# Patient Record
Sex: FEMALE | Race: WHITE | Employment: OTHER | URBAN - METROPOLITAN AREA
[De-identification: names, ages, dates, MRNs, and addresses within clinical notes are randomized per-mention and may not be internally consistent; named-entity substitution may affect disease eponyms.]

---

## 2017-08-13 ENCOUNTER — APPOINTMENT (OUTPATIENT)
Dept: GENERAL RADIOLOGY | Age: 67
End: 2017-08-13
Payer: MEDICARE

## 2017-08-13 ENCOUNTER — HOSPITAL ENCOUNTER (EMERGENCY)
Age: 67
Discharge: HOME | End: 2017-08-14
Attending: EMERGENCY MEDICINE
Payer: MEDICARE

## 2017-08-13 VITALS
RESPIRATION RATE: 16 BRPM | WEIGHT: 260 LBS | HEIGHT: 60 IN | TEMPERATURE: 99.6 F | HEART RATE: 82 BPM | SYSTOLIC BLOOD PRESSURE: 130 MMHG | BODY MASS INDEX: 51.04 KG/M2 | DIASTOLIC BLOOD PRESSURE: 46 MMHG | OXYGEN SATURATION: 96 %

## 2017-08-13 DIAGNOSIS — E86.0 DEHYDRATION: Primary | ICD-10-CM

## 2017-08-13 DIAGNOSIS — R31.9 URINARY TRACT INFECTION WITH HEMATURIA, SITE UNSPECIFIED: ICD-10-CM

## 2017-08-13 DIAGNOSIS — N39.0 URINARY TRACT INFECTION WITH HEMATURIA, SITE UNSPECIFIED: ICD-10-CM

## 2017-08-13 LAB
-: ABNORMAL
ABSOLUTE EOS #: 0 K/UL (ref 0–0.4)
ABSOLUTE LYMPH #: 0.5 K/UL (ref 1–4.8)
ABSOLUTE MONO #: 0.8 K/UL (ref 0.1–1.2)
ALBUMIN SERPL-MCNC: 3.5 G/DL (ref 3.5–5.2)
ALBUMIN/GLOBULIN RATIO: 1 (ref 1–2.5)
ALP BLD-CCNC: 48 U/L (ref 35–104)
ALT SERPL-CCNC: 7 U/L (ref 5–33)
AMORPHOUS: ABNORMAL
ANION GAP SERPL CALCULATED.3IONS-SCNC: 20 MMOL/L (ref 9–17)
AST SERPL-CCNC: 7 U/L
BACTERIA: ABNORMAL
BASOPHILS # BLD: 0 %
BASOPHILS ABSOLUTE: 0 K/UL (ref 0–0.2)
BILIRUB SERPL-MCNC: 0.43 MG/DL (ref 0.3–1.2)
BILIRUBIN URINE: NEGATIVE
BUN BLDV-MCNC: 85 MG/DL (ref 8–23)
BUN/CREAT BLD: ABNORMAL (ref 9–20)
CALCIUM SERPL-MCNC: 8.8 MG/DL (ref 8.6–10.4)
CASTS UA: ABNORMAL /LPF
CHLORIDE BLD-SCNC: 95 MMOL/L (ref 98–107)
CO2: 19 MMOL/L (ref 20–31)
COLOR: YELLOW
COMMENT UA: ABNORMAL
CREAT SERPL-MCNC: 2.07 MG/DL (ref 0.5–0.9)
CRYSTALS, UA: ABNORMAL /HPF
DIFFERENTIAL TYPE: ABNORMAL
EOSINOPHILS RELATIVE PERCENT: 0 %
EPITHELIAL CELLS UA: ABNORMAL /HPF (ref 0–5)
GFR AFRICAN AMERICAN: 29 ML/MIN
GFR NON-AFRICAN AMERICAN: 24 ML/MIN
GFR SERPL CREATININE-BSD FRML MDRD: ABNORMAL ML/MIN/{1.73_M2}
GFR SERPL CREATININE-BSD FRML MDRD: ABNORMAL ML/MIN/{1.73_M2}
GLUCOSE BLD-MCNC: 196 MG/DL (ref 70–99)
GLUCOSE BLD-MCNC: 199 MG/DL (ref 65–105)
GLUCOSE URINE: NEGATIVE
HCT VFR BLD CALC: 29.1 % (ref 36–46)
HEMOGLOBIN: 9.8 G/DL (ref 12–16)
KETONES, URINE: NEGATIVE
LEUKOCYTE ESTERASE, URINE: ABNORMAL
LYMPHOCYTES # BLD: 5 %
MCH RBC QN AUTO: 28.5 PG (ref 26–34)
MCHC RBC AUTO-ENTMCNC: 33.6 G/DL (ref 31–37)
MCV RBC AUTO: 85 FL (ref 80–100)
MONOCYTES # BLD: 8 %
MUCUS: ABNORMAL
NITRITE, URINE: NEGATIVE
OTHER OBSERVATIONS UA: ABNORMAL
PDW BLD-RTO: 14.1 % (ref 12.5–15.4)
PH UA: 5 (ref 5–8)
PLATELET # BLD: 263 K/UL (ref 140–450)
PLATELET ESTIMATE: ABNORMAL
PMV BLD AUTO: 8.1 FL (ref 6–12)
POTASSIUM SERPL-SCNC: 3.5 MMOL/L (ref 3.7–5.3)
PROTEIN UA: ABNORMAL
RBC # BLD: 3.42 M/UL (ref 4–5.2)
RBC # BLD: ABNORMAL 10*6/UL
RBC UA: ABNORMAL /HPF (ref 0–2)
RENAL EPITHELIAL, UA: ABNORMAL /HPF
SEG NEUTROPHILS: 87 %
SEGMENTED NEUTROPHILS ABSOLUTE COUNT: 9 K/UL (ref 1.8–7.7)
SODIUM BLD-SCNC: 134 MMOL/L (ref 135–144)
SPECIFIC GRAVITY UA: 1.02 (ref 1–1.03)
TOTAL PROTEIN: 6.9 G/DL (ref 6.4–8.3)
TRICHOMONAS: ABNORMAL
TURBIDITY: ABNORMAL
URINE HGB: ABNORMAL
UROBILINOGEN, URINE: NORMAL
WBC # BLD: 10.4 K/UL (ref 3.5–11)
WBC # BLD: ABNORMAL 10*3/UL
WBC UA: ABNORMAL /HPF (ref 0–5)
YEAST: ABNORMAL

## 2017-08-13 PROCEDURE — 87077 CULTURE AEROBIC IDENTIFY: CPT

## 2017-08-13 PROCEDURE — 87186 SC STD MICRODIL/AGAR DIL: CPT

## 2017-08-13 PROCEDURE — 85025 COMPLETE CBC W/AUTO DIFF WBC: CPT

## 2017-08-13 PROCEDURE — 74022 RADEX COMPL AQT ABD SERIES: CPT

## 2017-08-13 PROCEDURE — 81001 URINALYSIS AUTO W/SCOPE: CPT

## 2017-08-13 PROCEDURE — 2580000003 HC RX 258: Performed by: EMERGENCY MEDICINE

## 2017-08-13 PROCEDURE — 6360000002 HC RX W HCPCS: Performed by: EMERGENCY MEDICINE

## 2017-08-13 PROCEDURE — 36415 COLL VENOUS BLD VENIPUNCTURE: CPT

## 2017-08-13 PROCEDURE — 82947 ASSAY GLUCOSE BLOOD QUANT: CPT

## 2017-08-13 PROCEDURE — 87086 URINE CULTURE/COLONY COUNT: CPT

## 2017-08-13 PROCEDURE — 99283 EMERGENCY DEPT VISIT LOW MDM: CPT

## 2017-08-13 PROCEDURE — 96365 THER/PROPH/DIAG IV INF INIT: CPT

## 2017-08-13 PROCEDURE — 80053 COMPREHEN METABOLIC PANEL: CPT

## 2017-08-13 RX ORDER — LOSARTAN POTASSIUM AND HYDROCHLOROTHIAZIDE 25; 100 MG/1; MG/1
1 TABLET ORAL DAILY
Status: ON HOLD | COMMUNITY
End: 2017-10-17 | Stop reason: HOSPADM

## 2017-08-13 RX ORDER — FAMOTIDINE 40 MG/1
40 TABLET, FILM COATED ORAL DAILY
Status: ON HOLD | COMMUNITY
End: 2017-11-09 | Stop reason: HOSPADM

## 2017-08-13 RX ORDER — 0.9 % SODIUM CHLORIDE 0.9 %
500 INTRAVENOUS SOLUTION INTRAVENOUS ONCE
Status: COMPLETED | OUTPATIENT
Start: 2017-08-13 | End: 2017-08-14

## 2017-08-13 RX ORDER — ATORVASTATIN CALCIUM 40 MG/1
40 TABLET, FILM COATED ORAL DAILY
Status: ON HOLD | COMMUNITY
End: 2017-11-09 | Stop reason: HOSPADM

## 2017-08-13 RX ORDER — GLIPIZIDE 10 MG/1
10 TABLET ORAL
Status: ON HOLD | COMMUNITY
End: 2017-10-17 | Stop reason: HOSPADM

## 2017-08-13 RX ORDER — AMLODIPINE BESYLATE 5 MG/1
5 TABLET ORAL DAILY
Status: ON HOLD | COMMUNITY
End: 2017-10-17 | Stop reason: HOSPADM

## 2017-08-13 RX ORDER — CARVEDILOL 6.25 MG/1
6.25 TABLET ORAL 2 TIMES DAILY WITH MEALS
Status: ON HOLD | COMMUNITY
End: 2017-10-17 | Stop reason: HOSPADM

## 2017-08-13 RX ORDER — PAROXETINE 10 MG/1
10 TABLET, FILM COATED ORAL EVERY MORNING
Status: ON HOLD | COMMUNITY
End: 2017-11-09 | Stop reason: HOSPADM

## 2017-08-13 RX ORDER — PHENOL 1.4 %
1 AEROSOL, SPRAY (ML) MUCOUS MEMBRANE DAILY
Status: ON HOLD | COMMUNITY
End: 2017-11-09 | Stop reason: HOSPADM

## 2017-08-13 RX ORDER — FUROSEMIDE 40 MG/1
40 TABLET ORAL 2 TIMES DAILY
Status: ON HOLD | COMMUNITY
End: 2017-10-17

## 2017-08-13 RX ORDER — PANTOPRAZOLE SODIUM 40 MG/1
40 TABLET, DELAYED RELEASE ORAL DAILY
Status: ON HOLD | COMMUNITY
End: 2017-11-09 | Stop reason: HOSPADM

## 2017-08-13 RX ADMIN — SODIUM CHLORIDE 500 ML: 9 INJECTION, SOLUTION INTRAVENOUS at 23:46

## 2017-08-13 RX ADMIN — CEFTRIAXONE SODIUM 1 G: 1 INJECTION, POWDER, FOR SOLUTION INTRAMUSCULAR; INTRAVENOUS at 23:46

## 2017-08-14 PROCEDURE — 96361 HYDRATE IV INFUSION ADD-ON: CPT

## 2017-08-14 RX ORDER — SULFAMETHOXAZOLE AND TRIMETHOPRIM 800; 160 MG/1; MG/1
1 TABLET ORAL 2 TIMES DAILY
Qty: 20 TABLET | Refills: 0 | Status: SHIPPED | OUTPATIENT
Start: 2017-08-14 | End: 2017-08-24

## 2017-08-14 RX ORDER — ONDANSETRON 4 MG/1
4 TABLET, ORALLY DISINTEGRATING ORAL EVERY 8 HOURS PRN
Qty: 20 TABLET | Refills: 0 | Status: ON HOLD | OUTPATIENT
Start: 2017-08-14 | End: 2017-11-09 | Stop reason: HOSPADM

## 2017-08-14 ASSESSMENT — ENCOUNTER SYMPTOMS
DIARRHEA: 0
EYE PAIN: 0
COLOR CHANGE: 0
ABDOMINAL PAIN: 1
SHORTNESS OF BREATH: 0
SORE THROAT: 0
CONSTIPATION: 0
EYE DISCHARGE: 0
BACK PAIN: 0
CHEST TIGHTNESS: 0
WHEEZING: 0
RHINORRHEA: 0
NAUSEA: 1
COUGH: 0
EYE REDNESS: 0

## 2017-08-15 LAB
CULTURE: ABNORMAL
CULTURE: ABNORMAL
Lab: ABNORMAL
ORGANISM: ABNORMAL
SPECIMEN DESCRIPTION: ABNORMAL
SPECIMEN DESCRIPTION: ABNORMAL
STATUS: ABNORMAL

## 2017-08-17 ENCOUNTER — HOSPITAL ENCOUNTER (OUTPATIENT)
Age: 67
Discharge: HOME | End: 2017-08-17
Payer: MEDICARE

## 2017-08-17 LAB
ALBUMIN SERPL-MCNC: 3.7 G/DL (ref 3.5–5.2)
ALBUMIN/GLOBULIN RATIO: 1.2 (ref 1–2.5)
ALP BLD-CCNC: 51 U/L (ref 35–104)
ALT SERPL-CCNC: 13 U/L (ref 5–33)
ANION GAP SERPL CALCULATED.3IONS-SCNC: 21 MMOL/L (ref 9–17)
AST SERPL-CCNC: 16 U/L
BILIRUB SERPL-MCNC: 0.15 MG/DL (ref 0.3–1.2)
BUN BLDV-MCNC: 84 MG/DL (ref 8–23)
BUN/CREAT BLD: ABNORMAL (ref 9–20)
CALCIUM SERPL-MCNC: 9.4 MG/DL (ref 8.6–10.4)
CHLORIDE BLD-SCNC: 98 MMOL/L (ref 98–107)
CO2: 18 MMOL/L (ref 20–31)
CREAT SERPL-MCNC: 2.32 MG/DL (ref 0.5–0.9)
GFR AFRICAN AMERICAN: 25 ML/MIN
GFR NON-AFRICAN AMERICAN: 21 ML/MIN
GFR SERPL CREATININE-BSD FRML MDRD: ABNORMAL ML/MIN/{1.73_M2}
GFR SERPL CREATININE-BSD FRML MDRD: ABNORMAL ML/MIN/{1.73_M2}
GLUCOSE BLD-MCNC: 98 MG/DL (ref 70–99)
HCT VFR BLD CALC: 28.9 % (ref 36–46)
HEMOGLOBIN: 9.8 G/DL (ref 12–16)
POTASSIUM SERPL-SCNC: 4.2 MMOL/L (ref 3.7–5.3)
SODIUM BLD-SCNC: 137 MMOL/L (ref 135–144)
TOTAL PROTEIN: 6.9 G/DL (ref 6.4–8.3)
TSH SERPL DL<=0.05 MIU/L-ACNC: 1.22 MIU/L (ref 0.3–5)
VITAMIN B-12: 289 PG/ML (ref 211–946)

## 2017-08-17 PROCEDURE — 80053 COMPREHEN METABOLIC PANEL: CPT

## 2017-08-17 PROCEDURE — 82607 VITAMIN B-12: CPT

## 2017-08-17 PROCEDURE — 36415 COLL VENOUS BLD VENIPUNCTURE: CPT

## 2017-08-17 PROCEDURE — 85014 HEMATOCRIT: CPT

## 2017-08-17 PROCEDURE — 84443 ASSAY THYROID STIM HORMONE: CPT

## 2017-08-17 PROCEDURE — 85018 HEMOGLOBIN: CPT

## 2017-10-09 ENCOUNTER — HOSPITAL ENCOUNTER (INPATIENT)
Age: 67
LOS: 7 days | Discharge: NURSING, REHAB OR LONG-TERM CARE | DRG: 853 | End: 2017-10-17
Attending: EMERGENCY MEDICINE | Admitting: INTERNAL MEDICINE
Payer: MEDICARE

## 2017-10-09 DIAGNOSIS — N30.01 ACUTE CYSTITIS WITH HEMATURIA: ICD-10-CM

## 2017-10-09 DIAGNOSIS — R11.2 NON-INTRACTABLE VOMITING WITH NAUSEA, UNSPECIFIED VOMITING TYPE: Primary | ICD-10-CM

## 2017-10-09 DIAGNOSIS — N20.0 KIDNEY STONE: ICD-10-CM

## 2017-10-09 PROCEDURE — 80048 BASIC METABOLIC PNL TOTAL CA: CPT

## 2017-10-09 PROCEDURE — 80076 HEPATIC FUNCTION PANEL: CPT

## 2017-10-09 PROCEDURE — 82150 ASSAY OF AMYLASE: CPT

## 2017-10-09 PROCEDURE — 93005 ELECTROCARDIOGRAM TRACING: CPT

## 2017-10-09 PROCEDURE — 36415 COLL VENOUS BLD VENIPUNCTURE: CPT

## 2017-10-09 PROCEDURE — 99285 EMERGENCY DEPT VISIT HI MDM: CPT

## 2017-10-09 PROCEDURE — 85025 COMPLETE CBC W/AUTO DIFF WBC: CPT

## 2017-10-09 PROCEDURE — 2500000003 HC RX 250 WO HCPCS

## 2017-10-09 PROCEDURE — 83690 ASSAY OF LIPASE: CPT

## 2017-10-09 PROCEDURE — 84484 ASSAY OF TROPONIN QUANT: CPT

## 2017-10-09 PROCEDURE — 6360000002 HC RX W HCPCS

## 2017-10-09 RX ORDER — 0.9 % SODIUM CHLORIDE 0.9 %
250 INTRAVENOUS SOLUTION INTRAVENOUS ONCE
Status: COMPLETED | OUTPATIENT
Start: 2017-10-10 | End: 2017-10-10

## 2017-10-09 RX ORDER — 0.9 % SODIUM CHLORIDE 0.9 %
1000 INTRAVENOUS SOLUTION INTRAVENOUS ONCE
Status: DISCONTINUED | OUTPATIENT
Start: 2017-10-10 | End: 2017-10-09

## 2017-10-09 RX ORDER — ONDANSETRON 2 MG/ML
4 INJECTION INTRAMUSCULAR; INTRAVENOUS ONCE
Status: COMPLETED | OUTPATIENT
Start: 2017-10-10 | End: 2017-10-10

## 2017-10-09 RX ORDER — M-VIT,TX,IRON,MINS/CALC/FOLIC 27MG-0.4MG
1 TABLET ORAL DAILY
COMMUNITY

## 2017-10-09 ASSESSMENT — PAIN DESCRIPTION - LOCATION: LOCATION: BACK

## 2017-10-09 ASSESSMENT — PAIN SCALES - GENERAL: PAINLEVEL_OUTOF10: 5

## 2017-10-09 ASSESSMENT — PAIN DESCRIPTION - PAIN TYPE: TYPE: ACUTE PAIN

## 2017-10-10 ENCOUNTER — ANESTHESIA (OUTPATIENT)
Dept: OPERATING ROOM | Age: 67
DRG: 853 | End: 2017-10-10
Payer: MEDICARE

## 2017-10-10 ENCOUNTER — APPOINTMENT (OUTPATIENT)
Dept: GENERAL RADIOLOGY | Age: 67
DRG: 853 | End: 2017-10-10
Attending: PSYCHIATRY & NEUROLOGY
Payer: MEDICARE

## 2017-10-10 ENCOUNTER — APPOINTMENT (OUTPATIENT)
Dept: CT IMAGING | Age: 67
DRG: 853 | End: 2017-10-10
Attending: PSYCHIATRY & NEUROLOGY
Payer: MEDICARE

## 2017-10-10 ENCOUNTER — APPOINTMENT (OUTPATIENT)
Dept: MRI IMAGING | Age: 67
DRG: 853 | End: 2017-10-10
Payer: MEDICARE

## 2017-10-10 ENCOUNTER — APPOINTMENT (OUTPATIENT)
Dept: MRI IMAGING | Age: 67
DRG: 853 | End: 2017-10-10
Attending: PSYCHIATRY & NEUROLOGY
Payer: MEDICARE

## 2017-10-10 ENCOUNTER — ANESTHESIA EVENT (OUTPATIENT)
Dept: OPERATING ROOM | Age: 67
DRG: 853 | End: 2017-10-10
Payer: MEDICARE

## 2017-10-10 VITALS — SYSTOLIC BLOOD PRESSURE: 109 MMHG | DIASTOLIC BLOOD PRESSURE: 53 MMHG | OXYGEN SATURATION: 97 %

## 2017-10-10 PROBLEM — N13.30 HYDRONEPHROSIS OF LEFT KIDNEY: Status: ACTIVE | Noted: 2017-10-10

## 2017-10-10 PROBLEM — I95.9 HYPOTENSION ARTERIAL: Status: ACTIVE | Noted: 2017-10-10

## 2017-10-10 PROBLEM — I10 ESSENTIAL HYPERTENSION: Status: ACTIVE | Noted: 2017-10-10

## 2017-10-10 PROBLEM — I48.0 PAROXYSMAL ATRIAL FIBRILLATION (HCC): Status: ACTIVE | Noted: 2017-10-10

## 2017-10-10 PROBLEM — N18.30 CKD (CHRONIC KIDNEY DISEASE), STAGE III (HCC): Status: ACTIVE | Noted: 2017-10-10

## 2017-10-10 PROBLEM — Z86.711 HISTORY OF PULMONARY EMBOLUS (PE): Status: ACTIVE | Noted: 2017-10-10

## 2017-10-10 PROBLEM — E66.01 MORBID OBESITY WITH BMI OF 40.0-44.9, ADULT (HCC): Status: ACTIVE | Noted: 2017-10-10

## 2017-10-10 PROBLEM — N30.01 ACUTE CYSTITIS WITH HEMATURIA: Status: ACTIVE | Noted: 2017-10-10

## 2017-10-10 PROBLEM — Z86.73 HISTORY OF ARTERIAL ISCHEMIC STROKE: Status: ACTIVE | Noted: 2017-10-10

## 2017-10-10 PROBLEM — N20.1 LEFT URETERAL STONE: Status: ACTIVE | Noted: 2017-10-10

## 2017-10-10 LAB
-: ABNORMAL
ABSOLUTE EOS #: 0 K/UL (ref 0–0.4)
ABSOLUTE EOS #: 0.2 K/UL (ref 0–0.4)
ABSOLUTE LYMPH #: 0.55 K/UL (ref 1–4.8)
ABSOLUTE LYMPH #: 0.7 K/UL (ref 1–4.8)
ABSOLUTE MONO #: 0.8 K/UL (ref 0.1–1.2)
ABSOLUTE MONO #: 0.83 K/UL (ref 0.1–0.8)
ALBUMIN SERPL-MCNC: 2.8 G/DL (ref 3.5–5.2)
ALBUMIN SERPL-MCNC: 3.8 G/DL (ref 3.5–5.2)
ALBUMIN/GLOBULIN RATIO: 1.1 (ref 1–2.5)
ALBUMIN/GLOBULIN RATIO: 1.4 (ref 1–2.5)
ALLEN TEST: ABNORMAL
ALP BLD-CCNC: 38 U/L (ref 35–104)
ALP BLD-CCNC: 55 U/L (ref 35–104)
ALT SERPL-CCNC: 10 U/L (ref 5–33)
ALT SERPL-CCNC: 7 U/L (ref 5–33)
AMORPHOUS: ABNORMAL
AMYLASE: 70 U/L (ref 28–100)
ANION GAP SERPL CALCULATED.3IONS-SCNC: 15 MMOL/L (ref 9–17)
ANION GAP SERPL CALCULATED.3IONS-SCNC: 20 MMOL/L (ref 9–17)
AST SERPL-CCNC: 15 U/L
AST SERPL-CCNC: 19 U/L
BACTERIA: ABNORMAL
BASOPHILS # BLD: 0 %
BASOPHILS # BLD: 0 %
BASOPHILS ABSOLUTE: 0 K/UL (ref 0–0.2)
BASOPHILS ABSOLUTE: 0 K/UL (ref 0–0.2)
BILIRUB SERPL-MCNC: 0.28 MG/DL (ref 0.3–1.2)
BILIRUB SERPL-MCNC: 0.32 MG/DL (ref 0.3–1.2)
BILIRUBIN DIRECT: 0.11 MG/DL
BILIRUBIN URINE: NEGATIVE
BILIRUBIN, INDIRECT: 0.21 MG/DL (ref 0–1)
BUN BLDV-MCNC: 47 MG/DL (ref 8–23)
BUN BLDV-MCNC: 64 MG/DL (ref 8–23)
BUN/CREAT BLD: ABNORMAL (ref 9–20)
BUN/CREAT BLD: ABNORMAL (ref 9–20)
CALCIUM SERPL-MCNC: 7.7 MG/DL (ref 8.6–10.4)
CALCIUM SERPL-MCNC: 8.8 MG/DL (ref 8.6–10.4)
CASTS UA: ABNORMAL /LPF
CHLORIDE BLD-SCNC: 103 MMOL/L (ref 98–107)
CHLORIDE BLD-SCNC: 98 MMOL/L (ref 98–107)
CHOLESTEROL/HDL RATIO: 1.9
CHOLESTEROL: 73 MG/DL
CO2: 18 MMOL/L (ref 20–31)
CO2: 20 MMOL/L (ref 20–31)
COLOR: YELLOW
COMMENT UA: ABNORMAL
CREAT SERPL-MCNC: 1.55 MG/DL (ref 0.5–0.9)
CREAT SERPL-MCNC: 1.74 MG/DL (ref 0.5–0.9)
CRYSTALS, UA: ABNORMAL /HPF
DIFFERENTIAL TYPE: ABNORMAL
DIFFERENTIAL TYPE: ABNORMAL
EOSINOPHILS RELATIVE PERCENT: 0 %
EOSINOPHILS RELATIVE PERCENT: 2 %
EPITHELIAL CELLS UA: ABNORMAL /HPF (ref 0–5)
FIO2: 2
GFR AFRICAN AMERICAN: 35 ML/MIN
GFR AFRICAN AMERICAN: 40 ML/MIN
GFR NON-AFRICAN AMERICAN: 29 ML/MIN
GFR NON-AFRICAN AMERICAN: 33 ML/MIN
GFR SERPL CREATININE-BSD FRML MDRD: ABNORMAL ML/MIN/{1.73_M2}
GLOBULIN: NORMAL G/DL (ref 1.5–3.8)
GLUCOSE BLD-MCNC: 122 MG/DL (ref 65–105)
GLUCOSE BLD-MCNC: 183 MG/DL (ref 65–105)
GLUCOSE BLD-MCNC: 190 MG/DL (ref 70–99)
GLUCOSE BLD-MCNC: 199 MG/DL (ref 70–99)
GLUCOSE BLD-MCNC: 202 MG/DL (ref 65–105)
GLUCOSE BLD-MCNC: 225 MG/DL (ref 65–105)
GLUCOSE URINE: NEGATIVE
HCT VFR BLD CALC: 25.7 % (ref 36–46)
HCT VFR BLD CALC: 28.7 % (ref 36–46)
HDLC SERPL-MCNC: 39 MG/DL
HEMOGLOBIN: 8.4 G/DL (ref 12–16)
HEMOGLOBIN: 8.9 G/DL (ref 12–16)
KETONES, URINE: NEGATIVE
LACTIC ACID, WHOLE BLOOD: 1.2 MMOL/L (ref 0.7–2.1)
LACTIC ACID: 1.1 MMOL/L (ref 0.5–2.2)
LDL CHOLESTEROL: 21 MG/DL (ref 0–130)
LEUKOCYTE ESTERASE, URINE: ABNORMAL
LIPASE: 70 U/L (ref 13–60)
LYMPHOCYTES # BLD: 2 %
LYMPHOCYTES # BLD: 5 %
MCH RBC QN AUTO: 28.3 PG (ref 26–34)
MCH RBC QN AUTO: 29 PG (ref 26–34)
MCHC RBC AUTO-ENTMCNC: 31.1 G/DL (ref 31–37)
MCHC RBC AUTO-ENTMCNC: 32.7 G/DL (ref 31–37)
MCV RBC AUTO: 88.9 FL (ref 80–100)
MCV RBC AUTO: 90.8 FL (ref 80–100)
MODE: ABNORMAL
MONOCYTES # BLD: 3 %
MONOCYTES # BLD: 5 %
MORPHOLOGY: ABNORMAL
MUCUS: ABNORMAL
NEGATIVE BASE EXCESS, ART: 2 (ref 0–2)
NITRITE, URINE: NEGATIVE
O2 DEVICE/FLOW/%: ABNORMAL
OTHER OBSERVATIONS UA: ABNORMAL
PATIENT TEMP: ABNORMAL
PDW BLD-RTO: 17.4 % (ref 12.5–15.4)
PDW BLD-RTO: 18.6 % (ref 12.5–15.4)
PH UA: 5 (ref 5–8)
PLATELET # BLD: 266 K/UL (ref 140–450)
PLATELET # BLD: 351 K/UL (ref 140–450)
PLATELET ESTIMATE: ABNORMAL
PLATELET ESTIMATE: ABNORMAL
PMV BLD AUTO: 8 FL (ref 6–12)
PMV BLD AUTO: 8.1 FL (ref 6–12)
POC HCO3: 22.5 MMOL/L (ref 21–28)
POC O2 SATURATION: 95 % (ref 94–98)
POC PCO2 TEMP: ABNORMAL MM HG
POC PCO2: 36.4 MM HG (ref 35–48)
POC PH TEMP: ABNORMAL
POC PH: 7.4 (ref 7.35–7.45)
POC PO2 TEMP: ABNORMAL MM HG
POC PO2: 74 MM HG (ref 83–108)
POSITIVE BASE EXCESS, ART: ABNORMAL (ref 0–3)
POTASSIUM SERPL-SCNC: 3.3 MMOL/L (ref 3.7–5.3)
POTASSIUM SERPL-SCNC: 4.2 MMOL/L (ref 3.7–5.3)
PROTEIN UA: ABNORMAL
RBC # BLD: 2.89 M/UL (ref 4–5.2)
RBC # BLD: 3.16 M/UL (ref 4–5.2)
RBC # BLD: ABNORMAL 10*6/UL
RBC # BLD: ABNORMAL 10*6/UL
RBC UA: ABNORMAL /HPF (ref 0–2)
RENAL EPITHELIAL, UA: ABNORMAL /HPF
SAMPLE SITE: ABNORMAL
SEG NEUTROPHILS: 88 %
SEG NEUTROPHILS: 95 %
SEGMENTED NEUTROPHILS ABSOLUTE COUNT: 12.2 K/UL (ref 1.8–7.7)
SEGMENTED NEUTROPHILS ABSOLUTE COUNT: 26.22 K/UL (ref 1.8–7.7)
SODIUM BLD-SCNC: 136 MMOL/L (ref 135–144)
SODIUM BLD-SCNC: 138 MMOL/L (ref 135–144)
SPECIFIC GRAVITY UA: 1.01 (ref 1–1.03)
TCO2 (CALC), ART: 24 MMOL/L (ref 22–29)
TOTAL PROTEIN: 5.4 G/DL (ref 6.4–8.3)
TOTAL PROTEIN: 6.5 G/DL (ref 6.4–8.3)
TRICHOMONAS: ABNORMAL
TRIGL SERPL-MCNC: 66 MG/DL
TROPONIN INTERP: NORMAL
TROPONIN T: <0.03 NG/ML
TURBIDITY: ABNORMAL
URINE HGB: ABNORMAL
UROBILINOGEN, URINE: NORMAL
VLDLC SERPL CALC-MCNC: ABNORMAL MG/DL (ref 1–30)
WBC # BLD: 13.9 K/UL (ref 3.5–11)
WBC # BLD: 27.6 K/UL (ref 3.5–11)
WBC # BLD: ABNORMAL 10*3/UL
WBC # BLD: ABNORMAL 10*3/UL
WBC UA: ABNORMAL /HPF (ref 0–5)
YEAST: ABNORMAL

## 2017-10-10 PROCEDURE — 82803 BLOOD GASES ANY COMBINATION: CPT

## 2017-10-10 PROCEDURE — 6370000000 HC RX 637 (ALT 250 FOR IP): Performed by: EMERGENCY MEDICINE

## 2017-10-10 PROCEDURE — 3700000000 HC ANESTHESIA ATTENDED CARE: Performed by: UROLOGY

## 2017-10-10 PROCEDURE — 87086 URINE CULTURE/COLONY COUNT: CPT

## 2017-10-10 PROCEDURE — 36415 COLL VENOUS BLD VENIPUNCTURE: CPT

## 2017-10-10 PROCEDURE — 2500000003 HC RX 250 WO HCPCS: Performed by: NURSE ANESTHETIST, CERTIFIED REGISTERED

## 2017-10-10 PROCEDURE — 6360000002 HC RX W HCPCS: Performed by: EMERGENCY MEDICINE

## 2017-10-10 PROCEDURE — 99291 CRITICAL CARE FIRST HOUR: CPT | Performed by: PSYCHIATRY & NEUROLOGY

## 2017-10-10 PROCEDURE — 83036 HEMOGLOBIN GLYCOSYLATED A1C: CPT

## 2017-10-10 PROCEDURE — 96375 TX/PRO/DX INJ NEW DRUG ADDON: CPT

## 2017-10-10 PROCEDURE — 82607 VITAMIN B-12: CPT

## 2017-10-10 PROCEDURE — 83605 ASSAY OF LACTIC ACID: CPT

## 2017-10-10 PROCEDURE — 2580000003 HC RX 258: Performed by: EMERGENCY MEDICINE

## 2017-10-10 PROCEDURE — 6370000000 HC RX 637 (ALT 250 FOR IP): Performed by: NURSE PRACTITIONER

## 2017-10-10 PROCEDURE — 99223 1ST HOSP IP/OBS HIGH 75: CPT | Performed by: PSYCHIATRY & NEUROLOGY

## 2017-10-10 PROCEDURE — 2580000003 HC RX 258: Performed by: NURSE PRACTITIONER

## 2017-10-10 PROCEDURE — 3700000001 HC ADD 15 MINUTES (ANESTHESIA): Performed by: UROLOGY

## 2017-10-10 PROCEDURE — 85025 COMPLETE CBC W/AUTO DIFF WBC: CPT

## 2017-10-10 PROCEDURE — 6370000000 HC RX 637 (ALT 250 FOR IP): Performed by: PSYCHIATRY & NEUROLOGY

## 2017-10-10 PROCEDURE — 7100000011 HC PHASE II RECOVERY - ADDTL 15 MIN: Performed by: UROLOGY

## 2017-10-10 PROCEDURE — 70551 MRI BRAIN STEM W/O DYE: CPT

## 2017-10-10 PROCEDURE — 6360000002 HC RX W HCPCS: Performed by: INTERNAL MEDICINE

## 2017-10-10 PROCEDURE — 3600000002 HC SURGERY LEVEL 2 BASE: Performed by: UROLOGY

## 2017-10-10 PROCEDURE — 80053 COMPREHEN METABOLIC PANEL: CPT

## 2017-10-10 PROCEDURE — 7100000010 HC PHASE II RECOVERY - FIRST 15 MIN: Performed by: UROLOGY

## 2017-10-10 PROCEDURE — 96376 TX/PRO/DX INJ SAME DRUG ADON: CPT

## 2017-10-10 PROCEDURE — 87040 BLOOD CULTURE FOR BACTERIA: CPT

## 2017-10-10 PROCEDURE — 80061 LIPID PANEL: CPT

## 2017-10-10 PROCEDURE — 96374 THER/PROPH/DIAG INJ IV PUSH: CPT

## 2017-10-10 PROCEDURE — 82947 ASSAY GLUCOSE BLOOD QUANT: CPT

## 2017-10-10 PROCEDURE — C2617 STENT, NON-COR, TEM W/O DEL: HCPCS | Performed by: UROLOGY

## 2017-10-10 PROCEDURE — 2060000000 HC ICU INTERMEDIATE R&B

## 2017-10-10 PROCEDURE — 36600 WITHDRAWAL OF ARTERIAL BLOOD: CPT

## 2017-10-10 PROCEDURE — 0T778DZ DILATION OF LEFT URETER WITH INTRALUMINAL DEVICE, VIA NATURAL OR ARTIFICIAL OPENING ENDOSCOPIC: ICD-10-PCS | Performed by: UROLOGY

## 2017-10-10 PROCEDURE — 6360000002 HC RX W HCPCS: Performed by: NURSE ANESTHETIST, CERTIFIED REGISTERED

## 2017-10-10 PROCEDURE — 74000 XR ABDOMEN LIMITED (KUB): CPT

## 2017-10-10 PROCEDURE — 99223 1ST HOSP IP/OBS HIGH 75: CPT | Performed by: INTERNAL MEDICINE

## 2017-10-10 PROCEDURE — 3600000012 HC SURGERY LEVEL 2 ADDTL 15MIN: Performed by: UROLOGY

## 2017-10-10 PROCEDURE — 81001 URINALYSIS AUTO W/SCOPE: CPT

## 2017-10-10 PROCEDURE — 82746 ASSAY OF FOLIC ACID SERUM: CPT

## 2017-10-10 PROCEDURE — 74176 CT ABD & PELVIS W/O CONTRAST: CPT

## 2017-10-10 DEVICE — STENT URET 6FR L26CM PERCFLX HYDR+ TAPR TIP GRAD: Type: IMPLANTABLE DEVICE | Site: VAGINA | Status: FUNCTIONAL

## 2017-10-10 RX ORDER — M-VIT,TX,IRON,MINS/CALC/FOLIC 27MG-0.4MG
1 TABLET ORAL DAILY
Status: DISCONTINUED | OUTPATIENT
Start: 2017-10-10 | End: 2017-10-17 | Stop reason: HOSPADM

## 2017-10-10 RX ORDER — ONDANSETRON 4 MG/1
4 TABLET, FILM COATED ORAL EVERY 8 HOURS PRN
Status: DISCONTINUED | OUTPATIENT
Start: 2017-10-10 | End: 2017-10-11 | Stop reason: SDUPTHER

## 2017-10-10 RX ORDER — AMLODIPINE BESYLATE 5 MG/1
5 TABLET ORAL DAILY
Status: DISCONTINUED | OUTPATIENT
Start: 2017-10-10 | End: 2017-10-10

## 2017-10-10 RX ORDER — BISACODYL 10 MG
10 SUPPOSITORY, RECTAL RECTAL DAILY PRN
Status: DISCONTINUED | OUTPATIENT
Start: 2017-10-10 | End: 2017-10-17 | Stop reason: HOSPADM

## 2017-10-10 RX ORDER — CIPROFLOXACIN 2 MG/ML
400 INJECTION, SOLUTION INTRAVENOUS EVERY 12 HOURS
Status: DISCONTINUED | OUTPATIENT
Start: 2017-10-10 | End: 2017-10-10

## 2017-10-10 RX ORDER — CEFTRIAXONE SODIUM 1 G/50ML
1 INJECTION, SOLUTION INTRAVENOUS EVERY 24 HOURS
Status: DISCONTINUED | OUTPATIENT
Start: 2017-10-10 | End: 2017-10-10

## 2017-10-10 RX ORDER — CIPROFLOXACIN 2 MG/ML
400 INJECTION, SOLUTION INTRAVENOUS ONCE
Status: COMPLETED | OUTPATIENT
Start: 2017-10-10 | End: 2017-10-10

## 2017-10-10 RX ORDER — ACETAMINOPHEN 500 MG
1000 TABLET ORAL ONCE
Status: COMPLETED | OUTPATIENT
Start: 2017-10-10 | End: 2017-10-10

## 2017-10-10 RX ORDER — LOSARTAN POTASSIUM 50 MG/1
100 TABLET ORAL DAILY
Status: DISCONTINUED | OUTPATIENT
Start: 2017-10-10 | End: 2017-10-10

## 2017-10-10 RX ORDER — ASPIRIN 81 MG/1
81 TABLET ORAL DAILY
Status: DISCONTINUED | OUTPATIENT
Start: 2017-10-10 | End: 2017-10-16

## 2017-10-10 RX ORDER — LIDOCAINE HYDROCHLORIDE 10 MG/ML
INJECTION, SOLUTION EPIDURAL; INFILTRATION; INTRACAUDAL; PERINEURAL PRN
Status: DISCONTINUED | OUTPATIENT
Start: 2017-10-10 | End: 2017-10-10 | Stop reason: SDUPTHER

## 2017-10-10 RX ORDER — ONDANSETRON 2 MG/ML
4 INJECTION INTRAMUSCULAR; INTRAVENOUS
Status: DISCONTINUED | OUTPATIENT
Start: 2017-10-10 | End: 2017-10-10 | Stop reason: HOSPADM

## 2017-10-10 RX ORDER — PAROXETINE 10 MG/1
10 TABLET, FILM COATED ORAL EVERY MORNING
Status: DISCONTINUED | OUTPATIENT
Start: 2017-10-10 | End: 2017-10-17 | Stop reason: HOSPADM

## 2017-10-10 RX ORDER — 0.9 % SODIUM CHLORIDE 0.9 %
500 INTRAVENOUS SOLUTION INTRAVENOUS ONCE
Status: DISCONTINUED | OUTPATIENT
Start: 2017-10-10 | End: 2017-10-13

## 2017-10-10 RX ORDER — SODIUM CHLORIDE 9 MG/ML
INJECTION, SOLUTION INTRAVENOUS CONTINUOUS
Status: DISCONTINUED | OUTPATIENT
Start: 2017-10-10 | End: 2017-10-17 | Stop reason: HOSPADM

## 2017-10-10 RX ORDER — HYDROCODONE BITARTRATE AND ACETAMINOPHEN 5; 325 MG/1; MG/1
1 TABLET ORAL EVERY 4 HOURS PRN
Status: DISCONTINUED | OUTPATIENT
Start: 2017-10-10 | End: 2017-10-17 | Stop reason: HOSPADM

## 2017-10-10 RX ORDER — FAMOTIDINE 20 MG/1
40 TABLET, FILM COATED ORAL DAILY
Status: DISCONTINUED | OUTPATIENT
Start: 2017-10-10 | End: 2017-10-17 | Stop reason: HOSPADM

## 2017-10-10 RX ORDER — HYDROCODONE BITARTRATE AND ACETAMINOPHEN 5; 325 MG/1; MG/1
2 TABLET ORAL EVERY 4 HOURS PRN
Status: DISCONTINUED | OUTPATIENT
Start: 2017-10-10 | End: 2017-10-17 | Stop reason: HOSPADM

## 2017-10-10 RX ORDER — MEPERIDINE HYDROCHLORIDE 50 MG/ML
12.5 INJECTION INTRAMUSCULAR; INTRAVENOUS; SUBCUTANEOUS EVERY 5 MIN PRN
Status: DISCONTINUED | OUTPATIENT
Start: 2017-10-10 | End: 2017-10-10 | Stop reason: HOSPADM

## 2017-10-10 RX ORDER — FENTANYL CITRATE 50 UG/ML
25 INJECTION, SOLUTION INTRAMUSCULAR; INTRAVENOUS EVERY 5 MIN PRN
Status: DISCONTINUED | OUTPATIENT
Start: 2017-10-10 | End: 2017-10-10 | Stop reason: HOSPADM

## 2017-10-10 RX ORDER — SODIUM CHLORIDE 0.9 % (FLUSH) 0.9 %
10 SYRINGE (ML) INJECTION EVERY 12 HOURS SCHEDULED
Status: DISCONTINUED | OUTPATIENT
Start: 2017-10-10 | End: 2017-10-17 | Stop reason: HOSPADM

## 2017-10-10 RX ORDER — POTASSIUM CHLORIDE 1.5 G/1.77G
20 POWDER, FOR SOLUTION ORAL 2 TIMES DAILY
Status: DISCONTINUED | OUTPATIENT
Start: 2017-10-10 | End: 2017-10-17 | Stop reason: HOSPADM

## 2017-10-10 RX ORDER — HYDROCHLOROTHIAZIDE 25 MG/1
25 TABLET ORAL DAILY
Status: DISCONTINUED | OUTPATIENT
Start: 2017-10-10 | End: 2017-10-10

## 2017-10-10 RX ORDER — NICOTINE 21 MG/24HR
1 PATCH, TRANSDERMAL 24 HOURS TRANSDERMAL DAILY PRN
Status: DISCONTINUED | OUTPATIENT
Start: 2017-10-10 | End: 2017-10-17 | Stop reason: HOSPADM

## 2017-10-10 RX ORDER — ACETAMINOPHEN 325 MG/1
650 TABLET ORAL EVERY 4 HOURS PRN
Status: DISCONTINUED | OUTPATIENT
Start: 2017-10-10 | End: 2017-10-12

## 2017-10-10 RX ORDER — SODIUM CHLORIDE 0.9 % (FLUSH) 0.9 %
10 SYRINGE (ML) INJECTION PRN
Status: DISCONTINUED | OUTPATIENT
Start: 2017-10-10 | End: 2017-10-17 | Stop reason: HOSPADM

## 2017-10-10 RX ORDER — CARVEDILOL 6.25 MG/1
6.25 TABLET ORAL 2 TIMES DAILY WITH MEALS
Status: DISCONTINUED | OUTPATIENT
Start: 2017-10-10 | End: 2017-10-10

## 2017-10-10 RX ORDER — DEXTROSE MONOHYDRATE 50 MG/ML
100 INJECTION, SOLUTION INTRAVENOUS PRN
Status: DISCONTINUED | OUTPATIENT
Start: 2017-10-10 | End: 2017-10-17 | Stop reason: HOSPADM

## 2017-10-10 RX ORDER — CEFEPIME 1 G/50ML
1 INJECTION, SOLUTION INTRAVENOUS EVERY 12 HOURS
Status: DISCONTINUED | OUTPATIENT
Start: 2017-10-10 | End: 2017-10-13

## 2017-10-10 RX ORDER — ONDANSETRON 2 MG/ML
4 INJECTION INTRAMUSCULAR; INTRAVENOUS ONCE
Status: COMPLETED | OUTPATIENT
Start: 2017-10-10 | End: 2017-10-10

## 2017-10-10 RX ORDER — ATORVASTATIN CALCIUM 40 MG/1
40 TABLET, FILM COATED ORAL DAILY
Status: DISCONTINUED | OUTPATIENT
Start: 2017-10-10 | End: 2017-10-17 | Stop reason: HOSPADM

## 2017-10-10 RX ORDER — PROPOFOL 10 MG/ML
INJECTION, EMULSION INTRAVENOUS PRN
Status: DISCONTINUED | OUTPATIENT
Start: 2017-10-10 | End: 2017-10-10 | Stop reason: SDUPTHER

## 2017-10-10 RX ORDER — LOSARTAN POTASSIUM AND HYDROCHLOROTHIAZIDE 25; 100 MG/1; MG/1
1 TABLET ORAL DAILY
Status: DISCONTINUED | OUTPATIENT
Start: 2017-10-10 | End: 2017-10-10

## 2017-10-10 RX ORDER — ONDANSETRON 2 MG/ML
4 INJECTION INTRAMUSCULAR; INTRAVENOUS EVERY 6 HOURS PRN
Status: DISCONTINUED | OUTPATIENT
Start: 2017-10-10 | End: 2017-10-17 | Stop reason: HOSPADM

## 2017-10-10 RX ORDER — DEXTROSE MONOHYDRATE 25 G/50ML
12.5 INJECTION, SOLUTION INTRAVENOUS PRN
Status: DISCONTINUED | OUTPATIENT
Start: 2017-10-10 | End: 2017-10-17 | Stop reason: HOSPADM

## 2017-10-10 RX ORDER — FUROSEMIDE 40 MG/1
40 TABLET ORAL 2 TIMES DAILY
Status: DISCONTINUED | OUTPATIENT
Start: 2017-10-10 | End: 2017-10-10

## 2017-10-10 RX ORDER — GLIPIZIDE 10 MG/1
10 TABLET ORAL
Status: DISCONTINUED | OUTPATIENT
Start: 2017-10-10 | End: 2017-10-10

## 2017-10-10 RX ORDER — FENTANYL CITRATE 50 UG/ML
INJECTION, SOLUTION INTRAMUSCULAR; INTRAVENOUS PRN
Status: DISCONTINUED | OUTPATIENT
Start: 2017-10-10 | End: 2017-10-10 | Stop reason: SDUPTHER

## 2017-10-10 RX ORDER — 0.9 % SODIUM CHLORIDE 0.9 %
2000 INTRAVENOUS SOLUTION INTRAVENOUS ONCE
Status: COMPLETED | OUTPATIENT
Start: 2017-10-10 | End: 2017-10-10

## 2017-10-10 RX ORDER — PANTOPRAZOLE SODIUM 40 MG/1
40 TABLET, DELAYED RELEASE ORAL DAILY
Status: DISCONTINUED | OUTPATIENT
Start: 2017-10-10 | End: 2017-10-14

## 2017-10-10 RX ORDER — MYCOPHENOLATE MOFETIL 500 MG/1
1500 TABLET ORAL 2 TIMES DAILY
Status: ON HOLD | COMMUNITY
End: 2017-11-09 | Stop reason: HOSPADM

## 2017-10-10 RX ORDER — NICOTINE POLACRILEX 4 MG
15 LOZENGE BUCCAL PRN
Status: DISCONTINUED | OUTPATIENT
Start: 2017-10-10 | End: 2017-10-17 | Stop reason: HOSPADM

## 2017-10-10 RX ORDER — FENTANYL CITRATE 50 UG/ML
50 INJECTION, SOLUTION INTRAMUSCULAR; INTRAVENOUS EVERY 5 MIN PRN
Status: DISCONTINUED | OUTPATIENT
Start: 2017-10-10 | End: 2017-10-10 | Stop reason: HOSPADM

## 2017-10-10 RX ORDER — TAMSULOSIN HYDROCHLORIDE 0.4 MG/1
0.4 CAPSULE ORAL DAILY
Status: DISCONTINUED | OUTPATIENT
Start: 2017-10-10 | End: 2017-10-17 | Stop reason: HOSPADM

## 2017-10-10 RX ADMIN — FENTANYL CITRATE 25 MCG: 50 INJECTION INTRAMUSCULAR; INTRAVENOUS at 10:45

## 2017-10-10 RX ADMIN — FENTANYL CITRATE 25 MCG: 50 INJECTION INTRAMUSCULAR; INTRAVENOUS at 10:50

## 2017-10-10 RX ADMIN — HYDROCODONE BITARTRATE AND ACETAMINOPHEN 1 TABLET: 5; 325 TABLET ORAL at 13:53

## 2017-10-10 RX ADMIN — TAMSULOSIN HYDROCHLORIDE 0.4 MG: 0.4 CAPSULE ORAL at 13:53

## 2017-10-10 RX ADMIN — ATORVASTATIN CALCIUM 40 MG: 40 TABLET, FILM COATED ORAL at 15:50

## 2017-10-10 RX ADMIN — GLIPIZIDE 10 MG: 10 TABLET ORAL at 15:50

## 2017-10-10 RX ADMIN — PANTOPRAZOLE SODIUM 40 MG: 40 TABLET, DELAYED RELEASE ORAL at 13:52

## 2017-10-10 RX ADMIN — MULTIPLE VITAMINS W/ MINERALS TAB 1 TABLET: TAB at 13:51

## 2017-10-10 RX ADMIN — LIDOCAINE HYDROCHLORIDE 50 MG: 10 INJECTION, SOLUTION EPIDURAL; INFILTRATION; INTRACAUDAL; PERINEURAL at 10:39

## 2017-10-10 RX ADMIN — Medication 600 MG: at 13:52

## 2017-10-10 RX ADMIN — SODIUM CHLORIDE 1000 ML: 9 INJECTION, SOLUTION INTRAVENOUS at 17:31

## 2017-10-10 RX ADMIN — ACETAMINOPHEN 1000 MG: 500 TABLET ORAL at 00:36

## 2017-10-10 RX ADMIN — CEFTRIAXONE SODIUM 1 G: 1 INJECTION, SOLUTION INTRAVENOUS at 10:02

## 2017-10-10 RX ADMIN — SODIUM CHLORIDE: 9 INJECTION, SOLUTION INTRAVENOUS at 05:21

## 2017-10-10 RX ADMIN — PAROXETINE HYDROCHLORIDE HEMIHYDRATE 10 MG: 10 TABLET, FILM COATED ORAL at 13:52

## 2017-10-10 RX ADMIN — ONDANSETRON 4 MG: 2 INJECTION, SOLUTION INTRAMUSCULAR; INTRAVENOUS at 02:41

## 2017-10-10 RX ADMIN — APIXABAN 5 MG: 5 TABLET, FILM COATED ORAL at 15:50

## 2017-10-10 RX ADMIN — CEFEPIME 1 G: 1 INJECTION, SOLUTION INTRAVENOUS at 20:51

## 2017-10-10 RX ADMIN — CIPROFLOXACIN 400 MG: 2 INJECTION, SOLUTION INTRAVENOUS at 01:48

## 2017-10-10 RX ADMIN — ONDANSETRON 4 MG: 2 INJECTION, SOLUTION INTRAMUSCULAR; INTRAVENOUS at 00:03

## 2017-10-10 RX ADMIN — SODIUM CHLORIDE 250 ML: 9 INJECTION, SOLUTION INTRAVENOUS at 00:03

## 2017-10-10 RX ADMIN — ONDANSETRON 4 MG: 2 INJECTION, SOLUTION INTRAMUSCULAR; INTRAVENOUS at 00:37

## 2017-10-10 RX ADMIN — INSULIN LISPRO 2 UNITS: 100 INJECTION, SOLUTION INTRAVENOUS; SUBCUTANEOUS at 17:45

## 2017-10-10 RX ADMIN — PROPOFOL 60 MG: 10 INJECTION, EMULSION INTRAVENOUS at 10:39

## 2017-10-10 RX ADMIN — FENTANYL CITRATE 50 MCG: 50 INJECTION INTRAMUSCULAR; INTRAVENOUS at 10:35

## 2017-10-10 RX ADMIN — ASPIRIN 81 MG: 81 TABLET ORAL at 22:21

## 2017-10-10 RX ADMIN — POTASSIUM CHLORIDE 20 MEQ: 1.5 POWDER, FOR SOLUTION ORAL at 20:53

## 2017-10-10 ASSESSMENT — PAIN SCALES - GENERAL
PAINLEVEL_OUTOF10: 0
PAINLEVEL_OUTOF10: 4
PAINLEVEL_OUTOF10: 0
PAINLEVEL_OUTOF10: 4
PAINLEVEL_OUTOF10: 0
PAINLEVEL_OUTOF10: 5

## 2017-10-10 NOTE — CONSULTS
family positive for gait disturbance, and weakness  Vitals:  BP (!) 108/48   Pulse 99   Temp 98.7 °F (37.1 °C) (Oral)   Resp 21   Ht 5' 2\" (1.575 m)   Wt 232 lb 11.2 oz (105.6 kg)   SpO2 95%   BMI 42.56 kg/m²     PHYSICAL EXAM:      CONSTITUTIONAL:  Well developed, well nourished, alert and oriented to person and place, but not time or situation. GCS 15. Nontoxic. HEAD:  normocephalic, atraumatic    EYES:  PERRLA, partial vision deficit   ENT:  moist mucous membranes   NECK:  supple, symmetric, no midline tenderness to palpation    BACK:  without midline tenderness, step-offs or deformities   LUNGS:  Equal air entry bilaterally   CARDIOVASCULAR:  normal s1 / s2   ABDOMEN:  Soft, no rigidity   NEUROLOGIC:  Mental Status:  A & O x2 and Not oriented to date,awake             Cranial Nerves:    cranial nerves II-XII are grossly intact    Motor Exam:    Drift:  No drift in the RUE, unable to test the LUE  Tone:  Normal  Strength: 4/5  strength on the LUE, inability to move LUE up to her nose, 5/5  strength in RUE, 5/5 strength in the B/L LE.       Sensory:    Touch:    Right Upper Extremity:  normal  Left Upper Extremity:  normal  Right Lower Extremity:  normal  Left Lower Extremity:  normal      Coordination/Dysmetria:  Finger to Nose: normal in the right, unable to perform on the left     SKIN:  no rash, mild bleeding from the old IV site            DATA:  CBC with Differential:    Lab Results   Component Value Date    WBC 13.9 10/09/2017    RBC 3.16 10/09/2017    HGB 8.9 10/09/2017    HCT 28.7 10/09/2017     10/09/2017    MCV 90.8 10/09/2017    MCH 28.3 10/09/2017    MCHC 31.1 10/09/2017    RDW 17.4 10/09/2017    LYMPHOPCT 5 10/09/2017    MONOPCT 5 10/09/2017    BASOPCT 0 10/09/2017    MONOSABS 0.80 10/09/2017    LYMPHSABS 0.70 10/09/2017    EOSABS 0.20 10/09/2017    BASOSABS 0.00 10/09/2017    DIFFTYPE NOT REPORTED 10/09/2017     CMP:    Lab Results   Component Value Date     10/09/2017 K 4.2 10/09/2017    CL 98 10/09/2017    CO2 18 10/09/2017    BUN 64 10/09/2017    CREATININE 1.74 10/09/2017    GFRAA 35 10/09/2017    LABGLOM 29 10/09/2017    GLUCOSE 199 10/09/2017    PROT 6.5 10/09/2017    LABALBU 3.8 10/09/2017    CALCIUM 8.8 10/09/2017    BILITOT 0.32 10/09/2017    ALKPHOS 55 10/09/2017    AST 15 10/09/2017    ALT 7 10/09/2017       U/A:    Lab Results   Component Value Date    COLORU YELLOW 10/10/2017    PROTEINU 1+ 10/10/2017    PHUR 5.0 10/10/2017    WBCUA 50  10/10/2017    RBCUA 50  10/10/2017    MUCUS NOT REPORTED 10/10/2017    TRICHOMONAS NOT REPORTED 10/10/2017    YEAST NOT REPORTED 10/10/2017    BACTERIA MODERATE 10/10/2017    SPECGRAV 1.010 10/10/2017    LEUKOCYTESUR MODERATE 10/10/2017    UROBILINOGEN Normal 10/10/2017    BILIRUBINUR NEGATIVE 10/10/2017    GLUCOSEU NEGATIVE 10/10/2017    AMORPHOUS NOT REPORTED 10/10/2017       Radiology:     Ct Abdomen Pelvis Wo Iv Contrast    Result Date: 10/10/2017  EXAMINATION: CT OF THE ABDOMEN AND PELVIS WITHOUT CONTRAST 10/10/2017 12:57 am TECHNIQUE: CT of the abdomen and pelvis was performed without the administration of intravenous contrast. Multiplanar reformatted images are provided for review. Dose modulation, iterative reconstruction, and/or weight based adjustment of the mA/kV was utilized to reduce the radiation dose to as low as reasonably achievable. COMPARISON: None. HISTORY: ORDERING SYSTEM PROVIDED HISTORY: vomiting TECHNOLOGIST PROVIDED HISTORY: Ordering Physician Provided Reason for Exam: C/o vomitting x 2-3 hours pta and nausea. Denies diarrhea or constipation. Acuity: Acute Type of Exam: Initial Relevant Medical/Surgical History: Hx xiao, diabetes, hernia repair FINDINGS: Lower Chest: The lung bases are clear. The heart is borderline enlarged. Calcification in the region of the mitral annulus. There is trace pericardial fluid.  Organs: Well-circumscribed low-attenuation lesions in the superior aspect of the liver measuring up to 1 cm, likely small cysts. The gallbladder is surgically absent. The spleen, pancreas and adrenal glands are normal with the limitations of a noncontrast study. There are multiple bilateral renal calculi measuring up to 1 cm in the lower pole of the left kidney. On the left, there is an obstructing 6-7 mm calculus in the proximal ureter just distal to the UPJ causing moderate left hydronephrosis. There is peripelvic and proximal periureteral fat stranding. The left kidney is enlarged. GI/Bowel: Mild sigmoid colon diverticulosis. No evidence of diverticulitis. No evidence bowel obstruction. Pelvis: Small amount of air in the urinary bladder likely related to recent catheterization. Uterus and ovaries are atrophic. Peritoneum/Retroperitoneum: There is no adenopathy, free air or free fluid. Prominent vascular calcifications suggest diabetes. Bones/Soft Tissues: Degenerative changes in the lumbar spine particularly facet arthropathy. No acute bone finding. Acute left obstructive uropathy secondary to a 6- 7 mm calculus in the proximal ureter just distal to the UPJ. There are multiple bilateral nonobstructing renal calculi measuring up to 1 cm in the lower pole of the left kidney. Small amount of air within the urinary bladder likely related to recent catheterization. Clinical correlation is recommended. Xr Abdomen (kub) (single Ap View)    Result Date: 10/10/2017  EXAMINATION: SUPINE VIEW(S) OF THE ABDOMEN 10/10/2017 11:16 am COMPARISON: 08/13/2017 HISTORY: ORDERING SYSTEM PROVIDED HISTORY: Stent insertion TECHNOLOGIST PROVIDED HISTORY: Reason for exam:->Stent insertion Left ureteral stent insertion in surgery FINDINGS: 6 seconds of fluoroscopy time utilized in surgery. D AP 82.6. 2 spot views obtained in surgery show placement of a left ureteral stent. Please correlate with procedure report for additional details. Intraoperative fluoroscopy for left ureteral stent placement. Mri Brain Wo Contrast    Result Date: 10/10/2017  EXAMINATION: MRI OF THE BRAIN WITHOUT CONTRAST  10/10/2017 12:33 pm TECHNIQUE: Multiplanar multisequence MRI of the brain was performed without the administration of intravenous contrast. COMPARISON: None HISTORY: ORDERING SYSTEM PROVIDED HISTORY: FACIAL MUSCLE WEAKNESS/PARALYSIS Initial evaluation. FINDINGS: INTRACRANIAL STRUCTURES/VENTRICLES: There is an area of increased diffusion signal noted along the left choroid plexus, likely related to calcification. There are multifocal areas of restricted diffusion, primarily noted in the posterior right frontal lobe, as well as along the right pre and postcentral gyri. This is likely accounting for the patient's facial muscle weakness. This is in the vascular distribution of the right middle cerebral artery. The cerebral and cerebellar parenchyma demonstrate volume loss. There are areas of encephalomalacia along the medial aspects of the occipital lobes. There are scattered areas of increased T2 signal noted supratentorially which are compatible with minimal chronic microvascular white matter ischemic disease. There is a small chronic infarct noted in the left cerebellar hemisphere. There are no abnormal extra-axial fluid collections. The ventricles are proportional to the cerebral sulci. Normal major intracranial flow voids are noted. There are no areas of blooming artifact noted on the gradient echo sequences to suggest sequela of acute or chronic hemorrhage. ORBITS: Lens implants from prior cataract surgery are noted. The orbits are otherwise unremarkable. SINUSES: There is scattered paranasal sinus disease with greatest involvement in the ethmoid air cells. The rest of the visualized paranasal sinuses and mastoid air cells are well-aerated. BONES/SOFT TISSUES: The bone marrow signal intensity appears normal. The craniocervical junction is normal in appearance.      1. There are multifocal acute infarcts noted with pmhx of HTN, PE, occipital lobe strokes, lymphedema, presenting to the ED with nausea and vomiting and was diagnosed with hydronephrosis and a kidney stone. Patient transferred to [facility].  for further care. Earlier it was noticed that the patient had L sided weakness. MRI with Right sided ana rolandic region stroke. On exam,   Sleepy, following commands, L sided UE weakness (extensor), no drift in bilateral LEs, mild dysarthria. CV: S1 S2. No murmur. RESP: Clear to auscultation. GI: Soft, non-tender. EXT: Lymphedema. DIAGNOSTIC DATA REVIEWED. Assessment/PLAN:  Patient is a 74year old lady with pmhx of embolic strokes, PE, on eliquis presenting to the ED with GI symptoms and diagnosed with hydronephrosis and kidney stone (septic). - R MCA territory infarction.  - Etiology appears to be embolic in nature. - Continue with Eliquis. - Patient meets criteria for sepsis with UTI and low BP and leukocytosis. - Early goal directed therapy--  A. Fluid resuscitation. B. IV antibiotics. C. ABG and lactic Acid. D. Obtain TTE to rule out infective endocarditis. E. Blood cultures x 2.    - Close neuro monitoring.   - SBP goal > 100 mmHg. - Statin therapy. - Elevated serum creatinine.   - GI prophylaxis. - Continue close monitoring. Patient is at increased risk of further deterioration given the presence of stroke and the high risk of developing severe sepsis and septic shock. I provided 35 minutes of critical care time for this patient. Shea Corbett [address], Vascular neurology.   [address] [facility]

## 2017-10-10 NOTE — CONSULTS
NEUROLOGY INPATIENT CONSULT NOTE    10/10/2017         Carmelita Child is a  74 y.o. female admitted on 10/9/2017 with  Ureterolithiasis [N20.1]      History is obtained mostly from the patient and the medical record and from the caregivers. Chart is reviewed and patient is examined. Briefly, this is a  74 y.o. left handed   female with hx of htn, DM, CKD was admitted on 10/9/2017 with acute cystitis without hematuria and hydronephrosis of left kidney. Patient is on schedule for cysto stent emergently. Patient is evaluated on her way to stenting. Patient's son at bedside stated that patient is having left-sided weakness since last night/this morning. Patient stated that she has weakness in left upper extremity and mild weakness in left lower extremity. Patient has been on atorvastatin and eliquis at home. Patient presently admits to having mild headache but denied nausea and vomiting. Denied speech and swallowing difficulties. Vitals on admit: 124/51, 92/min, 98.6°F.    No current facility-administered medications on file prior to encounter.       Current Outpatient Prescriptions on File Prior to Encounter   Medication Sig Dispense Refill    ondansetron (ZOFRAN ODT) 4 MG disintegrating tablet Take 1 tablet by mouth every 8 hours as needed for Nausea 20 tablet 0    amLODIPine (NORVASC) 5 MG tablet Take 5 mg by mouth daily      atorvastatin (LIPITOR) 40 MG tablet Take 40 mg by mouth daily      calcium carbonate 600 MG TABS tablet Take 1 tablet by mouth daily      carvedilol (COREG) 6.25 MG tablet Take 6.25 mg by mouth 2 times daily (with meals)      apixaban (ELIQUIS) 5 MG TABS tablet Take by mouth 2 times daily      famotidine (PEPCID) 40 MG tablet Take 40 mg by mouth daily      furosemide (LASIX) 40 MG tablet Take 40 mg by mouth 2 times daily      Insulin Glargine (LANTUS SC) Inject 40 Units into the skin nightly       losartan-hydrochlorothiazide (HYZAAR) 100-25 MG per tablet Take 1 Normal touch, normal pin, normal vibration, normal proprioception   CEREBELLAR FUNCTION:  Intact fine motor control over upper limbs   REFLEX FUNCTION:  Symmetric, no perverted reflex, Left extensor plantar response and right equivocal plantar response    STATION and GAIT  Not tested         Data:    Lab Results:     Lab Results   Component Value Date    ALT 7 10/09/2017    AST 15 10/09/2017    TSH 1.22 08/17/2017    [id number] 289 08/17/2017     Hematology:  Recent Labs      10/09/17   2340   WBC  13.9*   HGB  8.9*   HCT  28.7*   PLT  351     Chemistry:  Recent Labs      10/09/17   2340   NA  136   K  4.2   CL  98   CO2  18*   GLUCOSE  199*   BUN  64*   CREATININE  1.74*   CALCIUM  8.8     Recent Labs      10/09/17   2340   PROT  6.5   LABALBU  3.8   AST  15   ALT  7       No results found for: PHENYTOIN, PHENYTOIN, VALPROATE, CBMZ        Diagnostic data reviewed:  EKG (10/9/17): NSR. Impression and Plan: Ms. Colin Lopez is a 74 y.o. female with   New onset left UE weakness; stat MRI brain  Comorbid hypertension, diabetes, CKD., hx of PE (on eliquis)  Recommend permissive hypertension with target systolic [phone]    Septic encephalopathy; on IV Abx and cysto stent emergently as per urology  D/w urologist, Dr. Dwain Plasencia; patient needs procedure done ASAP and then to MRI brain. Discussed with patient and her son at bedside and Nurse. Will follow with you. Thank you for consultation.

## 2017-10-10 NOTE — CONSULTS
day    [facility] Hold] tamsulosin  0.4 mg Oral Daily    [MAR Hold] insulin lispro  0-12 Units Subcutaneous TID WC    [MAR Hold] insulin lispro  0-6 Units Subcutaneous Nightly    [MAR Hold] influenza virus vaccine  0.5 mL Intramuscular Once    [MAR Hold] cefTRIAXone (ROCEPHIN) IV  1 g Intravenous Q24H    [MAR Hold] sodium chloride  500 mL Intravenous Once     Continuous Infusions:   [MAR Hold] sodium chloride 150 mL/hr at 10/10/17 0521    [MAR Hold] dextrose       PRN Meds:. [MAR Hold] ondansetron, [MAR Hold] sodium chloride flush, [MAR Hold] acetaminophen, [MAR Hold] HYDROcodone 5 mg - acetaminophen **OR** [MAR Hold] HYDROcodone 5 mg - acetaminophen, [MAR Hold] magnesium hydroxide, [MAR Hold] bisacodyl, [MAR Hold] ondansetron, [MAR Hold] nicotine, [MAR Hold] HYDROmorphone **OR** [MAR Hold] HYDROmorphone, [MAR Hold] glucose, [MAR Hold] dextrose, [MAR Hold] glucagon (rDNA), [MAR Hold] dextrose    Allergies: Iv contrast [iodides]    Social History:    Social History     Social History    Marital status:      Spouse name: N/A    Number of children: N/A    Years of education: N/A     Occupational History    Not on file. Social History Main Topics    Smoking status: Never Smoker    Smokeless tobacco: Never Used    Alcohol use No    Drug use: No    Sexual activity: No     Other Topics Concern    Not on file     Social History Narrative    No narrative on file       Family History:  History reviewed. No pertinent family history.   Previous Urologic Family history: none  REVIEW OF SYSTEMS:  Constitutional: generalized malaise  Eyes: negative  Respiratory: negative  Cardiovascular: tachycardic  Gastrointestinal: negative  Genitourinary: see HPI  Musculoskeletal: negative  Skin: negative   Neurological: altered mental status  Hematological/Lymphatic: negative  Psychological: negative    Physical Exam:      This a 74 y.o. female   Patient Vitals for the past 24 hrs:   BP Temp Temp src Pulse Resp SpO2 Height Weight   10/10/17 0900 (!) 107/47 - - 100 - - - -   10/10/17 0833 (!) 88/49 97.4 °F (36.3 °C) Oral 100 14 96 % - -   10/10/17 0830 (!) 88/49 - Oral 96 - - - -   10/10/17 0718 (!) 99/48 99.2 °F (37.3 °C) Oral 100 16 96 % - -   10/10/17 0430 (!) 122/55 99.3 °F (37.4 °C) Oral 111 22 94 % 5' 2\" (1.575 m) 232 lb 11.2 oz (105.6 kg)   10/10/17 0232 (!) 111/37 - - 92 18 95 % - -   10/09/17 2343 (!) 124/51 - - - - - - -   10/09/17 2336 - 98.6 °F (37 °C) Oral 92 20 93 % 5' (1.524 m) 203 lb (92.1 kg)     Constitutional: Patient hypotensive, altered mental status  Neuro: altered mental status  Psych: alterd mental status  HEENT negative  Lungs: Respiratory effort is normal  Cardiovascular: Normal peripheral pulses, tachycardic  Abdomen: Soft, non-tender, non-distended with no CVA, flank pain or hepatosplenomegaly. No hernias. Kidneys normal.  Lymphatics: No palpable lymphadenopathy. Bladder non-tender and not distended. Pelvic exam: deferred  Rectal exam not indicated    LABS:   Recent Labs      10/09/17   2340   WBC  13.9*   HGB  8.9*   HCT  28.7*   MCV  90.8   PLT  351     Recent Labs      10/09/17   2340   NA  136   K  4.2   CL  98   CO2  18*   BUN  64*   CREATININE  1.74*       Additional Lab/culture results: pending    Urinalysis:   Recent Labs      10/10/17   0010   COLORU  YELLOW   PHUR  5.0   WBCUA  50    RBCUA  50    MUCUS  NOT REPORTED   TRICHOMONAS  NOT REPORTED   YEAST  NOT REPORTED   BACTERIA  MODERATE*   SPECGRAV  1.010   LEUKOCYTESUR  MODERATE*   UROBILINOGEN  Normal   BILIRUBINUR  NEGATIVE        -----------------------------------------------------------------  Imaging Results: Acute left obstructive uropathy secondary to a 6- 7 mm calculus in the  proximal ureter just distal to the UPJ. There are multiple bilateral nonobstructing renal calculi measuring up to 1  cm in the lower pole of the left kidney.     Small amount of air within the urinary bladder likely related to

## 2017-10-10 NOTE — ED NOTES
Pt to ER with son, to room per wheel chair, transferred self to bed. Son is the caretaker of pt, reports pt started to vomit about three hours ago, reports 8-10 episodes of emesis, small amount. Son denies fever, denies diarrhea, reports attempting zofran and tylenol by mouth, but pt with emesis right after. Son denies sick contacts. Pt denies abd pain or cramping, does report low back pain, son reports it is ongoing chronic pain, pt rates 5/10.  Pt calm, cooperative, dry heaving, respers even non labored, skin warm pink, no distress, here for eval.      De Kumari, RN  10/10/17 3954

## 2017-10-10 NOTE — PROGRESS NOTES
NEUROLOGY INPATIENT PROGRESS NOTE- ADDENDUM    10/10/2017         MRI Brain reviewed and it is showing multifocal acute infarcts in Rt MCA territory. It also showed chronic infarcts in occipital lobes and left cerebellar hemisphere. Recommend transfer to neuro ICU for close monitoring. Also recommend Echo to rule out embolic source. During the interim; start her on baby ASA and continue Eliquis and Atorvastatin 40mg nightly. Will get PT/ OT/ speech eval  Also recommend permissive hypertension. Will follow with you  .      Nikos Guo MD 10/10/2017 1:47 PM

## 2017-10-10 NOTE — H&P
[facility]    HISTORY AND PHYSICAL EXAMINATION            Date:   10/10/2017  Patient name:  Shanthi Sellers  Date of admission:  10/9/2017 11:35 PM  MRN:   [id number]  Account:  [de-identified]  YOB: 1950  PCP:    Jessica Alves DO  Room:   8415/0296-07  Code Status:    Full Code    Chief Complaint:     Chief Complaint   Patient presents with    Emesis     History Obtained From:     Patient and medical record    History of Present Illness:     Miss Marah Worley is a nice 74year old with history of morbid obesity, diabetes, hypertension, with complications of CKD stage III with old creatinine level of around 2, Stroke with no residual paralysis, history of PE after stroke, paroxysmal A Fib with eliquis use at home. She was admitted to ER with complaint of sudden onset abdominal pain, mainly in back, sudden onset, severe, 10/10, with associated multiple episodes of vomiting, around 6 times at home. Noted to have leukocytosis, elevated creatinine at 1.7 along with CT abdomen showing bilateral renal stones and also obstruction on left side with hydronephrosis. She was admitted overnight, with continued pain and vomiting at my visit    Past Medical History:     Past Medical History:   Diagnosis Date    CVA (cerebral vascular accident) ([address]     Diabetes mellitus ([address]     PE [address] thromboembolism) [address] [facility]  Hypertension  Morbid obesity       Past Surgical History:     Past Surgical History:   Procedure Laterality Date    CHOLECYSTECTOMY      HERNIA REPAIR        Medications Prior to Admission:     Prior to Admission medications    Medication Sig Start Date End Date Taking?  Authorizing Provider   mycophenolate (CELLCEPT) 500 MG tablet Take 1,500 mg by mouth 2 times daily   Yes Historical Provider, MD   Multiple Vitamins-Minerals (THERAPEUTIC MULTIVITAMIN-MINERALS) tablet Take 1 tablet by mouth daily   Yes Historical Provider, MD patient if care not provided in a hospital setting.     Brenda Lopez MD  10/10/2017  9:04 AM    Copy sent to Dr. Mc Lawler DO

## 2017-10-10 NOTE — ED PROVIDER NOTES
[facility]  [address] [place] [address]  Phone: [phone]  Fax: [phone]  eMERGENCY dEPARTMENT eNCOUnter      Pt Name: Cheyanne Cooney  MRN: [id number]  [place] 1950  Date of evaluation: 10/9/2017      CHIEF COMPLAINT       Chief Complaint   Patient presents with    Emesis          HISTORY OF PRESENT ILLNESS    Cheyanne Cooney is a 74 y.o. female who presents To the emergency department complaining of vomiting that started 2-3 hours prior to arrival.  Multiple episodes approximately 8. No bowel pain she does admit to chronic low back pain. She denies any chest pain or shortness of breath. No blood in her stool no diarrhea or constipation. Family ate the same thing she did for dinner and they are not sick. She denies any other complaints. No recent travel no recent antibiotics and no sick contacts    REVIEW OF SYSTEMS       Constitutional: No fevers or chills   HEENT: No sore throat, rhinorrhea, or earache   Eyes: No blurry vision or double vision no drainage   Cardiovascular: No chest pain or tachycardia   Respiratory: No wheezing or shortness of breath no cough   Gastrointestinal: Positive nausea and vomiting no diarrhea, constipation, or abdominal pain   : No hematuria or dysuria   Musculoskeletal: No extremity pain positive chronic low back pain  Skin: No rash   Neurological: No focal neurologic complaints, paresthesias, weakness, or headache     PAST MEDICAL HISTORY    has a past medical history of CVA (cerebral vascular accident) ([facility] [address] Diabetes mellitus ([facility] [address] and PE (pulmonary thromboembolism) ([facility] [address] SURGICAL HISTORY      has a past surgical history that includes Cholecystectomy and hernia repair.     CURRENT MEDICATIONS       Previous Medications    AMLODIPINE (NORVASC) 5 MG TABLET    Take 5 mg by mouth daily    APIXABAN (ELIQUIS) 5 MG TABS TABLET    Take by mouth 2 times daily    ATORVASTATIN (LIPITOR) 40 MG TABLET    Take 40 mg by mouth daily CALCIUM CARBONATE 600 MG TABS TABLET    Take 1 tablet by mouth daily    CARVEDILOL (COREG) 6.25 MG TABLET    Take 6.25 mg by mouth 2 times daily (with meals)    FAMOTIDINE (PEPCID) 40 MG TABLET    Take 40 mg by mouth daily    FUROSEMIDE (LASIX) 40 MG TABLET    Take 40 mg by mouth 2 times daily    GLIPIZIDE (GLUCOTROL) 10 MG TABLET    Take 10 mg by mouth 2 times daily (before meals)    INSULIN GLARGINE (LANTUS SC)    Inject into the skin    LOSARTAN-HYDROCHLOROTHIAZIDE (HYZAAR) 100-25 MG PER TABLET    Take 1 tablet by mouth daily    METFORMIN (GLUCOPHAGE) 500 MG TABLET    Take 500 mg by mouth 2 times daily (with meals)    MULTIPLE VITAMINS-MINERALS (THERAPEUTIC MULTIVITAMIN-MINERALS) TABLET    Take 1 tablet by mouth daily    ONDANSETRON (ZOFRAN ODT) 4 MG DISINTEGRATING TABLET    Take 1 tablet by mouth every 8 hours as needed for Nausea    PANTOPRAZOLE (PROTONIX) 40 MG TABLET    Take 40 mg by mouth daily    PAROXETINE (PAXIL) 10 MG TABLET    Take 10 mg by mouth every morning    POTASSIUM CHLORIDE (KLOR-CON) 20 MEQ PACKET    Take 20 mEq by mouth 2 times daily       ALLERGIES     is allergic to iv contrast [iodides]. FAMILY HISTORY     has no family status information on file. family history is not on file. SOCIAL HISTORY      reports that she has never smoked. She has never used smokeless tobacco. She reports that she does not drink alcohol or use drugs. PHYSICAL EXAM     INITIAL VITALS:  height is 5' (1.524 m) and weight is 92.1 kg (203 lb). Her oral temperature is 98.6 °F (37 °C). Her blood pressure is 124/51 (abnormal) and her pulse is 92. Her respiration is 20 and oxygen saturation is 93%.       Constitutional: Alert,  nontoxic, afebrile, answering questions appropriately, acting properly for age, in no acute distress   HEENT: Extraocular muscles intact, mucus membranes dry, TMs clear bilaterally, no posterior pharyngeal erythema or exudates, Pupils equal, round, reactive to light,   Neck: Trachea REPORTED 9 - 20    Calcium 8.8 8.6 - 10.4 mg/dL    Sodium 136 135 - 144 mmol/L    Potassium 4.2 3.7 - 5.3 mmol/L    Chloride 98 98 - 107 mmol/L    CO2 18 (L) 20 - 31 mmol/L    Anion Gap 20 (H) 9 - 17 mmol/L    GFR Non-African American 29 (L) >60 mL/min    GFR  35 (L) >60 mL/min    GFR Comment          GFR Staging NOT REPORTED    Amylase   Result Value Ref Range    Amylase 70 28 - 100 U/L   Lipase   Result Value Ref Range    Lipase 70 (H) 13 - 60 U/L   Hepatic Function Panel   Result Value Ref Range    Alb 3.8 3.5 - 5.2 g/dL    Alkaline Phosphatase 55 35 - 104 U/L    ALT 7 5 - 33 U/L    AST 15 <32 U/L    Total Bilirubin 0.32 0.3 - 1.2 mg/dL    Bilirubin, Direct 0.11 <0.31 mg/dL    Bilirubin, Indirect 0.21 0.00 - 1.00 mg/dL    Total Protein 6.5 6.4 - 8.3 g/dL    Globulin NOT REPORTED 1.5 - 3.8 g/dL    Albumin/Globulin Ratio 1.4 1.0 - 2.5   UA W/REFLEX CULTURE   Result Value Ref Range    Color, UA YELLOW YEL    Turbidity UA SLIGHTLY CLOUDY (A) CLEAR    Glucose, Ur NEGATIVE NEG    Bilirubin Urine NEGATIVE NEG    Ketones, Urine NEGATIVE NEG    Specific Gravity, UA 1.010 1.005 - 1.030    Urine Hgb LARGE (A) NEG    pH, UA 5.0 5.0 - 8.0    Protein, UA 1+ (A) NEG    Urobilinogen, Urine Normal NORM    Nitrite, Urine NEGATIVE NEG    Leukocyte Esterase, Urine MODERATE (A) NEG    Urinalysis Comments NOT REPORTED    Troponin   Result Value Ref Range    Troponin T <0.03 <0.03 ng/mL    Troponin Interp         Lactic Acid   Result Value Ref Range    Lactic Acid 1.1 0.5 - 2.2 mmol/L   Microscopic Urinalysis   Result Value Ref Range    -          WBC, UA 50  0 - 5 /HPF    RBC, UA 50  0 - 2 /HPF    Casts UA NOT REPORTED /LPF    Crystals UA NOT REPORTED NONE /HPF    Epithelial Cells UA 2 TO 5 0 - 5 /HPF    Renal Epithelial, Urine NOT REPORTED 0 /HPF    Bacteria, UA MODERATE (A) NONE    Mucus, UA NOT REPORTED NONE    Trichomonas, UA NOT REPORTED NONE    Amorphous, UA NOT REPORTED NONE    Other Observations UA

## 2017-10-10 NOTE — PLAN OF CARE
Problem: Nutrition  Goal: Optimal nutrition therapy  Outcome: Ongoing  Nutrition Problem: Inadequate oral intake  Intervention: Food and/or Nutrient Delivery: Continue NPO - As able start an oral diet with ONS as/if needed. Nutritional Goals: Meet % of estimated nutrient needs.

## 2017-10-10 NOTE — OP NOTE
FACILITY: [place] [address], [address]  Skip [address]  1950  [id number]    DATE:  10/10/17  SURGEON:  SHANTEL Johnson:  Dr. Michael Almazan M.D. PREOPERATIVE DIAGNOSIS: L Kidney stone, obstructing, sepsis   POSTOPERATIVE DIAGNOSIS: Same  PROCEDURES PERFORMED:  1. Cystoscopy. 2. Left sided stent placement. DRAINS: A Left sided 6x26 cm double-J ureteral stent  SPECIMEN: Urine culture  ANESTHESIA: General  ESTIMATED BLOOD LOSS:  None.   COMPLICATIONS:  None.     INDICATIONS FOR PROCEDURE:  Cheyanne Cooney is a 74 y.o. female presents for L kidney stone, AMS, hypotension. After the risks, benefits, alternatives, of the procedure were discussed with the patient, informed consent was obtained. The patient elected to proceed.     DETAILS OF THE PROCEDURE:  The patient was brought back to the preoperative holding area to the operating suite, and was transferred to the operating table where the patient lay in supine position. General endotracheal anesthesia was induced, and patient was prepped and draped in standard surgical fashion after being placed in dorsolithotomy position. A proper timeout was performed, preoperative antibiotics were given. A rigid cystoscope with a 22 Australian sheath with 30 degree lens was inserted in the patient's urethral and into the bladder with ease. We then focused our attention on the left ureteral orifice, which we cannulated with our glidewire. Over our glidewire we placed a 6x26cm cm double-J ureteral stent and we noted appropriate placement in the upper collecting system using fluoroscopy. We then removed our wire. There was good proximal and distal curl. We did not leave the string at the end of the stent. We then drained the patient's bladder and removed the scope and the procedure was subsequently terminated. Dr. Jenn Monroy was present and scrubbed for all critical portions of the procedure.     Plan:   The patient will be transferred back to floor for further post-operative management by Medicine service.   The patient will need to follow up for definitive stone management.

## 2017-10-10 NOTE — ANESTHESIA PRE PROCEDURE
Department of Anesthesiology  Preprocedure Note       Name:  Henri Islas   Age:  74 y.o.  :  1950                                          MRN:  [id number]         Date:  10/10/2017      Surgeon: [place]):  Roger Ya MD    Procedure: Procedure(s):  CYSTOSCOPY, STENT INSERTION    Medications prior to admission:   Prior to Admission medications    Medication Sig Start Date End Date Taking?  Authorizing Provider   mycophenolate (CELLCEPT) 500 MG tablet Take 1,500 mg by mouth 2 times daily   Yes Historical Provider, MD   Multiple Vitamins-Minerals (THERAPEUTIC MULTIVITAMIN-MINERALS) tablet Take 1 tablet by mouth daily   Yes Historical Provider, MD   ondansetron (ZOFRAN ODT) 4 MG disintegrating tablet Take 1 tablet by mouth every 8 hours as needed for Nausea 17  Yes Clari Varela MD   amLODIPine (NORVASC) 5 MG tablet Take 5 mg by mouth daily   Yes Historical Provider, MD   atorvastatin (LIPITOR) 40 MG tablet Take 40 mg by mouth daily   Yes Historical Provider, MD   calcium carbonate 600 MG TABS tablet Take 1 tablet by mouth daily   Yes Historical Provider, MD   carvedilol (COREG) 6.25 MG tablet Take 6.25 mg by mouth 2 times daily (with meals)   Yes Historical Provider, MD   apixaban (ELIQUIS) 5 MG TABS tablet Take by mouth 2 times daily   Yes Historical Provider, MD   famotidine (PEPCID) 40 MG tablet Take 40 mg by mouth daily   Yes Historical Provider, MD   furosemide (LASIX) 40 MG tablet Take 40 mg by mouth 2 times daily   Yes Historical Provider, MD   Insulin Glargine (LANTUS SC) Inject 40 Units into the skin nightly    Yes Historical Provider, MD   losartan-hydrochlorothiazide (HYZAAR) 100-25 MG per tablet Take 1 tablet by mouth daily   Yes Historical Provider, MD   metFORMIN (GLUCOPHAGE) 500 MG tablet Take 500 mg by mouth 2 times daily (with meals)   Yes Historical Provider, MD   pantoprazole (PROTONIX) 40 MG tablet Take 40 mg by mouth daily   Yes Historical Provider, MD   PARoxetine (PAXIL) 10 MG tablet Take 10 mg by mouth every morning   Yes Historical Provider, MD   POTASSIUM CHLORIDE PO Take 20 mEq by mouth daily    Yes Historical Provider, MD   glipiZIDE (GLUCOTROL) 10 MG tablet Take 10 mg by mouth 2 times daily (before meals)    Historical Provider, MD       Current medications:    Current Facility-Administered Medications   Medication Dose Route Frequency Provider Last Rate Last Dose    [MAR Hold] atorvastatin (LIPITOR) tablet 40 mg  40 mg Oral Daily Lluvia Escobedo CNP        [MAR Hold] calcium carbonate tablet 600 mg  1 tablet Oral Daily Lluvia Escobedo CNP        [MAR Hold] famotidine (PEPCID) tablet 40 mg  40 mg Oral Daily Lluvia TRENA Escobedo CNP        [MAR Hold] glipiZIDE (GLUCOTROL) tablet 10 mg  10 mg Oral BID AC Lluvia Escobedo CNP        [MAR Hold] therapeutic multivitamin-minerals 1 tablet  1 tablet Oral Daily Lluvia Escobedo CNP        [MAR Hold] ondansetron (ZOFRAN) tablet 4 mg  4 mg Oral Q8H PRN Lluvia Escobedo CNP        [MAR Hold] pantoprazole (PROTONIX) tablet 40 mg  40 mg Oral Daily Lluvia TRENA Escobedo CNP        [MAR Hold] PARoxetine (PAXIL) tablet 10 mg  10 mg Oral QAM Lluvia Escobedo CNP        [MAR Hold] potassium chloride (KLOR-CON) packet 20 mEq  20 mEq Oral BID Lluvia Escobedo CNP        [MAR Hold] 0.9 % sodium chloride infusion   Intravenous Continuous Lluvia Escobedo  mL/hr at 10/10/17 0521      [MAR Hold] sodium chloride flush 0.9 % injection 10 mL  10 mL Intravenous 2 times per day Lluvia Escobedo CNP        [MAR Hold] sodium chloride flush 0.9 % injection 10 mL  10 mL Intravenous PRN Lluvia Escobedo CNP        [MAR Hold] acetaminophen (TYLENOL) tablet 650 mg  650 mg Oral Q4H PRN Lluvia Escobedo CNP        [MAR Hold] HYDROcodone-acetaminophen (NORCO) 5-325 MG per tablet 1 tablet  1 tablet Oral Q4H PRN Lluvia Escobedo CNP        Or    [MAR Hold] HYDROcodone-acetaminophen (NORCO) 5-325 MG per tablet 2 tablet  2 tablet Oral Q4H PRN [facility] TRENA Escobedo CNP        [MAR Hold] magnesium hydroxide (MILK OF MAGNESIA) 400 MG/5ML suspension 30 mL  30 mL Oral Daily PRN [facility] TRENA Escobedo CNP        [MAR Hold] bisacodyl (DULCOLAX) suppository 10 mg  10 mg Rectal Daily PRN [facility] TRENA Escobedo CNP        [MAR Hold] ondansetron (ZOFRAN) injection 4 mg  4 mg Intravenous Q6H PRN [facility] TRENA Escobedo CNP        [MAR Hold] nicotine (NICODERM CQ) 21 MG/24HR 1 patch  1 patch Transdermal Daily PRN [facility] TRENA Escobedo CNP        [MAR Hold] tamsulosin (FLOMAX) capsule 0.4 mg  0.4 mg Oral Daily [facility] TRENA Escobedo CNP        [MAR Hold] HYDROmorphone (DILAUDID) injection 0.25 mg  0.25 mg Intravenous Q3H PRN [facility] TRENA Escobedo CNP        Or    [MAR Hold] HYDROmorphone (DILAUDID) injection 0.5 mg  0.5 mg Intravenous Q3H PRN [facility] TRENA Escobedo CNP        [MAR Hold] insulin lispro (HUMALOG) injection vial 0-12 Units  0-12 Units Subcutaneous TID WC [facility] TRENA Escobedo CNP        [MAR Hold] insulin lispro (HUMALOG) injection vial 0-6 Units  0-6 Units Subcutaneous Nightly [facility] TRENA Escobedo CNP        [MAR Hold] glucose (GLUTOSE) 40 % oral gel 15 g  15 g Oral PRN [facility] TRENA Escobedo CNP        [MAR Hold] dextrose 50 % solution 12.5 g  12.5 g Intravenous PRN [facility] TRENA Escobedo CNP        [MAR Hold] glucagon (rDNA) injection 1 mg  1 mg Intramuscular PRN [facility] CNP        [MAR Hold] dextrose 5 % solution  100 mL/hr Intravenous PRN [facility] TRENA Escobedo CNP        [MAR Hold] influenza quadrivalent split vaccine (FLUZONE;FLUARIX;FLULAVAL;AFLURIA) injection 0.5 mL  0.5 mL Intramuscular Once John MD Catarino Kingston [facility] Hold] cefTRIAXone (ROCEPHIN) IVPB 1 g  1 g Intravenous Q24H Dave De Los Santos  mL/hr at 10/10/17 1002 1 g at 10/10/17 1002    [MAR Hold] 0.9 % sodium chloride bolus  500 mL Intravenous Once John Kingston MD           Allergies:     Allergies   Allergen Reactions  Iv Contrast [Iodides] Other (See Comments)     unknown       Problem List:    Patient Active Problem List   Diagnosis Code    Hydronephrosis of left kidney N13.30    Acute cystitis with hematuria N30.01    CKD (chronic kidney disease), stage III N18.3    Hypotension arterial I95.9    Essential hypertension I10    Morbid obesity with BMI of 40.0-44.9, adult ([facility] [address] E66.01, Z68.41    History of arterial ischemic stroke Z86.73    History of pulmonary embolus (PE) Z86.711    Paroxysmal atrial fibrillation (HCC) I48.0    Left ureteral stone N20.1       Past Medical History:        Diagnosis Date    [facility]     Diabetes mellitus ([facility]     PE (pulmonary throm[facility]embolism) ([facility]        Past Surgical History:        Procedure Laterality Date    CHOLECYSTECTOMY      HERNIA REPAIR         Social History:    Social History   Substance Use Topics    Smoking status: Never Smoker    Smokeless tobacco: Never Used    Alcohol use No                                Counseling given: Not Answered      Vital Signs (Current):   Vitals:    10/10/17 0830 10/10/17 0833 10/10/17 0900 10/10/17 1000   BP: (!) 88/49 (!) 88/49 (!) 107/47 (!) 120/56   Pulse: 96 100 100    Resp:  14     Temp:  97.4 °F (36.3 °C)     TempSrc: Oral Oral     SpO2:  96%     Weight:       Height:                                                  BP Readings from Last 3 Encounters:   10/10/17 (!) 120/56   08/13/17 (!) 130/46       NPO Status:                                                                                 BMI:   Wt Readings from Last 3 Encounters:   10/10/17 232 lb 11.2 oz (105.6 kg)   08/13/17 260 lb (117.9 kg)     Body mass index is 42.56 kg/m².     CBC:   Lab Results   Component Value Date    WBC 13.9 10/09/2017    RBC 3.16 10/09/2017    HGB 8.9 10/09/2017    HCT 28.7 10/09/2017    MCV 90.8 10/09/2017    RDW 17.4 10/09/2017     10/09/2017       CMP:   Lab Results   Component Value Date     10/09/2017    K 4.2 10/09/2017    CL 98 10/09/2017    CO2 18 10/09/2017    BUN 64 10/09/2017    CREATININE 1.74 10/09/2017    GFRAA 35 10/09/2017    LABGLOM 29 10/09/2017    GLUCOSE 199 10/09/2017    PROT 6.5 10/09/2017    CALCIUM 8.8 10/09/2017    BILITOT 0.32 10/09/2017    ALKPHOS 55 10/09/2017    AST 15 10/09/2017    ALT 7 10/09/2017       POC Tests:   Recent Labs      10/10/17   0717   POCGLU  202*       Coags: No results found for: PROTIME, INR, APTT    HCG (If Applicable): No results found for: PREGTESTUR, PREGSERUM, HCG, HCGQUANT     ABGs: No results found for: PHART, PO2ART, LNR8PPJ, VMJ9XIH, BEART, H9QJBMOJ     Type & Screen (If Applicable):  No results found for: LABABO, [address]    Anesthesia Evaluation  Patient summary reviewed and Nursing notes reviewed no history of anesthetic complications:   Airway: Mallampati: III  TM distance: >3 FB   Neck ROM: full  Mouth opening: > = 3 FB Dental:    (+) caps      Pulmonary:negative ROS and normal exam                               Cardiovascular:    (+) hypertension:,                   Neuro/Psych:   (+) CVA:,             GI/Hepatic/Renal: neg ROS            Endo/Other: negative ROS                    Abdominal:   (+) obese,         Vascular:                                    Anesthesia Plan      MAC     ASA 3 - emergent     (ASA 3E for obesity, concern with sepsis  Anesthesia consent signed by patient's son)  Induction: intravenous. Anesthetic plan and risks discussed with patient. Plan discussed with CRNA.                 Daina Fitzpatrick MD   10/10/2017

## 2017-10-10 NOTE — PROGRESS NOTES
Smoking Cessation - topics covered   []  Health Risks  []  Benefits of Quitting   []  Smoking Cessation  [x]  Patient has no history of tobacco use  []  Patient is former smoker. [x]  No need for tobacco cessation education. []  Booklet given  []  Patient verbalizes understanding. []  Patient denies need for tobacco cessation education.   Irina Villanueva  8:29 AM

## 2017-10-10 NOTE — ED NOTES
Updated pt with POC, pt and son verbalizes understanding. Assisted pt with positioning in bed, pt denies needs.       Khanh Jimenez RN  10/10/17 0630

## 2017-10-10 NOTE — PROGRESS NOTES
Nutrition Assessment    Type and Reason for Visit: Initial, Positive Nutrition Screen, Reassess (Nausea/Vomiting)    Nutrition Recommendations: Continue NPO - as able suggest starting a Carb Controlled diet as tolerated. Will provide ONS as/if needed. Nutrition Diagnosis:   · Problem: Inadequate oral intake  · Etiology: related to Renal dysfunction; Current medical condition/Procedures     Signs and symptoms:  as evidenced by Diet history of poor intake, Nausea, Vomiting    Nutrition Assessment:  · Subjective Assessment: Pt admitted with c/o nausea and multiple episodes of vomiting. Reports of back and abdominal pain along as well. Today nausea has improved with no additional episodes of vomiting - pt c/o a headache. Currently NPO and planned for a cystoscopy and stent placement today. · Wound Type: None  · Current Nutrition Therapies:  · Oral Diet Orders: NPO   · Anthropometric Measures:  · Ht: 5' 2\" (157.5 cm)   · Current Body Wt: 232 lb 11.2 oz (105.6 kg)  · Ideal Body Wt: 110 lb (49.9 kg), % Ideal Body 211%  · Adjusted Body Wt: 149 lb (67.6 kg), body weight adjusted for Obesity  · BMI Classification: BMI > or equal to 40.0 Obese Class III  · Comparative Standards (Estimated Nutrition Needs):  · Estimated Daily Total Kcal: 8564-9715 kcal  · Estimated Daily Protein (g): 65-85 gm protein    Estimated Intake vs Estimated Needs: Intake Less Than Needs    Nutrition Risk Level: Moderate    Nutrition Interventions:   Continue NPO - As able start an oral diet with ONS as/if needed. Continued Inpatient Monitoring, Education Not Indicated    Nutrition Evaluation:   · Evaluation: Goals set   · Goals: Meet % of estimated nutrient needs. · Monitoring: NPO Status, Diet Progression, Skin Integrity, Fluid Balance, Weight, Pertinent Labs, Nausea or Vomiting    See Adult Nutrition Doc Flowsheet for more detail.      Electronically signed by Junito Hoffmann RD, TERESA on 10/10/17 at 11:43 AM    Contact Number: [phone]

## 2017-10-11 ENCOUNTER — ANESTHESIA EVENT (OUTPATIENT)
Dept: INTERVENTIONAL RADIOLOGY/VASCULAR | Age: 67
DRG: 853 | End: 2017-10-11
Payer: MEDICARE

## 2017-10-11 ENCOUNTER — APPOINTMENT (OUTPATIENT)
Dept: MRI IMAGING | Age: 67
DRG: 853 | End: 2017-10-11
Attending: PSYCHIATRY & NEUROLOGY
Payer: MEDICARE

## 2017-10-11 PROBLEM — I63.511 ACUTE ISCHEMIC RIGHT MCA STROKE (HCC): Status: ACTIVE | Noted: 2017-10-11

## 2017-10-11 LAB
ANION GAP SERPL CALCULATED.3IONS-SCNC: 12 MMOL/L (ref 9–17)
BUN BLDV-MCNC: 36 MG/DL (ref 8–23)
BUN/CREAT BLD: ABNORMAL (ref 9–20)
CALCIUM SERPL-MCNC: 7.2 MG/DL (ref 8.6–10.4)
CHLORIDE BLD-SCNC: 108 MMOL/L (ref 98–107)
CO2: 20 MMOL/L (ref 20–31)
COLLAGEN ADENOSINE-5'-DIPHOSPHATE (ADP) TIME: 126 SEC (ref 67–112)
COLLAGEN EPINEPHRINE TIME: 86 SEC (ref 85–172)
CREAT SERPL-MCNC: 1.16 MG/DL (ref 0.5–0.9)
CULTURE: NO GROWTH
CULTURE: NORMAL
ESTIMATED AVERAGE GLUCOSE: 131 MG/DL
GFR AFRICAN AMERICAN: 56 ML/MIN
GFR NON-AFRICAN AMERICAN: 46 ML/MIN
GFR SERPL CREATININE-BSD FRML MDRD: ABNORMAL ML/MIN/{1.73_M2}
GFR SERPL CREATININE-BSD FRML MDRD: ABNORMAL ML/MIN/{1.73_M2}
GLUCOSE BLD-MCNC: 118 MG/DL (ref 65–105)
GLUCOSE BLD-MCNC: 133 MG/DL (ref 65–105)
GLUCOSE BLD-MCNC: 188 MG/DL (ref 65–105)
GLUCOSE BLD-MCNC: 88 MG/DL (ref 65–105)
GLUCOSE BLD-MCNC: 91 MG/DL (ref 70–99)
HBA1C MFR BLD: 6.2 % (ref 4–6)
HCT VFR BLD CALC: 24 % (ref 36–46)
HEMOGLOBIN: 7.7 G/DL (ref 12–16)
LV EF: 53 %
LVEF MODALITY: NORMAL
Lab: NORMAL
Lab: NORMAL
MCH RBC QN AUTO: 28.7 PG (ref 26–34)
MCHC RBC AUTO-ENTMCNC: 31.9 G/DL (ref 31–37)
MCV RBC AUTO: 89.9 FL (ref 80–100)
PDW BLD-RTO: 19.4 % (ref 12.5–15.4)
PLATELET # BLD: 256 K/UL (ref 140–450)
PLATELET FUNCTION INTERP: ABNORMAL
PMV BLD AUTO: 7.9 FL (ref 6–12)
POTASSIUM SERPL-SCNC: 3.3 MMOL/L (ref 3.7–5.3)
RBC # BLD: 2.67 M/UL (ref 4–5.2)
SODIUM BLD-SCNC: 140 MMOL/L (ref 135–144)
SPECIMEN DESCRIPTION: NORMAL
STATUS: NORMAL
STATUS: NORMAL
WBC # BLD: 18.5 K/UL (ref 3.5–11)

## 2017-10-11 PROCEDURE — 80048 BASIC METABOLIC PNL TOTAL CA: CPT

## 2017-10-11 PROCEDURE — 99232 SBSQ HOSP IP/OBS MODERATE 35: CPT | Performed by: FAMILY MEDICINE

## 2017-10-11 PROCEDURE — 97530 THERAPEUTIC ACTIVITIES: CPT

## 2017-10-11 PROCEDURE — 97535 SELF CARE MNGMENT TRAINING: CPT

## 2017-10-11 PROCEDURE — 36415 COLL VENOUS BLD VENIPUNCTURE: CPT

## 2017-10-11 PROCEDURE — 85576 BLOOD PLATELET AGGREGATION: CPT

## 2017-10-11 PROCEDURE — G9168 MEMORY CURRENT STATUS: HCPCS

## 2017-10-11 PROCEDURE — 97166 OT EVAL MOD COMPLEX 45 MIN: CPT

## 2017-10-11 PROCEDURE — G9169 MEMORY GOAL STATUS: HCPCS

## 2017-10-11 PROCEDURE — 99291 CRITICAL CARE FIRST HOUR: CPT | Performed by: PSYCHIATRY & NEUROLOGY

## 2017-10-11 PROCEDURE — 6370000000 HC RX 637 (ALT 250 FOR IP): Performed by: NEUROLOGICAL SURGERY

## 2017-10-11 PROCEDURE — 99211 OFF/OP EST MAY X REQ PHY/QHP: CPT

## 2017-10-11 PROCEDURE — 6370000000 HC RX 637 (ALT 250 FOR IP): Performed by: PSYCHIATRY & NEUROLOGY

## 2017-10-11 PROCEDURE — 70547 MR ANGIOGRAPHY NECK W/O DYE: CPT

## 2017-10-11 PROCEDURE — 76937 US GUIDE VASCULAR ACCESS: CPT

## 2017-10-11 PROCEDURE — G8978 MOBILITY CURRENT STATUS: HCPCS

## 2017-10-11 PROCEDURE — G8987 SELF CARE CURRENT STATUS: HCPCS

## 2017-10-11 PROCEDURE — 6370000000 HC RX 637 (ALT 250 FOR IP): Performed by: NURSE PRACTITIONER

## 2017-10-11 PROCEDURE — 97162 PT EVAL MOD COMPLEX 30 MIN: CPT

## 2017-10-11 PROCEDURE — 99233 SBSQ HOSP IP/OBS HIGH 50: CPT | Performed by: PSYCHIATRY & NEUROLOGY

## 2017-10-11 PROCEDURE — 85027 COMPLETE CBC AUTOMATED: CPT

## 2017-10-11 PROCEDURE — 82947 ASSAY GLUCOSE BLOOD QUANT: CPT

## 2017-10-11 PROCEDURE — 2580000003 HC RX 258: Performed by: NURSE PRACTITIONER

## 2017-10-11 PROCEDURE — 92523 SPEECH SOUND LANG COMPREHEN: CPT

## 2017-10-11 PROCEDURE — G8988 SELF CARE GOAL STATUS: HCPCS

## 2017-10-11 PROCEDURE — 2000000003 HC NEURO ICU R&B

## 2017-10-11 PROCEDURE — 93306 TTE W/DOPPLER COMPLETE: CPT

## 2017-10-11 PROCEDURE — 6360000002 HC RX W HCPCS: Performed by: INTERNAL MEDICINE

## 2017-10-11 PROCEDURE — 70544 MR ANGIOGRAPHY HEAD W/O DYE: CPT

## 2017-10-11 PROCEDURE — G8979 MOBILITY GOAL STATUS: HCPCS

## 2017-10-11 RX ORDER — INSULIN GLARGINE 100 [IU]/ML
40 INJECTION, SOLUTION SUBCUTANEOUS NIGHTLY
Status: DISCONTINUED | OUTPATIENT
Start: 2017-10-11 | End: 2017-10-16

## 2017-10-11 RX ORDER — CLOPIDOGREL BISULFATE 75 MG/1
300 TABLET ORAL ONCE
Status: COMPLETED | OUTPATIENT
Start: 2017-10-11 | End: 2017-10-11

## 2017-10-11 RX ORDER — CLOPIDOGREL BISULFATE 75 MG/1
75 TABLET ORAL DAILY
Status: COMPLETED | OUTPATIENT
Start: 2017-10-12 | End: 2017-10-12

## 2017-10-11 RX ORDER — LORAZEPAM 2 MG/ML
1 INJECTION INTRAMUSCULAR EVERY 6 HOURS PRN
Status: DISCONTINUED | OUTPATIENT
Start: 2017-10-11 | End: 2017-10-17 | Stop reason: HOSPADM

## 2017-10-11 RX ORDER — POLYETHYLENE GLYCOL 3350 17 G/17G
17 POWDER, FOR SOLUTION ORAL DAILY
Status: DISCONTINUED | OUTPATIENT
Start: 2017-10-11 | End: 2017-10-17 | Stop reason: HOSPADM

## 2017-10-11 RX ADMIN — FAMOTIDINE 40 MG: 20 TABLET, FILM COATED ORAL at 08:42

## 2017-10-11 RX ADMIN — POLYETHYLENE GLYCOL 3350 17 G: 17 POWDER, FOR SOLUTION ORAL at 11:21

## 2017-10-11 RX ADMIN — POTASSIUM CHLORIDE 20 MEQ: 1.5 POWDER, FOR SOLUTION ORAL at 21:09

## 2017-10-11 RX ADMIN — PAROXETINE HYDROCHLORIDE HEMIHYDRATE 10 MG: 10 TABLET, FILM COATED ORAL at 08:42

## 2017-10-11 RX ADMIN — POTASSIUM CHLORIDE 20 MEQ: 1.5 POWDER, FOR SOLUTION ORAL at 08:42

## 2017-10-11 RX ADMIN — MULTIPLE VITAMINS W/ MINERALS TAB 1 TABLET: TAB at 08:42

## 2017-10-11 RX ADMIN — ASPIRIN 81 MG: 81 TABLET ORAL at 08:42

## 2017-10-11 RX ADMIN — CEFEPIME 1 G: 1 INJECTION, SOLUTION INTRAVENOUS at 20:49

## 2017-10-11 RX ADMIN — CLOPIDOGREL 300 MG: 75 TABLET, FILM COATED ORAL at 17:30

## 2017-10-11 RX ADMIN — TAMSULOSIN HYDROCHLORIDE 0.4 MG: 0.4 CAPSULE ORAL at 08:42

## 2017-10-11 RX ADMIN — Medication 10 ML: at 08:42

## 2017-10-11 RX ADMIN — INSULIN LISPRO 2 UNITS: 100 INJECTION, SOLUTION INTRAVENOUS; SUBCUTANEOUS at 13:03

## 2017-10-11 RX ADMIN — Medication 10 ML: at 21:10

## 2017-10-11 RX ADMIN — APIXABAN 5 MG: 5 TABLET, FILM COATED ORAL at 08:42

## 2017-10-11 RX ADMIN — Medication 600 MG: at 08:42

## 2017-10-11 RX ADMIN — PANTOPRAZOLE SODIUM 40 MG: 40 TABLET, DELAYED RELEASE ORAL at 08:42

## 2017-10-11 RX ADMIN — ATORVASTATIN CALCIUM 40 MG: 40 TABLET, FILM COATED ORAL at 08:42

## 2017-10-11 ASSESSMENT — ENCOUNTER SYMPTOMS
WHEEZING: 0
COUGH: 0
CONSTIPATION: 0
VOMITING: 0
DIARRHEA: 0
SHORTNESS OF BREATH: 0
SORE THROAT: 0
SINUS PRESSURE: 0
NAUSEA: 0
VOICE CHANGE: 0
BLOOD IN STOOL: 0
ABDOMINAL PAIN: 0

## 2017-10-11 ASSESSMENT — PAIN DESCRIPTION - DESCRIPTORS
DESCRIPTORS: ACHING
DESCRIPTORS: ACHING;HEADACHE

## 2017-10-11 ASSESSMENT — PAIN DESCRIPTION - PAIN TYPE
TYPE: ACUTE PAIN
TYPE: ACUTE PAIN

## 2017-10-11 ASSESSMENT — PAIN SCALES - GENERAL
PAINLEVEL_OUTOF10: 3
PAINLEVEL_OUTOF10: 0
PAINLEVEL_OUTOF10: 0
PAINLEVEL_OUTOF10: 3

## 2017-10-11 ASSESSMENT — PAIN DESCRIPTION - LOCATION
LOCATION: HEAD
LOCATION: HEAD

## 2017-10-11 NOTE — ANESTHESIA POSTPROCEDURE EVALUATION
Department of Anesthesiology  Postprocedure Note    Patient: Minh Franks  MRN: [id number]  YOB: 1950  Date of evaluation: 10/11/2017  Time:  5:04 PM     Procedure Summary     Date:  10/10/17 Room / Location:  [facility] OR  / [facility] OR    Anesthesia Start:  1030 Anesthesia Stop:  9053    Procedure:  CYSTOSCOPY, STENT INSERTION (Left Vagina ) Diagnosis:  (ADD ON )    Surgeon:  Cathy Miller MD Responsible Provider:  Tiago Isaac MD    Anesthesia Type:  MAC ASA Status:  3 - Emergent          Anesthesia Type: MAC    Abhishek Phase I: Abhishek Score: 7    Abhishek Phase II:      Last vitals: Reviewed and per EMR flowsheets.    POST-OP ANESTHESIA NOTE       /60   Pulse 105   Temp 97.9 °F (36.6 °C) (Oral)   Resp 25   Ht 5' 2\" (1.575 m)   Wt 232 lb 11.2 oz (105.6 kg)   SpO2 99%   BMI 42.56 kg/m²    Pain Assessment: 0-10  Pain Level: 3             Anesthesia Post Evaluation    Patient location during evaluation: PACU  Patient participation: complete - patient participated  Level of consciousness: awake  Pain score: 3  Airway patency: patent  Nausea & Vomiting: no vomiting and no nausea  Complications: no  Cardiovascular status: hemodynamically stable  Respiratory status: acceptable  Hydration status: stable

## 2017-10-11 NOTE — PROGRESS NOTES
CN II-XII significant for left facial droop, pupils 2 mm reactive to light bilaterally. Speech: mild aphasia, mild to moderate dysarthria  Normal tone in four extremities. Sensory: diminished sensation to light touch and pain on left hemibody  Muscle strength is : 5/5 right hemibody  Left shoulder: -4/5, left biceps/triceps -4/5, unable to move left fingers  Left leg: +4/5  DTRs : +1 in bilateral biceps and knees. Cerebellar exam: No nystagmus. Lungs sounds clear to auscultation bilaterally. Heart exam: normal S1,S2 sounds,RRR,no murmers. Abdomen was soft, NT,ND with positive bowel sounds. Normal bilateral radial and pedal pulses. LABS:     CBC:   Lab Results   Component Value Date    WBC 18.5 10/11/2017    RBC 2.67 10/11/2017    HGB 7.7 10/11/2017    HCT 24.0 10/11/2017    MCV 89.9 10/11/2017    MCH 28.7 10/11/2017    MCHC 31.9 10/11/2017    RDW 19.4 10/11/2017     10/11/2017    MPV 7.9 10/11/2017     BMP:    Lab Results   Component Value Date     10/11/2017    K 3.3 10/11/2017     10/11/2017    CO2 20 10/11/2017    BUN 36 10/11/2017    LABALBU 2.8 10/10/2017    CREATININE 1.16 10/11/2017    CALCIUM 7.2 10/11/2017    GFRAA 56 10/11/2017    LABGLOM 46 10/11/2017    GLUCOSE 91 10/11/2017       IMPRESSIONS:     75 yo female with PMH of obesity, DM, HTN, CKD, stroke(with no residual symptoms), A fib and PE who wad admitted on 10/11/17 for left kidney stone and underwent left side ureteral stent placement. Prior to her admission, patient developed left hemibody weakness . MRI brain showed right MCA acute infarct. MRA head and neck showed 70-80% right ICA stenosis. PLANS:     Right MCA acute stroke  Right ICA stenosis    MRA head and neck: bad quality images showed 70-80% right ICA stenosis. Given the symptomatic right ICA stenosis with right MCA stroke, patient might benefit from right ICA stenting.  Plan for conventional angiogram tomorrow with possible carotid stenting.  - Plavix

## 2017-10-11 NOTE — PROGRESS NOTES
NEUROLOGY INPATIENT PROGRESS NOTE    10/11/2017         Subjective: Meda Krabbe is a  74 y.o. female admitted on 10/9/2017 with Ureterolithiasis [N20.1]      Briefly, this is a  74 y.o. female admitted on 10/9/2017 initially presented to outlying facility for nausea and vomiting starting 2-3 hours PTA. CT A/P showed an acute left obstructive uropathy w/ 6-7 mm calculus in the proximal ureter just distal to the UPJ. Was found to have a leukocytosis and UTI. Patient was started on antibiotics and was transferred to our facility. Today she was tachycardic and hypotensive, meeting sepsis criteria. Per family, they noticed patient was weak early this morning. Specifically LUE weakness and gait disturbance. Also noted small facial droop. Uncertain if it started last night or this morning. Given patient's clinical status she was taken for ureteral stent placement first and then taken to the MRI. MRI showed multifocal acute infarcts in the R MCA distribution though within a relative narrow zone. There were also chronic infarcts seen in the occipital lobes and left cerebellar hemisphere. She was seen by neuro and EVNS. Decision was made to transfer the patient to the Neuro ICU for closer monitoring. Pt unable to get gadolinium for MRA neck due to 1.7 creatinine, and was not given IV contrast for CTA due to Hx/o dye allergy. Unenhanced neck MRA indicated possible severe R ICA stenosis. Ureteral stone removed during stent placement 10/10 reportedly appeared as though it might be infected; 11 hour BCx2 (1800 10/10) no growth. There is concern over possible sepsis and SBE, this concern magnified by scattered MRI acute infarct pattern (described above). Pt is on cefipime, but 10/10 was also on LQ and Rocephin.     Within the last year the patient has had a stroke apparently in [facility] [employer] where she was in [facility] that the based on review of her current brain MRI which shows bilateral occipital lobe chronic infarctions, and the report from the patient and daughter-in-law that at the time she developed \"tunnel vision\" as the symptom of the stroke; and, during the course of workup a Holter monitor that was done for 3 days showed evidence of paroxysmal atrial fibrillation, and as well in the same general timeframe the patient had a pulmonary embolus, the patient has been maintained on Eliquis. There is also a reported history as noted above of iodinated dye allergy which was the reason why she did not get a contrast CTA of head and neck yesterday. However the daughter-in-law states that the only manifestation of this allergy is that she developed hives. With this in mind she could get iodinated contrast with premedication.                     No current facility-administered medications on file prior to encounter.       Current Outpatient Prescriptions on File Prior to Encounter   Medication Sig Dispense Refill    ondansetron (ZOFRAN ODT) 4 MG disintegrating tablet Take 1 tablet by mouth every 8 hours as needed for Nausea 20 tablet 0    amLODIPine (NORVASC) 5 MG tablet Take 5 mg by mouth daily      atorvastatin (LIPITOR) 40 MG tablet Take 40 mg by mouth daily      calcium carbonate 600 MG TABS tablet Take 1 tablet by mouth daily      carvedilol (COREG) 6.25 MG tablet Take 6.25 mg by mouth 2 times daily (with meals)      apixaban (ELIQUIS) 5 MG TABS tablet Take by mouth 2 times daily      famotidine (PEPCID) 40 MG tablet Take 40 mg by mouth daily      furosemide (LASIX) 40 MG tablet Take 40 mg by mouth 2 times daily      Insulin Glargine (LANTUS SC) Inject 40 Units into the skin nightly       losartan-hydrochlorothiazide (HYZAAR) 100-25 MG per tablet Take 1 tablet by mouth daily      metFORMIN (GLUCOPHAGE) 500 MG tablet Take 500 mg by mouth 2 times daily (with meals)      pantoprazole (PROTONIX) 40 MG tablet Take 40 mg by mouth daily      PARoxetine (PAXIL) 10 MG tablet Take 10 mg by mouth every morning      POTASSIUM CHLORIDE PO Take 20 mEq by mouth daily       glipiZIDE (GLUCOTROL) 10 MG tablet Take 10 mg by mouth 2 times daily (before meals)         Allergies: Fili Heck is allergic to iv contrast [iodides].     Past Medical History:   Diagnosis Date    [facility] [address]     [facility]     PE [facility] throm[facility]embolism) ([facility]        Past Surgical History:   Procedure Laterality Date    CHOLECYSTECTOMY      HERNIA REPAIR             Medications:     polyethylene glycol  17 g Oral Daily    atorvastatin  40 mg Oral Daily    calcium carbonate  1 tablet Oral Daily    famotidine  40 mg Oral Daily    therapeutic multivitamin-minerals  1 tablet Oral Daily    pantoprazole  40 mg Oral Daily    PARoxetine  10 mg Oral QAM    potassium chloride  20 mEq Oral BID    sodium chloride flush  10 mL Intravenous 2 times per day    tamsulosin  0.4 mg Oral Daily    insulin lispro  0-12 Units Subcutaneous TID WC    insulin lispro  0-6 Units Subcutaneous Nightly    influenza virus vaccine  0.5 mL Intramuscular Once    sodium chloride  500 mL Intravenous Once    aspirin  81 mg Oral Daily    apixaban  5 mg Oral Daily    cefepime  1 g Intravenous Q12H     PRN Meds include: LORazepam, ondansetron, sodium chloride flush, acetaminophen, HYDROcodone 5 mg - acetaminophen **OR** HYDROcodone 5 mg - acetaminophen, magnesium hydroxide, bisacodyl, ondansetron, nicotine, HYDROmorphone **OR** HYDROmorphone, glucose, dextrose, glucagon (rDNA), dextrose    Objective:   BP (!) 107/46   Pulse 96   Temp 99 °F (37.2 °C)   Resp 23   Ht 5' 2\" (1.575 m)   Wt 232 lb 11.2 oz (105.6 kg)   SpO2 94%   BMI 42.56 kg/m²     Blood pressure range: Systolic (30WVK), TZV:375 , Min:90 , GOI:661   ; Diastolic (56WGX), HST:17, Min:38, Max:56  ON EXAMINATION:  GENERAL  Appears comfortable and in no distress   HEENT  NC/ AT   NECK  Supple and no bruits heard   MENTAL STATUS:  Alert, oriented to self, PROVIDED HISTORY: Ordering Physician Provided Reason for Exam: C/o vomitting x 2-3 hours pta and nausea. Denies diarrhea or constipation. Acuity: Acute Type of Exam: Initial Relevant Medical/Surgical History: Hx xiao, diabetes, hernia repair FINDINGS: Lower Chest: The lung bases are clear. The heart is borderline enlarged. Calcification in the region of the mitral annulus. There is trace pericardial fluid. Organs: Well-circumscribed low-attenuation lesions in the superior aspect of the liver measuring up to 1 cm, likely small cysts. The gallbladder is surgically absent. The spleen, pancreas and adrenal glands are normal with the limitations of a noncontrast study. There are multiple bilateral renal calculi measuring up to 1 cm in the lower pole of the left kidney. On the left, there is an obstructing 6-7 mm calculus in the proximal ureter just distal to the UPJ causing moderate left hydronephrosis. There is peripelvic and proximal periureteral fat stranding. The left kidney is enlarged. GI/Bowel: Mild sigmoid colon diverticulosis. No evidence of diverticulitis. No evidence bowel obstruction. Pelvis: Small amount of air in the urinary bladder likely related to recent catheterization. Uterus and ovaries are atrophic. Peritoneum/Retroperitoneum: There is no adenopathy, free air or free fluid. Prominent vascular calcifications suggest diabetes. Bones/Soft Tissues: Degenerative changes in the lumbar spine particularly facet arthropathy. No acute bone finding. Acute left obstructive uropathy secondary to a 6- 7 mm calculus in the proximal ureter just distal to the UPJ. There are multiple bilateral nonobstructing renal calculi measuring up to 1 cm in the lower pole of the left kidney. Small amount of air within the urinary bladder likely related to recent catheterization. Clinical correlation is recommended.      Xr Abdomen (kub) (single Ap View)    Result Date: 10/10/2017  EXAMINATION: SUPINE VIEW(S) OF THE ABDOMEN 10/10/2017 11:16 am COMPARISON: 08/13/2017 HISTORY: ORDERING SYSTEM PROVIDED HISTORY: Stent insertion TECHNOLOGIST PROVIDED HISTORY: Reason for exam:->Stent insertion Left ureteral stent insertion in surgery FINDINGS: 6 seconds of fluoroscopy time utilized in surgery. D AP 82.6. 2 spot views obtained in surgery show placement of a left ureteral stent. Please correlate with procedure report for additional details. Intraoperative fluoroscopy for left ureteral stent placement. Mra Head Wo Contrast    Result Date: 10/11/2017  EXAMINATION: MRA OF THE HEAD WITHOUT CONTRAST  10/11/2017 3:38 am TECHNIQUE: MRA of the head was performed utilizing time-of-flight imaging with MIP images. No intravenous contrast was administered. COMPARISON: None HISTORY: ORDERING SYSTEM PROVIDED HISTORY: MCA stroke, evaluate vasculature FINDINGS: ANTERIOR CIRCULATION: On the right, there is apparent diminished flow in the cavernous and supraclinoid portion of the internal carotid artery. There is extensive artifact at the level of the MCA although flow may be diminished as well. SUNDEEP is not well-visualized. On the left, artifact is present although there is apparent flow in the ICA, and MCA. Sis. POSTERIOR CIRCULATION: There is a short segment moderate stenosis of P1 segment left posterior cerebral artery. Posterior cerebral arteries appear otherwise patent. . The vertebral and basilar arteries appear unremarkable. ANEURYSM: No intracranial aneurysm is seen. Study limited by artifact, within this limit, there is apparent diminished flow in the right ICA and MCA. Short-segment focal stenosis P1 segment left PCA. Mra Neck Wo Contrast    Result Date: 10/11/2017  EXAMINATION: MRA OF THE NECK WITHOUT CONTRAST 10/11/2017 3:38 am TECHNIQUE: Multiplanar multisequence MRA of the neck was performed without the administration of intravenous contrast. Stenosis of the internal carotid arteries measured using NASCET criteria. supratentorially which are compatible with minimal chronic microvascular white matter ischemic disease. There is a small chronic infarct noted in the left cerebellar hemisphere. There are no abnormal extra-axial fluid collections. The ventricles are proportional to the cerebral sulci. Normal major intracranial flow voids are noted. There are no areas of blooming artifact noted on the gradient echo sequences to suggest sequela of acute or chronic hemorrhage. ORBITS: Lens implants from prior cataract surgery are noted. The orbits are otherwise unremarkable. SINUSES: There is scattered paranasal sinus disease with greatest involvement in the ethmoid air cells. The rest of the visualized paranasal sinuses and mastoid air cells are well-aerated. BONES/SOFT TISSUES: The bone marrow signal intensity appears normal. The craniocervical junction is normal in appearance. 1. There are multifocal acute infarcts noted in the right middle cerebral artery vascular distribution. These involve the right pre and postcentral gyri, likely accounting for the patient's left-sided neurologic deficits. 2. Chronic infarcts are noted in the occipital lobes and left cerebellar hemisphere. 3. Cerebral and cerebellar parenchymal volume loss with minimal chronic microvascular white matter ischemic disease.        Lab Results:   CBC:   Recent Labs      10/09/17   2340  10/10/17   1806  10/11/17   0539   WBC  13.9*  27.6*  18.5*   HGB  8.9*  8.4*  7.7*   PLT  351  266  256     BMP:    Recent Labs      10/09/17   2340  10/10/17   1806  10/11/17   0539   NA  136  138  140   K  4.2  3.3*  3.3*   CL  98  103  108*   CO2  18*  20  20   BUN  64*  47*  36*   CREATININE  1.74*  1.55*  1.16*   GLUCOSE  199*  190*  91         Lab Results   Component Value Date    CHOL 73 10/10/2017    LDLCHOLESTEROL 21 10/10/2017    HDL 39 (L) 10/10/2017    TRIG 66 10/10/2017    ALT 10 10/10/2017    AST 19 10/10/2017    TSH 1.22 08/17/2017    [id number]

## 2017-10-11 NOTE — PROGRESS NOTES
Daily Progress Note     Admit Date: 10/9/2017  Bed/Room No.  0527/0527-01  Admitting Physician : Jackie Palm MD  Code Status :[address] Day:  LOS: 1 day   Complaint at Admission :   Chief Complaint   Patient presents with    Emesis     Active Problems:    Hydronephrosis of left kidney    Acute cystitis with hematuria    CKD (chronic kidney disease), stage III    Hypotension arterial    Essential hypertension    Morbid obesity with BMI of 40.0-44.9, adult ([address]    History of arterial ischemic stroke    History of pulmonary embolus (PE)    Paroxysmal atrial fibrillation (HCC)    Left ureteral stone    Left-sided weakness    Non-intractable vomiting with nausea    Cerebrovascular accident (CVA) due to embolism of right middle cerebral artery (HCC)    Hypotension    Blood poisoning ([address]    Elevated serum creatinine    Kidney stone    Subjective: Interval History/Significant events :  10/11/17    Patient Continues to have left upper extremity weakness and facial asymmetry. She denies any difficulty swallowing. Patient does not have speech difficulty. Remains afebrile  Vitals - Stable tachycardia, low blood pressure afebrile  Labs - hypokalemia, hyperglycemia. Nursing notes , Consults notes reviewed. Overnight events and updates discussed with Nursing staff . Background history    Admitted for <principal problem not specified> , in hospital for 1 days . [facility] 74 [facility]  [facility] after presenting with sudden onset abdominal pain with multiple episodes of vomiting. Initial evaluation showed leukocytosis 13.9, elevated creatinine 1.7 with CT abdomen showing bilateral kidney stones and left-sided hydronephrosis. Temperature was 99.3 on presentation and patient was tachycardic 11 blood pressure was low 88/49. Patient had low hemoglobin 8.9 normocytic normochromic. UA was positive for large hemoglobin and moderate bacteria, moderate leukoesterase, negative nitrite.   Patient was started on IV antibiotics for sepsis. Later patient was found to have left upper extremity weakness and decreased disturbance and facial droop. MRI showed multiple focal acute infarction right MCA distribution. MRA showed right ICA stenosis. Patient has been on anticoagulation Eliquis for pulmonary embolism. She has prior history of paroxysmal atrial fibrillation and was recommended Holter monitor which she did not used. PMH:  Past Medical History:   Diagnosis Date    [facility]     Diabetes mellitus ([facility] [address] [facility]     PE (pulmonary thromboembolism) ([facility]       Allergies: Allergies   Allergen Reactions    Iv Contrast [Iodides] Other (See Comments)     unknown      Medications :    atorvastatin 40 mg Oral Daily   calcium carbonate 1 tablet Oral Daily   famotidine 40 mg Oral Daily   therapeutic multivitamin-minerals 1 tablet Oral Daily   pantoprazole 40 mg Oral Daily   PARoxetine 10 mg Oral QAM   potassium chloride 20 mEq Oral BID   sodium chloride flush 10 mL Intravenous 2 times per day   tamsulosin 0.4 mg Oral Daily   insulin lispro 0-12 Units Subcutaneous TID WC   insulin lispro 0-6 Units Subcutaneous Nightly   influenza virus vaccine 0.5 mL Intramuscular Once   sodium chloride 500 mL Intravenous Once   aspirin 81 mg Oral Daily   apixaban 5 mg Oral Daily   cefepime 1 g Intravenous Q12H       Review of Systems   Review of Systems   Constitutional: Negative for activity change, appetite change, chills, fatigue, fever and unexpected weight change. HENT: Negative for congestion, mouth sores, postnasal drip, sinus pressure, sore throat and voice change. Eyes: Negative for visual disturbance. Respiratory: Negative for cough, shortness of breath and wheezing. Cardiovascular: Negative for chest pain and palpitations. Gastrointestinal: Negative for abdominal pain, blood in stool, constipation, diarrhea, nausea and vomiting. Endocrine: Negative for polyuria.    Genitourinary: Negative for difficulty urinating, dysuria, frequency and urgency. Musculoskeletal: Negative for arthralgias, joint swelling and myalgias. Neurological: Positive for facial asymmetry and weakness. Negative for dizziness, tremors, speech difficulty, light-headedness and headaches.      Objective:   Current Vitals : Temp: 99 °F (37.2 °C),  Pulse: 96, Resp: 23, BP: (!) 107/46, SpO2: 94 %  Last 24 Hrs Vitals   Patient Vitals for the past 24 hrs:   BP Temp Temp src Pulse Resp SpO2 Height   10/11/17 0703 (!) 107/46 - - 96 23 94 % -   10/11/17 0603 (!) 109/47 - - 90 18 97 % -   10/11/17 0503 (!) 106/49 - - 98 23 96 % -   10/11/17 0403 (!) 111/51 99 °F (37.2 °C) - 95 22 95 % -   10/11/17 0203 (!) 100/44 - - 88 18 98 % -   10/11/17 0103 (!) 92/44 - - 86 18 96 % -   10/11/17 0003 (!) 90/44 99.2 °F (37.3 °C) - 88 18 97 % -   10/10/17 2203 (!) 99/41 - - 94 18 96 % -   10/10/17 2103 (!) 93/46 - - 114 19 94 % -   10/10/17 2003 (!) 105/46 99 °F (37.2 °C) Oral 95 19 99 % -   10/10/17 1903 (!) 110/52 - - 95 19 97 % -   10/10/17 1832 (!) 124/47 - - 102 26 96 % -   10/10/17 1703 (!) 111/43 - - 100 21 97 % -   10/10/17 1619 (!) 109/38 97.7 °F (36.5 °C) Oral 101 25 91 % -   10/10/17 1526 (!) 108/48 98.7 °F (37.1 °C) Oral 99 21 95 % -   10/10/17 1322 - - - 98 - - -   10/10/17 1315 (!) 113/52 - - 96 23 92 % -   10/10/17 1200 (!) 96/51 - - 90 20 96 % -   10/10/17 1145 (!) 102/55 98.2 °F (36.8 °C) - 90 18 95 % -   10/10/17 1132 - - - - - - 5' 2\" (1.575 m)   10/10/17 1130 (!) 103/52 - - 96 20 95 % -   10/10/17 1115 (!) 105/53 - - 100 18 95 % -   10/10/17 1100 - 97.2 °F (36.2 °C) Temporal 90 20 - -   10/10/17 1000 (!) 120/56 - - - - - -   10/10/17 0900 (!) 107/47 - - 100 - - -   10/10/17 0833 (!) 88/49 97.4 °F (36.3 °C) Oral 100 14 96 % -   10/10/17 0830 (!) 88/49 - Oral 96 - - -     Intake / output   10/10 0701 - 10/11 0700  In: 4285 [I.V.:4285]  Out: 1750 [Urine:1750]  Physical Exam:  Physical Exam   Constitutional: She is oriented to person, place, and time. She appears well-developed and well-nourished. No distress. HENT:   Mouth/Throat: No oropharyngeal exudate. Eyes: Conjunctivae are normal. Pupils are equal, round, and reactive to light. No scleral icterus. Neck: No JVD present. No thyromegaly present. Cardiovascular: Normal rate, regular rhythm and normal heart sounds. Exam reveals no gallop and no friction rub. No murmur heard. Pulmonary/Chest: Effort normal. No respiratory distress. She has no wheezes. She has no rales. Abdominal: Soft. Bowel sounds are normal. She exhibits no distension and no mass. There is no tenderness. There is no rebound and no guarding. Lymphadenopathy:     She has no cervical adenopathy. Neurological: She is alert and oriented to person, place, and time. No cranial nerve deficit. She exhibits normal muscle tone. Left facial droop, left upper extremity weakness   Skin: She is not diaphoretic. Nursing note and vitals reviewed.     Lower Extremities : No ankle Edema , No calf Tenderness     Laboratory findings:    Recent Labs      10/09/17   2340  10/10/17   1806  10/11/17   0539   WBC  13.9*  27.6*  18.5*   HGB  8.9*  8.4*  7.7*   HCT  28.7*  25.7*  24.0*   PLT  351  266  256     Recent Labs      10/09/17   2340  10/10/17   1806  10/11/17   0539   NA  136  138  140   K  4.2  3.3*  3.3*   CL  98  103  108*   CO2  18*  20  20   GLUCOSE  199*  190*  91   BUN  64*  47*  36*   CREATININE  1.74*  1.55*  1.16*   CALCIUM  8.8  7.7*  7.2*     Recent Labs      10/09/17   2340  10/10/17   1443  10/10/17   1806   PROT  6.5   --   5.4*   LABALBU  3.8   --   2.8*   AST  15   --   19   ALT  7   --   10   ALKPHOS  55   --   38   BILITOT  0.32   --   0.28*   BILIDIR  0.11   --    --    AMYLASE  70   --    --    LIPASE  70*   --    --    CHOL   --   73   --    HDL   --   39*   --    LDLCHOLESTEROL   --   21   --    CHOLHDLRATIO   --   1.9   --    TRIG   --   66   --    VLDL   --   NOT REPORTED   -- Specific Gravity, UA   Date Value Ref Range Status   10/10/2017 1.010 1.005 - 1.030 Final     Protein, UA   Date Value Ref Range Status   10/10/2017 1+ (A) NEG Final     RBC, UA   Date Value Ref Range Status   10/10/2017 50  0 - 2 /HPF Final     Bacteria, UA   Date Value Ref Range Status   10/10/2017 MODERATE (A) NONE Final     Nitrite, Urine   Date Value Ref Range Status   10/10/2017 NEGATIVE NEG Final     WBC, UA   Date Value Ref Range Status   10/10/2017 50  0 - 5 /HPF Final     Leukocyte Esterase, Urine   Date Value Ref Range Status   10/10/2017 MODERATE (A) NEG Final     Comment:     Performed at [facility] and [facility], [facility] [address], [address]     MRI brain without contrast 10/10/17  1. There are multifocal acute infarcts noted in the right middle cerebral   artery vascular distribution.  These involve the right pre and postcentral   gyri, likely accounting for the patient's left-sided neurologic deficits. 2. Chronic infarcts are noted in the occipital lobes and left cerebellar   hemisphere. 3. Cerebral and cerebellar parenchymal volume loss with minimal chronic   microvascular white matter ischemic disease. MRA neck without contrast 10/11/17  Flow limiting stenosis in the proximal right internal carotid artery. MRA Head 10/11/17  Study limited by artifact, within this limit, there is apparent diminished   flow in the right ICA and MCA.       Short-segment focal stenosis P1 segment left PCA. Clinical Course : stable  Assessment and Plan      1. Sepsis due to UTI and left pyelonephritis - status post left stent Placement 10/10/17. Continue antibiotics. Follow culture sensitivity. 2. Bilateral kidney stones - urology on board. 3. Type 2 diabetes mellitus - resume Lantus. Metformin on hold. 4. Multiple right MCA infarct - continue aspirin 81 mg and Eliquis , Lipitor.   likely embolic- follow echocardiogram .  Blood pressure running

## 2017-10-11 NOTE — PROGRESS NOTES
10/10/17 0718 (!) 99/48 99.2 °F (37.3 °C) Oral 100 16 96 % -       Intake/Output Summary (Last 24 hours) at 10/11/17 0708  Last data filed at 10/11/17 0603   Gross per 24 hour   Intake             4285 ml   Output             1750 ml   Net             2535 ml       Recent Labs      10/09/17   2340  10/10/17   1806  10/11/17   0539   WBC  13.9*  27.6*  18.5*   HGB  8.9*  8.4*  7.7*   HCT  28.7*  25.7*  24.0*   MCV  90.8  88.9  89.9   PLT  351  266  256     Recent Labs      10/09/17   2340  10/10/17   1806  10/11/17   0539   NA  136  138  140   K  4.2  3.3*  3.3*   CL  98  103  108*   CO2  18*  20  20   BUN  64*  47*  36*   CREATININE  1.74*  1.55*  1.16*       Recent Labs      10/10/17   0010   COLORU  YELLOW   PHUR  5.0   WBCUA  50    RBCUA  50    MUCUS  NOT REPORTED   TRICHOMONAS  NOT REPORTED   YEAST  NOT REPORTED   BACTERIA  MODERATE*   SPECGRAV  1.010   LEUKOCYTESUR  MODERATE*   UROBILINOGEN  Normal   BILIRUBINUR  NEGATIVE       Additional Lab/culture results:    Physical Exam:   NAD, A/Ox2 (not to date)  RRR, palpable radial pulses  Equal chest rise, normal inspiratory effort. NC O2 on. Soft, NT/ND. No peritoneal signs. No flank pain.   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 [E66.01, Z68.41] 10/10/2017    History of arterial ischemic stroke [Z86.73] 10/10/2017    History of pulmonary embolus (PE) [Z86.711] 10/10/2017    Paroxysmal atrial fibrillation ([facility] [I48.0] 10/10/2017  Left ureteral stone [N20.1] 10/10/2017    Left-sided weakness [R53.1]     Non-intractable vomiting with nausea [R11.2]     Cerebrovascular accident (CVA) due to embolism of right middle cerebral artery ([facility] [I63.411]     Hypotension [I95.9]     Blood poisoning ([facility] [A41.9]     Elevated serum creatinine [R79.89]     Kidney stone [N20.0]        Plan:   Supportive care  Maintain catheter for full bladder decompression for now  F/u UCx final results  Okay with iv abx empirically until this happens - On cefepime  Please call with any questions  Patient will need definitive stone management as an outpatient    [facility]  7:08 AM 10/11/2017   I have discussed the care of this patient including pertinent history and exam findings, with the resident. I have seen and examined the patient and the key elements of all parts of the encounter have been performed by me. I agree with the assessment, plan and orders as documented by the resident.   Gavino Knox

## 2017-10-11 NOTE — PROGRESS NOTES
Speech Language Pathology  Facility/Department: [address]  Initial Speech/Language/Cognitive Assessment    NAME: Mikki Parra  : 1950   MRN: [id number]  ADMISSION DATE: 10/9/2017  ADMITTING DIAGNOSIS: has Hydronephrosis of left kidney; Acute cystitis with hematuria; CKD (chronic kidney disease), stage III; Hypotension arterial; Essential hypertension; Morbid obesity with BMI of 40.0-44.9, adult ([facility] History of arterial ischemic stroke; History of pulmonary embolus (PE); Paroxysmal atrial fibrillation ([facility] Left ureteral stone; Left-sided weakness; Non-intractable vomiting with nausea; Cerebrovascular accident (CVA) due to embolism of right middle cerebral artery ([facility] Hypotension; Sepsis secondary to UTI [facility]); Elevated serum creatinine; Kidney stone; Anemia; Hypokalemia; and Acute ischemic right MCA stroke [facility]) on her problem list.  DATE ONSET: 10/9/2017    Date of Eval: 10/11/2017   Evaluating Therapist: Iman Shelton    RECENT RESULTS  CT OF HEAD/MRI:   1. There are multifocal acute infarcts noted in the right middle cerebral   artery vascular distribution.  These involve the right pre and postcentral   gyri, likely accounting for the patient's left-sided neurologic deficits. 2. Chronic infarcts are noted in the occipital lobes and left cerebellar   hemisphere. 3. Cerebral and cerebellar parenchymal volume loss with minimal chronic   microvascular white matter ischemic disease. Primary Complaint: Miss Anam Mendoza is a nice 74year old with history of morbid obesity, diabetes, hypertension, with complications of CKD stage III with old creatinine level of around 2, Stroke with no residual paralysis, history of PE after stroke, paroxysmal A Fib with eliquis use at home. She was admitted to ER with complaint of sudden onset abdominal pain, mainly in back, sudden onset, severe, 10/10, with associated multiple episodes of vomiting, around 6 times at home.  Noted to have leukocytosis, elevated creatinine at 1.7 along with CT abdomen showing bilateral renal stones and also obstruction on left side with hydronephrosis. She was admitted overnight, with continued pain and vomiting at my visit    Pain:  Pain Assessment  Patient Currently in Pain: Denies  Pain Assessment: 0-10  Pain Level: 3  Pain Type: Acute pain  Pain Location: Head  Pain Descriptors: Aching, Headache  Multiple Pain Sites: No  Pain Assessment/FLACC  Pain Rating: FLACC (rest) - Face: occasional grimace or frown, withdrawn, disinterested  Pain Rating: FLACC (rest) - Legs: normal position or relaxed  Pain Rating: FLACC (rest) - Activity: lying quietly, normal position, moves easily  Pain Rating: FLACC (rest) - Cry: no cry (awake or asleep)  Pain Rating: FLACC (rest) - Consolability: content, relaxed  Score: FLACC (rest): 1    Assessment:      Pt presents with mild-severe cognitive deficits characterized by impaired delayed and immediate recall, word generation, divergent thinking, and orientation. ST to follow up and provide treatment to address noted deficits. Education provided. ST recommends home with intermittent assistance as needed at this time. Recommendations:  Requires SLP Intervention: Yes  Duration/Frequency of Treatment: 3-5x/week  D/C Recommendations: Home with intermittent assistance       Plan:   Goals:  Short-term Goals  Goal 1: Recall 3-5 units with and without distraction with 90% accuracy using compensatory strategies. Goal 2: Complete word generation tasks with 90% accuracy. Patient/family involved in developing goals and treatment plan: yes    Subjective:     General  Chart Reviewed: Yes  Family / Caregiver Present: Yes (son)  Social/Functional History  Lives With: Alone  Active : Yes  Occupation: Retired  Vision  Vision: Impaired (Glasses)  Hearing  Hearing: Within functional limits           Objective:     Oral/Motor  Oral Motor:  Within functional limits    Auditory Comprehension  Comprehension:

## 2017-10-11 NOTE — PROGRESS NOTES
[facility] Continence Nurse  Consult Note       NAME:  Korin Silva Kanorado  MEDICAL RECORD NUMBER:  [id number]  AGE: 74 y.o. GENDER: female  : 1950  TODAY'S DATE:  10/11/2017    Subjective:     Reason for WOCN Evaluation and Assessment: intergluteal crease erosion, bilateral buttock friction and moisture damaged skin. Shanthi Sellers is a 74 y.o. female referred by:   [] Physician  [] Nursing  [] Other:     Wound Identification:  Wound Type: friction, moisture  Contributing Factors: diabetes, decreased mobility, shear force, obesity, immunosuppression, anticoagulation therapy, incontinence of urine, poor hygiene and non-adherence        PAST MEDICAL HISTORY        Diagnosis Date    [facility]A [facility]cere[facility] accident) ([facility]     Diabetes mellitus ([facility]     PE (pulmonary thromboembolism) ([facility]        PAST SURGICAL HISTORY    Past Surgical History:   Procedure Laterality Date    CHOLECYSTECTOMY      CYSTOSCOPY Left 10/10/2017    CYSTOSCOPY, STENT INSERTION performed by Wade Sim MD at [facility]    History reviewed. No pertinent family history.     SOCIAL HISTORY    Social History   Substance Use Topics    Smoking status: Never Smoker    Smokeless tobacco: Never Used    Alcohol use No       ALLERGIES    Allergies   Allergen Reactions    Iv Contrast [Iodides] Other (See Comments)     unknown           Objective:      BP (!) 116/51   Pulse 106   Temp 97.9 °F (36.6 °C) (Oral)   Resp 24   Ht 5' 2\" (1.575 m)   Wt 232 lb 11.2 oz (105.6 kg)   SpO2 97%   BMI 42.56 kg/m²   Bandar Risk Score: Bandar Scale Score: 17    LABS    CBC:   Lab Results   Component Value Date    WBC 18.5 10/11/2017    RBC 2.67 10/11/2017    HGB 7.7 10/11/2017     CMP:  Albumin:    Lab Results   Component Value Date    LABALBU 2.8 10/10/2017     PT/INR:  No results found for: PROTIME, INR  HgBA1c:    Lab Results   Component Value Date    LABA1C 6.2 10/10/2017     PTT: No components found for: Teaching:  Pressure injury prevention- turn at least every 2 hours, use zinc oxide cream on the moisture damaged skin.  Increase frequency of brief changes when at home.   [] Indicates understanding       [] Needs reinforcement  [] Unsuccessful      [x] Verbal Understanding  [] Demonstrated understanding       [] No evidence of learning  [] Refused teaching         [] N/A       Electronically signed by Jeremiah Bragg RN, CWON on 10/11/2017 at 2:44 PM

## 2017-10-11 NOTE — PROGRESS NOTES
all times  Hearing: Within functional limits    Orientation  Overall Orientation Status: Impaired  Orientation Level: Oriented to place;Oriented to person;Disoriented to time;Disoriented to situation (Pt knew location was a hospital however disoriented to name of hospital)     Balance  Sitting Balance: Supervision  Standing Balance: Moderate assistance  Standing Balance  Time: 5 min   Activity: Pt stood bedside and short func mob in room  Sit to stand: Maximum assistance  Stand to sit: Maximum assistance  Functional Mobility  Functional - Mobility Device: Rolling Walker  Activity: Other  Assist Level: Maximum assistance  Functional Mobility Comments: Pt demo'd high difficulty clearing floor with LLE and very unsteady, shuffling gait  ADL  Feeding: Setup;Supervision (Pt L handed-using R UE)  Grooming: Setup;Minimal assistance  UE Bathing: Setup;Maximum assistance  LE Bathing: Setup;Maximum assistance  UE Dressing: Setup;Maximum assistance  LE Dressing: Setup;Maximum assistance  Toileting: Setup;Maximum assistance  Tone RUE  RUE Tone: Normotonic  Tone LUE  LUE Tone: Hypotonic  LUE Modified Sharri Scale  LUE Modified Sharri Scale Completed: No  Coordination  Movements Are Fluid And Coordinated: No  Coordination and Movement description: Gross motor impairments; Fine motor impairments; Left UE     Bed mobility  Supine to Sit: Moderate assistance  Sit to Supine: Unable to assess  Scooting:  Moderate assistance  Comment: Pt left in chair  Transfers  Sit to stand: Maximum assistance  Stand to sit: Maximum assistance     Cognition  Overall Cognitive Status: Exceptions  Safety Judgement: Decreased awareness of need for assistance  Problem Solving: Assistance required to generate solutions  Insights: Decreased awareness of deficits  Initiation: Requires cues for some  Sequencing: Requires cues for some     Sensation  Overall Sensation Status: WNL      LUE AROM (degrees)  LUE AROM : Exceptions  L Shoulder Extension 0-45: restricted    Goals  Short term goals  Time Frame for Short term goals: Pt will by discharge   Short term goal 1: demo supine<>sit transfer to EOB with min A and increased time  Short term goal 2: demo sit<>stand transfer with RW and min Ax1  Short term goal 3: demo good safety awareness during func mob around floor with RW and max Ax1  Short term goal 4: demo ADL UB/LB dressing/bathing activity seated with mod A and setup  Short term goal 5: demo A&Ox3 for 3/4 tx sessions     Therapy Time   Individual Concurrent Group Co-treatment   Time In 0827         Time Out 0902         Minutes 35            Pt supine in bed upon arrival. Pt demo'd transfer to EOB with assist, pt disoriented x2 this date. Pt transferred to standing with RW and assist, pt de'd short func mob in room with max A and RW before transferring to recliner. Pt performed ADL feeding task seated with setup and retired with RN aware and call light within reach upon exit-BP WFL's entire tx session.     Mary Pena, OTR/L

## 2017-10-11 NOTE — PROGRESS NOTES
Physical Therapy    Facility/Department: [address]  Initial Assessment    NAME: Jesenia Campbell  : 1950  MRN: [id number]    Date of Service: 10/11/2017  Miss Bishop Erickson is a nice 74year old with history of morbid obesity, diabetes, hypertension, with complications of CKD stage III with old creatinine level of around 2, Stroke with no residual paralysis, history of PE after stroke, paroxysmal A Fib with eliquis use at home. She was admitted to ER with complaint of sudden onset abdominal pain, mainly in back, sudden onset, severe, 10/10, with associated multiple episodes of vomiting, around 6 times at home. Noted to have leukocytosis, elevated creatinine at 1.7 along with CT abdomen showing bilateral renal stones and also obstruction on left side with hydronephrosis. She was admitted overnight, with continued pain and vomiting at my visit  Patient Diagnosis(es): The primary encounter diagnosis was Non-intractable vomiting with nausea, unspecified vomiting type. Diagnoses of Kidney stone and Acute cystitis with hematuria were also pertinent to this visit. has a past medical history of CVA (cerebral vascular accident) ([address] Diabetes mellitus ([address] and PE (pulmonary thromboembolism) ([address] has a past surgical history that includes Cholecystectomy and hernia repair. Vision/Hearing  Vision: Impaired  Vision Exceptions: Wears glasses at all times  Hearing: Within functional limits     Subjective  General  Chart Reviewed: Yes  Patient assessed for rehabilitation services?: Yes  Family / Caregiver Present: No  Follows Commands: Within Functional Limits  Pain Screening  Patient Currently in Pain: Yes  Pain Assessment  Pain Assessment: 0-10  Pain Level: 3  Pain Type: Acute pain  Pain Location: Head  Pain Descriptors: Aching  Multiple Pain Sites: No  Vital Signs  Patient Currently in Pain: Yes    Orientation  Orientation  Overall Orientation Status: Impaired  Orientation Level: Disoriented to place; Disoriented to advancement of left foot during gait, difficulty staying within walker, small step length, and difficulty holding walker with LUE. Distance: 10'  Stairs/Curb  Stairs?: No     Balance  Posture: Good  Sitting - Static: Good  Standing - Static: Good;-  Standing - Dynamic: Fair;+  Comments: with use of 2WW AD and Mod Assist    Assessment   Body structures, Functions, Activity limitations: Decreased functional mobility ; Decreased balance;Decreased coordination;Decreased ROM; Decreased endurance;Decreased strength  Prognosis: Good  Decision Making: Medium Complexity  REQUIRES PT FOLLOW UP: Yes  Activity Tolerance  Activity Tolerance: Patient limited by endurance; Patient limited by fatigue  PT Equipment Recommendations  Equipment Needed: No     Discharge Recommendations:  Continue to assess, IP REHAB      Plan   Plan  Times per week: 5-6  Current Treatment Recommendations: Strengthening, Transfer Training, Endurance Training, Cognitive Reorientation, Patient/Caregiver Education & Training, ROM, Balance Training, Gait Training, Functional Mobility Training, Stair training, Safety Education & Training  Safety Devices  Type of devices: Gait belt, Patient at risk for falls, Nurse notified, Call light within reach, Left in chair    G-Code  PT G-Codes  Functional Assessment Tool Used: [place] Tool   Functional Limitation: Mobility: Walking and moving around    [employer] term goals  Time Frame for Short term goals: 14 visits  Short term goal 1: Independent bed mobility  Short term goal 2: Independent transfers  Short term goal 3: Independent ambulation with last restrictive device 200 ft  Short term goal 4 : Pt to improve standing dynamic balance to good. Short term goal 5; Pt to tolerate 45 minutes of PT treatment to improve strength and endurance.   Patient Goals   Patient goals : patient would like to get back to moving around and walking on own for return to home        Therapy Time   Individual Concurrent Group Co-treatment   Time In 0825         Time Out 0900         Minutes [address], PT

## 2017-10-12 ENCOUNTER — HOSPITAL ENCOUNTER (OUTPATIENT)
Dept: INTERVENTIONAL RADIOLOGY/VASCULAR | Age: 67
Discharge: HOME | DRG: 853 | End: 2017-10-12
Attending: PSYCHIATRY & NEUROLOGY
Payer: MEDICARE

## 2017-10-12 ENCOUNTER — ANESTHESIA (OUTPATIENT)
Dept: INTERVENTIONAL RADIOLOGY/VASCULAR | Age: 67
DRG: 853 | End: 2017-10-12
Payer: MEDICARE

## 2017-10-12 VITALS
RESPIRATION RATE: 18 BRPM | DIASTOLIC BLOOD PRESSURE: 55 MMHG | TEMPERATURE: 99.5 F | HEART RATE: 110 BPM | SYSTOLIC BLOOD PRESSURE: 115 MMHG

## 2017-10-12 VITALS
RESPIRATION RATE: 19 BRPM | SYSTOLIC BLOOD PRESSURE: 102 MMHG | DIASTOLIC BLOOD PRESSURE: 47 MMHG | OXYGEN SATURATION: 95 % | TEMPERATURE: 68.8 F

## 2017-10-12 LAB
ABSOLUTE EOS #: 0 K/UL (ref 0–0.4)
ABSOLUTE LYMPH #: 0.11 K/UL (ref 1–4.8)
ABSOLUTE MONO #: 0.11 K/UL (ref 0.1–0.8)
ACTIVATED CLOTTING TIME: 131 SEC (ref 79–149)
ACTIVATED CLOTTING TIME: 148 SEC (ref 79–149)
ACTIVATED CLOTTING TIME: 192 SEC (ref 79–149)
ACTIVATED CLOTTING TIME: 229 SEC (ref 79–149)
ACTIVATED CLOTTING TIME: 233 SEC (ref 79–149)
ACTIVATED CLOTTING TIME: 237 SEC (ref 79–149)
ACTIVATED CLOTTING TIME: 253 SEC (ref 79–149)
ANION GAP SERPL CALCULATED.3IONS-SCNC: 16 MMOL/L (ref 9–17)
BASOPHILS # BLD: 0 %
BASOPHILS ABSOLUTE: 0 K/UL (ref 0–0.2)
BUN BLDV-MCNC: 20 MG/DL (ref 8–23)
BUN/CREAT BLD: ABNORMAL (ref 9–20)
CALCIUM SERPL-MCNC: 7.3 MG/DL (ref 8.6–10.4)
CHLORIDE BLD-SCNC: 107 MMOL/L (ref 98–107)
CO2: 17 MMOL/L (ref 20–31)
COLLAGEN ADENOSINE-5'-DIPHOSPHATE (ADP) TIME: 79 SEC (ref 67–112)
COLLAGEN EPINEPHRINE TIME: 120 SEC (ref 85–172)
CREAT SERPL-MCNC: 0.88 MG/DL (ref 0.5–0.9)
DIFFERENTIAL TYPE: ABNORMAL
EOSINOPHILS RELATIVE PERCENT: 0 %
GFR AFRICAN AMERICAN: >60 ML/MIN
GFR NON-AFRICAN AMERICAN: >60 ML/MIN
GFR SERPL CREATININE-BSD FRML MDRD: ABNORMAL ML/MIN/{1.73_M2}
GFR SERPL CREATININE-BSD FRML MDRD: ABNORMAL ML/MIN/{1.73_M2}
GLUCOSE BLD-MCNC: 152 MG/DL (ref 65–105)
GLUCOSE BLD-MCNC: 216 MG/DL (ref 65–105)
GLUCOSE BLD-MCNC: 228 MG/DL (ref 70–99)
HCT VFR BLD CALC: 24.1 % (ref 36–46)
HCT VFR BLD CALC: 25.5 % (ref 36–46)
HEMOGLOBIN: 7.6 G/DL (ref 12–16)
HEMOGLOBIN: 8.2 G/DL (ref 12–16)
LYMPHOCYTES # BLD: 1 %
MCH RBC QN AUTO: 28.2 PG (ref 26–34)
MCH RBC QN AUTO: 28.8 PG (ref 26–34)
MCHC RBC AUTO-ENTMCNC: 31.5 G/DL (ref 31–37)
MCHC RBC AUTO-ENTMCNC: 32.3 G/DL (ref 31–37)
MCV RBC AUTO: 89.1 FL (ref 80–100)
MCV RBC AUTO: 89.6 FL (ref 80–100)
MONOCYTES # BLD: 1 %
MORPHOLOGY: ABNORMAL
PDW BLD-RTO: 18.7 % (ref 12.5–15.4)
PDW BLD-RTO: 18.9 % (ref 12.5–15.4)
PLATELET # BLD: 241 K/UL (ref 140–450)
PLATELET # BLD: 324 K/UL (ref 140–450)
PLATELET ESTIMATE: ABNORMAL
PLATELET FUNCTION INTERP: NORMAL
PMV BLD AUTO: 8.1 FL (ref 6–12)
PMV BLD AUTO: 8.1 FL (ref 6–12)
POTASSIUM SERPL-SCNC: 4.6 MMOL/L (ref 3.7–5.3)
RBC # BLD: 2.69 M/UL (ref 4–5.2)
RBC # BLD: 2.86 M/UL (ref 4–5.2)
RBC # BLD: ABNORMAL 10*6/UL
SEG NEUTROPHILS: 98 %
SEGMENTED NEUTROPHILS ABSOLUTE COUNT: 10.88 K/UL (ref 1.8–7.7)
SODIUM BLD-SCNC: 140 MMOL/L (ref 135–144)
WBC # BLD: 11.1 K/UL (ref 3.5–11)
WBC # BLD: 16 K/UL (ref 3.5–11)
WBC # BLD: ABNORMAL 10*3/UL

## 2017-10-12 PROCEDURE — 2580000003 HC RX 258: Performed by: NURSE ANESTHETIST, CERTIFIED REGISTERED

## 2017-10-12 PROCEDURE — 6360000002 HC RX W HCPCS: Performed by: NURSE ANESTHETIST, CERTIFIED REGISTERED

## 2017-10-12 PROCEDURE — 6370000000 HC RX 637 (ALT 250 FOR IP): Performed by: NURSE PRACTITIONER

## 2017-10-12 PROCEDURE — 6360000002 HC RX W HCPCS: Performed by: NURSE PRACTITIONER

## 2017-10-12 PROCEDURE — 6370000000 HC RX 637 (ALT 250 FOR IP): Performed by: PSYCHIATRY & NEUROLOGY

## 2017-10-12 PROCEDURE — 85576 BLOOD PLATELET AGGREGATION: CPT

## 2017-10-12 PROCEDURE — 61650 EVASC PRLNG ADMN RX AGNT 1ST: CPT | Performed by: PSYCHIATRY & NEUROLOGY

## 2017-10-12 PROCEDURE — 36620 INSERTION CATHETER ARTERY: CPT

## 2017-10-12 PROCEDURE — 36224 PLACE CATH CAROTD ART: CPT | Performed by: PSYCHIATRY & NEUROLOGY

## 2017-10-12 PROCEDURE — 85027 COMPLETE CBC AUTOMATED: CPT

## 2017-10-12 PROCEDURE — 80048 BASIC METABOLIC PNL TOTAL CA: CPT

## 2017-10-12 PROCEDURE — 3700000001 HC ADD 15 MINUTES (ANESTHESIA)

## 2017-10-12 PROCEDURE — 99232 SBSQ HOSP IP/OBS MODERATE 35: CPT | Performed by: FAMILY MEDICINE

## 2017-10-12 PROCEDURE — 99222 1ST HOSP IP/OBS MODERATE 55: CPT | Performed by: PHYSICAL MEDICINE & REHABILITATION

## 2017-10-12 PROCEDURE — 99232 SBSQ HOSP IP/OBS MODERATE 35: CPT | Performed by: PSYCHIATRY & NEUROLOGY

## 2017-10-12 PROCEDURE — 037K34Z DILATION OF RIGHT INTERNAL CAROTID ARTERY WITH DRUG-ELUTING INTRALUMINAL DEVICE, PERCUTANEOUS APPROACH: ICD-10-PCS | Performed by: PSYCHIATRY & NEUROLOGY

## 2017-10-12 PROCEDURE — 82947 ASSAY GLUCOSE BLOOD QUANT: CPT

## 2017-10-12 PROCEDURE — C1769 GUIDE WIRE: HCPCS

## 2017-10-12 PROCEDURE — 3700000000 HC ANESTHESIA ATTENDED CARE

## 2017-10-12 PROCEDURE — 6370000000 HC RX 637 (ALT 250 FOR IP): Performed by: FAMILY MEDICINE

## 2017-10-12 PROCEDURE — 6360000002 HC RX W HCPCS: Performed by: INTERNAL MEDICINE

## 2017-10-12 PROCEDURE — 97532 HC COGNITIVE THERAPY 15 MIN: CPT

## 2017-10-12 PROCEDURE — 2580000003 HC RX 258: Performed by: NURSE PRACTITIONER

## 2017-10-12 PROCEDURE — 36415 COLL VENOUS BLD VENIPUNCTURE: CPT

## 2017-10-12 PROCEDURE — 2500000003 HC RX 250 WO HCPCS: Performed by: NURSE PRACTITIONER

## 2017-10-12 PROCEDURE — 99233 SBSQ HOSP IP/OBS HIGH 50: CPT | Performed by: PSYCHIATRY & NEUROLOGY

## 2017-10-12 PROCEDURE — 6370000000 HC RX 637 (ALT 250 FOR IP): Performed by: EMERGENCY MEDICINE

## 2017-10-12 PROCEDURE — 85025 COMPLETE CBC W/AUTO DIFF WBC: CPT

## 2017-10-12 PROCEDURE — 37216 TRANSCATH STENT CCA W/O EPS: CPT | Performed by: PSYCHIATRY & NEUROLOGY

## 2017-10-12 PROCEDURE — 2000000003 HC NEURO ICU R&B

## 2017-10-12 PROCEDURE — 6370000000 HC RX 637 (ALT 250 FOR IP): Performed by: NEUROLOGICAL SURGERY

## 2017-10-12 PROCEDURE — 37215 TRANSCATH STENT CCA W/EPS: CPT | Performed by: PSYCHIATRY & NEUROLOGY

## 2017-10-12 PROCEDURE — 6360000004 HC RX CONTRAST MEDICATION: Performed by: NURSE ANESTHETIST, CERTIFIED REGISTERED

## 2017-10-12 PROCEDURE — 99152 MOD SED SAME PHYS/QHP 5/>YRS: CPT | Performed by: PSYCHIATRY & NEUROLOGY

## 2017-10-12 PROCEDURE — 85347 COAGULATION TIME ACTIVATED: CPT

## 2017-10-12 RX ORDER — DILTIAZEM HYDROCHLORIDE 5 MG/ML
10 INJECTION INTRAVENOUS ONCE
Status: COMPLETED | OUTPATIENT
Start: 2017-10-12 | End: 2017-10-12

## 2017-10-12 RX ORDER — PROTAMINE SULFATE 10 MG/ML
INJECTION, SOLUTION INTRAVENOUS PRN
Status: DISCONTINUED | OUTPATIENT
Start: 2017-10-12 | End: 2017-10-12 | Stop reason: SDUPTHER

## 2017-10-12 RX ORDER — SODIUM CHLORIDE 9 MG/ML
INJECTION, SOLUTION INTRAVENOUS CONTINUOUS PRN
Status: DISCONTINUED | OUTPATIENT
Start: 2017-10-12 | End: 2017-10-12 | Stop reason: SDUPTHER

## 2017-10-12 RX ORDER — ACETAMINOPHEN 325 MG/1
650 TABLET ORAL EVERY 4 HOURS PRN
Status: DISCONTINUED | OUTPATIENT
Start: 2017-10-12 | End: 2017-10-15 | Stop reason: HOSPADM

## 2017-10-12 RX ORDER — PREDNISONE 50 MG/1
50 TABLET ORAL ONCE
Status: COMPLETED | OUTPATIENT
Start: 2017-10-12 | End: 2017-10-12

## 2017-10-12 RX ORDER — FENTANYL CITRATE 50 UG/ML
INJECTION, SOLUTION INTRAMUSCULAR; INTRAVENOUS PRN
Status: DISCONTINUED | OUTPATIENT
Start: 2017-10-12 | End: 2017-10-12 | Stop reason: SDUPTHER

## 2017-10-12 RX ORDER — MIDAZOLAM HYDROCHLORIDE 1 MG/ML
INJECTION INTRAMUSCULAR; INTRAVENOUS PRN
Status: DISCONTINUED | OUTPATIENT
Start: 2017-10-12 | End: 2017-10-12 | Stop reason: SDUPTHER

## 2017-10-12 RX ORDER — SODIUM CHLORIDE 9 MG/ML
INJECTION, SOLUTION INTRAVENOUS CONTINUOUS
Status: DISCONTINUED | OUTPATIENT
Start: 2017-10-12 | End: 2017-10-15 | Stop reason: HOSPADM

## 2017-10-12 RX ORDER — FENTANYL CITRATE 50 UG/ML
25 INJECTION, SOLUTION INTRAMUSCULAR; INTRAVENOUS EVERY 5 MIN PRN
Status: DISCONTINUED | OUTPATIENT
Start: 2017-10-12 | End: 2017-10-15 | Stop reason: HOSPADM

## 2017-10-12 RX ORDER — IODIXANOL 270 MG/ML
150 INJECTION, SOLUTION INTRAVASCULAR
Status: COMPLETED | OUTPATIENT
Start: 2017-10-12 | End: 2017-10-12

## 2017-10-12 RX ORDER — HEPARIN SODIUM 1000 [USP'U]/ML
INJECTION, SOLUTION INTRAVENOUS; SUBCUTANEOUS PRN
Status: DISCONTINUED | OUTPATIENT
Start: 2017-10-12 | End: 2017-10-12 | Stop reason: SDUPTHER

## 2017-10-12 RX ORDER — DIPHENHYDRAMINE HCL 25 MG
50 TABLET ORAL ONCE
Status: COMPLETED | OUTPATIENT
Start: 2017-10-12 | End: 2017-10-12

## 2017-10-12 RX ADMIN — SODIUM CHLORIDE: 9 INJECTION, SOLUTION INTRAVENOUS at 15:29

## 2017-10-12 RX ADMIN — PREDNISONE 50 MG: 50 TABLET ORAL at 10:47

## 2017-10-12 RX ADMIN — LORAZEPAM 1 MG: 2 INJECTION INTRAMUSCULAR; INTRAVENOUS at 00:48

## 2017-10-12 RX ADMIN — CLOPIDOGREL 75 MG: 75 TABLET, FILM COATED ORAL at 08:16

## 2017-10-12 RX ADMIN — CEFEPIME 1 G: 1 INJECTION, SOLUTION INTRAVENOUS at 21:53

## 2017-10-12 RX ADMIN — HEPARIN SODIUM 7400 UNITS: 1000 INJECTION, SOLUTION INTRAVENOUS; SUBCUTANEOUS at 14:33

## 2017-10-12 RX ADMIN — FENTANYL CITRATE 50 MCG: 50 INJECTION, SOLUTION INTRAMUSCULAR; INTRAVENOUS at 15:26

## 2017-10-12 RX ADMIN — INSULIN GLARGINE 40 UNITS: 100 INJECTION, SOLUTION SUBCUTANEOUS at 22:08

## 2017-10-12 RX ADMIN — FENTANYL CITRATE 25 MCG: 50 INJECTION, SOLUTION INTRAMUSCULAR; INTRAVENOUS at 17:00

## 2017-10-12 RX ADMIN — Medication 10 ML: at 08:10

## 2017-10-12 RX ADMIN — MIDAZOLAM HYDROCHLORIDE 0.5 MG: 1 INJECTION, SOLUTION INTRAMUSCULAR; INTRAVENOUS at 15:53

## 2017-10-12 RX ADMIN — PHENYLEPHRINE HYDROCHLORIDE 100 MCG/MIN: 10 INJECTION INTRAMUSCULAR; INTRAVENOUS; SUBCUTANEOUS at 16:24

## 2017-10-12 RX ADMIN — HEPARIN SODIUM 2000 UNITS: 1000 INJECTION, SOLUTION INTRAVENOUS; SUBCUTANEOUS at 16:22

## 2017-10-12 RX ADMIN — TAMSULOSIN HYDROCHLORIDE 0.4 MG: 0.4 CAPSULE ORAL at 08:01

## 2017-10-12 RX ADMIN — PHENYLEPHRINE HYDROCHLORIDE 100 MCG: 10 INJECTION INTRAMUSCULAR; INTRAVENOUS; SUBCUTANEOUS at 15:55

## 2017-10-12 RX ADMIN — HEPARIN SODIUM 1500 UNITS: 1000 INJECTION, SOLUTION INTRAVENOUS; SUBCUTANEOUS at 14:57

## 2017-10-12 RX ADMIN — FENTANYL CITRATE 25 MCG: 50 INJECTION, SOLUTION INTRAMUSCULAR; INTRAVENOUS at 14:24

## 2017-10-12 RX ADMIN — FENTANYL CITRATE 50 MCG: 50 INJECTION, SOLUTION INTRAMUSCULAR; INTRAVENOUS at 15:59

## 2017-10-12 RX ADMIN — Medication 600 MG: at 08:01

## 2017-10-12 RX ADMIN — MULTIPLE VITAMINS W/ MINERALS TAB 1 TABLET: TAB at 08:01

## 2017-10-12 RX ADMIN — SODIUM CHLORIDE: 9 INJECTION, SOLUTION INTRAVENOUS at 12:40

## 2017-10-12 RX ADMIN — ATORVASTATIN CALCIUM 40 MG: 40 TABLET, FILM COATED ORAL at 08:01

## 2017-10-12 RX ADMIN — FENTANYL CITRATE 25 MCG: 50 INJECTION, SOLUTION INTRAMUSCULAR; INTRAVENOUS at 17:10

## 2017-10-12 RX ADMIN — Medication 10 ML: at 21:00

## 2017-10-12 RX ADMIN — POTASSIUM CHLORIDE 20 MEQ: 1.5 POWDER, FOR SOLUTION ORAL at 08:01

## 2017-10-12 RX ADMIN — FENTANYL CITRATE 50 MCG: 50 INJECTION, SOLUTION INTRAMUSCULAR; INTRAVENOUS at 15:12

## 2017-10-12 RX ADMIN — MIDAZOLAM HYDROCHLORIDE 0.5 MG: 1 INJECTION, SOLUTION INTRAMUSCULAR; INTRAVENOUS at 17:00

## 2017-10-12 RX ADMIN — PREDNISONE 50 MG: 50 TABLET ORAL at 04:26

## 2017-10-12 RX ADMIN — FENTANYL CITRATE 25 MCG: 50 INJECTION, SOLUTION INTRAMUSCULAR; INTRAVENOUS at 12:50

## 2017-10-12 RX ADMIN — FENTANYL CITRATE 50 MCG: 50 INJECTION, SOLUTION INTRAMUSCULAR; INTRAVENOUS at 14:58

## 2017-10-12 RX ADMIN — PANTOPRAZOLE SODIUM 40 MG: 40 TABLET, DELAYED RELEASE ORAL at 08:01

## 2017-10-12 RX ADMIN — HEPARIN SODIUM 4000 UNITS: 1000 INJECTION, SOLUTION INTRAVENOUS; SUBCUTANEOUS at 15:22

## 2017-10-12 RX ADMIN — POLYETHYLENE GLYCOL 3350 17 G: 17 POWDER, FOR SOLUTION ORAL at 08:01

## 2017-10-12 RX ADMIN — ASPIRIN 81 MG: 81 TABLET ORAL at 08:01

## 2017-10-12 RX ADMIN — PHENYLEPHRINE HYDROCHLORIDE 100 MCG: 10 INJECTION INTRAMUSCULAR; INTRAVENOUS; SUBCUTANEOUS at 16:09

## 2017-10-12 RX ADMIN — DILTIAZEM HYDROCHLORIDE 10 MG: 5 INJECTION INTRAVENOUS at 03:01

## 2017-10-12 RX ADMIN — FENTANYL CITRATE 25 MCG: 50 INJECTION, SOLUTION INTRAMUSCULAR; INTRAVENOUS at 14:05

## 2017-10-12 RX ADMIN — FENTANYL CITRATE 25 MCG: 50 INJECTION, SOLUTION INTRAMUSCULAR; INTRAVENOUS at 13:19

## 2017-10-12 RX ADMIN — FAMOTIDINE 40 MG: 20 TABLET, FILM COATED ORAL at 08:01

## 2017-10-12 RX ADMIN — INSULIN LISPRO 2 UNITS: 100 INJECTION, SOLUTION INTRAVENOUS; SUBCUTANEOUS at 22:09

## 2017-10-12 RX ADMIN — DIPHENHYDRAMINE HCL 50 MG: 25 TABLET ORAL at 10:47

## 2017-10-12 RX ADMIN — CEFEPIME 1 G: 1 INJECTION, SOLUTION INTRAVENOUS at 08:09

## 2017-10-12 RX ADMIN — SODIUM CHLORIDE: 9 INJECTION, SOLUTION INTRAVENOUS at 07:50

## 2017-10-12 RX ADMIN — SODIUM CHLORIDE: 9 INJECTION, SOLUTION INTRAVENOUS at 12:46

## 2017-10-12 RX ADMIN — FENTANYL CITRATE 50 MCG: 50 INJECTION, SOLUTION INTRAMUSCULAR; INTRAVENOUS at 15:53

## 2017-10-12 RX ADMIN — MIDAZOLAM HYDROCHLORIDE 0.5 MG: 1 INJECTION, SOLUTION INTRAMUSCULAR; INTRAVENOUS at 15:51

## 2017-10-12 RX ADMIN — PHENYLEPHRINE HYDROCHLORIDE 100 MCG: 10 INJECTION INTRAMUSCULAR; INTRAVENOUS; SUBCUTANEOUS at 16:26

## 2017-10-12 RX ADMIN — HEPARIN SODIUM 2000 UNITS: 1000 INJECTION, SOLUTION INTRAVENOUS; SUBCUTANEOUS at 15:46

## 2017-10-12 RX ADMIN — PAROXETINE HYDROCHLORIDE HEMIHYDRATE 10 MG: 10 TABLET, FILM COATED ORAL at 08:01

## 2017-10-12 RX ADMIN — MIDAZOLAM HYDROCHLORIDE 0.5 MG: 1 INJECTION, SOLUTION INTRAMUSCULAR; INTRAVENOUS at 17:10

## 2017-10-12 RX ADMIN — PROTAMINE SULFATE 80 MG: 10 INJECTION, SOLUTION INTRAVENOUS at 17:20

## 2017-10-12 RX ADMIN — IODIXANOL 235 ML: 270 INJECTION, SOLUTION INTRAVASCULAR at 17:26

## 2017-10-12 ASSESSMENT — ENCOUNTER SYMPTOMS
CONSTIPATION: 0
DIARRHEA: 0
BLOOD IN STOOL: 0
VOMITING: 0
SORE THROAT: 0
NAUSEA: 0
SINUS PRESSURE: 0
VOICE CHANGE: 0
SHORTNESS OF BREATH: 0
COUGH: 0
ABDOMINAL PAIN: 0
WHEEZING: 0

## 2017-10-12 ASSESSMENT — PAIN SCALES - GENERAL
PAINLEVEL_OUTOF10: 0

## 2017-10-12 NOTE — OP NOTE
[facility]    NEUROENDOVASCULAR SERVICE: POST-OP NOTE: 10/12/2017    Pt Name: Bronson Anderson  MRN: [id number]  [place]: 1950  Date of Procedure: 10/12/2017  Primary Care Physician: Beth Joel DO    Pre-Procedural Diagnosis:right ica stenosis  Post-Procedural Diagnosis: R cavernous segment       Procedure Performed:right ica cavernous segment stenting. Surgeon:   Cleveland Melo MD    Fellow:  Sam Martinez MD and Gomez Liu MD     1st Assistant:  Derick Low    PRE-PROCEDURAL EXAM:  MODIFIED HALIE SCORE: 2 - Slight disability:  unable to carry out all previous activities, but able to look after own affairs without assistance. Neurological exam performed and unchanged from initial H&P or consult    Anesthesia: MAC anesthesia  Complications: none    EBL: < Minimal      Cc            Contrast:     Visipaque 270 low osmolar 230 Cc             Fluoro: 107.3 min    Findings:  Please see dictated Radiology note for further details  1. The right proximal ICA is not stenotic as reported by the [facility]; however, there is hemodynamically significant cavernous segment ICA stenosis   2. Significaint proximal right SUNDEEP stenosis      POST-PROCEDURAL EXAM:   Stable neurological Exam  Neurological exam performed and unchanged from initial H&P or consult    Closure:  right Angioseal 6   F        POST-PROCEDURAL MONITORING : see orders  Disposition: Neuro ICU      Recommendations:  1. Right leg flat for 6 hours  2.  Aspirin and plavix daily  3. sbp < 140 mmHg        Cleveland Melo MD   Pager: [phone]  Stroke, [facility]  [address]  Electronically signed 10/12/2017 at 5:30 PM

## 2017-10-12 NOTE — SEDATION DOCUMENTATION
Consent obtained by Dr Benja Macario  from patients son [facility]) for intracranial stenting of ICA

## 2017-10-12 NOTE — PROGRESS NOTES
Daily Progress Note     Admit Date: 10/9/2017  Bed/Room No.  0527/0527-01  Admitting Physician : Magnolia Harris MD  Code Status :[address] Day:  LOS: 2 days   Complaint at Admission :   Chief Complaint   Patient presents with    Emesis     Principal Problem:    Sepsis secondary to UTI [facility])  Active Problems:    Acute ischemic right MCA stroke ([facility]    Hydronephrosis of left kidney    Acute cystitis with hematuria    CKD (chronic kidney disease), stage III    Hypotension arterial    Essential hypertension    Morbid obesity with BMI of 40.0-44.9, adult ([facility]    History of arterial ischemic stroke    History of pulmonary embolus (PE)    Paroxysmal atrial fibrillation (HCC)    Left ureteral stone    Left-sided weakness    Non-intractable vomiting with nausea    Cerebrovascular accident (CVA) due to embolism of right middle cerebral artery (HCC)    Hypotension    Elevated serum creatinine    Kidney stone    Anemia    Hypokalemia    Subjective: Interval History/Significant events :  10/12/17    Patient is confused this morning , no changes in weakness . Blood sugar controlled . Vitals - Stable tachycardia, low blood pressure afebrile  Labs - hypokalemia, hyperglycemia. Creatinine improved . HGB stable . Nursing notes , Consults notes reviewed. Overnight events and updates discussed with Nursing staff . Background history    Admitted for Sepsis secondary to UTI [facility]) , in hospital for 2 days . [facility] 74 y.o. female  was admitted after presenting with sudden onset abdominal pain with multiple episodes of vomiting. Initial evaluation showed leukocytosis 13.9, elevated creatinine 1.7 with CT abdomen showing bilateral kidney stones and left-sided hydronephrosis. Temperature was 99.3 on presentation and patient was tachycardic 11 blood pressure was low 88/49. Patient had low hemoglobin 8.9 normocytic normochromic.   UA was positive for large hemoglobin and moderate bacteria, moderate Cardiovascular: Negative for chest pain and palpitations. Gastrointestinal: Negative for abdominal pain, blood in stool, constipation, diarrhea, nausea and vomiting. Endocrine: Negative for polyuria. Genitourinary: Negative for difficulty urinating, dysuria, frequency and urgency. Musculoskeletal: Negative for arthralgias, joint swelling and myalgias. Neurological: Positive for facial asymmetry and weakness. Negative for dizziness, tremors, speech difficulty, light-headedness and headaches.      Objective:   Current Vitals : Temp: 98.8 °F (37.1 °C),  Pulse: 96, Resp: 22, BP: (!) 97/50, SpO2: 97 %  Last 24 Hrs Vitals   Patient Vitals for the past 24 hrs:   BP Temp Temp src Pulse Resp SpO2   10/12/17 0703 (!) 97/50 - - 96 22 97 %   10/12/17 0603 (!) 107/57 - - 108 23 97 %   10/12/17 0503 (!) 100/51 - - 104 21 98 %   10/12/17 0403 (!) 91/50 98.8 °F (37.1 °C) - 99 24 96 %   10/12/17 0303 (!) 109/56 - - 127 (!) 31 96 %   10/12/17 0203 (!) 133/110 - - 135 (!) 35 95 %   10/12/17 0103 112/60 - - 117 (!) 31 95 %   10/12/17 0003 128/68 99 °F (37.2 °C) - 116 (!) 31 91 %   10/11/17 2303 (!) 126/50 - - 110 (!) 31 92 %   10/11/17 2203 (!) 124/53 - - 106 26 97 %   10/11/17 2103 110/69 - - 104 24 99 %   10/11/17 2003 (!) 111/57 98.8 °F (37.1 °C) - 104 25 99 %   10/11/17 1902 101/77 - - 101 - 99 %   10/11/17 1802 (!) 116/52 - - 104 - 99 %   10/11/17 1702 (!) 111/55 - - 101 23 99 %   10/11/17 1602 (!) 98/58 97 °F (36.1 °C) Oral 102 26 99 %   10/11/17 1503 110/60 - - 105 25 99 %   10/11/17 1445 (!) 117/51 - - 104 25 99 %   10/11/17 1302 (!) 116/51 - - 106 24 97 %   10/11/17 1202 (!) 95/55 97.9 °F (36.6 °C) Oral 106 26 96 %   10/11/17 1141 (!) 121/52 - - 108 23 94 %   10/11/17 1002 (!) 111/40 - - 102 23 99 %   10/11/17 0902 (!) 122/50 - - 108 24 96 %   10/11/17 0802 (!) 56/38 97.7 °F (36.5 °C) Oral 93 24 96 %     Intake / output   10/11 0701 - 10/12 0700  In: 2440 [I.V.:2440]  Out: 768 [Urine:768]  Physical Exam:  Physical Exam   Constitutional: She is oriented to person, place, and time. She appears well-developed and well-nourished. No distress. HENT:   Mouth/Throat: No oropharyngeal exudate. Eyes: Conjunctivae are normal. Pupils are equal, round, and reactive to light. No scleral icterus. Neck: No JVD present. No thyromegaly present. Cardiovascular: Normal rate, regular rhythm and normal heart sounds. Exam reveals no gallop and no friction rub. No murmur heard. Pulmonary/Chest: Effort normal. No respiratory distress. She has no wheezes. She has no rales. Abdominal: Soft. Bowel sounds are normal. She exhibits no distension and no mass. There is no tenderness. There is no rebound and no guarding. Lymphadenopathy:     She has no cervical adenopathy. Neurological: She is alert and oriented to person, place, and time. No cranial nerve deficit. She exhibits normal muscle tone. Left facial droop, left upper extremity weakness   Skin: She is not diaphoretic. Nursing note and vitals reviewed.     Lower Extremities : No ankle Edema , No calf Tenderness     Laboratory findings:    Recent Labs      10/09/17   2340  10/10/17   1806  10/11/17   0539   WBC  13.9*  27.6*  18.5*   HGB  8.9*  8.4*  7.7*   HCT  28.7*  25.7*  24.0*   PLT  351  266  256     Recent Labs      10/09/17   2340  10/10/17   1806  10/11/17   0539   NA  136  138  140   K  4.2  3.3*  3.3*   CL  98  103  108*   CO2  18*  20  20   GLUCOSE  199*  190*  91   BUN  64*  47*  36*   CREATININE  1.74*  1.55*  1.16*   CALCIUM  8.8  7.7*  7.2*     Recent Labs      10/09/17   2340  10/10/17   1443  10/10/17   1806   PROT  6.5   --   5.4*   LABALBU  3.8   --   2.8*   LABA1C   --   6.2*   --    AST  15   --   19   ALT  7   --   10   ALKPHOS  55   --   38   BILITOT  0.32   --   0.28*   BILIDIR  0.11   --    --    AMYLASE  70   --    --    LIPASE  70*   --    --    CHOL   --   73   --    HDL   --   39*   --    LDLCHOLESTEROL   --   21   --    CHOLHDLRATIO   --   1.9   -- TRIG   --   66   --    VLDL   --   NOT REPORTED   --           Specific Gravity, UA   Date Value Ref Range Status   10/10/2017 1.010 1.005 - 1.030 Final     Protein, UA   Date Value Ref Range Status   10/10/2017 1+ (A) NEG Final     RBC, UA   Date Value Ref Range Status   10/10/2017 50  0 - 2 /HPF Final     Bacteria, UA   Date Value Ref Range Status   10/10/2017 MODERATE (A) NONE Final     Nitrite, Urine   Date Value Ref Range Status   10/10/2017 NEGATIVE NEG Final     WBC, UA   Date Value Ref Range Status   10/10/2017 50  0 - 5 /HPF Final     Leukocyte Esterase, Urine   Date Value Ref Range Status   10/10/2017 MODERATE (A) NEG Final     Comment:     Performed at [address] [facility] and [facility], [facility] [address], [address], [address]     MRI brain without contrast 10/10/17  1. There are multifocal acute infarcts noted in the right middle cerebral   artery vascular distribution.  These involve the right pre and postcentral   gyri, likely accounting for the patient's left-sided neurologic deficits. 2. Chronic infarcts are noted in the occipital lobes and left cerebellar   hemisphere. 3. Cerebral and cerebellar parenchymal volume loss with minimal chronic   microvascular white matter ischemic disease. MRA neck without contrast 10/11/17  Flow limiting stenosis in the proximal right internal carotid artery. MRA Head 10/11/17  Study limited by artifact, within this limit, there is apparent diminished   flow in the right ICA and MCA.       Short-segment focal stenosis P1 segment left PCA.      CT abdomen and pelvis 10/10/17   Acute left obstructive uropathy secondary to a 6- 7 mm calculus in the   proximal ureter just distal to the UPJ.       There are multiple bilateral nonobstructing renal calculi measuring up to 1   cm in the lower pole of the left kidney.       Small amount of air within the urinary bladder likely related to recent   catheterization.  Clinical correlation is recommended. Clinical Course : stable  Assessment and Plan      1. Sepsis due to UTI and left pyelonephritis - status post left stent Placement 10/10/17. Continue antibiotics. Hassan cathter management per urology. No growth on culture . Cipro for 7 days   2. Bilateral kidney stones left 1 cm  Obstructive uropathy   - s/p stent . .   3. Type 2 diabetes mellitus - continue Lantus. Metformin on hold. 4. Multiple right MCA infarct - continue aspirin 81 mg and Eliquis , Lipitor. likely embolic- . Blood pressure running low. Hold on rate control medication and antihypertensive medications. 5. Right ICA stenosis-carotid Doppler pending . Diagnostic angio today   6. PAF - in NSR - continue Eliquis. Follow after angio   Continue to monitor vitals , Intake / output ,  Cell count , HGB , Kidney function, oxygenation  as indicated . Plan and updates discussed with patient ,  answers  explained to satisfaction.    Plan discussed with Staff edd CLEMENTS     (Please note that portions of this note were completed with a voice recognition program. Efforts were made to edit the dictations but occasionally words are mis-transcribed.)      Basil Cerda MD  10/12/2017

## 2017-10-12 NOTE — PROGRESS NOTES
Neuroendovascular surgery consult    Pt Name: Cheyanne Cooney  MRN: [id number]  [place]: 1950  Date of evaluation: 10/12/2017  Primary Care Physician: Gregory Lei DO  Reason for evaluation: stroke    SUBJECTIVE:   History of Chief Complaint:    73 yo female with PMH of obesity, DM, HTN, CKD, stroke(with no residual symptoms), A fib and PE who wad admitted today for left kidney stone and underwent left side ureteral stent placement. She developed left hemibody weakness for which neurology was consulted. MRI brain showed right MCA infarcts for which stroke team was consulted. She denies any previous strokes. She denies loss of vision/loss of consciousness or seizure like activity. Allergies  is allergic to iv contrast [iodides]. Medications Scheduled Meds:   predniSONE  50 mg Oral Once    diphenhydrAMINE  50 mg Oral Once    polyethylene glycol  17 g Oral Daily    insulin glargine  40 Units Subcutaneous Nightly    clopidogrel  75 mg Oral Daily    atorvastatin  40 mg Oral Daily    calcium carbonate  1 tablet Oral Daily    famotidine  40 mg Oral Daily    therapeutic multivitamin-minerals  1 tablet Oral Daily    pantoprazole  40 mg Oral Daily    PARoxetine  10 mg Oral QAM    potassium chloride  20 mEq Oral BID    sodium chloride flush  10 mL Intravenous 2 times per day    tamsulosin  0.4 mg Oral Daily    insulin lispro  0-12 Units Subcutaneous TID WC    insulin lispro  0-6 Units Subcutaneous Nightly    influenza virus vaccine  0.5 mL Intramuscular Once    sodium chloride  500 mL Intravenous Once    aspirin  81 mg Oral Daily    apixaban  5 mg Oral Daily    cefepime  1 g Intravenous Q12H     Continuous Infusions:   sodium chloride 150 mL/hr at 10/10/17 0521    dextrose       PRN Meds:. LORazepam, sodium chloride flush, acetaminophen, HYDROcodone 5 mg - acetaminophen **OR** HYDROcodone 5 mg - acetaminophen, magnesium hydroxide, bisacodyl, ondansetron, nicotine, HYDROmorphone **OR** HYDROmorphone, glucose, dextrose, glucagon (rDNA), dextrose    OBJECTIVE:   Vitals: BP (!) 107/57   Pulse 108   Temp 98.8 °F (37.1 °C)   Resp 23   Ht 5' 2\" (1.575 m)   Wt 232 lb 11.2 oz (105.6 kg)   SpO2 97%   BMI 42.56 kg/m²     On exam today:   Patient is AAO x3, following commands. CN II-XII significant for left facial droop, pupils 2 mm reactive to light bilaterally. Speech: mild aphasia, mild to moderate dysarthria  Normal tone in four extremities. Sensory: diminished sensation to light touch and pain on left hemibody  Muscle strength is : 5/5 right hemibody  Left shoulder: -4/5, left biceps/triceps -4/5, unable to move left fingers  Left leg: +4/5  DTRs : +1 in bilateral biceps and knees. Cerebellar exam: No nystagmus. Lungs sounds clear to auscultation bilaterally. Heart exam: normal S1,S2 sounds,RRR,no murmers. Abdomen was soft, NT,ND with positive bowel sounds. Normal bilateral radial and pedal pulses. LABS:     CBC:   Lab Results   Component Value Date    WBC 18.5 10/11/2017    RBC 2.67 10/11/2017    HGB 7.7 10/11/2017    HCT 24.0 10/11/2017    MCV 89.9 10/11/2017    MCH 28.7 10/11/2017    MCHC 31.9 10/11/2017    RDW 19.4 10/11/2017     10/11/2017    MPV 7.9 10/11/2017     BMP:    Lab Results   Component Value Date     10/11/2017    K 3.3 10/11/2017     10/11/2017    CO2 20 10/11/2017    BUN 36 10/11/2017    LABALBU 2.8 10/10/2017    CREATININE 1.16 10/11/2017    CALCIUM 7.2 10/11/2017    GFRAA 56 10/11/2017    LABGLOM 46 10/11/2017    GLUCOSE 91 10/11/2017     IMPRESSIONS:     73 yo left handed female with PMH of obesity, DM, HTN, CKD, stroke(with no residual symptoms), A fib and PE who wad admitted for left kidney stone and underwent left side ureteral stent placement. During the work up of her right MCA stroke she was found to have right  ICA stenosis for which we were consulted.      PLANS:     Right MCA acute stroke  Right ICA stenosis  Conventional angiogram

## 2017-10-12 NOTE — PLAN OF CARE
Problem: Falls - Risk of  Goal: Absence of falls  Outcome: Met This Shift  No falls overnight. Problem: Risk for Impaired Skin Integrity  Goal: Tissue integrity - skin and mucous membranes  Structural intactness and normal physiological function of skin and  mucous membranes. Outcome: Ongoing  No new problems identified with skin or mucous membranes this shift. Problem: NEUROLOGICAL DEFICIT  Goal: Neurological status is stable or improving  Outcome: Ongoing  Neurological status remained stable overnight.

## 2017-10-12 NOTE — ANESTHESIA POSTPROCEDURE EVALUATION
Department of Anesthesiology  Postprocedure Note    Patient: Ashley Joya  MRN: [id number]  YOB: 1950  Date of evaluation: 10/12/2017  Time:  6:01 PM     Procedure Summary     Date:  10/12/17 Room / Location:  [facility] Special Procedures    Anesthesia Start:  0803 Anesthesia Stop:  5911    Procedure:  IR ANGIOGRAM CAROTID CEREBRAL BILATERAL Diagnosis:  (carotid stent)    Scheduled Providers:  Марина Badillo CRNA Responsible Provider:  Dannielle Grubbs MD    Anesthesia Type:  general, MAC ASA Status:  4          Anesthesia Type: general, MAC    Abhishek Phase I: Abhishek Score: 9    Abhishek Phase II:      Last vitals: Reviewed and per EMR flowsheets. Anesthesia Post Evaluation    Patient location during evaluation: ICU  Patient participation: complete - patient participated  Level of consciousness: awake and lethargic (baseline)  Pain score: 2  Airway patency: patent  Nausea & Vomiting: no nausea and no vomiting  Complications: no  Cardiovascular status: vasoactive/inotropes and hemodynamically stable (Phenylephrine gtt running at 125 mcg/hr.)  Respiratory status: face mask (8 liters/min)  Hydration status: stable (suspect pt increasinly anemic.   Nursing drawing and sendng stat labs for H/H.)

## 2017-10-12 NOTE — PROGRESS NOTES
NEUROLOGY INPATIENT PROGRESS NOTE    10/12/2017         Subjective: Ashley Joya is a  74 y.o. female admitted on 10/9/2017 with Ureterolithiasis [N20.1]      Briefly, this is a  74 y.o. female admitted on 10/9/2017 [facility] and vomiting starting 2-3 hours PTA. CT A/P showed an acute left obstructive uropathy w/ 6-7 mm calculus in the proximal ureter just distal to the UPJ. Was found to have a leukocytosis and UTI. Patient was started on antibiotics and was transferred to our facility. Today she was tachycardic and hypotensive, meeting sepsis criteria. Per family, they noticed patient was weak early this morning. Specifically LUE weakness and gait disturbance. Also noted small facial droop. Uncertain if it started last night or this morning. Given patient's clinical status she was taken for ureteral stent placement first and then taken to the MRI. MRI showed multifocal acute infarcts in the R MCA distribution though within a relative narrow zone. There were also chronic infarcts seen in the occipital lobes and left cerebellar hemisphere. She was seen by neuro and EVNS. Decision was made to transfer the patient to the Neuro ICU for closer monitoring. Pt unable to get gadolinium for MRA neck due to 1.7 creatinine, and was not given IV contrast for CTA due to Hx/o dye allergy. Unenhanced neck MRA indicated possible severe R ICA stenosis. Ureteral stone removed during stent placement 10/10 reportedly appeared as though it might be infected; 11 hour BCx2 (1800 10/10) no growth. There is concern over possible sepsis and SBE, this concern magnified by scattered MRI acute infarct pattern (described above). Pt is on cefipime, but 10/10 was also on LQ and Rocephin.     Within the last year the patient has had a stroke apparently in [place] where she was in [facility] that the based on review of her current brain MRI which shows bilateral occipital lobe Take 40 mg by mouth daily      PARoxetine (PAXIL) 10 MG tablet Take 10 mg by mouth every morning      POTASSIUM CHLORIDE PO Take 20 mEq by mouth daily       glipiZIDE (GLUCOTROL) 10 MG tablet Take 10 mg by mouth 2 times daily (before meals)         Allergies: Ashley Joya is allergic to iv contrast [iodides].     Past Medical History:   Diagnosis Date    [facility]     Diabetes mellitus ([facility]     [facility]        Past Surgical History:   Procedure Laterality Date    CHOLECYSTECTOMY      CYSTOSCOPY Left 10/10/2017    CYSTOSCOPY, STENT INSERTION performed by Brandy Rojas MD at [address]             Medications:     predniSONE  50 mg Oral Once    diphenhydrAMINE  50 mg Oral Once    polyethylene glycol  17 g Oral Daily    insulin glargine  40 Units Subcutaneous Nightly    atorvastatin  40 mg Oral Daily    calcium carbonate  1 tablet Oral Daily    famotidine  40 mg Oral Daily    therapeutic multivitamin-minerals  1 tablet Oral Daily    pantoprazole  40 mg Oral Daily    PARoxetine  10 mg Oral QAM    potassium chloride  20 mEq Oral BID    sodium chloride flush  10 mL Intravenous 2 times per day    tamsulosin  0.4 mg Oral Daily    insulin lispro  0-12 Units Subcutaneous TID WC    insulin lispro  0-6 Units Subcutaneous Nightly    influenza virus vaccine  0.5 mL Intramuscular Once    sodium chloride  500 mL Intravenous Once    aspirin  81 mg Oral Daily    apixaban  5 mg Oral Daily    cefepime  1 g Intravenous Q12H     PRN Meds include: LORazepam, sodium chloride flush, acetaminophen, HYDROcodone 5 mg - acetaminophen **OR** HYDROcodone 5 mg - acetaminophen, magnesium hydroxide, bisacodyl, ondansetron, nicotine, HYDROmorphone **OR** HYDROmorphone, glucose, dextrose, glucagon (rDNA), dextrose    Objective:   BP (!) 97/50   Pulse 96   Temp 98.8 °F (37.1 °C)   Resp 22   Ht 5' 2\" (1.575 m)   Wt 232 lb 11.2 oz (105.6 kg)   SpO2 97% visualization    Indications:CVA. Comments:Ordering Physician: Dr. Dimas Angel Lobar    Critical Result: RN @ 12:30    History / Tech. Comments:  Procedure explained to patient. HTN, Atrial Fibrillation    Patient Status: Inpatient    Height: 62 inches Weight: 232.01 pounds BSA: 2.04 m^2 BMI: 42.43 kg/m^2    CONCLUSIONS    Summary  Left ventricle is moderately enlarged, global left ventricular systolic  function is normal, inferior and lateral hypokinesis is noted. Estimated  ejection fraction is 50-55%  Evidence of diastolic dysfunction. Right ventricular dilatation with reduced systolic function. Bi-atrial enlargement. Moderate mitral regurgitation due to papillary muscle dysfunction. Mild tricuspid regurgitation. Estimated right ventricular systolic pressure is 40 mmHg. Ct Abdomen Pelvis Wo Iv Contrast    Result Date: 10/10/2017  EXAMINATION: CT OF THE ABDOMEN AND PELVIS WITHOUT CONTRAST 10/10/2017 12:57 am TECHNIQUE: CT of the abdomen and pelvis was performed without the administration of intravenous contrast. Multiplanar reformatted images are provided for review. Dose modulation, iterative reconstruction, and/or weight based adjustment of the mA/kV was utilized to reduce the radiation dose to as low as reasonably achievable. COMPARISON: None. HISTORY: ORDERING SYSTEM PROVIDED HISTORY: vomiting TECHNOLOGIST PROVIDED HISTORY: Ordering Physician Provided Reason for Exam: C/o vomitting x 2-3 hours pta and nausea. Denies diarrhea or constipation. Acuity: Acute Type of Exam: Initial Relevant Medical/Surgical History: Hx xiao, diabetes, hernia repair FINDINGS: Lower Chest: The lung bases are clear. The heart is borderline enlarged. Calcification in the region of the mitral annulus. There is trace pericardial fluid. Organs: Well-circumscribed low-attenuation lesions in the superior aspect of the liver measuring up to 1 cm, likely small cysts. The gallbladder is surgically absent.   The spleen, pancreas and am TECHNIQUE: MRA of the head was performed utilizing time-of-flight imaging with MIP images. No intravenous contrast was administered. COMPARISON: None HISTORY: ORDERING SYSTEM PROVIDED HISTORY: MCA stroke, evaluate vasculature FINDINGS: ANTERIOR CIRCULATION: On the right, there is apparent diminished flow in the cavernous and supraclinoid portion of the internal carotid artery. There is extensive artifact at the level of the MCA although flow may be diminished as well. SUNDEEP is not well-visualized. On the left, artifact is present although there is apparent flow in the ICA, and MCA. Sis. POSTERIOR CIRCULATION: There is a short segment moderate stenosis of P1 segment left posterior cerebral artery. Posterior cerebral arteries appear otherwise patent. . The vertebral and basilar arteries appear unremarkable. ANEURYSM: No intracranial aneurysm is seen. Study limited by artifact, within this limit, there is apparent diminished flow in the right ICA and MCA. Short-segment focal stenosis P1 segment left PCA. Mra Neck Wo Contrast    Result Date: 10/11/2017  EXAMINATION: MRA OF THE NECK WITHOUT CONTRAST 10/11/2017 3:38 am TECHNIQUE: Multiplanar multisequence MRA of the neck was performed without the administration of intravenous contrast. Stenosis of the internal carotid arteries measured using NASCET criteria. COMPARISON: None. HISTORY: ORDERING SYSTEM PROVIDED HISTORY: Evaluate vasculature FINDINGS: AORTIC ARCH/GREAT VESSELS: There is a normal branch pattern of the aortic arch. No significant stenosis is seen of the innominate artery or subclavian arteries. CAROTID ARTERIES: On the right, there is diffusely diminished flow throughout the internal carotid artery cervical segment from its origin through the skullbase. The right common carotid artery appears widely patent. On the left, the common and internal carotid artery are normal in signal and caliber.   Is difficult to estimate the degree of stenosis by NASCET criteria, of the right internal carotid artery within estimate is 70-80%. VERTEBRAL ARTERIES: The vertebral arteries both arise from the subclavian arteries and are normal in caliber without evidence of flow limiting stenosis. Flow limiting stenosis in the proximal right internal carotid artery. Mri Brain Wo Contrast    Result Date: 10/10/2017  EXAMINATION: MRI OF THE BRAIN WITHOUT CONTRAST  10/10/2017 12:33 pm TECHNIQUE: Multiplanar multisequence MRI of the brain was performed without the administration of intravenous contrast. COMPARISON: None HISTORY: ORDERING SYSTEM PROVIDED HISTORY: FACIAL MUSCLE WEAKNESS/PARALYSIS Initial evaluation. FINDINGS: INTRACRANIAL STRUCTURES/VENTRICLES: There is an area of increased diffusion signal noted along the left choroid plexus, likely related to calcification. There are multifocal areas of restricted diffusion, primarily noted in the posterior right frontal lobe, as well as along the right pre and postcentral gyri. This is likely accounting for the patient's facial muscle weakness. This is in the vascular distribution of the right middle cerebral artery. The cerebral and cerebellar parenchyma demonstrate volume loss. There are areas of encephalomalacia along the medial aspects of the occipital lobes. There are scattered areas of increased T2 signal noted supratentorially which are compatible with minimal chronic microvascular white matter ischemic disease. There is a small chronic infarct noted in the left cerebellar hemisphere. There are no abnormal extra-axial fluid collections. The ventricles are proportional to the cerebral sulci. Normal major intracranial flow voids are noted. There are no areas of blooming artifact noted on the gradient echo sequences to suggest sequela of acute or chronic hemorrhage. ORBITS: Lens implants from prior cataract surgery are noted. The orbits are otherwise unremarkable.  SINUSES: There is scattered paranasal sinus disease MRA suggests severe right ICA stenosis (ipsilateral to stroke). She needs to have angiography done at least with CTA and given the limited contrast allergy described above, this could be done with premedication. This was discussed with neuro intensivist just now. Blood cultures are negative after 2 days and transthoracic echocardiogram shows no evidence for valvular vegetations nor other potential cardioembolic source. As neither of these indicate a reasonable possibility of SBE, this entity does not need to be  ruled out with a LO. Cervical and cerebral angiogram is planned for today. Patient seen with daughter-in-law. Case discussed with neuro intensivist.  Once the angiogram is done and the groin puncture site is stable, Eliquis will request will be restarted. Critical care time 45 minutes.       Jack Cramer M.D.

## 2017-10-12 NOTE — PLAN OF CARE
Problem: Falls - Risk of  Goal: Absence of falls  Outcome: Met This Shift  Patient remains free from falls this shift. Fall precautions in place. Falling star posted. Pt is high fall risk. Pt is alert, but disoriented. Bed alarm activated. Family at bedside. Bed is locked and in lowest position. Call light within reach and patient uses appropriately. Non skid footwear applied. Room free from clutter. Will continue to monitor with hourly rounding. Problem: Pain:  Goal: Pain level will decrease  Pain level will decrease   Outcome: Ongoing  PRN pain medication given as ordered for pain. Patient satisfied with current pain medication and states a decrease in 0-10 pain level after each administration.

## 2017-10-12 NOTE — PROGRESS NOTES
Neuro Critical Care Daily Progress Note    Patient Name: Amira Schwab  Patient : 1950  Room/Bed: [facility]/0527-  Allergies: Allergies   Allergen Reactions    Iv Contrast [Iodides] Other (See Comments)     unknown       Admission History:  Patient is a 74year old lady admitted for management of kidney stone and developed left sided weakness. Hospital Course:  Patient found to have a R MCA territory infarction--    No acute events overnight. Patients neuro exam and hemodynamics have remained stable. Patients only concern is nervousness for her angiography today.      Current Medications:  Scheduled Meds:   polyethylene glycol  17 g Oral Daily    insulin glargine  40 Units Subcutaneous Nightly    atorvastatin  40 mg Oral Daily    calcium carbonate  1 tablet Oral Daily    famotidine  40 mg Oral Daily    therapeutic multivitamin-minerals  1 tablet Oral Daily    pantoprazole  40 mg Oral Daily    PARoxetine  10 mg Oral QAM    potassium chloride  20 mEq Oral BID    sodium chloride flush  10 mL Intravenous 2 times per day    tamsulosin  0.4 mg Oral Daily    insulin lispro  0-12 Units Subcutaneous TID WC    insulin lispro  0-6 Units Subcutaneous Nightly    influenza virus vaccine  0.5 mL Intramuscular Once    sodium chloride  500 mL Intravenous Once    aspirin  81 mg Oral Daily    apixaban  5 mg Oral Daily    cefepime  1 g Intravenous Q12H     Continuous Infusions:   sodium chloride 150 mL/hr at 10/12/17 0750    dextrose         Vitals:  Patient Vitals for the past 8 hrs:   BP Temp Temp src Pulse Resp SpO2   10/12/17 1002 (!) 102/54 - - 110 25 98 %   10/12/17 0903 (!) 101/54 - - 95 22 98 %   10/12/17 0801 (!) 114/57 98.5 °F (36.9 °C) Oral 128 (!) 33 94 %   10/12/17 0703 (!) 97/50 - - 96 22 97 %   10/12/17 0603 (!) 107/57 - - 108 23 97 %   10/12/17 0503 (!) 100/51 - - 104 21 98 %     Height and Weight  Height: 5' 2\" (157.5 cm)  Weight: 232 lb 11.2 oz (105.6 kg)  Weight Method: Actual, Bed scale  BSA (Calculated - sq m): 2.15 sq meters  BMI (Calculated): 42.7  Body mass index is 42.56 kg/m². <16 Severe malnutrition  1616.99 Moderate malnutrition  1718.49 Mild malnutrition  18.524.9 Normal  2529.9 Overweight (not obese)  3034.9 Obese class 1 (Low Risk)  3539.9 Obese class 2 (Moderate Risk)  ? 40 Obese class 3 (High Risk)    NEURO: Alert, oriented, mild dysarthria, LUE extensor weakness and more distally, left sided facial asymmetry, visual field defect on the R side. 5/5 muscle strength throughout R side  CVS: Tachycardic, S1 S2, no murmurs. PULMONARY: Clear to auscultation. GI: No tenderness. EXT:  Positive for lymphedema. Assessment/Plan:  Patient is a 74year old  lady admitted with kidney stone and abdominal pain--developed left sided weakness---  R MCA territory infarction.    - Embolic stroke. - MRA reviewed with no significant disease--the quality of study is very poor. - Angiography to be performed today at 1200  - Continue with aspirin and plavix, will begin Elequis after angiography and groin puncture site is stable  - TTE shows preserved EF, likely not source of embolism and Neurology feels patient does not need LO  - Blood cultures negative at 2 days   - Leukocytosis much improved  - Anemia with improving hemoglobin- transfuse if hemoglobin < 7.   - continued to have improved kidney function.  - Hypokalemia resolved  - Continue with IV anti-biotics Cefepime  - Follow FOBT. - Septic parameters improved.  - PT/OT. - Continue close monitoring in ICU. Rhiannon Rosario DO  Neuro Critical Care Team  Pager [phone]  10/12/2017  1:01 PM     Stroke and Neuroscience Intensive Care Attending:    I obtained brief history, examined the patient,reviewed the residents note and agree with the documented findings and plan of care. Any areas of disagreement are noted on the chart.  I agree with the chief complaint, past medical history, past surgical history, allergies, medications, social and family history as documented unless otherwise noted above. Patient is a 74year old  lady admitted to the hospital with urosepsis --admitted and developed left sided weakness and R sided frontal infarct. Alert and oriented, left sided weakness that is extensor and upper motor neuron type Arm > Leg. Lungs are clear to auscultation. CV: S1S2, tachycardia. ABD: Soft, non-tender. EXT: No edema.     - To cerebral angio today. - Continue with IV antibiotics. - Follow FOBT. - Follow CBC and transfuse if less than 7.  - Will continue to follow. Den Garcia [address], Vascular neurology.   [address] [facility] Stroke [place]

## 2017-10-12 NOTE — CONSULTS
this concern magnified by scattered MRI acute infarct pattern (described above). Pt is on cefipime, was also on  Rocephin. Within the last year the patient has had a stroke apparently in [facility] [employer] where she was in [facility] that the based on review by neuro with her current brain MRI which shows bilateral occipital lobe chronic infarctions, and the report from the patient and daughter-in-law that at the time she developed \"tunnel vision\" as the symptom of the stroke; and, during the course of workup a Holter monitor that was done for 3 days showed evidence of paroxysmal atrial fibrillation, and as well in the same general timeframe the patient had a pulmonary embolus, the patient has been maintained on Eliquis. There is also a reported history as noted above of iodinated dye allergy which was the reason why she did not get a contrast CTA of head and neck yesterday. However the daughter-in-law states that the only manifestation of this allergy is that she developed hives. With this in mind she could get iodinated contrast with premedication. Seen by  Neuro-Stroke right middle cerebral artery territory as described above with deficit left upper extremity weakness maximal and severe distally. Blood cultures are negative after 2 days and transthoracic echocardiogram shows no evidence for valvular vegetations nor other potential cardioembolic source. As neither of these indicate a reasonable possibility of SBE, this entity does not need to be  ruled out with a LO. Cervical and cerebral angiogram is planned for today. Once the angiogram is done and the groin puncture site is stable, Eliquis will be restarted.   Neurocrit-R MCA territory infarction- embolic  Uro-s/p cysto/L ureteral stent for septic stone on 10/10, gant cath, will need definitive stone management as an outpatient, cont gant, uro signed off  IM- sepsis due to UTI and left pyelonephritis - antibx, b/l kidney stones per uro, anemia tablet 1 tablet, 1 tablet, Oral, Q4H PRN **OR** HYDROcodone-acetaminophen (NORCO) 5-325 MG per tablet 2 tablet, 2 tablet, Oral, Q4H PRN  magnesium hydroxide (MILK OF MAGNESIA) 400 MG/5ML suspension 30 mL, 30 mL, Oral, Daily PRN  bisacodyl (DULCOLAX) suppository 10 mg, 10 mg, Rectal, Daily PRN  ondansetron (ZOFRAN) injection 4 mg, 4 mg, Intravenous, Q6H PRN  nicotine (NICODERM CQ) 21 MG/24HR 1 patch, 1 patch, Transdermal, Daily PRN  tamsulosin (FLOMAX) capsule 0.4 mg, 0.4 mg, Oral, Daily  HYDROmorphone (DILAUDID) injection 0.25 mg, 0.25 mg, Intravenous, Q3H PRN **OR** HYDROmorphone (DILAUDID) injection 0.5 mg, 0.5 mg, Intravenous, Q3H PRN  insulin lispro (HUMALOG) injection vial 0-12 Units, 0-12 Units, Subcutaneous, TID WC  insulin lispro (HUMALOG) injection vial 0-6 Units, 0-6 Units, Subcutaneous, Nightly  glucose (GLUTOSE) 40 % oral gel 15 g, 15 g, Oral, PRN  dextrose 50 % solution 12.5 g, 12.5 g, Intravenous, PRN  glucagon (rDNA) injection 1 mg, 1 mg, Intramuscular, PRN  dextrose 5 % solution, 100 mL/hr, Intravenous, PRN  influenza quadrivalent split vaccine (FLUZONE;FLUARIX;FLULAVAL;AFLURIA) injection 0.5 mL, 0.5 mL, Intramuscular, Once  0.9 % sodium chloride bolus, 500 mL, Intravenous, Once  aspirin EC tablet 81 mg, 81 mg, Oral, Daily  apixaban (ELIQUIS) tablet 5 mg, 5 mg, Oral, Daily  cefepime (MAXIPIME) IVPB 1 g, 1 g, Intravenous, Q12H    Social History:  Social History     Social History    Marital status:      Spouse name: N/A    Number of children: N/A    Years of education: N/A     Occupational History    Not on file.      Social History Main Topics    Smoking status: Never Smoker    Smokeless tobacco: Never Used    Alcohol use No    Drug use: No    Sexual activity: No     Other Topics Concern    Not on file     Social History Narrative    No narrative on file       Lives with:   Alone but family supportive and will provide support on dc- plan to go to son or daughters home on [address] [facility] setup:   Steps into home 2 . Floors in home:  #1. Bed/bathroom on floor  1. Tobacco none. Ethanol none. Family History:   History reviewed. No pertinent family history. Physical Exam:  BP (!) 97/50   Pulse 96   Temp 98.8 °F (37.1 °C)   Resp 22   Ht 5' 2\" (1.575 m)   Wt 232 lb 11.2 oz (105.6 kg)   SpO2 97%   BMI 42.56 kg/m²   General appearance: alert, appears stated age, cooperative, and no distress- anxious,   Lungs: clear to auscultation bilaterally. Heart: regular rate and rhythm, no murmur. Abdomen: soft, non-tender; bowel sounds normal.  Extremities: extremities normal, atraumatic, no edema, normal tone. Mental status: Alert, orientedX1- in [address] [place] [address], 2017, [place] pres, follow 2 step commands, names objects, world forward and backward, thought content appropriate. Sensory: Intact in BUE and BLE to soft and pin sensation. Motor:LUE distal 1-2/5, prox 3/5, LLE 4/5, R 5/5, L facial droop  +Babinski LEft      Diagnostics:  CBC   Lab Results   Component Value Date    WBC 18.5 10/11/2017    RBC 2.67 10/11/2017    HGB 7.7 10/11/2017    HCT 24.0 10/11/2017    MCV 89.9 10/11/2017    RDW 19.4 10/11/2017     10/11/2017     BMP    Lab Results   Component Value Date     10/11/2017    K 3.3 10/11/2017     10/11/2017    CO2 20 10/11/2017    BUN 36 10/11/2017     Uric Acid  No components found for: URIC  VITAMIN B12 No components found for: B12  PT/INR  No results found for: "[employer]    Radiology:     MRA neck 10/11/17  Flow limiting stenosis in the proximal right internal carotid artery. 10/10/17 MRI brain  Impression:        1. There are multifocal acute infarcts noted in the right middle cerebral  artery vascular distribution.  These involve the right pre and postcentral  gyri, likely accounting for the patient's left-sided neurologic deficits. 2. Chronic infarcts are noted in the occipital lobes and left cerebellar  hemisphere.   3. Cerebral and cerebellar parenchymal volume loss with

## 2017-10-12 NOTE — PROGRESS NOTES
Stent in good position per intraop images. Patient stable from  perspective. Hassan catheter in and draining well. Urology will sign off for now. Please call with any questions/concerns. Patient will need outpatient follow up for definitive stone management. Thank you for the consult.

## 2017-10-12 NOTE — PROGRESS NOTES
Physical Therapy  DATE: 10/12/2017    NAME: Ramana Barker  MRN: [id number]   : 1950    Patient not seen this date for Physical Therapy due to:  [] Blood transfusion in progress  [] Hemodialysis  []  Patient Declined  [] Spine Precautions   [] Strict Bedrest  [x] Surgery/ Procedure- Per RN, pt currently in surgery, will check back tomorrow, 10/13  [] Testing      [] Other        [] PT being discontinued at this time. Patient independent. No further needs. [] PT being discontinued at this time as the patient has been transferred to palliative care. No further needs. Marylene Pound, SPT  Evaluation/treatment performed by Student PT under the supervision of co-signing PT who agrees with all evaluation/treatment and documentation.

## 2017-10-12 NOTE — ANESTHESIA PRE PROCEDURE
Department of Anesthesiology  Preprocedure Note       Name:  Pineda Esposito   Age:  74 y.o.  :  1950                                          MRN:  [id number]         Date:  10/12/2017      Surgeon: * No surgeons listed *    Procedure: * No procedures listed *    Medications prior to admission:   Prior to Admission medications    Medication Sig Start Date End Date Taking?  Authorizing Provider   mycophenolate (CELLCEPT) 500 MG tablet Take 1,500 mg by mouth 2 times daily    Historical Provider, MD   Multiple Vitamins-Minerals (THERAPEUTIC MULTIVITAMIN-MINERALS) tablet Take 1 tablet by mouth daily    Historical Provider, MD   ondansetron (ZOFRAN ODT) 4 MG disintegrating tablet Take 1 tablet by mouth every 8 hours as needed for Nausea 17   Don Hills MD   amLODIPine (NORVASC) 5 MG tablet Take 5 mg by mouth daily    Historical Provider, MD   atorvastatin (LIPITOR) 40 MG tablet Take 40 mg by mouth daily    Historical Provider, MD   calcium carbonate 600 MG TABS tablet Take 1 tablet by mouth daily    Historical Provider, MD   carvedilol (COREG) 6.25 MG tablet Take 6.25 mg by mouth 2 times daily (with meals)    Historical Provider, MD   apixaban (ELIQUIS) 5 MG TABS tablet Take by mouth 2 times daily    Historical Provider, MD   famotidine (PEPCID) 40 MG tablet Take 40 mg by mouth daily    Historical Provider, MD   furosemide (LASIX) 40 MG tablet Take 40 mg by mouth 2 times daily    Historical Provider, MD   glipiZIDE (GLUCOTROL) 10 MG tablet Take 10 mg by mouth 2 times daily (before meals)    Historical Provider, MD   Insulin Glargine (LANTUS SC) Inject 40 Units into the skin nightly     Historical Provider, MD   losartan-hydrochlorothiazide (HYZAAR) 100-25 MG per tablet Take 1 tablet by mouth daily    Historical Provider, MD   metFORMIN (GLUCOPHAGE) 500 MG tablet Take 500 mg by mouth 2 times daily (with meals)    Historical Provider, MD   pantoprazole (PROTONIX) 40 MG tablet Take 40 mg by mouth daily MD   81 mg at 10/12/17 0801    apixaban (ELIQUIS) tablet 5 mg  5 mg Oral Daily Isaiah Boogie MD   Stopped at 10/12/17 0900    cefepime (MAXIPIME) IVPB 1 g  1 g Intravenous Q12H Missy Crouch MD   Stopped at 10/12/17 9246       Allergies:     Allergies   Allergen Reactions    Iv Contrast [Iodides] Other (See Comments)     unknown       Problem List:    Patient Active Problem List   Diagnosis Code    Hydronephrosis of left kidney N13.30    Acute cystitis with hematuria N30.01    CKD (chronic kidney disease), stage III N18.3    Hypotension arterial I95.9    Essential hypertension I10    Morbid obesity with BMI of 40.0-44.9, adult (HCC) E66.01, Z68.41    History of arterial ischemic stroke Z86.73    History of pulmonary embolus (PE) Z86.711    Paroxysmal atrial fibrillation (HCC) I48.0    Left ureteral stone N20.1    Left-sided weakness R53.1    Non-intractable vomiting with nausea R11.2    Cerebrovascular accident (CVA) due to embolism of right middle cerebral artery (HCC) I63.411    Hypotension I95.9    Sepsis secondary to UTI ([facility] A41.9, N39.0    Elevated serum creatinine R79.89    Kidney stone N20.0    Anemia D64.9    Hypokalemia E87.6    Acute ischemic right MCA stroke ([facility]) I63.511       Past Medical History:        Diagnosis Date    CVA (cerebral vascular accident) ([facility]     Diabetes mellitus ([facility]     PE (pulmonary thromboembolism) ([facility]        Past Surgical History:        Procedure Laterality Date    CHOLECYSTECTOMY      CYSTOSCOPY Left 10/10/2017    CYSTOSCOPY, STENT INSERTION performed by Andrew Marin MD at [address] [facility]         Social History:    Social History   Substance Use Topics    Smoking status: Never Smoker    Smokeless tobacco: Never Used    Alcohol use No                                Counseling given: Not Answered      Vital Signs (Current):   Vitals:    10/12/17 1106   BP: (!) 115/55   Pulse: 110   Resp: 18   Temp: 99.5 °F (37.5 °C)   TempSrc: Temporal                                              BP Readings from Last 3 Encounters:   10/12/17 (!) 102/54   10/12/17 (!) 115/55   10/10/17 (!) 109/53       NPO Status: Time of last liquid consumption: 1800                        Time of last solid consumption: 1800                        Date of last liquid consumption: 10/11/17                        Date of last solid food consumption: 10/11/17    BMI:   Wt Readings from Last 3 Encounters:   10/10/17 232 lb 11.2 oz (105.6 kg)   08/13/17 260 lb (117.9 kg)     There is no height or weight on file to calculate BMI.    CBC:   Lab Results   Component Value Date    WBC 11.1 10/12/2017    RBC 2.86 10/12/2017    HGB 8.2 10/12/2017    HCT 25.5 10/12/2017    MCV 89.1 10/12/2017    RDW 18.9 10/12/2017     10/12/2017       CMP:   Lab Results   Component Value Date     10/12/2017    K 4.6 10/12/2017     10/12/2017    CO2 17 10/12/2017    BUN 20 10/12/2017    CREATININE 0.88 10/12/2017    GFRAA >60 10/12/2017    LABGLOM >60 10/12/2017    GLUCOSE 228 10/12/2017    PROT 5.4 10/10/2017    CALCIUM 7.3 10/12/2017    BILITOT 0.28 10/10/2017    ALKPHOS 38 10/10/2017    AST 19 10/10/2017    ALT 10 10/10/2017       POC Tests:   Recent Labs      10/12/17   0753   POCGLU  152*       Coags: No results found for: PROTIME, INR, APTT    HCG (If Applicable): No results found for: PREGTESTUR, PREGSERUM, HCG, HCGQUANT     ABGs: No results found for: PHART, PO2ART, DGV7OXN, PCU3NLD, BEART, E2FICLLI     Type & Screen (If Applicable):  No results found for: LABABO, [address]    Anesthesia Evaluation  Patient summary reviewed and Nursing notes reviewed no history of anesthetic complications:   Airway: Mallampati: III  TM distance: >3 FB   Neck ROM: full  Mouth opening: < 3 FB Dental:          Pulmonary:negative ROS and normal exam                               Cardiovascular:    (+) hypertension:, dysrhythmias: atrial fibrillation,                   Neuro/Psych:   (+) CVA: residual symptoms,             GI/Hepatic/Renal:   (+) renal disease: CRI,           Endo/Other:                     Abdominal:           Vascular:   + PVD, aortic or cerebral, PE. Anesthesia Plan      general and MAC     ASA 4       Induction: intravenous. arterial line  MIPS: Prophylactic antiemetics administered. Anesthetic plan and risks discussed with healthcare power of  and child/children.       Plan discussed with CRNA and surgical team.                  Nithin Riley MD   10/12/2017

## 2017-10-13 LAB
BLOOD BANK SPECIMEN: NORMAL
COLLAGEN ADENOSINE-5'-DIPHOSPHATE (ADP) TIME: 105 SEC (ref 67–112)
COLLAGEN EPINEPHRINE TIME: 114 SEC (ref 85–172)
FERRITIN: 180 UG/L (ref 13–150)
GLUCOSE BLD-MCNC: 129 MG/DL (ref 65–105)
GLUCOSE BLD-MCNC: 163 MG/DL (ref 65–105)
GLUCOSE BLD-MCNC: 217 MG/DL (ref 65–105)
GLUCOSE BLD-MCNC: 247 MG/DL (ref 65–105)
HCT VFR BLD CALC: 20.6 % (ref 36–46)
HCT VFR BLD CALC: 26.9 % (ref 36–46)
HEMOGLOBIN: 6.5 G/DL (ref 12–16)
HEMOGLOBIN: 8.8 G/DL (ref 12–16)
IRON SATURATION: 17 % (ref 20–55)
IRON: 25 UG/DL (ref 37–145)
PLATELET FUNCTION INTERP: NORMAL
TOTAL IRON BINDING CAPACITY: 148 UG/DL (ref 250–450)
UNSATURATED IRON BINDING CAPACITY: 123 UG/DL (ref 112–347)

## 2017-10-13 PROCEDURE — 6370000000 HC RX 637 (ALT 250 FOR IP): Performed by: FAMILY MEDICINE

## 2017-10-13 PROCEDURE — 86920 COMPATIBILITY TEST SPIN: CPT

## 2017-10-13 PROCEDURE — 99232 SBSQ HOSP IP/OBS MODERATE 35: CPT | Performed by: PHYSICAL MEDICINE & REHABILITATION

## 2017-10-13 PROCEDURE — 85576 BLOOD PLATELET AGGREGATION: CPT

## 2017-10-13 PROCEDURE — 36430 TRANSFUSION BLD/BLD COMPNT: CPT

## 2017-10-13 PROCEDURE — 86901 BLOOD TYPING SEROLOGIC RH(D): CPT

## 2017-10-13 PROCEDURE — 36415 COLL VENOUS BLD VENIPUNCTURE: CPT

## 2017-10-13 PROCEDURE — 82947 ASSAY GLUCOSE BLOOD QUANT: CPT

## 2017-10-13 PROCEDURE — 86850 RBC ANTIBODY SCREEN: CPT

## 2017-10-13 PROCEDURE — 2580000003 HC RX 258: Performed by: EMERGENCY MEDICINE

## 2017-10-13 PROCEDURE — P9016 RBC LEUKOCYTES REDUCED: HCPCS

## 2017-10-13 PROCEDURE — 51798 US URINE CAPACITY MEASURE: CPT

## 2017-10-13 PROCEDURE — 6370000000 HC RX 637 (ALT 250 FOR IP): Performed by: NURSE PRACTITIONER

## 2017-10-13 PROCEDURE — 97532 HC COGNITIVE THERAPY 15 MIN: CPT

## 2017-10-13 PROCEDURE — 6370000000 HC RX 637 (ALT 250 FOR IP): Performed by: PSYCHIATRY & NEUROLOGY

## 2017-10-13 PROCEDURE — 99233 SBSQ HOSP IP/OBS HIGH 50: CPT | Performed by: PSYCHIATRY & NEUROLOGY

## 2017-10-13 PROCEDURE — 86900 BLOOD TYPING SEROLOGIC ABO: CPT

## 2017-10-13 PROCEDURE — 97110 THERAPEUTIC EXERCISES: CPT

## 2017-10-13 PROCEDURE — 2580000003 HC RX 258: Performed by: FAMILY MEDICINE

## 2017-10-13 PROCEDURE — 99232 SBSQ HOSP IP/OBS MODERATE 35: CPT | Performed by: FAMILY MEDICINE

## 2017-10-13 PROCEDURE — 99024 POSTOP FOLLOW-UP VISIT: CPT | Performed by: PSYCHIATRY & NEUROLOGY

## 2017-10-13 PROCEDURE — 6360000002 HC RX W HCPCS

## 2017-10-13 PROCEDURE — 85014 HEMATOCRIT: CPT

## 2017-10-13 PROCEDURE — 2580000003 HC RX 258: Performed by: NURSE PRACTITIONER

## 2017-10-13 PROCEDURE — 85018 HEMOGLOBIN: CPT

## 2017-10-13 PROCEDURE — 83540 ASSAY OF IRON: CPT

## 2017-10-13 PROCEDURE — 83550 IRON BINDING TEST: CPT

## 2017-10-13 PROCEDURE — 82728 ASSAY OF FERRITIN: CPT

## 2017-10-13 PROCEDURE — 1200000000 HC SEMI PRIVATE

## 2017-10-13 RX ORDER — CIPROFLOXACIN 500 MG/1
500 TABLET, FILM COATED ORAL EVERY 12 HOURS SCHEDULED
Status: DISCONTINUED | OUTPATIENT
Start: 2017-10-13 | End: 2017-10-17 | Stop reason: HOSPADM

## 2017-10-13 RX ORDER — CLOPIDOGREL BISULFATE 75 MG/1
75 TABLET ORAL DAILY
Status: DISCONTINUED | OUTPATIENT
Start: 2017-10-13 | End: 2017-10-17 | Stop reason: HOSPADM

## 2017-10-13 RX ORDER — LANOLIN ALCOHOL/MO/W.PET/CERES
325 CREAM (GRAM) TOPICAL 2 TIMES DAILY WITH MEALS
Status: DISCONTINUED | OUTPATIENT
Start: 2017-10-13 | End: 2017-10-17 | Stop reason: HOSPADM

## 2017-10-13 RX ORDER — 0.9 % SODIUM CHLORIDE 0.9 %
250 INTRAVENOUS SOLUTION INTRAVENOUS ONCE
Status: COMPLETED | OUTPATIENT
Start: 2017-10-13 | End: 2017-10-13

## 2017-10-13 RX ADMIN — SODIUM CHLORIDE 250 ML: 9 INJECTION, SOLUTION INTRAVENOUS at 13:28

## 2017-10-13 RX ADMIN — Medication 600 MG: at 08:33

## 2017-10-13 RX ADMIN — Medication 10 ML: at 21:06

## 2017-10-13 RX ADMIN — CIPROFLOXACIN 500 MG: 500 TABLET, FILM COATED ORAL at 08:59

## 2017-10-13 RX ADMIN — ASPIRIN 81 MG: 81 TABLET ORAL at 08:34

## 2017-10-13 RX ADMIN — SODIUM CHLORIDE: 9 INJECTION, SOLUTION INTRAVENOUS at 09:09

## 2017-10-13 RX ADMIN — INSULIN LISPRO 2 UNITS: 100 INJECTION, SOLUTION INTRAVENOUS; SUBCUTANEOUS at 16:57

## 2017-10-13 RX ADMIN — PANTOPRAZOLE SODIUM 40 MG: 40 TABLET, DELAYED RELEASE ORAL at 08:34

## 2017-10-13 RX ADMIN — PAROXETINE HYDROCHLORIDE HEMIHYDRATE 10 MG: 10 TABLET, FILM COATED ORAL at 08:34

## 2017-10-13 RX ADMIN — CIPROFLOXACIN 500 MG: 500 TABLET, FILM COATED ORAL at 21:05

## 2017-10-13 RX ADMIN — FAMOTIDINE 40 MG: 20 TABLET, FILM COATED ORAL at 08:34

## 2017-10-13 RX ADMIN — INSULIN LISPRO 4 UNITS: 100 INJECTION, SOLUTION INTRAVENOUS; SUBCUTANEOUS at 08:50

## 2017-10-13 RX ADMIN — APIXABAN 5 MG: 5 TABLET, FILM COATED ORAL at 09:28

## 2017-10-13 RX ADMIN — POLYETHYLENE GLYCOL 3350 17 G: 17 POWDER, FOR SOLUTION ORAL at 08:33

## 2017-10-13 RX ADMIN — POTASSIUM CHLORIDE 20 MEQ: 1.5 POWDER, FOR SOLUTION ORAL at 21:05

## 2017-10-13 RX ADMIN — TAMSULOSIN HYDROCHLORIDE 0.4 MG: 0.4 CAPSULE ORAL at 08:34

## 2017-10-13 RX ADMIN — MULTIPLE VITAMINS W/ MINERALS TAB 1 TABLET: TAB at 08:34

## 2017-10-13 RX ADMIN — INSULIN LISPRO 4 UNITS: 100 INJECTION, SOLUTION INTRAVENOUS; SUBCUTANEOUS at 12:18

## 2017-10-13 RX ADMIN — FERROUS SULFATE TAB EC 325 MG (65 MG FE EQUIVALENT) 325 MG: 325 (65 FE) TABLET DELAYED RESPONSE at 16:55

## 2017-10-13 RX ADMIN — ATORVASTATIN CALCIUM 40 MG: 40 TABLET, FILM COATED ORAL at 08:34

## 2017-10-13 RX ADMIN — Medication 10 ML: at 08:37

## 2017-10-13 RX ADMIN — POTASSIUM CHLORIDE 20 MEQ: 1.5 POWDER, FOR SOLUTION ORAL at 08:33

## 2017-10-13 RX ADMIN — CLOPIDOGREL 75 MG: 75 TABLET, FILM COATED ORAL at 14:53

## 2017-10-13 RX ADMIN — INSULIN GLARGINE 40 UNITS: 100 INJECTION, SOLUTION SUBCUTANEOUS at 21:12

## 2017-10-13 ASSESSMENT — ENCOUNTER SYMPTOMS
WHEEZING: 0
SORE THROAT: 0
COUGH: 0
SINUS PRESSURE: 0
NAUSEA: 0
BLOOD IN STOOL: 0
SHORTNESS OF BREATH: 1
VOICE CHANGE: 0
CONSTIPATION: 0
VOMITING: 0
DIARRHEA: 0
ABDOMINAL PAIN: 0

## 2017-10-13 ASSESSMENT — PAIN SCALES - GENERAL
PAINLEVEL_OUTOF10: 0

## 2017-10-13 NOTE — PROGRESS NOTES
Physical Therapy  [address]  Daily [address] Note  NAME: Shasta Chu  : 1950  MRN: [id number]    Date of Service: 10/13/2017    Patient Diagnosis(es):   Patient Active Problem List    Diagnosis Date Noted    Acute ischemic right MCA stroke ([address] 10/11/2017     Priority: High    Anemia     Hypokalemia     Hydronephrosis of left kidney 10/10/2017    Acute cystitis with hematuria 10/10/2017    CKD (chronic kidney disease), stage III 10/10/2017    Hypotension arterial 10/10/2017    Essential hypertension 10/10/2017    Morbid obesity with BMI of 40.0-44.9, adult ([address] 10/10/2017    History of arterial ischemic stroke 10/10/2017    History of pulmonary embolus (PE) 10/10/2017    Paroxysmal atrial fibrillation ([address] 10/10/2017    Left ureteral stone 10/10/2017    Left-sided weakness     Non-intractable vomiting with nausea     Cerebrovascular accident (CVA) due to embolism of right middle cerebral artery (HCC)     Hypotension     Sepsis secondary to UTI ([address]     Elevated serum creatinine     Kidney stone        Past Medical History:   Diagnosis Date    CVA (cerebral vascular accident) ([address]     Diabetes mellitus ([address]     PE (pulmonary thromboembolism) ([address]      Past Surgical History:   Procedure Laterality Date    CHOLECYSTECTOMY      CYSTOSCOPY Left 10/10/2017    CYSTOSCOPY, STENT INSERTION performed by Blanquita Bailon MD at [address] [facility]         Restrictions  Restrictions/Precautions  Restrictions/Precautions: General Precautions, Fall Risk  Required Braces or Orthoses?: No  Position Activity Restriction  Other position/activity restrictions: up with assist  Subjective   General  Family / Caregiver Present: No  Subjective  Subjective: RN and pt agreed to PT, pt supine in bed upon arrival and denies pain at rest  Pain Screening  Patient Currently in Pain: Denies  Vital Signs  Patient Currently in Pain: Denies       Orientation  Orientation  Overall Orientation Status: Impaired  Orientation Level: Disoriented to time;Disoriented to situation;Oriented to person;Oriented to place  Objective   Bed mobility  Bridging: Unable to assess (Hgb 6.5; hypotension)  Transfers  Sit to Stand: Unable to assess (Hgb 6.5, hypotension)  Ambulation  Ambulation?: No  Stairs/Curb  Stairs?: No        Exercises  Quad Sets: BLE x 10   Heelslides: BLE x 10   Gluteal Sets: x 10  Hip Abduction: BLE x 10   Knee Short Arc Quad: BLE x 10   Ankle Pumps: BLE x 20   Upper Extremity: RUE AROM x 10, LUE AAROM x 10 including bicep curls and shoulder flex  Comments: L hip ER and B ankle stretches 30\" x 3; Issued FDS and wear schedule; Increased HR up to 122bpm and SOB with ther ex; all exercises in supine        Assessment   Body structures, Functions, Activity limitations: Decreased functional mobility ; Decreased balance;Decreased coordination;Decreased ROM; Decreased endurance;Decreased strength  Assessment: No mob this session d/t low Hgb and BP; AAROM needed for LUE and AROM BLE and RUE in supine  Prognosis: Good  REQUIRES PT FOLLOW UP: Yes  Activity Tolerance  Activity Tolerance: Patient limited by endurance; Patient limited by fatigue;Treatment limited secondary to medical complications (free text)  Activity Tolerance: low Hgb and BP  PT Equipment Recommendations  Equipment Needed: No       Discharge Recommendations:  [address], Patient would benefit from continued therapy after discharge                    Goals  Short term goals  Time Frame for Short term goals: 15  Short term goal 1: independent bed mobility  Short term goal 2: independent transfers  Short term goal 3: independent ambulation with least restrictive device 200ft  Short term goal 4: pt. to improve standing dynamic balance to good  Short term goal 5: pt. to tolerate 45 minutes of PT treatment to improve strength and endurance   Patient Goals   Patient goals : patient would like to get back to moving

## 2017-10-13 NOTE — PROGRESS NOTES
Systems   Constitutional: Negative for activity change, appetite change, chills, fatigue, fever and unexpected weight change. HENT: Negative for congestion, mouth sores, postnasal drip, sinus pressure, sore throat and voice change. Eyes: Negative for visual disturbance. Respiratory: Positive for shortness of breath. Negative for cough and wheezing. Cardiovascular: Negative for chest pain and palpitations. Gastrointestinal: Negative for abdominal pain, blood in stool, constipation, diarrhea, nausea and vomiting. Endocrine: Negative for polyuria. Genitourinary: Negative for difficulty urinating, dysuria, frequency and urgency. Musculoskeletal: Negative for arthralgias, joint swelling and myalgias. Neurological: Positive for facial asymmetry and weakness (left upper ext ). Negative for dizziness, tremors, speech difficulty, light-headedness and headaches.      Objective:   Current Vitals : Temp: 97.9 °F (36.6 °C),  Pulse: 90, Resp: 16, BP: (!) 93/49, SpO2: 98 %  Last 24 Hrs Vitals   Patient Vitals for the past 24 hrs:   BP Temp Temp src Pulse Resp SpO2 Weight   10/13/17 0602 (!) 93/49 - - 90 - 98 % -   10/13/17 0503 (!) 93/38 - - 93 16 99 % -   10/13/17 0408 (!) 96/37 - - 113 16 96 % -   10/13/17 0400 - - - - - - 239 lb 3.2 oz (108.5 kg)   10/13/17 0354 - 97.9 °F (36.6 °C) Oral - - - -   10/13/17 0304 (!) 91/45 - - 98 16 98 % -   10/13/17 0202 (!) 107/44 - - 90 16 98 % -   10/13/17 0105 (!) 107/50 - - 93 - 98 % -   10/13/17 0044 - 97.7 °F (36.5 °C) Oral - - - -   10/13/17 0002 (!) 96/46 - - 88 12 98 % -   10/12/17 2330 (!) 95/42 - - 99 18 - -   10/12/17 2303 (!) 79/35 - - 90 21 93 % -   10/12/17 2202 107/60 - - 94 16 99 % -   10/12/17 2103 (!) 100/49 - - 94 17 96 % -   10/12/17 2003 (!) 129/58 - - 117 21 95 % -   10/12/17 1950 - 97.6 °F (36.4 °C) Oral - - - -   10/12/17 1918 95/63 - - 100 17 97 % -   10/12/17 1903 (!) 115/57 - - 101 17 96 % -   10/12/17 1848 (!) 118/54 - - 99 17 100 % -   10/12/17 1832 (!) 116/51 - - 102 20 100 % -   10/12/17 1815 (!) 105/55 - - 100 17 100 % -   10/12/17 1802 (!) 102/56 - - 106 17 99 % -   10/12/17 1800 (!) 100/59 - - 109 20 98 % -   10/12/17 1002 (!) 102/54 - - 110 25 98 % -   10/12/17 0903 (!) 101/54 - - 95 22 98 % -   10/12/17 0801 (!) 114/57 98.5 °F (36.9 °C) Oral 128 (!) 33 94 % -     Intake / output   10/12 0701 - 10/13 0700  In: 0338 [P.O.:250; I.V.:3213]  Out: 1140 [Urine:1040]  Physical Exam:  Physical Exam   Constitutional: She is oriented to person, place, and time. She appears well-developed and well-nourished. No distress. Nasal cannula in place. HENT:   Mouth/Throat: No oropharyngeal exudate. Eyes: Conjunctivae are normal. Pupils are equal, round, and reactive to light. No scleral icterus. Neck: No JVD present. No thyromegaly present. Cardiovascular: Regular rhythm and normal heart sounds. Tachycardia present. Exam reveals no gallop and no friction rub. No murmur heard. Pulmonary/Chest: Accessory muscle usage present. No respiratory distress. She has no wheezes. She has no rales. Abdominal: Soft. Bowel sounds are normal. She exhibits no distension and no mass. There is no tenderness. There is no rebound and no guarding. Lymphadenopathy:     She has no cervical adenopathy. Neurological: She is alert and oriented to person, place, and time. No cranial nerve deficit. She exhibits normal muscle tone. Left facial droop, left upper extremity weakness   Skin: She is not diaphoretic. Nursing note and vitals reviewed.     Lower Extremities : No ankle Edema , No calf Tenderness     Laboratory findings:    Recent Labs      10/11/17   0539  10/12/17   1015  10/12/17   1806   WBC  18.5*  11.1*  16.0*   HGB  7.7*  8.2*  7.6*   HCT  24.0*  25.5*  24.1*   PLT  256  241  324     Recent Labs      10/10/17   1806  10/11/17   0539  10/12/17   1015   NA  138  140  140   K  3.3*  3.3*  4.6   CL  103  108*  107   CO2  20  20  17*   GLUCOSE  190*  91  228*   BUN obstructive uropathy secondary to a 6- 7 mm calculus in the   proximal ureter just distal to the UPJ.       There are multiple bilateral nonobstructing renal calculi measuring up to 1   cm in the lower pole of the left kidney.       Small amount of air within the urinary bladder likely related to recent   catheterization. Fifi Khan correlation is recommended. Echocardiogram 10/11/17  Summary  Left ventricle is moderately enlarged, global left ventricular systolic  function is normal, inferior and lateral hypokinesis is noted. Estimated  ejection fraction is 50-55%  Evidence of diastolic dysfunction. Right ventricular dilatation with reduced systolic function. Bi-atrial enlargement. Moderate mitral regurgitation due to papillary muscle dysfunction. Mild tricuspid regurgitation. Estimated right ventricular systolic pressure is 40 mmHg. Clinical Course : stable  Assessment and Plan      1. Sepsis due to UTI and left pyelonephritis - status post left stent Placement 10/10/17. Hassan taken out. . No growth on culture . Cipro for 7 days   2. Bilateral kidney stones left 1 cm  Obstructive uropathy   - s/p stent . .   3. Type 2 diabetes mellitus - continue Lantus. Metformin on hold. 4. Multiple right MCA infarct - continue aspirin 81 mg and plavix for 3 weeks then asa and eliquis . Continue Lipitor. likely embolic- . Blood pressure running low. antihypertensive medications on hold . 5. Right cavernous segment ICA s/p  stent 10-17 - Dual AP for 21 days . 6. PAF - in NSR - Hold eliquis while on dual antiplatelet ( 21 days ) then asa + plavix . Check stool for occult blood. Cardiology consult  7. Anemia - check iron panel, B12 folate, stool for occult blood. Transfuse 1 unit PRBC. No obvious blood loss. Check reticulocyte count, LDH, heptoglobin,  Peripheral smear.      Transfer out of neuro ICU  Continue to monitor vitals , Intake / output ,  Cell count , HGB , Kidney function, oxygenation  as indicated . Plan and updates discussed with patient's son and daughter at bed side  ,  answers  explained to satisfaction. Discussed with Dr Elisabeth Mosley .    Plan discussed with Staff Natacha Colon RN     (Please note that portions of this note were completed with a voice recognition program. Efforts were made to edit the dictations but occasionally words are mis-transcribed.)      Nickie Wong MD  10/13/2017

## 2017-10-13 NOTE — PLAN OF CARE
Problem: Falls - Risk of  Goal: Absence of falls  Outcome: Ongoing  Patient remains free from falls this shift. Fall precautions in place. Falling star posted. Pt is a fall risk. Pt is alert, oriented x1 , and on bedrest. Family at bedside. Bed is locked and in lowest position. Call light within reach. Non skid footwear applied. Room free from clutter. Will continue to monitor with hourly rounding. Problem: Risk for Impaired Skin Integrity  Goal: Tissue integrity - skin and mucous membranes  Structural intactness and normal physiological function of skin and  mucous membranes. Outcome: Ongoing  Complete skin assessment completed. No new areas of skin breakdown noted. Patient kept clean and dry. Pt repositioned every 2 hours with pillow support. Pt on ICU gel mattress. Preventative mepilex on coccyx. Brief checked and changed. Will continue to monitor. Problem: Pain:  Goal: Pain level will decrease  Pain level will decrease   Outcome: Ongoing  Pt denies any pain this shift. Will continue to monitor with hourly rounding.

## 2017-10-13 NOTE — PROGRESS NOTES
106/82 98 °F (36.7 °C) Oral 102 16 95 %   10/13/17 1324 (!) 103/50 97.7 °F (36.5 °C) Oral 95 18 100 %   10/13/17 1322 (!) 103/50 97.8 °F (36.6 °C) Oral 93 16 100 %   10/13/17 1303 (!) 105/48 - - 94 16 100 %   10/13/17 1217 (!) 100/48 97.5 °F (36.4 °C) Oral 98 18 100 %   10/13/17 1103 (!) 100/40 - - 94 16 100 %   10/13/17 1002 (!) 104/55 - - 108 16 100 %   10/13/17 0903 (!) 101/39 - - 94 14 100 %   10/13/17 0802 (!) 100/46 97.5 °F (36.4 °C) Oral 86 16 100 %     Height and Weight  Height: 5' 2\" (157.5 cm)  Weight: 239 lb 3.2 oz (108.5 kg)  Weight Method: Bed scale  BSA (Calculated - sq m): 2.15 sq meters  BMI (Calculated): 43.8  Body mass index is 43.75 kg/m². <16 Severe malnutrition  1616.99 Moderate malnutrition  1718.49 Mild malnutrition  18.524.9 Normal  2529.9 Overweight (not obese)  3034.9 Obese class 1 (Low Risk)  3539.9 Obese class 2 (Moderate Risk)  ? 40 Obese class 3 (High Risk)    NEURO: Alert, oriented, mild dysarthria, L UE extensor weakness and more distally, left sided facial asymmetry, visual field defect on the R side. CVS: S1 S2, no murmurs. PULMONARY: Clear to auscultation. GI: No tenderness. EXT:  Positive for lymphedema. Assessment/Plan:  Patient is a 74year old  lady admitted with kidney stone and abdominal pain--developed left sided weakness---  R MCA territory infarction.    - Embolic stroke. - MRA reviewed with no significant disease--the quality of study is very poor. - S/P carotid placement. - Continue with plavix and aspirin.  - Continue eliquis after two weeks from the dual anti-platelets.   - TTE:  Left ventricle is moderately enlarged, global left ventricular systolic  function is normal, inferior and lateral hypokinesis is noted. Estimated  ejection fraction is 50-55%  Evidence of diastolic dysfunction. Right ventricular dilatation with reduced systolic function. Bi-atrial enlargement.   Moderate mitral regurgitation due to papillary muscle dysfunction. Mild tricuspid regurgitation. Estimated right ventricular systolic pressure is 40 mmHg. - Negative blood cultures. - Improved kidney function-- creatinine at 0.88.   - Cipofloxacin.   - Follow FOBT. - Needs blood transfusion.  - Continue close monitoring in ICU.      Natasha Rico MD  Neuro Critical Care Team  Pager [phone]  10/13/2017  3:40 PM

## 2017-10-13 NOTE — PROGRESS NOTES
Neuroendovascular surgery consult    Pt Name: Shanthi Sellers  MRN: [id number]  [place]: 1950  Date of evaluation: 10/13/2017  Primary Care Physician: Jessica Alves DO  Reason for evaluation: stroke    SUBJECTIVE:   History of Chief Complaint:    73 yo female with PMH of obesity, DM, HTN, CKD, stroke(with no residual symptoms), A fib and PE who wad admitted today for left kidney stone and underwent left side ureteral stent placement. She developed left hemibody weakness for which neurology was consulted. MRI brain showed right MCA infarcts for which stroke team was consulted. She denies any previous strokes. She denies loss of vision/loss of consciousness or seizure like activity. Allergies  is allergic to iv contrast [iodides]. Medications Scheduled Meds:   polyethylene glycol  17 g Oral Daily    insulin glargine  40 Units Subcutaneous Nightly    atorvastatin  40 mg Oral Daily    calcium carbonate  1 tablet Oral Daily    famotidine  40 mg Oral Daily    therapeutic multivitamin-minerals  1 tablet Oral Daily    pantoprazole  40 mg Oral Daily    PARoxetine  10 mg Oral QAM    potassium chloride  20 mEq Oral BID    sodium chloride flush  10 mL Intravenous 2 times per day    tamsulosin  0.4 mg Oral Daily    insulin lispro  0-12 Units Subcutaneous TID WC    insulin lispro  0-6 Units Subcutaneous Nightly    influenza virus vaccine  0.5 mL Intramuscular Once    sodium chloride  500 mL Intravenous Once    aspirin  81 mg Oral Daily    apixaban  5 mg Oral Daily    cefepime  1 g Intravenous Q12H     Continuous Infusions:   sodium chloride 75 mL/hr at 10/12/17 2202    dextrose       PRN Meds:. LORazepam, sodium chloride flush, HYDROcodone 5 mg - acetaminophen **OR** HYDROcodone 5 mg - acetaminophen, magnesium hydroxide, bisacodyl, ondansetron, nicotine, HYDROmorphone **OR** HYDROmorphone, glucose, dextrose, glucagon (rDNA), dextrose    OBJECTIVE:   Vitals: BP (!) 93/49   Pulse 90   Temp 97.9

## 2017-10-13 NOTE — PROGRESS NOTES
NEUROLOGY INPATIENT PROGRESS NOTE    10/13/2017         Subjective: Meda Krabbe is a  74 y.o. female admitted on 10/9/2017 with Ureterolithiasis [N20.1]  POD #1 R ICA cavernous segment stenting. Briefly, this is a  74 y.o. female admitted on 10/9/2017 [facility] vomiting starting 2-3 hours PTA. CT A/P showed an acute left obstructive uropathy w/ 6-7 mm calculus in the proximal ureter just distal to the UPJ. Was found to have a leukocytosis and UTI. Patient was started on antibiotics and was transferred to our facility. Today she was tachycardic and hypotensive, meeting sepsis criteria. Per family, they noticed patient was weak early this morning. Specifically LUE weakness and gait disturbance. Also noted small facial droop. Uncertain if it started last night or this morning. Given patient's clinical status she was taken for ureteral stent placement first and then taken to the MRI. MRI showed multifocal acute infarcts in the R MCA distribution though within a relative narrow zone. There were also chronic infarcts seen in the occipital lobes and left cerebellar hemisphere. She was seen by neuro and EVNS. Decision was made to transfer the patient to the Neuro ICU for closer monitoring. Pt unable to get gadolinium for MRA neck due to 1.7 creatinine, and was not given IV contrast for CTA due to Hx/o dye allergy. Unenhanced neck MRA indicated possible severe R ICA stenosis. Ureteral stone removed during stent placement 10/10 reportedly appeared as though it might be infected; 11 hour BCx2 (1800 10/10) no growth. There is concern over possible sepsis and SBE, this concern magnified by scattered MRI acute infarct pattern (described above). Pt is on cefipime, but 10/10 was also on LQ and Rocephin.     Within the last year the patient has had a stroke apparently in [place] where she was in [facility] that the based on review of her current brain MRI which shows bilateral occipital lobe chronic infarctions, and the report from the patient and daughter-in-law that at the time she developed \"tunnel vision\" as the symptom of the stroke; and, during the course of workup a Holter monitor that was done for 3 days showed evidence of paroxysmal atrial fibrillation, and as well in the same general timeframe the patient had a pulmonary embolus, the patient has been maintained on Eliquis. There is also a reported history as noted above of iodinated dye allergy which was the reason why she did not get a contrast CTA of head and neck yesterday. However the daughter-in-law states that the only manifestation of this allergy is that she developed hives. With this in mind she could get iodinated contrast with premedication. Echo no valvular vegetations seen, no cardioembolic source. BC x2 10/10 no growth in 2 days. ABout [id number] 60/35 pt had uncomplicated R ICA cavernous segment stenting. No current facility-administered medications on file prior to encounter.       Current Outpatient Prescriptions on File Prior to Encounter   Medication Sig Dispense Refill    ondansetron (ZOFRAN ODT) 4 MG disintegrating tablet Take 1 tablet by mouth every 8 hours as needed for Nausea 20 tablet 0    amLODIPine (NORVASC) 5 MG tablet Take 5 mg by mouth daily      atorvastatin (LIPITOR) 40 MG tablet Take 40 mg by mouth daily      calcium carbonate 600 MG TABS tablet Take 1 tablet by mouth daily      carvedilol (COREG) 6.25 MG tablet Take 6.25 mg by mouth 2 times daily (with meals)      apixaban (ELIQUIS) 5 MG TABS tablet Take by mouth 2 times daily      famotidine (PEPCID) 40 MG tablet Take 40 mg by mouth daily      furosemide (LASIX) 40 MG tablet Take 40 mg by mouth 2 times daily      Insulin Glargine (LANTUS SC) Inject 40 Units into the skin nightly       losartan-hydrochlorothiazide (HYZAAR) 100-25 MG per tablet Take 1 tablet by mouth daily      metFORMIN Adequate visualization    Indications:CVA. Comments:Ordering Physician: Dr. Sherita Griffith Lobar    Critical Result: RN @ 12:30    History / Tech. Comments:  Procedure explained to patient. HTN, Atrial Fibrillation    Patient Status: Inpatient    Height: 62 inches Weight: 232.01 pounds BSA: 2.04 m^2 BMI: 42.43 kg/m^2    CONCLUSIONS    Summary  Left ventricle is moderately enlarged, global left ventricular systolic  function is normal, inferior and lateral hypokinesis is noted. Estimated  ejection fraction is 50-55%  Evidence of diastolic dysfunction. Right ventricular dilatation with reduced systolic function. Bi-atrial enlargement. Moderate mitral regurgitation due to papillary muscle dysfunction. Mild tricuspid regurgitation. Estimated right ventricular systolic pressure is 40 mmHg. Ct Abdomen Pelvis Wo Iv Contrast    Result Date: 10/10/2017  EXAMINATION: CT OF THE ABDOMEN AND PELVIS WITHOUT CONTRAST 10/10/2017 12:57 am TECHNIQUE: CT of the abdomen and pelvis was performed without the administration of intravenous contrast. Multiplanar reformatted images are provided for review. Dose modulation, iterative reconstruction, and/or weight based adjustment of the mA/kV was utilized to reduce the radiation dose to as low as reasonably achievable. COMPARISON: None. HISTORY: ORDERING SYSTEM PROVIDED HISTORY: vomiting TECHNOLOGIST PROVIDED HISTORY: Ordering Physician Provided Reason for Exam: C/o vomitting x 2-3 hours pta and nausea. Denies diarrhea or constipation. Acuity: Acute Type of Exam: Initial Relevant Medical/Surgical History: Hx xiao, diabetes, hernia repair FINDINGS: Lower Chest: The lung bases are clear. The heart is borderline enlarged. Calcification in the region of the mitral annulus. There is trace pericardial fluid. Organs: Well-circumscribed low-attenuation lesions in the superior aspect of the liver measuring up to 1 cm, likely small cysts. The gallbladder is surgically absent.   The spleen, pancreas and adrenal glands are normal with the limitations of a noncontrast study. There are multiple bilateral renal calculi measuring up to 1 cm in the lower pole of the left kidney. On the left, there is an obstructing 6-7 mm calculus in the proximal ureter just distal to the UPJ causing moderate left hydronephrosis. There is peripelvic and proximal periureteral fat stranding. The left kidney is enlarged. GI/Bowel: Mild sigmoid colon diverticulosis. No evidence of diverticulitis. No evidence bowel obstruction. Pelvis: Small amount of air in the urinary bladder likely related to recent catheterization. Uterus and ovaries are atrophic. Peritoneum/Retroperitoneum: There is no adenopathy, free air or free fluid. Prominent vascular calcifications suggest diabetes. Bones/Soft Tissues: Degenerative changes in the lumbar spine particularly facet arthropathy. No acute bone finding. Acute left obstructive uropathy secondary to a 6- 7 mm calculus in the proximal ureter just distal to the UPJ. There are multiple bilateral nonobstructing renal calculi measuring up to 1 cm in the lower pole of the left kidney. Small amount of air within the urinary bladder likely related to recent catheterization. Clinical correlation is recommended. Xr Abdomen (kub) (single Ap View)    Result Date: 10/10/2017  EXAMINATION: SUPINE VIEW(S) OF THE ABDOMEN 10/10/2017 11:16 am COMPARISON: 08/13/2017 HISTORY: ORDERING SYSTEM PROVIDED HISTORY: Stent insertion TECHNOLOGIST PROVIDED HISTORY: Reason for exam:->Stent insertion Left ureteral stent insertion in surgery FINDINGS: 6 seconds of fluoroscopy time utilized in surgery. D AP 82.6. 2 spot views obtained in surgery show placement of a left ureteral stent. Please correlate with procedure report for additional details. Intraoperative fluoroscopy for left ureteral stent placement.      Mra Head Wo Contrast    Result Date: 10/11/2017  EXAMINATION: MRA OF THE HEAD WITHOUT CONTRAST  10/11/2017 stenosis by NASCET criteria, of the right internal carotid artery within estimate is 70-80%. VERTEBRAL ARTERIES: The vertebral arteries both arise from the subclavian arteries and are normal in caliber without evidence of flow limiting stenosis. Flow limiting stenosis in the proximal right internal carotid artery. Mri Brain Wo Contrast    Result Date: 10/10/2017  EXAMINATION: MRI OF THE BRAIN WITHOUT CONTRAST  10/10/2017 12:33 pm TECHNIQUE: Multiplanar multisequence MRI of the brain was performed without the administration of intravenous contrast. COMPARISON: None HISTORY: ORDERING SYSTEM PROVIDED HISTORY: FACIAL MUSCLE WEAKNESS/PARALYSIS Initial evaluation. FINDINGS: INTRACRANIAL STRUCTURES/VENTRICLES: There is an area of increased diffusion signal noted along the left choroid plexus, likely related to calcification. There are multifocal areas of restricted diffusion, primarily noted in the posterior right frontal lobe, as well as along the right pre and postcentral gyri. This is likely accounting for the patient's facial muscle weakness. This is in the vascular distribution of the right middle cerebral artery. The cerebral and cerebellar parenchyma demonstrate volume loss. There are areas of encephalomalacia along the medial aspects of the occipital lobes. There are scattered areas of increased T2 signal noted supratentorially which are compatible with minimal chronic microvascular white matter ischemic disease. There is a small chronic infarct noted in the left cerebellar hemisphere. There are no abnormal extra-axial fluid collections. The ventricles are proportional to the cerebral sulci. Normal major intracranial flow voids are noted. There are no areas of blooming artifact noted on the gradient echo sequences to suggest sequela of acute or chronic hemorrhage. ORBITS: Lens implants from prior cataract surgery are noted. The orbits are otherwise unremarkable.  SINUSES: There is scattered paranasal

## 2017-10-13 NOTE — PLAN OF CARE
Elizabeth Kennedy 17    Second Visit Note  For more detailed information please refer to the progress note of the day      10/13/2017    4:30 PM    Name:   Shanthi Sellers  MRN:     [id number]     aHrikakirstielyside:      [de-identified]   Room:   [facility]/0527-01   Day:  3  Admit Date:  10/9/2017 11:35 PM    PCP:   Clotilde Aase A Raux, DO  Code Status:  Full Code        Pt vitals were reviewed   New labs were reviewed   Patient was seen    Updated plan :     1. Patient comfortable , urinating OK after Hassan taken out . Continue current care. Transfer to stepdown. PT/OT.         Yuan Schaefer MD  10/13/2017  4:30 PM

## 2017-10-13 NOTE — PLAN OF CARE
Problem: Falls - Risk of  Goal: Absence of falls  Outcome: Ongoing  Bed lock and in lowest position, side rails up x 2, bed alarm on, call light within reach, patient alert and remains free from falls    Problem: Risk for Impaired Skin Integrity  Goal: Tissue integrity - skin and mucous membranes  Structural intactness and normal physiological function of skin and  mucous membranes.    Outcome: Ongoing  Patient turn every 2 hours, skin assessment every 4 hours, bilateral heels elevated off bed, preventive dressing on sacrum and protective barrier cream apply to areas of breakdown    Problem: Pain:  Goal: Control of acute pain  Control of acute pain   Outcome: Ongoing  Patient denies pain when ask    Problem: NEUROLOGICAL DEFICIT  Goal: Neurological status is stable or improving  Outcome: Ongoing  Left side weakness noted on assessment    Problem: HEMODYNAMIC STATUS  Goal: Patient has stable vital signs and fluid balance  Outcome: Ongoing  Left radial artline noted and ABP stable    Problem: ACTIVITY INTOLERANCE/IMPAIRED MOBILITY  Goal: Mobility/activity is maintained at optimum level for patient  Outcome: Ongoing

## 2017-10-14 LAB
ABO/RH: NORMAL
ABSOLUTE EOS #: 0.21 K/UL (ref 0–0.4)
ABSOLUTE LYMPH #: 0.83 K/UL (ref 1–4.8)
ABSOLUTE MONO #: 0.21 K/UL (ref 0.1–0.8)
ABSOLUTE RETIC #: 0.06 M/UL (ref 0.02–0.1)
ANTIBODY SCREEN: NEGATIVE
ARM BAND NUMBER: NORMAL
BASOPHILS # BLD: 0 %
BASOPHILS ABSOLUTE: 0 K/UL (ref 0–0.2)
BLD PROD TYP BPU: NORMAL
COLLAGEN ADENOSINE-5'-DIPHOSPHATE (ADP) TIME: 99 SEC (ref 67–112)
COLLAGEN EPINEPHRINE TIME: 115 SEC (ref 85–172)
CROSSMATCH RESULT: NORMAL
DIFFERENTIAL TYPE: ABNORMAL
DISPENSE STATUS BLOOD BANK: NORMAL
EOSINOPHILS RELATIVE PERCENT: 2 %
EXPIRATION DATE: NORMAL
FOLATE: 15.6 NG/ML
GLUCOSE BLD-MCNC: 109 MG/DL (ref 65–105)
GLUCOSE BLD-MCNC: 147 MG/DL (ref 65–105)
GLUCOSE BLD-MCNC: 151 MG/DL (ref 65–105)
GLUCOSE BLD-MCNC: 163 MG/DL (ref 65–105)
HAPTOGLOBIN: 278 MG/DL (ref 30–200)
HCT VFR BLD CALC: 26.9 % (ref 36–46)
HEMOGLOBIN: 8.3 G/DL (ref 12–16)
LACTATE DEHYDROGENASE: 230 U/L (ref 135–214)
LYMPHOCYTES # BLD: 8 %
MCH RBC QN AUTO: 28.7 PG (ref 26–34)
MCHC RBC AUTO-ENTMCNC: 31 G/DL (ref 31–37)
MCV RBC AUTO: 92.4 FL (ref 80–100)
MONOCYTES # BLD: 2 %
MORPHOLOGY: ABNORMAL
PDW BLD-RTO: 18.3 % (ref 12.5–15.4)
PLATELET # BLD: 263 K/UL (ref 140–450)
PLATELET ESTIMATE: ABNORMAL
PLATELET FUNCTION INTERP: NORMAL
PMV BLD AUTO: 7.9 FL (ref 6–12)
RBC # BLD: 2.91 M/UL (ref 4–5.2)
RBC # BLD: ABNORMAL 10*6/UL
RETIC %: 2.1 % (ref 0.5–2)
SEG NEUTROPHILS: 88 %
SEGMENTED NEUTROPHILS ABSOLUTE COUNT: 9.15 K/UL (ref 1.8–7.7)
TRANSFUSION STATUS: NORMAL
UNIT DIVISION: 0
UNIT NUMBER: NORMAL
VITAMIN B-12: 338 PG/ML (ref 211–946)
WBC # BLD: 10.4 K/UL (ref 3.5–11)
WBC # BLD: ABNORMAL 10*3/UL

## 2017-10-14 PROCEDURE — 99233 SBSQ HOSP IP/OBS HIGH 50: CPT | Performed by: PSYCHIATRY & NEUROLOGY

## 2017-10-14 PROCEDURE — 6370000000 HC RX 637 (ALT 250 FOR IP): Performed by: NURSE PRACTITIONER

## 2017-10-14 PROCEDURE — 6370000000 HC RX 637 (ALT 250 FOR IP): Performed by: PSYCHIATRY & NEUROLOGY

## 2017-10-14 PROCEDURE — 82947 ASSAY GLUCOSE BLOOD QUANT: CPT

## 2017-10-14 PROCEDURE — 85045 AUTOMATED RETICULOCYTE COUNT: CPT

## 2017-10-14 PROCEDURE — 99232 SBSQ HOSP IP/OBS MODERATE 35: CPT | Performed by: FAMILY MEDICINE

## 2017-10-14 PROCEDURE — 83010 ASSAY OF HAPTOGLOBIN QUANT: CPT

## 2017-10-14 PROCEDURE — 85027 COMPLETE CBC AUTOMATED: CPT

## 2017-10-14 PROCEDURE — 36415 COLL VENOUS BLD VENIPUNCTURE: CPT

## 2017-10-14 PROCEDURE — 83615 LACTATE (LD) (LDH) ENZYME: CPT

## 2017-10-14 PROCEDURE — 85576 BLOOD PLATELET AGGREGATION: CPT

## 2017-10-14 PROCEDURE — 99232 SBSQ HOSP IP/OBS MODERATE 35: CPT | Performed by: PSYCHIATRY & NEUROLOGY

## 2017-10-14 PROCEDURE — 6370000000 HC RX 637 (ALT 250 FOR IP): Performed by: FAMILY MEDICINE

## 2017-10-14 PROCEDURE — 1200000000 HC SEMI PRIVATE

## 2017-10-14 PROCEDURE — 2580000003 HC RX 258: Performed by: NURSE PRACTITIONER

## 2017-10-14 PROCEDURE — 2580000003 HC RX 258: Performed by: FAMILY MEDICINE

## 2017-10-14 PROCEDURE — 6360000002 HC RX W HCPCS: Performed by: FAMILY MEDICINE

## 2017-10-14 RX ORDER — DOCUSATE SODIUM 100 MG/1
100 CAPSULE, LIQUID FILLED ORAL DAILY
Status: DISCONTINUED | OUTPATIENT
Start: 2017-10-14 | End: 2017-10-17 | Stop reason: HOSPADM

## 2017-10-14 RX ADMIN — Medication 600 MG: at 10:22

## 2017-10-14 RX ADMIN — ATORVASTATIN CALCIUM 40 MG: 40 TABLET, FILM COATED ORAL at 10:22

## 2017-10-14 RX ADMIN — INSULIN LISPRO 2 UNITS: 100 INJECTION, SOLUTION INTRAVENOUS; SUBCUTANEOUS at 17:38

## 2017-10-14 RX ADMIN — FAMOTIDINE 40 MG: 20 TABLET, FILM COATED ORAL at 10:22

## 2017-10-14 RX ADMIN — INSULIN GLARGINE 40 UNITS: 100 INJECTION, SOLUTION SUBCUTANEOUS at 21:31

## 2017-10-14 RX ADMIN — TAMSULOSIN HYDROCHLORIDE 0.4 MG: 0.4 CAPSULE ORAL at 10:24

## 2017-10-14 RX ADMIN — IRON SUCROSE 200 MG: 20 INJECTION, SOLUTION INTRAVENOUS at 10:53

## 2017-10-14 RX ADMIN — DOCUSATE SODIUM 100 MG: 100 CAPSULE, LIQUID FILLED ORAL at 10:33

## 2017-10-14 RX ADMIN — ASPIRIN 81 MG: 81 TABLET ORAL at 10:22

## 2017-10-14 RX ADMIN — POLYETHYLENE GLYCOL 3350 17 G: 17 POWDER, FOR SOLUTION ORAL at 10:23

## 2017-10-14 RX ADMIN — MULTIPLE VITAMINS W/ MINERALS TAB 1 TABLET: TAB at 10:24

## 2017-10-14 RX ADMIN — INSULIN LISPRO 1 UNITS: 100 INJECTION, SOLUTION INTRAVENOUS; SUBCUTANEOUS at 21:31

## 2017-10-14 RX ADMIN — CIPROFLOXACIN 500 MG: 500 TABLET, FILM COATED ORAL at 21:31

## 2017-10-14 RX ADMIN — PAROXETINE HYDROCHLORIDE HEMIHYDRATE 10 MG: 10 TABLET, FILM COATED ORAL at 10:23

## 2017-10-14 RX ADMIN — FERROUS SULFATE TAB EC 325 MG (65 MG FE EQUIVALENT) 325 MG: 325 (65 FE) TABLET DELAYED RESPONSE at 10:22

## 2017-10-14 RX ADMIN — POTASSIUM CHLORIDE 20 MEQ: 1.5 POWDER, FOR SOLUTION ORAL at 21:31

## 2017-10-14 RX ADMIN — FERROUS SULFATE TAB EC 325 MG (65 MG FE EQUIVALENT) 325 MG: 325 (65 FE) TABLET DELAYED RESPONSE at 17:40

## 2017-10-14 RX ADMIN — CLOPIDOGREL 75 MG: 75 TABLET, FILM COATED ORAL at 10:22

## 2017-10-14 RX ADMIN — POTASSIUM CHLORIDE 20 MEQ: 1.5 POWDER, FOR SOLUTION ORAL at 10:23

## 2017-10-14 RX ADMIN — Medication 10 ML: at 10:33

## 2017-10-14 RX ADMIN — CIPROFLOXACIN 500 MG: 500 TABLET, FILM COATED ORAL at 10:22

## 2017-10-14 ASSESSMENT — ENCOUNTER SYMPTOMS
BLOOD IN STOOL: 0
COUGH: 0
ABDOMINAL PAIN: 0
WHEEZING: 0
VOICE CHANGE: 0
NAUSEA: 0
DIARRHEA: 0
SORE THROAT: 0
VOMITING: 0
CONSTIPATION: 1
SINUS PRESSURE: 0

## 2017-10-14 NOTE — PROGRESS NOTES
NEUROLOGY INPATIENT PROGRESS NOTE    10/14/2017         Subjective: David Prabhakar is a  74 y.o. female admitted on 10/9/2017 with Ureterolithiasis [N20.1]  POD #2 R ICA cavernous segment stenting. Briefly, this is a  74 y.o. female admitted on 10/9/2017 [facility] starting 2-3 hours PTA. CT A/P showed an acute left obstructive uropathy w/ 6-7 mm calculus in the proximal ureter just distal to the UPJ. Was found to have a leukocytosis and UTI. Patient was started on antibiotics and was transferred to our facility. Today she was tachycardic and hypotensive, meeting sepsis criteria. Per family, they noticed patient was weak early this morning. Specifically LUE weakness and gait disturbance. Also noted small facial droop. Uncertain if it started last night or this morning. Given patient's clinical status she was taken for ureteral stent placement first and then taken to the MRI. MRI showed multifocal acute infarcts in the R MCA distribution though within a relative narrow zone. There were also chronic infarcts seen in the occipital lobes and left cerebellar hemisphere. She was seen by neuro and EVNS. Decision was made to transfer the patient to the Neuro ICU for closer monitoring. Pt unable to get gadolinium for MRA neck due to 1.7 creatinine, and was not given IV contrast for CTA due to Hx/o dye allergy. Unenhanced neck MRA indicated possible severe R ICA stenosis. Ureteral stone removed during stent placement 10/10 reportedly appeared as though it might be infected; 11 hour BCx2 (1800 10/10) no growth. There is concern over possible sepsis and SBE, this concern magnified by scattered MRI acute infarct pattern (described above). Pt is on cefipime, but 10/10 was also on LQ and Rocephin.     Within the last year the patient has had a stroke apparently in [facility] [employer] where she was in [facility] that the based on review of her current brain MRI Adequate visualization    Indications:CVA. Comments:Ordering Physician: Dr. Roselinda Cogan Lobar    Critical Result: RN @ 12:30    History / Tech. Comments:  Procedure explained to patient. HTN, Atrial Fibrillation    Patient Status: Inpatient    Height: 62 inches Weight: 232.01 pounds BSA: 2.04 m^2 BMI: 42.43 kg/m^2    CONCLUSIONS    Summary  Left ventricle is moderately enlarged, global left ventricular systolic  function is normal, inferior and lateral hypokinesis is noted. Estimated  ejection fraction is 50-55%  Evidence of diastolic dysfunction. Right ventricular dilatation with reduced systolic function. Bi-atrial enlargement. Moderate mitral regurgitation due to papillary muscle dysfunction. Mild tricuspid regurgitation. Estimated right ventricular systolic pressure is 40 mmHg. Ct Abdomen Pelvis Wo Iv Contrast    Result Date: 10/10/2017  EXAMINATION: CT OF THE ABDOMEN AND PELVIS WITHOUT CONTRAST 10/10/2017 12:57 am TECHNIQUE: CT of the abdomen and pelvis was performed without the administration of intravenous contrast. Multiplanar reformatted images are provided for review. Dose modulation, iterative reconstruction, and/or weight based adjustment of the mA/kV was utilized to reduce the radiation dose to as low as reasonably achievable. COMPARISON: None. HISTORY: ORDERING SYSTEM PROVIDED HISTORY: vomiting TECHNOLOGIST PROVIDED HISTORY: Ordering Physician Provided Reason for Exam: C/o vomitting x 2-3 hours pta and nausea. Denies diarrhea or constipation. Acuity: Acute Type of Exam: Initial Relevant Medical/Surgical History: Hx xiao, diabetes, hernia repair FINDINGS: Lower Chest: The lung bases are clear. The heart is borderline enlarged. Calcification in the region of the mitral annulus. There is trace pericardial fluid. Organs: Well-circumscribed low-attenuation lesions in the superior aspect of the liver measuring up to 1 cm, likely small cysts. The gallbladder is surgically absent.   The spleen, pancreas 3:38 am TECHNIQUE: MRA of the head was performed utilizing time-of-flight imaging with MIP images. No intravenous contrast was administered. COMPARISON: None HISTORY: ORDERING SYSTEM PROVIDED HISTORY: MCA stroke, evaluate vasculature FINDINGS: ANTERIOR CIRCULATION: On the right, there is apparent diminished flow in the cavernous and supraclinoid portion of the internal carotid artery. There is extensive artifact at the level of the MCA although flow may be diminished as well. SUNDEEP is not well-visualized. On the left, artifact is present although there is apparent flow in the ICA, and MCA. Sis. POSTERIOR CIRCULATION: There is a short segment moderate stenosis of P1 segment left posterior cerebral artery. Posterior cerebral arteries appear otherwise patent. . The vertebral and basilar arteries appear unremarkable. ANEURYSM: No intracranial aneurysm is seen. Study limited by artifact, within this limit, there is apparent diminished flow in the right ICA and MCA. Short-segment focal stenosis P1 segment left PCA. Mra Neck Wo Contrast    Result Date: 10/11/2017  EXAMINATION: MRA OF THE NECK WITHOUT CONTRAST 10/11/2017 3:38 am TECHNIQUE: Multiplanar multisequence MRA of the neck was performed without the administration of intravenous contrast. Stenosis of the internal carotid arteries measured using NASCET criteria. COMPARISON: None. HISTORY: ORDERING SYSTEM PROVIDED HISTORY: Evaluate vasculature FINDINGS: AORTIC ARCH/GREAT VESSELS: There is a normal branch pattern of the aortic arch. No significant stenosis is seen of the innominate artery or subclavian arteries. CAROTID ARTERIES: On the right, there is diffusely diminished flow throughout the internal carotid artery cervical segment from its origin through the skullbase. The right common carotid artery appears widely patent. On the left, the common and internal carotid artery are normal in signal and caliber.   Is difficult to estimate the degree of stenosis by NASCET criteria, of the right internal carotid artery within estimate is 70-80%. VERTEBRAL ARTERIES: The vertebral arteries both arise from the subclavian arteries and are normal in caliber without evidence of flow limiting stenosis. Flow limiting stenosis in the proximal right internal carotid artery. Mri Brain Wo Contrast    Result Date: 10/10/2017  EXAMINATION: MRI OF THE BRAIN WITHOUT CONTRAST  10/10/2017 12:33 pm TECHNIQUE: Multiplanar multisequence MRI of the brain was performed without the administration of intravenous contrast. COMPARISON: None HISTORY: ORDERING SYSTEM PROVIDED HISTORY: FACIAL MUSCLE WEAKNESS/PARALYSIS Initial evaluation. FINDINGS: INTRACRANIAL STRUCTURES/VENTRICLES: There is an area of increased diffusion signal noted along the left choroid plexus, likely related to calcification. There are multifocal areas of restricted diffusion, primarily noted in the posterior right frontal lobe, as well as along the right pre and postcentral gyri. This is likely accounting for the patient's facial muscle weakness. This is in the vascular distribution of the right middle cerebral artery. The cerebral and cerebellar parenchyma demonstrate volume loss. There are areas of encephalomalacia along the medial aspects of the occipital lobes. There are scattered areas of increased T2 signal noted supratentorially which are compatible with minimal chronic microvascular white matter ischemic disease. There is a small chronic infarct noted in the left cerebellar hemisphere. There are no abnormal extra-axial fluid collections. The ventricles are proportional to the cerebral sulci. Normal major intracranial flow voids are noted. There are no areas of blooming artifact noted on the gradient echo sequences to suggest sequela of acute or chronic hemorrhage. ORBITS: Lens implants from prior cataract surgery are noted. The orbits are otherwise unremarkable.  SINUSES: There is scattered paranasal

## 2017-10-14 NOTE — CONSULTS
[facility]    Consult                        Today's Date: 10/14/2017  Patient Name: Fela Ge  Date of admission: 10/9/2017 11:35 PM  Patient's age: 74 y. o., 1950  Admission Dx: Ureterolithiasis [N20.1]    Reason for Consult:  Cardiac evaluation    Requesting Physician: Yaneli Esposito MD    CHIEF COMPLAINT:  CVA    History Obtained From:  patient, electronic medical record    HISTORY OF PRESENT ILLNESS:      The patient is a 74 y.o.  female who was transferred from [facility] where she presented with nausea and vomiting and found to have left obstructive uropathy and UTI and antibiotics and DJ stent 10/10/17. Patient was noted to have facial droop and MRI showed acute MCA infarction. Patient was evaluated by endovascular neurology team and underwent R ICA cavernous segment stenting. Patient has h/o PAF and recent h/o CVA and was on eliquis. Cardiology is consulted for [facility] recommendation. Patient is currently in [address]. Patient denies any chest pain or dyspnea. Past Medical History:   has a past medical history of CVA (cerebral vascular accident) ([facility] [address] Diabetes mellitus ([facility] [address] and PE (pulmonary thromboembolism) ([facility] [address] Past Surgical History:   has a past surgical history that includes Cholecystectomy; hernia repair; and Cystoscopy (Left, 10/10/2017). Home Medications:    Prior to Admission medications    Medication Sig Start Date End Date Taking?  Authorizing Provider   mycophenolate (CELLCEPT) 500 MG tablet Take 1,500 mg by mouth 2 times daily   Yes Historical Provider, MD   Multiple Vitamins-Minerals (THERAPEUTIC MULTIVITAMIN-MINERALS) tablet Take 1 tablet by mouth daily   Yes Historical Provider, MD   ondansetron (ZOFRAN ODT) 4 MG disintegrating tablet Take 1 tablet by mouth every 8 hours as needed for Nausea 8/14/17  Yes Wander Miller MD   amLODIPine (NORVASC) 5 MG tablet Take 5 mg by mouth daily   Yes Historical Provider, MD   atorvastatin (LIPITOR) 40 MG tablet Take 40 mg by mouth daily   Yes Historical Provider, MD   calcium carbonate 600 MG TABS tablet Take 1 tablet by mouth daily   Yes Historical Provider, MD   carvedilol (COREG) 6.25 MG tablet Take 6.25 mg by mouth 2 times daily (with meals)   Yes Historical Provider, MD   apixaban (ELIQUIS) 5 MG TABS tablet Take by mouth 2 times daily   Yes Historical Provider, MD   famotidine (PEPCID) 40 MG tablet Take 40 mg by mouth daily   Yes Historical Provider, MD   furosemide (LASIX) 40 MG tablet Take 40 mg by mouth 2 times daily   Yes Historical Provider, MD   Insulin Glargine (LANTUS SC) Inject 40 Units into the skin nightly    Yes Historical Provider, MD   losartan-hydrochlorothiazide (HYZAAR) 100-25 MG per tablet Take 1 tablet by mouth daily   Yes Historical Provider, MD   metFORMIN (GLUCOPHAGE) 500 MG tablet Take 500 mg by mouth 2 times daily (with meals)   Yes Historical Provider, MD   pantoprazole (PROTONIX) 40 MG tablet Take 40 mg by mouth daily   Yes Historical Provider, MD   PARoxetine (PAXIL) 10 MG tablet Take 10 mg by mouth every morning   Yes Historical Provider, MD   POTASSIUM CHLORIDE PO Take 20 mEq by mouth daily    Yes Historical Provider, MD   glipiZIDE (GLUCOTROL) 10 MG tablet Take 10 mg by mouth 2 times daily (before meals)    Historical Provider, MD       Allergies: Iv contrast [iodides]    Social History:   reports that she has never smoked. She has never used smokeless tobacco. She reports that she does not drink alcohol or use drugs. Family History: family history is not on file. No h/o sudden cardiac death. No for premature CAD    REVIEW OF SYSTEMS:    · Constitutional: there has been no unanticipated weight loss. There's been No change in energy level, No change in activity level. · Eyes: No visual changes or diplopia. No scleral icterus. · ENT: No Headaches, hearing loss or vertigo. No mouth sores or sore throat.   · Cardiovascular: see above  · Respiratory: see above  · Gastrointestinal: No abdominal pain, appetite loss, blood in stools. · Genitourinary: see above. · Musculoskeletal:  No gait disturbance, No weakness or joint complaints. · Integumentary: No rash or pruritis. · Neurological: Nsee above. · Psychiatric: No anxiety, or depression. · Endocrine: No temperature intolerance. · Hematologic/Lymphatic: No abnormal bruising or bleeding, blood clots or swollen lymph nodes. · Allergic/Immunologic: No nasal congestion or hives. PHYSICAL EXAM:      BP (!) 112/41   Pulse 89   Temp 98.2 °F (36.8 °C) (Oral)   Resp 18   Ht 5' 2\" (1.575 m)   Wt 239 lb 3.2 oz (108.5 kg)   SpO2 98%   BMI 43.75 kg/m²    Constitutional and General Appearance: alert, cooperative, no distress and appears stated age  HEENT: PERRL, no cervical lymphadenopathy. No masses palpable. Normal oral mucosa  Respiratory:  · Normal excursion and expansion without use of accessory muscles  · Resp Auscultation: Good respiratory effort. No for increased work of breathing. On auscultation: clear to auscultation bilaterally  Cardiovascular:  · Heart tones are crisp and normal. regular S1 and S2.  · Jugular venous pulsation Normal  · The carotid upstroke is normal in amplitude and contour without delay or bruit   Abdomen:   · soft  · Bowel sounds present  Extremities:  ·  No edema  Neurological:  · Alert and oriented. DATA:    Diagnostics:    EKG: SR, RBBB    TTE 10/11/17  Summary  Left ventricle is moderately enlarged, global left ventricular systolic  function is normal, inferior and lateral hypokinesis is noted. Estimated  ejection fraction is 50-55%  Evidence of diastolic dysfunction. Right ventricular dilatation with reduced systolic function. Bi-atrial enlargement. Moderate mitral regurgitation due to papillary muscle dysfunction. Mild tricuspid regurgitation. Estimated right ventricular systolic pressure is 40 mmHg.   Labs:     CBC:   Recent Labs      10/12/17   1806   10/13/17   1811  10/14/17   0650   WBC 16.0*   --    --   10.4   HGB  7.6*   < >  8.8*  8.3*   HCT  24.1*   < >  26.9*  26.9*   PLT  324   --    --   263    < > = values in this interval not displayed. BMP:   Recent Labs      10/12/17   1015   NA  140   K  4.6   CO2  17*   BUN  20   CREATININE  0.88   LABGLOM  >60   GLUCOSE  228*     BNP: No results for input(s): BNP in the last 72 hours. PT/INR: No results for input(s): PROTIME, INR in the last 72 hours. APTT:No results for input(s): APTT in the last 72 hours. CARDIAC ENZYMES:No results for input(s): CKTOTAL, CKMB, CKMBINDEX, TROPONINI in the last 72 hours. FASTING LIPID PANEL:  Lab Results   Component Value Date    HDL 39 10/10/2017    TRIG 66 10/10/2017     LIVER PROFILE:No results for input(s): AST, ALT, LABALBU in the last 72 hours. IMPRESSION:    Patient Active Problem List   Diagnosis    Hydronephrosis of left kidney    Acute cystitis with hematuria    CKD (chronic kidney disease), stage III    Hypotension arterial    Essential hypertension    Morbid obesity with BMI of 40.0-44.9, adult (HCC)    History of arterial ischemic stroke    History of pulmonary embolus (PE)    Paroxysmal atrial fibrillation (HCC)    Left ureteral stone    Left-sided weakness    Non-intractable vomiting with nausea    Cerebrovascular accident (CVA) due to embolism of right middle cerebral artery (HCC)    Hypotension    Sepsis secondary to UTI ([address]    Elevated serum creatinine    Kidney stone    Anemia    Hypokalemia    Acute ischemic right MCA stroke ([address]    Right-sided carotid artery disease (HCC)     CVA s/p NICO cavernous segment stenting  UTI and obstructive uropathy s/p DJ stent  PAF  Preserved LV systolic function with focal wall motion abnormality  HTN  CKD  Anemia    RECOMMENDATIONS:  1. Patient is currently on ASA and plavix and per neurology resume eliquis in 2 weeks. I agree with this strategy  2. Continue statin  3.  Consider stress test at OP due to presence of focal wall motion abnormality in 4-6 weeks. Not recommended at present due to recent stroke and absence of anginal symptoms. 4. Follow up in cardiology clinic in 2 weeks. Call cardiology with questions. Discussed with patient and Nurse.     [id number] Cardiology Consult           [phone]

## 2017-10-14 NOTE — PROGRESS NOTES
Dr Eduardo Bishop notifed of pt having expressive aphagia. Pt shows no other signs or symptoms of stroke. Pupils equal and reactive. Hand grasps strong and equal.  Pt alert and orient. Able to walk to and from bathroom. Vitals stable. No new orders at this time. RN will continue to monitor.

## 2017-10-14 NOTE — PROGRESS NOTES
Neuroendovascular surgery consult    Pt Name: Pamela Smith  MRN: [id number]  [place]: 1950  Date of evaluation: 10/14/2017  Primary Care Physician: Daniele Carlson DO  Reason for evaluation: stroke    SUBJECTIVE:   History of Chief Complaint:    73 yo female with PMH of obesity, DM, HTN, CKD, stroke(with no residual symptoms), A fib and PE who wad admitted for left kidney stone and underwent left side ureteral stent placement. She developed left hemibody weakness for which neurology was consulted. MRI brain showed right MCA infarcts for which stroke team was consulted. She denies any previous strokes. She denies loss of vision/loss of consciousness or seizure like activity. She underwent angioplasty and stenting of a clinoid segment right ICA stenosis on 10/12/2017. She was transferred to the floor yesterday. No acute issues since then. Allergies  is allergic to iv contrast [iodides]. Medications Scheduled Meds:   docusate sodium  100 mg Oral Daily    ciprofloxacin  500 mg Oral 2 times per day    clopidogrel  75 mg Oral Daily    ferrous sulfate  325 mg Oral BID WC    polyethylene glycol  17 g Oral Daily    insulin glargine  40 Units Subcutaneous Nightly    atorvastatin  40 mg Oral Daily    calcium carbonate  1 tablet Oral Daily    famotidine  40 mg Oral Daily    therapeutic multivitamin-minerals  1 tablet Oral Daily    PARoxetine  10 mg Oral QAM    potassium chloride  20 mEq Oral BID    sodium chloride flush  10 mL Intravenous 2 times per day    tamsulosin  0.4 mg Oral Daily    insulin lispro  0-12 Units Subcutaneous TID WC    insulin lispro  0-6 Units Subcutaneous Nightly    influenza virus vaccine  0.5 mL Intramuscular Once    aspirin  81 mg Oral Daily     Continuous Infusions:   sodium chloride 10 mL/hr at 10/14/17 1030    dextrose       PRN Meds:. LORazepam, sodium chloride flush, HYDROcodone 5 mg - acetaminophen **OR** HYDROcodone 5 mg - acetaminophen, magnesium continue. Upon discharge, patient to follow up in the neuroendovascular clinic in 2 weeks. Discussed with Dr Hardeep Ruvalcaba.      Diony Marks MD  Pager [phone]  Stroke, [facility]  [facility]  Electronically signed 10/14/2017 at 12:29 PM

## 2017-10-14 NOTE — PLAN OF CARE
[facility]    Second Visit Note  For more detailed information please refer to the progress note of the day      10/14/2017    3:38 PM    Name:   Edna Ventura  MRN:     [id number]     [place]:      [de-identified]   Room:   [address] [place] [address]  IP Day:  4  Admit Date:  10/9/2017 11:35 PM    PCP:   Sandee Joel DO  Code Status:  Full Code        Pt vitals were reviewed   New labs were reviewed   Patient was seen    Updated plan :     1. Son concerned LUE swelling . Non tender . No warmth . Elevate . Replace IV .          Mildred Clifford MD  10/14/2017  3:38 PM

## 2017-10-14 NOTE — PROGRESS NOTES
(!) 104/49 98.5 °F (36.9 °C) Oral 99 23 99 %   10/13/17 1328 106/82 98 °F (36.7 °C) Oral 102 16 95 %   10/13/17 1324 (!) 103/50 97.7 °F (36.5 °C) Oral 95 18 100 %   10/13/17 1322 (!) 103/50 97.8 °F (36.6 °C) Oral 93 16 100 %   10/13/17 1303 (!) 105/48 - - 94 16 100 %   10/13/17 1217 (!) 100/48 97.5 °F (36.4 °C) Oral 98 18 100 %   10/13/17 1103 (!) 100/40 - - 94 16 100 %   10/13/17 1002 (!) 104/55 - - 108 16 100 %   10/13/17 0903 (!) 101/39 - - 94 14 100 %   10/13/17 0802 (!) 100/46 97.5 °F (36.4 °C) Oral 86 16 100 %     Intake / output   10/13 0701 - 10/14 0700  In: 969.2 [P.O.:640]  Out: 223 [Urine:223]  Physical Exam:  Physical Exam   Constitutional: She is oriented to person, place, and time. She appears well-developed and well-nourished. No distress. Nasal cannula in place. HENT:   Mouth/Throat: No oropharyngeal exudate. Eyes: Conjunctivae are normal. Pupils are equal, round, and reactive to light. No scleral icterus. Neck: No JVD present. No thyromegaly present. Cardiovascular: Normal rate, regular rhythm and normal heart sounds. Exam reveals no gallop and no friction rub. No murmur heard. Pulmonary/Chest: No accessory muscle usage. No respiratory distress. She has no wheezes. She has no rales. Abdominal: Soft. Bowel sounds are normal. She exhibits no distension and no mass. There is no tenderness. There is no rebound and no guarding. Lymphadenopathy:     She has no cervical adenopathy. Neurological: She is alert and oriented to person, place, and time. No cranial nerve deficit. She exhibits normal muscle tone. Left facial droop, left upper extremity weakness   Skin: She is not diaphoretic. Nursing note and vitals reviewed.     Lower Extremities : No ankle Edema , No calf Tenderness     Laboratory findings:    Recent Labs      10/12/17   1015  10/12/17   1806  10/13/17   0745  10/13/17   1811  10/14/17   0650   WBC  11.1*  16.0*   --    --   10.4   HGB  8.2*  7.6*  6.5*  8.8*  8.3*   HCT

## 2017-10-15 LAB
COLLAGEN ADENOSINE-5'-DIPHOSPHATE (ADP) TIME: 73 SEC (ref 67–112)
COLLAGEN EPINEPHRINE TIME: 105 SEC (ref 85–172)
GLUCOSE BLD-MCNC: 119 MG/DL (ref 65–105)
GLUCOSE BLD-MCNC: 154 MG/DL (ref 65–105)
GLUCOSE BLD-MCNC: 71 MG/DL (ref 65–105)
GLUCOSE BLD-MCNC: 80 MG/DL (ref 65–105)
HCT VFR BLD CALC: 26.9 % (ref 36–46)
HEMOGLOBIN: 8.6 G/DL (ref 12–16)
MCH RBC QN AUTO: 29 PG (ref 26–34)
MCHC RBC AUTO-ENTMCNC: 31.8 G/DL (ref 31–37)
MCV RBC AUTO: 91.1 FL (ref 80–100)
PDW BLD-RTO: 19.4 % (ref 12.5–15.4)
PLATELET # BLD: 274 K/UL (ref 140–450)
PLATELET FUNCTION INTERP: NORMAL
PMV BLD AUTO: 7.8 FL (ref 6–12)
RBC # BLD: 2.95 M/UL (ref 4–5.2)
WBC # BLD: 9.2 K/UL (ref 3.5–11)

## 2017-10-15 PROCEDURE — 6370000000 HC RX 637 (ALT 250 FOR IP): Performed by: NURSE PRACTITIONER

## 2017-10-15 PROCEDURE — 36415 COLL VENOUS BLD VENIPUNCTURE: CPT

## 2017-10-15 PROCEDURE — 6370000000 HC RX 637 (ALT 250 FOR IP): Performed by: FAMILY MEDICINE

## 2017-10-15 PROCEDURE — 82947 ASSAY GLUCOSE BLOOD QUANT: CPT

## 2017-10-15 PROCEDURE — 85576 BLOOD PLATELET AGGREGATION: CPT

## 2017-10-15 PROCEDURE — 2580000003 HC RX 258: Performed by: STUDENT IN AN ORGANIZED HEALTH CARE EDUCATION/TRAINING PROGRAM

## 2017-10-15 PROCEDURE — 6370000000 HC RX 637 (ALT 250 FOR IP): Performed by: PSYCHIATRY & NEUROLOGY

## 2017-10-15 PROCEDURE — 99232 SBSQ HOSP IP/OBS MODERATE 35: CPT | Performed by: FAMILY MEDICINE

## 2017-10-15 PROCEDURE — 85027 COMPLETE CBC AUTOMATED: CPT

## 2017-10-15 PROCEDURE — 97530 THERAPEUTIC ACTIVITIES: CPT

## 2017-10-15 PROCEDURE — 97116 GAIT TRAINING THERAPY: CPT

## 2017-10-15 PROCEDURE — 99232 SBSQ HOSP IP/OBS MODERATE 35: CPT | Performed by: PSYCHIATRY & NEUROLOGY

## 2017-10-15 PROCEDURE — 1200000000 HC SEMI PRIVATE

## 2017-10-15 PROCEDURE — 2580000003 HC RX 258: Performed by: NURSE PRACTITIONER

## 2017-10-15 PROCEDURE — 97535 SELF CARE MNGMENT TRAINING: CPT

## 2017-10-15 RX ORDER — 0.9 % SODIUM CHLORIDE 0.9 %
500 INTRAVENOUS SOLUTION INTRAVENOUS ONCE
Status: COMPLETED | OUTPATIENT
Start: 2017-10-15 | End: 2017-10-15

## 2017-10-15 RX ADMIN — TAMSULOSIN HYDROCHLORIDE 0.4 MG: 0.4 CAPSULE ORAL at 09:42

## 2017-10-15 RX ADMIN — Medication 600 MG: at 09:43

## 2017-10-15 RX ADMIN — MULTIPLE VITAMINS W/ MINERALS TAB 1 TABLET: TAB at 09:42

## 2017-10-15 RX ADMIN — Medication 10 ML: at 21:12

## 2017-10-15 RX ADMIN — POTASSIUM CHLORIDE 20 MEQ: 1.5 POWDER, FOR SOLUTION ORAL at 21:02

## 2017-10-15 RX ADMIN — PAROXETINE HYDROCHLORIDE HEMIHYDRATE 10 MG: 10 TABLET, FILM COATED ORAL at 09:42

## 2017-10-15 RX ADMIN — CIPROFLOXACIN 500 MG: 500 TABLET, FILM COATED ORAL at 09:43

## 2017-10-15 RX ADMIN — SODIUM CHLORIDE 500 ML: 0.9 INJECTION, SOLUTION INTRAVENOUS at 05:49

## 2017-10-15 RX ADMIN — CIPROFLOXACIN 500 MG: 500 TABLET, FILM COATED ORAL at 21:02

## 2017-10-15 RX ADMIN — POLYETHYLENE GLYCOL 3350 17 G: 17 POWDER, FOR SOLUTION ORAL at 09:43

## 2017-10-15 RX ADMIN — INSULIN GLARGINE 40 UNITS: 100 INJECTION, SOLUTION SUBCUTANEOUS at 21:02

## 2017-10-15 RX ADMIN — ASPIRIN 81 MG: 81 TABLET ORAL at 09:43

## 2017-10-15 RX ADMIN — FERROUS SULFATE TAB EC 325 MG (65 MG FE EQUIVALENT) 325 MG: 325 (65 FE) TABLET DELAYED RESPONSE at 18:23

## 2017-10-15 RX ADMIN — CLOPIDOGREL 75 MG: 75 TABLET, FILM COATED ORAL at 09:43

## 2017-10-15 RX ADMIN — POTASSIUM CHLORIDE 20 MEQ: 1.5 POWDER, FOR SOLUTION ORAL at 09:42

## 2017-10-15 RX ADMIN — FAMOTIDINE 40 MG: 20 TABLET, FILM COATED ORAL at 09:43

## 2017-10-15 RX ADMIN — Medication 10 ML: at 09:45

## 2017-10-15 RX ADMIN — FERROUS SULFATE TAB EC 325 MG (65 MG FE EQUIVALENT) 325 MG: 325 (65 FE) TABLET DELAYED RESPONSE at 09:42

## 2017-10-15 RX ADMIN — ATORVASTATIN CALCIUM 40 MG: 40 TABLET, FILM COATED ORAL at 09:43

## 2017-10-15 RX ADMIN — DOCUSATE SODIUM 100 MG: 100 CAPSULE, LIQUID FILLED ORAL at 09:43

## 2017-10-15 ASSESSMENT — ENCOUNTER SYMPTOMS
NAUSEA: 0
SINUS PRESSURE: 0
COUGH: 0
WHEEZING: 0
CONSTIPATION: 1
VOMITING: 0
BLOOD IN STOOL: 0
DIARRHEA: 0
VOICE CHANGE: 0
ABDOMINAL PAIN: 0
SORE THROAT: 0

## 2017-10-15 ASSESSMENT — PAIN SCALES - GENERAL: PAINLEVEL_OUTOF10: 0

## 2017-10-15 NOTE — PROCEDURES
Your answer to Question 1 - \"Do you have any symptoms that are bothering you? \" = YES    Your answer to Question 2 - \"Are you able to do the same work as before? \" = NO    Your answer to Question 3 - \"Are you able to keep up with your hobbies? \" = NO    Your answer to Question 4 - \"Have you maintained your ties to friends and family? \" = YES    Your answer to Question 5 - \"Do you need help making a simple meal, doing household chores, or balancing a checkbook? \" = YES    Your answer to Question 6 - \"Do you need help with shopping or traveling close to home? \" = YES    Your answer to Question 7 - \"Do you need another person to help you walk? \" = YES    Your answer to Question 8 - \"Do you need help with eating, going to the toilet, or bathing? \" = NO    Your answer to Question 9 - \"Do you stay in bed most of the day and need constant nursing care? \" = NO      The modified Bethesda Scale (mRS) is: <b=\"\"4     Potential conflicts are listed here (use the BACK button on your browser to fix errors):  No conflicts identified. Levels of the modified Bethesda Scale (mRS):   0 - No symptoms. 1 - No significant disability. Able to carry out all usual activities, despite some symptoms. 2 - Slight disability. Able to look after own affairs without assistance, but unable to carry out all previous activities. 3 - Moderate disability. Requires some help, but able to walk unassisted. 4 - Moderately severe disability. Unable to attend to own bodily needs without assistance or unable to walk unassisted. 5 - Severe disability. Requires constant nursing care and attention, bedridden.
warrants treatment or treatment change, needs at least one of the first two questions endorsed as positive  (little pleasure, feeling depressed) indicating the symptom has been present more than half the time in the past two weeks. --In addition, the tenth question about difficulty at work or home or getting along with others should be answered at least somewhat difficult.   --When a depression diagnosis has been made, patient preferences should be considered, especially when choosing between treatment recommendations of antidepressants treatment and psychotherapy. PHQ-9  Score Provisional diagnosis Treatment recommendation   5-9 Minimal symptoms Support, educate to call if worse; return in 1 month   10-14 Minor depression  -----------------------------------------  Dysthymia  -----------------------------------------  Major depression, mild Support, watchful waiting  -------------------------------------------------------------  Antidepressant or psychotherapy  -------------------------------------------------------------Antidepressant or psychotherapy   15-19 Major depression, moderately severe Antidepressant or psychotherapy     >20 Major depression, severe Antidepressant and psychotherapy  (especially if not improved on monotherapy)   # If symptoms present > two years, then probable chronic depression which warrants antidepressant or psychotherapy  (ask, In the past 2 years have you felt depressed or sad most days, even if you felt okay sometimes? )  ## If symptoms present > one month or severe functional impairment, consider active treatment.

## 2017-10-15 NOTE — PROGRESS NOTES
time of eval)     Transfers  Sit to Stand: Minimal Assistance; Moderate Assistance (+2)  Stand to sit: Minimal Assistance     Ambulation  Ambulation?: Yes  Ambulation 1  Surface: level tile  Device: Rolling Walker  Assistance: Moderate assistance  Quality of Gait: Pt with difficulty advancing left LE. Unable to keep left hand on RW. Distance: 15ft, 5ft; pt was able to stand for approx 2-3 mins for hand washing. Comments: May consider quad cane or kelechi walker     Balance  Sitting - Dynamic: Good  Standing - Static: Fair;Good  Standing - Dynamic: Fair         Assessment   Assessment: Focused on ambulation this date with chair follow. Fatigue with gait with Left LE giving way after about 15 ft. Pt may benefit from kelechi walker if available due to inability to maintain left hand on walker. Treatment Diagnosis: left hemiparesis  Activity Tolerance  Activity Tolerance: Patient Tolerated treatment well       Discharge Recommendations:  [address], Patient would benefit from continued therapy after discharge    Goals  Short term goals  Time Frame for Short term goals: 15  Short term goal 1: independent bed mobility  Short term goal 2: independent transfers  Short term goal 3: independent ambulation with least restrictive device 200ft  Short term goal 4: pt. to improve standing dynamic balance to good  Short term goal 5: pt. to tolerate 45 minutes of PT treatment to improve strength and endurance   Patient Goals   Patient goals : patient would like to get back to moving around and walking on own for return to home     Plan    Plan  Times per week: 5-6  Current Treatment Recommendations: Strengthening, Transfer Training, Endurance Training, Cognitive Reorientation, Patient/Caregiver Education & Training, ROM, Balance Training, Gait Training, Functional Mobility Training, Stair training, Safety Education & Training  Safety Devices  Type of devices:  All fall risk precautions in place     Therapy Time   Individual Concurrent Group Co-treatment   Time In 1149         Time Out 1214         Minutes 25                 Mabel Sepulveda, PT, DPT

## 2017-10-15 NOTE — PROGRESS NOTES
nightly       losartan-hydrochlorothiazide (HYZAAR) 100-25 MG per tablet Take 1 tablet by mouth daily      metFORMIN (GLUCOPHAGE) 500 MG tablet Take 500 mg by mouth 2 times daily (with meals)      pantoprazole (PROTONIX) 40 MG tablet Take 40 mg by mouth daily      PARoxetine (PAXIL) 10 MG tablet Take 10 mg by mouth every morning      POTASSIUM CHLORIDE PO Take 20 mEq by mouth daily       glipiZIDE (GLUCOTROL) 10 MG tablet Take 10 mg by mouth 2 times daily (before meals)         Allergies: Carmelita Gene is allergic to iv contrast [iodides].     Past Medical History:   Diagnosis Date    [facility]     Diabetes mellitus [facility]     PE (pulmonary thromboembolism) ([facility]        Past Surgical History:   Procedure Laterality Date    CHOLECYSTECTOMY      CYSTOSCOPY Left 10/10/2017    CYSTOSCOPY, STENT INSERTION performed by Yvon Bashir MD at [address]             Medications:     docusate sodium  100 mg Oral Daily    ciprofloxacin  500 mg Oral 2 times per day    clopidogrel  75 mg Oral Daily    ferrous sulfate  325 mg Oral BID WC    polyethylene glycol  17 g Oral Daily    insulin glargine  40 Units Subcutaneous Nightly    atorvastatin  40 mg Oral Daily    calcium carbonate  1 tablet Oral Daily    famotidine  40 mg Oral Daily    therapeutic multivitamin-minerals  1 tablet Oral Daily    PARoxetine  10 mg Oral QAM    potassium chloride  20 mEq Oral BID    sodium chloride flush  10 mL Intravenous 2 times per day    tamsulosin  0.4 mg Oral Daily    insulin lispro  0-12 Units Subcutaneous TID WC    insulin lispro  0-6 Units Subcutaneous Nightly    influenza virus vaccine  0.5 mL Intramuscular Once    aspirin  81 mg Oral Daily     PRN Meds include: LORazepam, sodium chloride flush, HYDROcodone 5 mg - acetaminophen **OR** HYDROcodone 5 mg - acetaminophen, magnesium hydroxide, bisacodyl, ondansetron, nicotine, HYDROmorphone **OR** HYDROmorphone, glucose, dextrose, glucagon (rDNA), dextrose    Objective:   BP (!) 105/58   Pulse 97   Temp 97.7 °F (36.5 °C)   Resp 20   Ht 5' 2\" (1.575 m)   Wt 239 lb 3.2 oz (108.5 kg)   SpO2 99%   BMI 43.75 kg/m²     Blood pressure range: Systolic (95GBM), YYO:665 , Min:95 , EXD:691   ; Diastolic (74YKG), SWJ:34, Min:38, Max:58  ON EXAMINATION:  GENERAL  Appears comfortable and in no distress   HEENT  NC/ AT   NECK  Supple and no bruits heard   MENTAL STATUS:  Alert, oriented to self, hospital and month; difficulty with immediate recall; able to name the objects and follow 1step and 2 step commands; no apparent language parameter disturbance verbally; no hallucination or delusion. CRANIAL NERVES: II     -       Pupils reactive b/l. Right upper quadrantanopia and inconsistent responses testing nasal field OD bitemporal field OS seems intact so there may be some left visual field cut but less so than she has in the right upper quadrant.   (The patient believes that she has tunnel vision.)  III,IV,VI -  EOMs full, no afferent defect, no                      LAMONT, no ptosis  V     -     Normal facial sensation  VII    -     Normal facial symmetry except increased size left palpebral fissure  VIII   -     Intact hearing grossly  IX,X -     Symmetrical palate  XI    -     Symmetrical shoulder shrug  XII   -     Midline tongue, no atrophy    MOTOR FUNCTION:  Significant for grade 2/5 weakness in left deltoid, 4/5L biceps/triceps and 0/5 distal to the elbow; otherwise 5/5 in Rt UE and bilateral LE with normal bulk, normal tone, no involuntary movements, no tremor   SENSORY FUNCTION:  Normal touch, normal pain grossly, and intact graphesthesia left hand    CEREBELLAR FUNCTION:  Intact fine motor control over upper limbs   REFLEX FUNCTION:   left hyperreflexia with plantar responses extensor left, flexor right    STATION and GAIT  Not tested               Data:  Type of Study      TTE procedure:2D Echocardiogram, M-Mode, Doppler, Color Doppler.     Procedure Date  Date: 10/11/2017 Start: 10:37 AM    Study Location: [facility]  Technical Quality: Adequate visualization    Indications:CVA. Comments:Ordering Physician: Dr. Carlos A Combs Lobar    Critical Result: RN @ 12:30    History / Tech. Comments:  Procedure explained to patient. HTN, Atrial Fibrillation    Patient Status: Inpatient    Height: 62 inches Weight: 232.01 pounds BSA: 2.04 m^2 BMI: 42.43 kg/m^2    CONCLUSIONS    Summary  Left ventricle is moderately enlarged, global left ventricular systolic  function is normal, inferior and lateral hypokinesis is noted. Estimated  ejection fraction is 50-55%  Evidence of diastolic dysfunction. Right ventricular dilatation with reduced systolic function. Bi-atrial enlargement. Moderate mitral regurgitation due to papillary muscle dysfunction. Mild tricuspid regurgitation. Estimated right ventricular systolic pressure is 40 mmHg. Ct Abdomen Pelvis Wo Iv Contrast    Result Date: 10/10/2017  EXAMINATION: CT OF THE ABDOMEN AND PELVIS WITHOUT CONTRAST 10/10/2017 12:57 am TECHNIQUE: CT of the abdomen and pelvis was performed without the administration of intravenous contrast. Multiplanar reformatted images are provided for review. Dose modulation, iterative reconstruction, and/or weight based adjustment of the mA/kV was utilized to reduce the radiation dose to as low as reasonably achievable. COMPARISON: None. HISTORY: ORDERING SYSTEM PROVIDED HISTORY: vomiting TECHNOLOGIST PROVIDED HISTORY: Ordering Physician Provided Reason for Exam: C/o vomitting x 2-3 hours pta and nausea. Denies diarrhea or constipation. Acuity: Acute Type of Exam: Initial Relevant Medical/Surgical History: Hx xiao, diabetes, hernia repair FINDINGS: Lower Chest: The lung bases are clear. The heart is borderline enlarged. Calcification in the region of the mitral annulus. There is trace pericardial fluid.  Organs: Well-circumscribed low-attenuation lesions in the patent. On the left, the common and internal carotid artery are normal in signal and caliber. Is difficult to estimate the degree of stenosis by NASCET criteria, of the right internal carotid artery within estimate is 70-80%. VERTEBRAL ARTERIES: The vertebral arteries both arise from the subclavian arteries and are normal in caliber without evidence of flow limiting stenosis. Flow limiting stenosis in the proximal right internal carotid artery. Mri Brain Wo Contrast    Result Date: 10/10/2017  EXAMINATION: MRI OF THE BRAIN WITHOUT CONTRAST  10/10/2017 12:33 pm TECHNIQUE: Multiplanar multisequence MRI of the brain was performed without the administration of intravenous contrast. COMPARISON: None HISTORY: ORDERING SYSTEM PROVIDED HISTORY: FACIAL MUSCLE WEAKNESS/PARALYSIS Initial evaluation. FINDINGS: INTRACRANIAL STRUCTURES/VENTRICLES: There is an area of increased diffusion signal noted along the left choroid plexus, likely related to calcification. There are multifocal areas of restricted diffusion, primarily noted in the posterior right frontal lobe, as well as along the right pre and postcentral gyri. This is likely accounting for the patient's facial muscle weakness. This is in the vascular distribution of the right middle cerebral artery. The cerebral and cerebellar parenchyma demonstrate volume loss. There are areas of encephalomalacia along the medial aspects of the occipital lobes. There are scattered areas of increased T2 signal noted supratentorially which are compatible with minimal chronic microvascular white matter ischemic disease. There is a small chronic infarct noted in the left cerebellar hemisphere. There are no abnormal extra-axial fluid collections. The ventricles are proportional to the cerebral sulci. Normal major intracranial flow voids are noted. There are no areas of blooming artifact noted on the gradient echo sequences to suggest sequela of acute or chronic hemorrhage. elevated creatinine and reported dye allergy, radiographic evaluation of her neck and brain vasculature has been limited, but incomplete neck MRA suggests severe right ICA stenosis (ipsilateral to stroke). She needs to have angiography done at least with CTA and given the limited contrast allergy described above, this could be done with premedication. This was discussed with neuro intensivist just now. Blood cultures are negative after 2 days and transthoracic echocardiogram shows no evidence for valvular vegetations nor other potential cardioembolic source. As neither of these indicated a reasonable possibility of SBE, this entity did not need to be  ruled out with a LO. POD #3s/p R ICA cavernous segment stenting. Eliquis has not been restarted. Pt will need in-pt acute rehab once cleared by endovascular and IM. Endovascular recommending to wait 2 weeks until after the period of dual antiplatelet Rx has passed before restarting Eliquis. Cardiology on board. Neurologically clear for rehab transfer.            Johnathan Lee M.D.

## 2017-10-15 NOTE — PROGRESS NOTES
Neuroendovascular surgery consult    Pt Name: Srinivas Sultana  MRN: [id number]  [place]: 1950  Date of evaluation: 10/15/2017  Primary Care Physician: Jayshree Myrick DO  Reason for evaluation: stroke    SUBJECTIVE:   History of Chief Complaint:    75 yo female with PMH of obesity, DM, HTN, CKD, stroke(with no residual symptoms), A fib and PE who wad admitted for left kidney stone and underwent left side ureteral stent placement. She developed left hemibody weakness for which neurology was consulted. MRI brain showed right MCA infarcts for which stroke team was consulted. She denies any previous strokes. She denies loss of vision/loss of consciousness or seizure like activity. She underwent angioplasty and stenting of a clinoid segment right ICA stenosis on 10/12/2017. This morning is seen sitting up in front of her breakfast tray; family at bedside. Allergies  is allergic to iv contrast [iodides]. Medications Scheduled Meds:   docusate sodium  100 mg Oral Daily    ciprofloxacin  500 mg Oral 2 times per day    clopidogrel  75 mg Oral Daily    ferrous sulfate  325 mg Oral BID WC    polyethylene glycol  17 g Oral Daily    insulin glargine  40 Units Subcutaneous Nightly    atorvastatin  40 mg Oral Daily    calcium carbonate  1 tablet Oral Daily    famotidine  40 mg Oral Daily    therapeutic multivitamin-minerals  1 tablet Oral Daily    PARoxetine  10 mg Oral QAM    potassium chloride  20 mEq Oral BID    sodium chloride flush  10 mL Intravenous 2 times per day    tamsulosin  0.4 mg Oral Daily    insulin lispro  0-12 Units Subcutaneous TID WC    insulin lispro  0-6 Units Subcutaneous Nightly    influenza virus vaccine  0.5 mL Intramuscular Once    aspirin  81 mg Oral Daily     Continuous Infusions:   sodium chloride 10 mL/hr at 10/14/17 1030    dextrose       PRN Meds:. LORazepam, sodium chloride flush, HYDROcodone 5 mg - acetaminophen **OR** HYDROcodone 5 mg - acetaminophen, magnesium hydroxide, bisacodyl, ondansetron, nicotine, HYDROmorphone **OR** HYDROmorphone, glucose, dextrose, glucagon (rDNA), dextrose    OBJECTIVE:   Vitals: /61   Pulse 120   Temp 97.9 °F (36.6 °C)   Resp 20   Ht 5' 2\" (1.575 m)   Wt 239 lb 3.2 oz (108.5 kg)   SpO2 97%   BMI 43.75 kg/m²     On exam today:   Alert. Not oriented to correct month (says it is January) or year (says it is 2009). She has distal LUE weakness > proximal; the LUE can be lifted against gravity. Antigravity strength in the bl LEs. LABS:     CBC:   Lab Results   Component Value Date    WBC 9.2 10/15/2017    RBC 2.95 10/15/2017    HGB 8.6 10/15/2017    HCT 26.9 10/15/2017    MCV 91.1 10/15/2017    MCH 29.0 10/15/2017    MCHC 31.8 10/15/2017    RDW 19.4 10/15/2017     10/15/2017    MPV 7.8 10/15/2017     BMP:    Lab Results   Component Value Date     10/12/2017    K 4.6 10/12/2017     10/12/2017    CO2 17 10/12/2017    BUN 20 10/12/2017    LABALBU 2.8 10/10/2017    CREATININE 0.88 10/12/2017    CALCIUM 7.3 10/12/2017    GFRAA >60 10/12/2017    LABGLOM >60 10/12/2017    GLUCOSE 228 10/12/2017     IMPRESSIONS:     75 yo left handed female with PMH of obesity, DM, HTN, CKD, stroke(with no residual symptoms), A fib and PE who wad admitted for left kidney stone and underwent left side ureteral stent placement. During the work up of her right MCA stroke she was found to have right clinoid segment ICA significant stenosis for which she underwent angioplasty and stenting. PLANS:     Right MCA acute stroke  Right clinoid ICA stenosis  POD 2 s/p clinoid ICA segment stenting  -Continue lipitor 40 mg daily.  -Considering her new ICA stenting, A fib and previous history of PE:  Plan for her anticoagulation and antiplatelet medications is as the following: For the coming 2 weeks:  Plavix 75 mg daily  Aspirin 325 mg daily  Hold eliquis    10/27:  Stop plavix  Resume eliquis 5 mg daily.   Change aspirin to 81 mg

## 2017-10-15 NOTE — PROGRESS NOTES
Daily Progress Note     Admit Date: 10/9/2017  Bed/Room No.  4854/8503-43  Admitting Physician : Francois Grace MD  Code Status :Prior  Hospital Day:  LOS: 5 days   Complaint at Admission :   Chief Complaint   Patient presents with    Emesis     Principal Problem:    Sepsis secondary to UTI [facility])  Active Problems:    Acute ischemic right MCA stroke ([facility]    Hydronephrosis of left kidney    Acute cystitis with hematuria    CKD (chronic kidney disease), stage III    Hypotension arterial    Essential hypertension    Morbid obesity with BMI of 40.0-44.9, adult ([facility]    History of arterial ischemic stroke    History of pulmonary embolus (PE)    Paroxysmal atrial fibrillation (HCC)    Left ureteral stone    Left-sided weakness    Non-intractable vomiting with nausea    Cerebrovascular accident (CVA) due to embolism of right middle cerebral artery (HCC)    Hypotension    Elevated serum creatinine    Kidney stone    Anemia    Hypokalemia    Right-sided carotid artery disease (HCC)    Subjective: Interval History/Significant events :  10/15/17    Patient looks much better . swelling Left arm is better . Remains in NSR. No change in weakness . Vitals - Stable NR controlled . low blood pressure afebrile. Labs - HGB 8.3 . Nursing notes , Consults notes reviewed. Overnight events and updates discussed with Nursing staff . Background history    Admitted for Sepsis secondary to UTI [facility]) , in hospital for 5 days . [facility] [place] 74 y.o. female  was admitted after presenting with sudden onset abdominal pain with multiple episodes of vomiting. Initial evaluation showed leukocytosis 13.9, elevated creatinine 1.7 with CT abdomen showing bilateral kidney stones and left-sided hydronephrosis. Temperature was 99.3 on presentation and patient was tachycardic 11 blood pressure was low 88/49. Patient had low hemoglobin 8.9 normocytic normochromic.   UA was positive for large hemoglobin and moderate bacteria, moderate Negative for activity change, appetite change, chills, fatigue, fever and unexpected weight change. HENT: Negative for congestion, mouth sores, postnasal drip, sinus pressure, sore throat and voice change. Eyes: Negative for visual disturbance. Respiratory: Negative for cough and wheezing. Cardiovascular: Negative for chest pain and palpitations. Gastrointestinal: Positive for constipation. Negative for abdominal pain, blood in stool, diarrhea, nausea and vomiting. Endocrine: Negative for polyuria. Genitourinary: Negative for difficulty urinating, dysuria, frequency and urgency. Musculoskeletal: Negative for arthralgias, joint swelling and myalgias. Neurological: Positive for facial asymmetry and weakness (left upper ext ). Negative for dizziness, tremors, speech difficulty, light-headedness and headaches. Objective:   Current Vitals : Temp: 97.4 °F (36.3 °C),  Pulse: 96, Resp: 18, BP: (!) 95/41, SpO2: 98 %  Last 24 Hrs Vitals   Patient Vitals for the past 24 hrs:   BP Temp Temp src Pulse Resp SpO2   10/15/17 0427 (!) 95/41 97.4 °F (36.3 °C) Oral 96 18 98 %   10/15/17 0325 - - - 106 25 -   10/15/17 0140 - - - 96 16 -   10/15/17 0100 - - - 92 19 -   10/15/17 0002 (!) 98/42 98.4 °F (36.9 °C) Oral 96 20 97 %   10/15/17 0000 - - - 92 19 -   10/14/17 2330 - - - 106 21 -   10/14/17 2034 (!) 103/48 - - 99 25 -   10/14/17 2033 - - - 101 22 -   10/14/17 2032 (!) 98/38 - - 102 24 -   10/14/17 2028 (!) 103/48 98.4 °F (36.9 °C) Oral - 24 -   10/14/17 1658 (!) 123/44 97.5 °F (36.4 °C) Oral 104 18 97 %   10/14/17 1134 (!) 111/55 98.2 °F (36.8 °C) Oral 99 23 99 %     Intake / output   10/14 0701 - 10/15 0700  In: 607 [I.V.:607]  Out: -   Physical Exam:  Physical Exam   Constitutional: She is oriented to person, place, and time. She appears well-developed and well-nourished. No distress. Nasal cannula in place. HENT:   Mouth/Throat: No oropharyngeal exudate.    Eyes: Conjunctivae are normal. Pupils are equal, round, and reactive to light. No scleral icterus. Neck: No JVD present. No thyromegaly present. Cardiovascular: Normal rate, regular rhythm and normal heart sounds. Exam reveals no gallop and no friction rub. No murmur heard. Pulmonary/Chest: No accessory muscle usage. No respiratory distress. She has no wheezes. She has no rales. Abdominal: Soft. Bowel sounds are normal. She exhibits no distension and no mass. There is no tenderness. There is no rebound and no guarding. Lymphadenopathy:     She has no cervical adenopathy. Neurological: She is alert and oriented to person, place, and time. No cranial nerve deficit. She exhibits normal muscle tone. Left facial droop, left upper extremity weakness 3/5    Skin: She is not diaphoretic. Nursing note and vitals reviewed. Lower Extremities : No ankle Edema , No calf Tenderness     Laboratory findings:    Recent Labs      10/12/17   1806   10/13/17   1811  10/14/17   0650  10/15/17   0648   WBC  16.0*   --    --   10.4  9.2   HGB  7.6*   < >  8.8*  8.3*  8.6*   HCT  24.1*   < >  26.9*  26.9*  26.9*   PLT  324   --    --   263  274    < > = values in this interval not displayed.      Recent Labs      10/12/17   1015   NA  140   K  4.6   CL  107   CO2  17*   GLUCOSE  228*   BUN  20   CREATININE  0.88   CALCIUM  7.3*     Recent Labs      10/14/17   0650   LDH  230*        Lab Results   Component Value Date    IRON 25 (L) 10/13/2017    TIBC 148 (L) 10/13/2017    FERRITIN 180 (H) 10/13/2017     Lab Results   Component Value Date    XFCFMWAS12 338 10/10/2017     Lab Results   Component Value Date    FOLATE 15.6 10/10/2017       Specific Gravity, UA   Date Value Ref Range Status   10/10/2017 1.010 1.005 - 1.030 Final     Protein, UA   Date Value Ref Range Status   10/10/2017 1+ (A) NEG Final     RBC, UA   Date Value Ref Range Status   10/10/2017 50  0 - 2 /HPF Final     Bacteria, UA   Date Value Ref Range Status   10/10/2017 MODERATE (A) NONE Final     Nitrite, Urine   Date Value Ref Range Status   10/10/2017 NEGATIVE NEG Final     WBC, UA   Date Value Ref Range Status   10/10/2017 50  0 - 5 /HPF Final     Leukocyte Esterase, Urine   Date Value Ref Range Status   10/10/2017 MODERATE (A) NEG Final     Comment:     Performed at [facility] and [address], [address], [place]     MRI brain without contrast 10/10/17  1. There are multifocal acute infarcts noted in the right middle cerebral   artery vascular distribution.  These involve the right pre and postcentral   gyri, likely accounting for the patient's left-sided neurologic deficits. 2. Chronic infarcts are noted in the occipital lobes and left cerebellar   hemisphere. 3. Cerebral and cerebellar parenchymal volume loss with minimal chronic   microvascular white matter ischemic disease. MRA neck without contrast 10/11/17  Flow limiting stenosis in the proximal right internal carotid artery. MRA Head 10/11/17  Study limited by artifact, within this limit, there is apparent diminished   flow in the right ICA and MCA.       Short-segment focal stenosis P1 segment left PCA. CT abdomen and pelvis 10/10/17   Acute left obstructive uropathy secondary to a 6- 7 mm calculus in the   proximal ureter just distal to the UPJ.       There are multiple bilateral nonobstructing renal calculi measuring up to 1   cm in the lower pole of the left kidney.       Small amount of air within the urinary bladder likely related to recent   catheterization. Maddy Lorenzo correlation is recommended. Echocardiogram 10/11/17  Summary  Left ventricle is moderately enlarged, global left ventricular systolic  function is normal, inferior and lateral hypokinesis is noted. Estimated  ejection fraction is 50-55%  Evidence of diastolic dysfunction. Right ventricular dilatation with reduced systolic function. Bi-atrial enlargement.   Moderate mitral regurgitation due to papillary muscle dysfunction. Mild tricuspid regurgitation. Estimated right ventricular systolic pressure is 40 mmHg. Clinical Course : stable  Assessment and Plan      1. Sepsis due to UTI and left pyelonephritis - status post left stent Placement 10/10/17. Hassan taken out. . No growth on culture . Cipro for 7 days . 2. Bilateral kidney stones left 1 cm  Obstructive uropathy   - s/p stent . .   3. Type 2 diabetes mellitus - continue Lantus. Metformin on hold. 4. Multiple right MCA infarct - continue aspirin 81 mg and plavix for 3 weeks then asa and eliquis then after  . Continue Lipitor. likely embolic- . Blood pressure running low. antihypertensive medications on hold . 5. Right cavernous segment ICA s/p  stent 10-17 - Dual AP for 21 days . 6. PAF - in NSR - Hold eliquis while on dual antiplatelet ( 21 days ) then asa + eliquis  . Check stool for occult blood. Cardiology consult  7. Anemia of chronic disease with Iron deficiency - s/p 1 unit PRBC transfusion. No obvious blood loss. Continue iron supplement . Family requests IV iron  Monitor HGB. Guaiac pending       Discharge planning when arrangements made . Patient and family want IP Rehab . Awaiting PM&R eval . Continue PT / OT       Continue to monitor vitals , Intake / output ,  Cell count , HGB , Kidney function, oxygenation  as indicated . Plan and updates discussed with patient's son and daughter at bed side  ,  answers  explained to satisfaction. Discussed with son and daughter at bed side .    Plan discussed with Staff   RN     (Please note that portions of this note were completed with a voice recognition program. Efforts were made to edit the dictations but occasionally words are mis-transcribed.)      Juan Coffman MD  10/15/2017

## 2017-10-15 NOTE — PROGRESS NOTES
Cardiology at bedside.   Pt sinus tach at times, systolic blood pressures in the 90's   500 fluid bolus ordered

## 2017-10-15 NOTE — PLAN OF CARE
Problem: Falls - Risk of  Goal: Absence of falls  Outcome: Ongoing  Fall risk assessment complete. Bed locked in lowest position, 2/4 side rails up. Call light within reach. RN makes frequent checks, Risk for falls ongoing. Problem: NEUROLOGICAL DEFICIT  Goal: Neurological status is stable or improving  Outcome: Ongoing  Neuro assessment complete q 4 hours. Neuro status remains stable.

## 2017-10-15 NOTE — PROGRESS NOTES
assess  Sit to Supine: Unable to assess  Scooting: Moderate assistance  Comment: Pt in chair upon arrival/exit  Transfers  Sit to stand: Maximum assistance  Stand to sit: Moderate assistance   Cognition  Overall Cognitive Status: Exceptions  Insights: Decreased awareness of deficits  Initiation: Requires cues for some  Sequencing: Requires cues for some        Assessment   Performance deficits / Impairments: Decreased functional mobility ; Decreased ADL status; Decreased high-level IADLs;Decreased safe awareness;Decreased balance;Decreased endurance;Decreased coordination  Prognosis: Fair  Patient Education: Safety awareness, OTPOC, discharge rec  Discharge Recommendations: IP Rehab  REQUIRES OT FOLLOW UP: Yes  Activity Tolerance  Activity Tolerance: Patient limited by fatigue  Activity Tolerance: Speech deficits  Safety Devices  Safety Devices in place: Yes  Type of devices: Left in chair; All fall risk precautions in place;Nurse notified;Call light within reach;Gait belt  Restraints  Initially in place: No       Discharge Recommendations:  [address] [facility]  Times per week: 5x  Current Treatment Recommendations: Balance Training, Functional Mobility Training, Endurance Training, Safety Education & Training, Self-Care / ADL, Equipment Evaluation, Education, & procurement, Patient/Caregiver Education & Training    Goals  Short term goals  Time Frame for Short term goals: Pt will by discharge   Short term goal 1: demo supine<>sit transfer to EOB with min A and increased time  Short term goal 2: demo sit<>stand transfer with RW and min Ax1  Short term goal 3: demo good safety awareness during func mob around floor with RW and max Ax1  Short term goal 4: demo ADL UB/LB dressing/bathing activity seated with mod A and setup  Short term goal 5: demo A&Ox3 for 3/4 tx sessions     Therapy Time   Individual Concurrent Group Co-treatment   Time In 1130         Time Out 1145         Minutes 15            Pt seated in chair with guests and RN present upon arrival. Pt transferred to standing and short func mob to bathroom with max A. Pt unable to maintain L hand on RW d/t hypotonicity and pt demo'd some difficulty progressing LLE. Pt sat for ADL toileting task and stood for bottom care from writer and pt stood sinkside during simple ADL grooming task with mod-max A. Pt returned to room and sat in recliner, pt scooted back in chair with assist and retired with 2 guests and RN in room upon exit. In my professional opinion, this patient could tolerate a total of 3 hours of combined therapies at an acute rehab facility.      Linda Mcintyre, OTR/L

## 2017-10-16 LAB
ANION GAP SERPL CALCULATED.3IONS-SCNC: 11 MMOL/L (ref 9–17)
BUN BLDV-MCNC: 9 MG/DL (ref 8–23)
BUN/CREAT BLD: ABNORMAL (ref 9–20)
CALCIUM SERPL-MCNC: 8.2 MG/DL (ref 8.6–10.4)
CHLORIDE BLD-SCNC: 111 MMOL/L (ref 98–107)
CO2: 20 MMOL/L (ref 20–31)
COLLAGEN ADENOSINE-5'-DIPHOSPHATE (ADP) TIME: 86 SEC (ref 67–112)
COLLAGEN EPINEPHRINE TIME: 77 SEC (ref 85–172)
CREAT SERPL-MCNC: 0.67 MG/DL (ref 0.5–0.9)
CULTURE: NORMAL
GFR AFRICAN AMERICAN: >60 ML/MIN
GFR NON-AFRICAN AMERICAN: >60 ML/MIN
GFR SERPL CREATININE-BSD FRML MDRD: ABNORMAL ML/MIN/{1.73_M2}
GFR SERPL CREATININE-BSD FRML MDRD: ABNORMAL ML/MIN/{1.73_M2}
GLUCOSE BLD-MCNC: 125 MG/DL (ref 65–105)
GLUCOSE BLD-MCNC: 176 MG/DL (ref 65–105)
GLUCOSE BLD-MCNC: 181 MG/DL (ref 70–99)
GLUCOSE BLD-MCNC: 196 MG/DL (ref 65–105)
GLUCOSE BLD-MCNC: 45 MG/DL (ref 65–105)
GLUCOSE BLD-MCNC: 52 MG/DL (ref 65–105)
HCT VFR BLD CALC: 31.5 % (ref 36–46)
HEMOGLOBIN: 10.1 G/DL (ref 12–16)
Lab: NORMAL
Lab: NORMAL
MAGNESIUM: 1.6 MG/DL (ref 1.6–2.6)
MCH RBC QN AUTO: 28.7 PG (ref 26–34)
MCHC RBC AUTO-ENTMCNC: 32.2 G/DL (ref 31–37)
MCV RBC AUTO: 89.3 FL (ref 80–100)
PDW BLD-RTO: 18.6 % (ref 12.5–15.4)
PLATELET # BLD: ABNORMAL K/UL (ref 140–450)
PLATELET FUNCTION INTERP: ABNORMAL
PMV BLD AUTO: ABNORMAL FL (ref 6–12)
POTASSIUM SERPL-SCNC: 4.4 MMOL/L (ref 3.7–5.3)
RBC # BLD: 3.53 M/UL (ref 4–5.2)
SODIUM BLD-SCNC: 142 MMOL/L (ref 135–144)
SPECIMEN DESCRIPTION: NORMAL
SPECIMEN DESCRIPTION: NORMAL
STATUS: NORMAL
STATUS: NORMAL
SURGICAL PATHOLOGY REPORT: NORMAL
WBC # BLD: 11.2 K/UL (ref 3.5–11)

## 2017-10-16 PROCEDURE — 97535 SELF CARE MNGMENT TRAINING: CPT

## 2017-10-16 PROCEDURE — 80048 BASIC METABOLIC PNL TOTAL CA: CPT

## 2017-10-16 PROCEDURE — 85576 BLOOD PLATELET AGGREGATION: CPT

## 2017-10-16 PROCEDURE — 99232 SBSQ HOSP IP/OBS MODERATE 35: CPT | Performed by: PHYSICAL MEDICINE & REHABILITATION

## 2017-10-16 PROCEDURE — 97116 GAIT TRAINING THERAPY: CPT

## 2017-10-16 PROCEDURE — 82947 ASSAY GLUCOSE BLOOD QUANT: CPT

## 2017-10-16 PROCEDURE — 97530 THERAPEUTIC ACTIVITIES: CPT

## 2017-10-16 PROCEDURE — 99232 SBSQ HOSP IP/OBS MODERATE 35: CPT | Performed by: PSYCHIATRY & NEUROLOGY

## 2017-10-16 PROCEDURE — 97110 THERAPEUTIC EXERCISES: CPT

## 2017-10-16 PROCEDURE — 6370000000 HC RX 637 (ALT 250 FOR IP): Performed by: NURSE PRACTITIONER

## 2017-10-16 PROCEDURE — 6370000000 HC RX 637 (ALT 250 FOR IP): Performed by: PSYCHIATRY & NEUROLOGY

## 2017-10-16 PROCEDURE — 1200000000 HC SEMI PRIVATE

## 2017-10-16 PROCEDURE — 36415 COLL VENOUS BLD VENIPUNCTURE: CPT

## 2017-10-16 PROCEDURE — 97140 MANUAL THERAPY 1/> REGIONS: CPT

## 2017-10-16 PROCEDURE — 85027 COMPLETE CBC AUTOMATED: CPT

## 2017-10-16 PROCEDURE — 76937 US GUIDE VASCULAR ACCESS: CPT

## 2017-10-16 PROCEDURE — 2580000003 HC RX 258: Performed by: NURSE PRACTITIONER

## 2017-10-16 PROCEDURE — 99232 SBSQ HOSP IP/OBS MODERATE 35: CPT | Performed by: FAMILY MEDICINE

## 2017-10-16 PROCEDURE — 6370000000 HC RX 637 (ALT 250 FOR IP): Performed by: FAMILY MEDICINE

## 2017-10-16 PROCEDURE — 83735 ASSAY OF MAGNESIUM: CPT

## 2017-10-16 RX ORDER — INSULIN GLARGINE 100 [IU]/ML
35 INJECTION, SOLUTION SUBCUTANEOUS NIGHTLY
Status: DISCONTINUED | OUTPATIENT
Start: 2017-10-16 | End: 2017-10-17 | Stop reason: HOSPADM

## 2017-10-16 RX ADMIN — POLYETHYLENE GLYCOL 3350 17 G: 17 POWDER, FOR SOLUTION ORAL at 08:39

## 2017-10-16 RX ADMIN — CIPROFLOXACIN 500 MG: 500 TABLET, FILM COATED ORAL at 20:28

## 2017-10-16 RX ADMIN — TAMSULOSIN HYDROCHLORIDE 0.4 MG: 0.4 CAPSULE ORAL at 08:39

## 2017-10-16 RX ADMIN — METOPROLOL TARTRATE 25 MG: 25 TABLET ORAL at 20:29

## 2017-10-16 RX ADMIN — POTASSIUM CHLORIDE 20 MEQ: 1.5 POWDER, FOR SOLUTION ORAL at 08:39

## 2017-10-16 RX ADMIN — INSULIN GLARGINE 35 UNITS: 100 INJECTION, SOLUTION SUBCUTANEOUS at 20:28

## 2017-10-16 RX ADMIN — DOCUSATE SODIUM 100 MG: 100 CAPSULE, LIQUID FILLED ORAL at 08:39

## 2017-10-16 RX ADMIN — METOPROLOL TARTRATE 25 MG: 25 TABLET ORAL at 15:07

## 2017-10-16 RX ADMIN — INSULIN LISPRO 2 UNITS: 100 INJECTION, SOLUTION INTRAVENOUS; SUBCUTANEOUS at 18:11

## 2017-10-16 RX ADMIN — POTASSIUM CHLORIDE 20 MEQ: 1.5 POWDER, FOR SOLUTION ORAL at 20:29

## 2017-10-16 RX ADMIN — PAROXETINE HYDROCHLORIDE HEMIHYDRATE 10 MG: 10 TABLET, FILM COATED ORAL at 08:39

## 2017-10-16 RX ADMIN — FAMOTIDINE 40 MG: 20 TABLET, FILM COATED ORAL at 08:39

## 2017-10-16 RX ADMIN — Medication 10 ML: at 08:40

## 2017-10-16 RX ADMIN — MULTIPLE VITAMINS W/ MINERALS TAB 1 TABLET: TAB at 08:39

## 2017-10-16 RX ADMIN — CLOPIDOGREL 75 MG: 75 TABLET, FILM COATED ORAL at 08:39

## 2017-10-16 RX ADMIN — CIPROFLOXACIN 500 MG: 500 TABLET, FILM COATED ORAL at 08:39

## 2017-10-16 RX ADMIN — Medication 600 MG: at 08:40

## 2017-10-16 RX ADMIN — INSULIN LISPRO 1 UNITS: 100 INJECTION, SOLUTION INTRAVENOUS; SUBCUTANEOUS at 20:38

## 2017-10-16 RX ADMIN — ATORVASTATIN CALCIUM 40 MG: 40 TABLET, FILM COATED ORAL at 08:40

## 2017-10-16 RX ADMIN — FERROUS SULFATE TAB EC 325 MG (65 MG FE EQUIVALENT) 325 MG: 325 (65 FE) TABLET DELAYED RESPONSE at 08:39

## 2017-10-16 RX ADMIN — FERROUS SULFATE TAB EC 325 MG (65 MG FE EQUIVALENT) 325 MG: 325 (65 FE) TABLET DELAYED RESPONSE at 18:09

## 2017-10-16 RX ADMIN — ASPIRIN 81 MG: 81 TABLET ORAL at 08:40

## 2017-10-16 ASSESSMENT — ENCOUNTER SYMPTOMS
SINUS PRESSURE: 0
VOMITING: 0
NAUSEA: 0
WHEEZING: 0
ABDOMINAL PAIN: 0
CONSTIPATION: 1
SORE THROAT: 0
DIARRHEA: 0
VOICE CHANGE: 0
BLOOD IN STOOL: 0
COUGH: 0

## 2017-10-16 ASSESSMENT — PAIN SCALES - GENERAL
PAINLEVEL_OUTOF10: 0

## 2017-10-16 NOTE — RESEARCH
Asked by Dr. Guera Ponce to evaluate the patient for Management of Acute Stroke Patients on Treatment with New Oral Anticoagulants: Addressing Real-world Anticoagulant Management Issues in Stroke (DOMINGA) Registry NCT Number:  [id number]     The patient met inclusion/exclusion criteria and was a candidate for the study.  A brief overview of study given. The patient's son and daughter were given a copy of the consent at 09:30 for review  [address] [place] [address] Son and daughter read study consent. Questions answered.   Consent signed by Karlene Osuna (daughter, POA)   A copy of the signed consents given to the daughter  [address] [place] [address] Subject #  [id number]   Forms to be completed for study on day of discharge per protocol    Keon Xavier RN    Clinical Research Nurse  For questions page the research nurse at [phone]

## 2017-10-16 NOTE — CARE COORDINATION
Call rec'd from Sanjana Sanchez at [facility] requesting clinicals be faxed to her for [address] H&P, progress notes, PT, OT, ST, labs, xrays faxed to [facility] at this time. ([phone]).   Electronically signed by Zane Cardona RN on 10/16/2017 at 3:05 PM

## 2017-10-16 NOTE — CARE COORDINATION
Precert for ARU initiated with Benitez WINSTON at [facility] Ref #:  [id number]. Await nurse to call for clinical information.   Electronically signed by Megan Taylor RN on 10/16/2017 at 12:25 PM

## 2017-10-16 NOTE — PROGRESS NOTES
skin nightly       losartan-hydrochlorothiazide (HYZAAR) 100-25 MG per tablet Take 1 tablet by mouth daily      metFORMIN (GLUCOPHAGE) 500 MG tablet Take 500 mg by mouth 2 times daily (with meals)      pantoprazole (PROTONIX) 40 MG tablet Take 40 mg by mouth daily      PARoxetine (PAXIL) 10 MG tablet Take 10 mg by mouth every morning      POTASSIUM CHLORIDE PO Take 20 mEq by mouth daily       glipiZIDE (GLUCOTROL) 10 MG tablet Take 10 mg by mouth 2 times daily (before meals)       Allergies: Shasta Chu is allergic to iv contrast [iodides]. Past Medical History:   Diagnosis Date    [facility] [address]     Diabetes mellitus ([facility] [address] [facility]     Patient in clinical research study 10/13/2017    Anticipated end date 04/13/2018    PE [facility]pulmonary throm[facility]embolism) [facility])        Past Surgical History:   Procedure Laterality Date    CHOLECYSTECTOMY      CYSTOSCOPY Left 10/10/2017    CYSTOSCOPY, STENT INSERTION performed by Blanquita Bailon MD at [address]       Social History: Shasta Chu  reports that she has never smoked. She has never used smokeless tobacco. She reports that she does not drink alcohol or use drugs. History reviewed. No pertinent family history.     Current Medications:     docusate sodium  100 mg Oral Daily    ciprofloxacin  500 mg Oral 2 times per day    clopidogrel  75 mg Oral Daily    ferrous sulfate  325 mg Oral BID WC    polyethylene glycol  17 g Oral Daily    insulin glargine  40 Units Subcutaneous Nightly    atorvastatin  40 mg Oral Daily    calcium carbonate  1 tablet Oral Daily    famotidine  40 mg Oral Daily    therapeutic multivitamin-minerals  1 tablet Oral Daily    PARoxetine  10 mg Oral QAM    potassium chloride  20 mEq Oral BID    sodium chloride flush  10 mL Intravenous 2 times per day    tamsulosin  0.4 mg Oral Daily    insulin lispro  0-12 Units Subcutaneous TID WC    insulin lispro  0-6 Units Subcutaneous Nightly    influenza virus

## 2017-10-16 NOTE — FLOWSHEET NOTE
[address] [facility]  Speech Language Pathology    Date: 10/16/2017  Patient Name: Gabriella James  YOB: 1950   AGE: 74 y.o.   MRN: [id number]        Patient Not Available for Speech Therapy     Due to:  [] Testing  [] Hemodialysis  [] Cancelled by RN  [] Surgery   [] Intubation/Sedation/Pain Medication  [] Medical instability  [x] Other: Pt with PT 10:40 am    Next scheduled treatment:   Completed by: Denice Mathews M.S. [address]

## 2017-10-16 NOTE — PROGRESS NOTES
CVA  Patient Visual Report: Past pointing/reachingm, blurred vision      Type of ROM/Therapeutic Exercise  Type of ROM/Therapeutic Exercise: PROM; Self PROM  Comment: Completed PROM/Self PROM LUE, Issued min resistant yellow foam block for /pinch strengthening. Educ on Self PROM to increase function in LUE (which is pts dominant hand). Elevation of LUE onto pillow to prevent edema. Pt has bilat shoulder shrug, elbow flex to chest, no active , wrist drop noted. Assessment   Performance deficits / Impairments: Decreased functional mobility ; Decreased ADL status; Decreased ROM; Decreased strength;Decreased safe awareness;Decreased endurance;Decreased balance;Decreased vision/visual deficit; Decreased high-level IADLs;Decreased fine motor control;Decreased coordination  Treatment Diagnosis: R CVA  Prognosis: Fair  Patient Education: OT POC, Educ on compensatory tech with adls (grooming) and w/visual deficits with bilat field cuts. Pt and son verbalized understanding. Educ on importance of Self PROM w/LUE to increase function with adls. Discharge Recommendations: IP Rehab  REQUIRES OT FOLLOW UP: Yes  Activity Tolerance  Activity Tolerance: Patient limited by fatigue  Activity Tolerance: Speech and visual deficits  Safety Devices  Safety Devices in place: Yes  Type of devices: All fall risk precautions in place;Call light within reach; Left in bed;Nurse notified       Discharge Recommendations:  IP Rehab  In my professional opinion, this patient could tolerate a total of 3 hours of combined therapies at an acute rehab facility.       Plan   Plan  Times per week: 5x  Current Treatment Recommendations: Balance Training, Functional Mobility Training, Endurance Training, Safety Education & Training, Self-Care / ADL, Equipment Evaluation, Education, & procurement, Patient/Caregiver Education & Training    Goals  Short term goals  Time Frame for Short term goals: Pt will by discharge   Short term goal 1: jakub supine<>sit transfer to EOB with min A and increased time  Short term goal 2: demo sit<>stand transfer with RW and min Ax1  Short term goal 3: demo good safety awareness during func mob around floor with RW and max Ax1  Short term goal 4: demo ADL UB/LB dressing/bathing activity seated with mod A and setup  Short term goal 5: demo A&Ox3 for 3/4 tx sessions     Discussed with Plejanki Campbell to add additional goals: /pinch strengthening LUE. Pt to complete MVPT. Therapy Time   Individual Concurrent Group Co-treatment   Time In  1510         Time Out  1605         Minutes                 Pt in bed upon arrival (just back to bed per RN after being up with PT and ambulating in hallway). A&O x1 (name and knew was in hospital but not name of hospital). Reoriented to place, time and situation and asked pt again multiple times throughout tx with difficulty with recall of place, time and situation x3. Pt set up for grooming at tray table (see above for LOF). Built up handle placed on toothbrush and educ on compensatory tech to complete grooming tasks. Educ on importance of use of LUE with functional activity and to complete Self PROM ex throughout the day to increase function (see above). Pt leaning to right side and needed assist to adjust. Pt had difficulty with reporting PLOF (son in room and able to verbalize correct info of PLOF). Pt remained in bed, call light and phone in reach. RN notified. Discussed with OTR to add additional goals (see above).     [address]/L

## 2017-10-16 NOTE — PROGRESS NOTES
Moderate assistance  Comment: Pt left in chair  Transfers  Sit to stand: Maximum assistance  Stand to sit: Maximum assistance     Cognition  Overall Cognitive Status: Exceptions  Safety Judgement: Decreased awareness of need for assistance  Problem Solving: Assistance required to generate solutions  Insights: Decreased awareness of deficits  Initiation: Requires cues for some  Sequencing: Requires cues for some      ST:    Orientation: Pt. Able to state first and last name and  independently. Able to state month with min verbal cues. Unable to state year and place with choice of 2.      Recall: Delayed recall of pictures:  0/3 increased to 3/3 with mod-max verbal cues                                                                                                  0/3 increased to 3/3 with mod verbal cues                         9 word sentence repetition:  100% with self correction x 1                        Paragraph recall: 50% increased to  87% with min-mod verbal cues and repetition          Objective:  BP (!) 122/54   Pulse 108   Temp 97.2 °F (36.2 °C) (Axillary)   Resp 19   Ht 5' 2\" (1.575 m)   Wt 239 lb 3.2 oz (108.5 kg)   SpO2 97%   BMI 43.75 kg/m²  I Body mass index is 43.75 kg/m². I   Wt Readings from Last 1 Encounters:   10/13/17 239 lb 3.2 oz (108.5 kg)      Temp (24hrs), Av.8 °F (36.6 °C), Min:97.2 °F (36.2 °C), Max:98.5 °F (36.9 °C)      Alert, no distress. Good speech and language function- pleasant. Lungs clear. Heart regular. Abdomen non-distended, non-tender. No calf tenderness or edema. Sensory: Intact in BUE and BLE to soft and pin sensation.   Motor:LUE distal 1/5, prox 3/5, LLE 4-/5, R 5/5, L facial droop  +Babinski LEft  Brace RLE  Knew in hospital but thought in [facility], yr , [employer], Follows 2 step commands, and names objects      Medications   Scheduled Meds:   docusate sodium  100 mg Oral Daily    ciprofloxacin  500 mg Oral 2 times per day    clopidogrel  75 mg Oral Daily    ferrous sulfate  325 mg Oral BID     polyethylene glycol  17 g Oral Daily    insulin glargine  40 Units Subcutaneous Nightly    atorvastatin  40 mg Oral Daily    calcium carbonate  1 tablet Oral Daily    famotidine  40 mg Oral Daily    therapeutic multivitamin-minerals  1 tablet Oral Daily    PARoxetine  10 mg Oral QAM    potassium chloride  20 mEq Oral BID    sodium chloride flush  10 mL Intravenous 2 times per day    tamsulosin  0.4 mg Oral Daily    insulin lispro  0-12 Units Subcutaneous TID WC    insulin lispro  0-6 Units Subcutaneous Nightly    influenza virus vaccine  0.5 mL Intramuscular Once    aspirin  81 mg Oral Daily     Continuous Infusions:   sodium chloride 10 mL/hr at 10/14/17 1030    dextrose       PRN Meds:. LORazepam, sodium chloride flush, HYDROcodone 5 mg - acetaminophen **OR** HYDROcodone 5 mg - acetaminophen, magnesium hydroxide, bisacodyl, ondansetron, nicotine, HYDROmorphone **OR** HYDROmorphone, glucose, dextrose, glucagon (rDNA), dextrose     Diagnostics:     CBC:   Recent Labs      10/13/17   1811  10/14/17   0650  10/15/17   0648   WBC   --   10.4  9.2   RBC   --   2.91*  2.95*   HGB  8.8*  8.3*  8.6*   HCT  26.9*  26.9*  26.9*   MCV   --   92.4  91.1   RDW   --   18.3*  19.4*   PLT   --   263  274     BMP: No results for input(s): NA, K, CL, CO2, PHOS, BUN, CREATININE in the last 72 hours. Invalid input(s): CA  BNP: No results for input(s): BNP in the last 72 hours. PT/INR: No results for input(s): PROTIME, INR in the last 72 hours. APTT: No results for input(s): APTT in the last 72 hours. CARDIAC ENZYMES: No results for input(s): CKMB, CKMBINDEX, TROPONINT in the last 72 hours.     Invalid input(s): CKTOTAL;3  FASTING LIPID PANEL:  Lab Results   Component Value Date    CHOL 73 10/10/2017    HDL 39 (L) 10/10/2017    TRIG 66 10/10/2017     LIVER PROFILE: No results for input(s): AST, ALT, ALB, BILIDIR, BILITOT, ALKPHOS in the last 72 hours.     I/O (24Hr): Intake/Output Summary (Last 24 hours) at 10/16/17 1025  Last data filed at 10/16/17 0900   Gross per 24 hour   Intake              400 ml   Output                0 ml   Net              400 ml       Glu last 24 hour  Recent Labs      10/15/17   1533  10/15/17   2055  10/16/17   0750  10/16/17   0802   POCGLU  119*  80  45*  52*       No results for input(s): CLARITYU, COLORU, PHUR, SPECGRAV, PROTEINU, RBCUA, BLOODU, BACTERIA, NITRU, WBCUA, LEUKOCYTESUR, YEAST, [place] Stammer in the last 72 hours. MRI brain without contrast 10/10/17  1. There are multifocal acute infarcts noted in the right middle cerebral   artery vascular distribution.  These involve the right pre and postcentral   gyri, likely accounting for the patient's left-sided neurologic deficits. 2. Chronic infarcts are noted in the occipital lobes and left cerebellar   hemisphere. 3. Cerebral and cerebellar parenchymal volume loss with minimal chronic   microvascular white matter ischemic disease.      MRA neck without contrast 10/11/17  Flow limiting stenosis in the proximal right internal carotid artery.     MRA Head 10/11/17  Study limited by artifact, within this limit, there is apparent diminished   flow in the right ICA and MCA.       Short-segment focal stenosis P1 segment left PCA.      CT abdomen and pelvis 10/10/17   Acute left obstructive uropathy secondary to a 6- 7 mm calculus in the   proximal ureter just distal to the UPJ.       There are multiple bilateral nonobstructing renal calculi measuring up to 1   cm in the lower pole of the left kidney.       Small amount of air within the urinary bladder likely related to recent   catheterization. Janki Maldonado correlation is recommended.      Echocardiogram 10/11/17  Summary  Left ventricle is moderately enlarged, global left ventricular systolic  function is normal, inferior and lateral hypokinesis is noted.  Estimated  ejection fraction is 50-55%  Evidence of

## 2017-10-16 NOTE — CARE COORDINATION
Referral received from [facility] and precert will be initiated today for acute rehab unit.   Electronically signed by Rahul Morales RN on 10/16/2017 at 11:28 AM

## 2017-10-16 NOTE — PROGRESS NOTES
Neuroendovascular surgery consult    Pt Name: David Prabhakar  MRN: [id number]  [place]: 1950  Date of evaluation: 10/16/2017  Primary Care Physician: Verena Joel DO    SUBJECTIVE:   History of Chief Complaint:    73 yo female with PMH of obesity, DM, HTN, CKD, stroke(with no residual symptoms), A fib and PE who wad admitted for left kidney stone and underwent left side ureteral stent placement. She developed left hemibody weakness for which neurology was consulted. MRI brain showed right MCA infarcts for which stroke team was consulted. She denies any previous strokes. She denies loss of vision/loss of consciousness or seizure like activity. She underwent angioplasty and stenting of a clinoid segment right ICA stenosis on 10/12/2017. Denies any complains. Allergies  is allergic to iv contrast [iodides]. Medications Scheduled Meds:   docusate sodium  100 mg Oral Daily    ciprofloxacin  500 mg Oral 2 times per day    clopidogrel  75 mg Oral Daily    ferrous sulfate  325 mg Oral BID WC    polyethylene glycol  17 g Oral Daily    insulin glargine  40 Units Subcutaneous Nightly    atorvastatin  40 mg Oral Daily    calcium carbonate  1 tablet Oral Daily    famotidine  40 mg Oral Daily    therapeutic multivitamin-minerals  1 tablet Oral Daily    PARoxetine  10 mg Oral QAM    potassium chloride  20 mEq Oral BID    sodium chloride flush  10 mL Intravenous 2 times per day    tamsulosin  0.4 mg Oral Daily    insulin lispro  0-12 Units Subcutaneous TID WC    insulin lispro  0-6 Units Subcutaneous Nightly    influenza virus vaccine  0.5 mL Intramuscular Once    aspirin  81 mg Oral Daily     Continuous Infusions:   sodium chloride 10 mL/hr at 10/14/17 1030    dextrose       PRN Meds:. LORazepam, sodium chloride flush, HYDROcodone 5 mg - acetaminophen **OR** HYDROcodone 5 mg - acetaminophen, magnesium hydroxide, bisacodyl, ondansetron, nicotine, HYDROmorphone **OR** HYDROmorphone, to follow up in the neuroendovascular clinic in 2 weeks. To be discussed with Dr Mor Barrios.      Derrell Zee MD  Pager [phone]  Stroke, [facility]  [id number]  Electronically signed 10/16/2017 at 9:14 AM

## 2017-10-16 NOTE — PROGRESS NOTES
Daily Progress Note     Admit Date: 10/9/2017  Bed/Room No.  5039/5838-51  Admitting Physician : Delonte Jimenes MD  Code Status :Prior  Hospital Day:  LOS: 6 days   Complaint at Admission :   Chief Complaint   Patient presents with    Emesis     Principal Problem:    Sepsis secondary to UTI [facility])  Active Problems:    Acute ischemic right MCA stroke ([facility]    Hydronephrosis of left kidney    Acute cystitis with hematuria    CKD (chronic kidney disease), stage III    Hypotension arterial    Essential hypertension    Morbid obesity with BMI of 40.0-44.9, adult ([address]    History of arterial ischemic stroke    History of pulmonary embolus (PE)    Paroxysmal atrial fibrillation (HCC)    Left ureteral stone    Left-sided weakness    Non-intractable vomiting with nausea    Cerebrovascular accident (CVA) due to embolism of right middle cerebral artery (HCC)    Hypotension    Elevated serum creatinine    Kidney stone    Anemia    Hypokalemia    Right-sided carotid artery disease (HCC)    Subjective: Interval History/Significant events :  10/16/17    Patient has no acute complaints . She is having sinus tachycardia . No palpitations , breathing difficulty . Vitals - Stable NR controlled . Blood pressure improved. Labs - HGB 8.3 . Nursing notes , Consults notes reviewed. Overnight events and updates discussed with Nursing staff . Background history    Admitted for Sepsis secondary to UTI [facility]) , in hospital for 6 days . Shawna Cardozo Colindres 74 y.o. female  was admitted after presenting with sudden onset abdominal pain with multiple episodes of vomiting. Initial evaluation showed leukocytosis 13.9, elevated creatinine 1.7 with CT abdomen showing bilateral kidney stones and left-sided hydronephrosis. Temperature was 99.3 on presentation and patient was tachycardic 11 blood pressure was low 88/49. Patient had low hemoglobin 8.9 normocytic normochromic.   UA was positive for large hemoglobin and moderate bacteria, and no friction rub. No murmur heard. Pulmonary/Chest: No accessory muscle usage. No respiratory distress. She has no wheezes. She has no rales. Abdominal: Soft. Bowel sounds are normal. She exhibits no distension and no mass. There is no tenderness. There is no rebound and no guarding. Lymphadenopathy:     She has no cervical adenopathy. Neurological: She is alert and oriented to person, place, and time. No cranial nerve deficit. She exhibits normal muscle tone. Left facial droop, left upper extremity weakness 3/5    Skin: She is not diaphoretic. Nursing note and vitals reviewed. Lower Extremities : No ankle Edema , No calf Tenderness     Laboratory findings:    Recent Labs      10/13/17   1811  10/14/17   0650  10/15/17   0648   WBC   --   10.4  9.2   HGB  8.8*  8.3*  8.6*   HCT  26.9*  26.9*  26.9*   PLT   --   263  274     No results for input(s): NA, K, CL, CO2, GLUCOSE, BUN, CREATININE, MG, CALCIUM, CAION, PHOS, PSA in the last 72 hours.   Recent Labs      10/14/17   0650   LDH  230*        Lab Results   Component Value Date    IRON 25 (L) 10/13/2017    TIBC 148 (L) 10/13/2017    FERRITIN 180 (H) 10/13/2017     Lab Results   Component Value Date    [id number] 338 10/10/2017     Lab Results   Component Value Date    FOLATE 15.6 10/10/2017       Specific Gravity, UA   Date Value Ref Range Status   10/10/2017 1.010 1.005 - 1.030 Final     Protein, UA   Date Value Ref Range Status   10/10/2017 1+ (A) NEG Final     RBC, UA   Date Value Ref Range Status   10/10/2017 50  0 - 2 /HPF Final     Bacteria, UA   Date Value Ref Range Status   10/10/2017 MODERATE (A) NONE Final     Nitrite, Urine   Date Value Ref Range Status   10/10/2017 NEGATIVE NEG Final     WBC, UA   Date Value Ref Range Status   10/10/2017 50  0 - 5 /HPF Final     Leukocyte Esterase, Urine   Date Value Ref Range Status   10/10/2017 MODERATE (A) NEG Final     Comment:     Performed at [facility], [facility] [address], [address], [address]     MRI brain without contrast 10/10/17  1. There are multifocal acute infarcts noted in the right middle cerebral   artery vascular distribution.  These involve the right pre and postcentral   gyri, likely accounting for the patient's left-sided neurologic deficits. 2. Chronic infarcts are noted in the occipital lobes and left cerebellar   hemisphere. 3. Cerebral and cerebellar parenchymal volume loss with minimal chronic   microvascular white matter ischemic disease. MRA neck without contrast 10/11/17  Flow limiting stenosis in the proximal right internal carotid artery. MRA Head 10/11/17  Study limited by artifact, within this limit, there is apparent diminished   flow in the right ICA and MCA.       Short-segment focal stenosis P1 segment left PCA. CT abdomen and pelvis 10/10/17   Acute left obstructive uropathy secondary to a 6- 7 mm calculus in the   proximal ureter just distal to the UPJ.       There are multiple bilateral nonobstructing renal calculi measuring up to 1   cm in the lower pole of the left kidney.       Small amount of air within the urinary bladder likely related to recent   catheterization. Bonnetta Gina correlation is recommended. Echocardiogram 10/11/17  Summary  Left ventricle is moderately enlarged, global left ventricular systolic  function is normal, inferior and lateral hypokinesis is noted. Estimated  ejection fraction is 50-55%  Evidence of diastolic dysfunction. Right ventricular dilatation with reduced systolic function. Bi-atrial enlargement. Moderate mitral regurgitation due to papillary muscle dysfunction. Mild tricuspid regurgitation. Estimated right ventricular systolic pressure is 40 mmHg. Clinical Course : stable  Assessment and Plan      1. Sepsis due to UTI and left pyelonephritis - status post left stent Placement 10/10/17. Hassan taken out. . No growth on culture . Cipro .  Stop date

## 2017-10-16 NOTE — CARE COORDINATION
Chart reviewed  Met with patient , son, dtr this date to discuss d/c plans  All are in agreement with Michelle for rehab  Spoke with Dr Jazz Leonard and patient appropriate  Jalen Mckeon at [facility] rehab notified and will start insurance pre-cert  Will need SUSSY/MARS and discharge re-admit completed prior d/c

## 2017-10-16 NOTE — PLAN OF CARE
[facility]    Second Visit Note  For more detailed information please refer to the progress note of the day      10/16/2017    5:16 PM    Name:   Cheyanne Cooney  MRN:     [id number]     Sue Jimenez:      [de-identified]   Room:   0102/9799-01   Day:  6  Admit Date:  10/9/2017 11:35 PM    PCP:   Gregory Lei DO  Code Status:  Prior        Pt vitals were reviewed   New labs were reviewed   Patient was seen    Updated plan :     1. No change in status . Precert pending. HR controlled.          Jacqui Bourgeois MD  10/16/2017  5:16 PM

## 2017-10-16 NOTE — PROGRESS NOTES
agreed to PT, pt supine in bed upon arrival  Pain Screening  Patient Currently in Pain: Denies  Vital Signs  Patient Currently in Pain: Denies       Orientation  Orientation  Overall Orientation Status: Within Functional Limits  Objective   Bed mobility  Supine to Sit: Moderate assistance  Scooting: Moderate assistance  Comments: left up in chair   Transfers  Sit to Stand: Moderate Assistance (1-2)  Stand to sit: Minimal Assistance  Comments: vc's for proper hand placement  Ambulation  Ambulation?: Yes  Ambulation 1  Surface: level tile  Device: Platform Walker left  Other Apparatus: Wheelchair follow  Assistance: Moderate assistance  Quality of Gait: VERY short shuffling steps, flex posture, very slow cinthia, pt with difficulty advancing BLE but > on left LE  Distance: 6ft x 1, 15ft x 1   Comments: Distance limited by fatigue and decreased endurance  Stairs/Curb  Stairs?: No     Balance  Standing - Static: Fair;-  Standing - Dynamic: Poor;+  Comments: EOB x ~5mins CGA to occassional Annika d/t posterior/R lateral lean  Exercises  Heelslides: LLE x 10   Gluteal Sets: x 10  Hip Abduction: LLE x 10   Knee Short Arc Quad: LLE x 10   Ankle Pumps: BLE x 20   Upper Extremity: LUE AAROM shoulder/elbow, PROM fingers/ wrist x 10  Comments: B ankle stretches 30\" x 3               Assessment   Body structures, Functions, Activity limitations: Decreased functional mobility ; Decreased strength;Decreased ROM; Decreased safe awareness;Decreased balance;Decreased endurance  Assessment: Focused on ambulation with platform walker this date with chair follow. Distance limited d/t fatigue  Treatment Diagnosis: left hemiparesis  Specific instructions for Next Treatment: Try ambulate with green lift walker  Prognosis: Good  REQUIRES PT FOLLOW UP: Yes  Activity Tolerance  Activity Tolerance: Patient limited by endurance; Patient limited by fatigue       Discharge Recommendations:  IP Rehab  In my professional opinion, this patient could

## 2017-10-17 ENCOUNTER — HOSPITAL ENCOUNTER (INPATIENT)
Age: 67
LOS: 23 days | Discharge: HOME HEALTH | DRG: 056 | End: 2017-11-09
Attending: PHYSICAL MEDICINE & REHABILITATION | Admitting: PHYSICAL MEDICINE & REHABILITATION
Payer: MEDICARE

## 2017-10-17 VITALS
WEIGHT: 239.2 LBS | HEART RATE: 92 BPM | DIASTOLIC BLOOD PRESSURE: 60 MMHG | RESPIRATION RATE: 18 BRPM | SYSTOLIC BLOOD PRESSURE: 101 MMHG | HEIGHT: 62 IN | OXYGEN SATURATION: 94 % | BODY MASS INDEX: 44.02 KG/M2 | TEMPERATURE: 98.3 F

## 2017-10-17 LAB
COLLAGEN ADENOSINE-5'-DIPHOSPHATE (ADP) TIME: 101 SEC (ref 67–112)
COLLAGEN EPINEPHRINE TIME: 99 SEC (ref 85–172)
GLUCOSE BLD-MCNC: 109 MG/DL (ref 65–105)
GLUCOSE BLD-MCNC: 122 MG/DL (ref 65–105)
GLUCOSE BLD-MCNC: 67 MG/DL (ref 65–105)
HCT VFR BLD CALC: 28.8 % (ref 36–46)
HEMOGLOBIN: 9.1 G/DL (ref 12–16)
MCH RBC QN AUTO: 28.9 PG (ref 26–34)
MCHC RBC AUTO-ENTMCNC: 31.8 G/DL (ref 31–37)
MCV RBC AUTO: 90.7 FL (ref 80–100)
PATHOLOGIST REVIEW: NORMAL
PDW BLD-RTO: 18.6 % (ref 12.5–15.4)
PLATELET # BLD: 374 K/UL (ref 140–450)
PLATELET FUNCTION INTERP: NORMAL
PMV BLD AUTO: 8.3 FL (ref 6–12)
RBC # BLD: 3.17 M/UL (ref 4–5.2)
WBC # BLD: 9.6 K/UL (ref 3.5–11)

## 2017-10-17 PROCEDURE — 97116 GAIT TRAINING THERAPY: CPT

## 2017-10-17 PROCEDURE — 99232 SBSQ HOSP IP/OBS MODERATE 35: CPT | Performed by: PSYCHIATRY & NEUROLOGY

## 2017-10-17 PROCEDURE — 6360000002 HC RX W HCPCS: Performed by: FAMILY MEDICINE

## 2017-10-17 PROCEDURE — 2580000003 HC RX 258: Performed by: NURSE PRACTITIONER

## 2017-10-17 PROCEDURE — 99239 HOSP IP/OBS DSCHRG MGMT >30: CPT | Performed by: FAMILY MEDICINE

## 2017-10-17 PROCEDURE — 1180000000 HC REHAB R&B

## 2017-10-17 PROCEDURE — 93970 EXTREMITY STUDY: CPT

## 2017-10-17 PROCEDURE — 6370000000 HC RX 637 (ALT 250 FOR IP): Performed by: FAMILY MEDICINE

## 2017-10-17 PROCEDURE — 97112 NEUROMUSCULAR REEDUCATION: CPT

## 2017-10-17 PROCEDURE — 82947 ASSAY GLUCOSE BLOOD QUANT: CPT

## 2017-10-17 PROCEDURE — 85027 COMPLETE CBC AUTOMATED: CPT

## 2017-10-17 PROCEDURE — 97110 THERAPEUTIC EXERCISES: CPT

## 2017-10-17 PROCEDURE — 85576 BLOOD PLATELET AGGREGATION: CPT

## 2017-10-17 PROCEDURE — 6370000000 HC RX 637 (ALT 250 FOR IP): Performed by: NURSE PRACTITIONER

## 2017-10-17 PROCEDURE — 36415 COLL VENOUS BLD VENIPUNCTURE: CPT

## 2017-10-17 RX ORDER — ATORVASTATIN CALCIUM 40 MG/1
40 TABLET, FILM COATED ORAL DAILY
Status: CANCELLED | OUTPATIENT
Start: 2017-10-18

## 2017-10-17 RX ORDER — BISACODYL 10 MG
10 SUPPOSITORY, RECTAL RECTAL DAILY PRN
Status: CANCELLED | OUTPATIENT
Start: 2017-10-17

## 2017-10-17 RX ORDER — SODIUM CHLORIDE 9 MG/ML
INJECTION, SOLUTION INTRAVENOUS CONTINUOUS
Status: CANCELLED | OUTPATIENT
Start: 2017-10-17

## 2017-10-17 RX ORDER — DOCUSATE SODIUM 100 MG/1
100 CAPSULE, LIQUID FILLED ORAL DAILY
Status: CANCELLED | OUTPATIENT
Start: 2017-10-18

## 2017-10-17 RX ORDER — LOSARTAN POTASSIUM 25 MG/1
25 TABLET ORAL DAILY
Qty: 30 TABLET | Refills: 3 | Status: ON HOLD | OUTPATIENT
Start: 2017-10-17 | End: 2017-11-09 | Stop reason: HOSPADM

## 2017-10-17 RX ORDER — CLOPIDOGREL BISULFATE 75 MG/1
75 TABLET ORAL DAILY
Status: CANCELLED | OUTPATIENT
Start: 2017-10-18

## 2017-10-17 RX ORDER — POLYETHYLENE GLYCOL 3350 17 G/17G
17 POWDER, FOR SOLUTION ORAL DAILY
Status: CANCELLED | OUTPATIENT
Start: 2017-10-18

## 2017-10-17 RX ORDER — LANOLIN ALCOHOL/MO/W.PET/CERES
325 CREAM (GRAM) TOPICAL 2 TIMES DAILY WITH MEALS
Qty: 90 TABLET | Refills: 3 | Status: ON HOLD | OUTPATIENT
Start: 2017-10-17 | End: 2017-11-09 | Stop reason: HOSPADM

## 2017-10-17 RX ORDER — CIPROFLOXACIN 500 MG/1
500 TABLET, FILM COATED ORAL EVERY 12 HOURS SCHEDULED
Qty: 2 TABLET | Refills: 0 | Status: ON HOLD | OUTPATIENT
Start: 2017-10-17 | End: 2017-11-09 | Stop reason: HOSPADM

## 2017-10-17 RX ORDER — FERROUS SULFATE 325(65) MG
325 TABLET ORAL 2 TIMES DAILY WITH MEALS
Status: DISCONTINUED | OUTPATIENT
Start: 2017-10-18 | End: 2017-11-09 | Stop reason: HOSPADM

## 2017-10-17 RX ORDER — POLYETHYLENE GLYCOL 3350 17 G/17G
17 POWDER, FOR SOLUTION ORAL DAILY
Status: DISCONTINUED | OUTPATIENT
Start: 2017-10-18 | End: 2017-11-09 | Stop reason: HOSPADM

## 2017-10-17 RX ORDER — LORAZEPAM 2 MG/ML
1 INJECTION INTRAMUSCULAR EVERY 6 HOURS PRN
Status: CANCELLED | OUTPATIENT
Start: 2017-10-17

## 2017-10-17 RX ORDER — DEXTROSE MONOHYDRATE 25 G/50ML
12.5 INJECTION, SOLUTION INTRAVENOUS PRN
Status: CANCELLED | OUTPATIENT
Start: 2017-10-17

## 2017-10-17 RX ORDER — ONDANSETRON 2 MG/ML
4 INJECTION INTRAMUSCULAR; INTRAVENOUS EVERY 6 HOURS PRN
Status: DISCONTINUED | OUTPATIENT
Start: 2017-10-17 | End: 2017-11-09 | Stop reason: HOSPADM

## 2017-10-17 RX ORDER — NICOTINE POLACRILEX 4 MG
15 LOZENGE BUCCAL PRN
Status: DISCONTINUED | OUTPATIENT
Start: 2017-10-17 | End: 2017-11-09 | Stop reason: HOSPADM

## 2017-10-17 RX ORDER — M-VIT,TX,IRON,MINS/CALC/FOLIC 27MG-0.4MG
1 TABLET ORAL DAILY
Status: CANCELLED | OUTPATIENT
Start: 2017-10-18

## 2017-10-17 RX ORDER — SODIUM CHLORIDE 0.9 % (FLUSH) 0.9 %
10 SYRINGE (ML) INJECTION EVERY 12 HOURS SCHEDULED
Status: CANCELLED | OUTPATIENT
Start: 2017-10-17

## 2017-10-17 RX ORDER — CALCIUM CARBONATE/VITAMIN D3 600 MG-10
1 TABLET ORAL DAILY
Status: DISCONTINUED | OUTPATIENT
Start: 2017-10-18 | End: 2017-11-09 | Stop reason: HOSPADM

## 2017-10-17 RX ORDER — TAMSULOSIN HYDROCHLORIDE 0.4 MG/1
0.4 CAPSULE ORAL DAILY
Status: CANCELLED | OUTPATIENT
Start: 2017-10-18

## 2017-10-17 RX ORDER — CIPROFLOXACIN 500 MG/1
500 TABLET, FILM COATED ORAL EVERY 12 HOURS SCHEDULED
Status: CANCELLED | OUTPATIENT
Start: 2017-10-17 | End: 2017-10-18

## 2017-10-17 RX ORDER — SODIUM CHLORIDE 0.9 % (FLUSH) 0.9 %
10 SYRINGE (ML) INJECTION PRN
Status: CANCELLED | OUTPATIENT
Start: 2017-10-17

## 2017-10-17 RX ORDER — DEXTROSE MONOHYDRATE 50 MG/ML
100 INJECTION, SOLUTION INTRAVENOUS PRN
Status: DISCONTINUED | OUTPATIENT
Start: 2017-10-17 | End: 2017-11-09 | Stop reason: HOSPADM

## 2017-10-17 RX ORDER — PAROXETINE 10 MG/1
10 TABLET, FILM COATED ORAL EVERY MORNING
Status: DISCONTINUED | OUTPATIENT
Start: 2017-10-18 | End: 2017-10-26

## 2017-10-17 RX ORDER — NICOTINE POLACRILEX 4 MG
15 LOZENGE BUCCAL PRN
Status: CANCELLED | OUTPATIENT
Start: 2017-10-17

## 2017-10-17 RX ORDER — CIPROFLOXACIN 500 MG/1
500 TABLET, FILM COATED ORAL EVERY 12 HOURS SCHEDULED
Status: COMPLETED | OUTPATIENT
Start: 2017-10-17 | End: 2017-10-17

## 2017-10-17 RX ORDER — TAMSULOSIN HYDROCHLORIDE 0.4 MG/1
0.4 CAPSULE ORAL DAILY
Qty: 30 CAPSULE | Refills: 3 | Status: ON HOLD | OUTPATIENT
Start: 2017-10-18 | End: 2017-11-09 | Stop reason: HOSPADM

## 2017-10-17 RX ORDER — ATORVASTATIN CALCIUM 40 MG/1
40 TABLET, FILM COATED ORAL DAILY
Status: DISCONTINUED | OUTPATIENT
Start: 2017-10-18 | End: 2017-11-09 | Stop reason: HOSPADM

## 2017-10-17 RX ORDER — ONDANSETRON 2 MG/ML
4 INJECTION INTRAMUSCULAR; INTRAVENOUS EVERY 6 HOURS PRN
Status: CANCELLED | OUTPATIENT
Start: 2017-10-17

## 2017-10-17 RX ORDER — FUROSEMIDE 40 MG/1
40 TABLET ORAL DAILY
Qty: 60 TABLET | Refills: 3 | Status: ON HOLD | OUTPATIENT
Start: 2017-10-17 | End: 2017-11-09 | Stop reason: HOSPADM

## 2017-10-17 RX ORDER — HYDROCODONE BITARTRATE AND ACETAMINOPHEN 5; 325 MG/1; MG/1
1 TABLET ORAL EVERY 4 HOURS PRN
Status: DISCONTINUED | OUTPATIENT
Start: 2017-10-17 | End: 2017-11-09 | Stop reason: HOSPADM

## 2017-10-17 RX ORDER — LORAZEPAM 2 MG/ML
1 INJECTION INTRAMUSCULAR EVERY 6 HOURS PRN
Status: DISCONTINUED | OUTPATIENT
Start: 2017-10-17 | End: 2017-11-02

## 2017-10-17 RX ORDER — FAMOTIDINE 20 MG/1
40 TABLET, FILM COATED ORAL DAILY
Status: CANCELLED | OUTPATIENT
Start: 2017-10-18

## 2017-10-17 RX ORDER — LANOLIN ALCOHOL/MO/W.PET/CERES
325 CREAM (GRAM) TOPICAL 2 TIMES DAILY WITH MEALS
Status: CANCELLED | OUTPATIENT
Start: 2017-10-17

## 2017-10-17 RX ORDER — INSULIN GLARGINE 100 [IU]/ML
35 INJECTION, SOLUTION SUBCUTANEOUS NIGHTLY
Status: DISCONTINUED | OUTPATIENT
Start: 2017-10-17 | End: 2017-10-21

## 2017-10-17 RX ORDER — POTASSIUM CHLORIDE 1.5 G/1.77G
20 POWDER, FOR SOLUTION ORAL 2 TIMES DAILY
Status: CANCELLED | OUTPATIENT
Start: 2017-10-17

## 2017-10-17 RX ORDER — DEXTROSE MONOHYDRATE 50 MG/ML
100 INJECTION, SOLUTION INTRAVENOUS PRN
Status: CANCELLED | OUTPATIENT
Start: 2017-10-17

## 2017-10-17 RX ORDER — PAROXETINE 10 MG/1
10 TABLET, FILM COATED ORAL EVERY MORNING
Status: CANCELLED | OUTPATIENT
Start: 2017-10-18

## 2017-10-17 RX ORDER — DEXTROSE MONOHYDRATE 25 G/50ML
12.5 INJECTION, SOLUTION INTRAVENOUS PRN
Status: DISCONTINUED | OUTPATIENT
Start: 2017-10-17 | End: 2017-11-09 | Stop reason: HOSPADM

## 2017-10-17 RX ORDER — FAMOTIDINE 20 MG/1
40 TABLET, FILM COATED ORAL DAILY
Status: DISCONTINUED | OUTPATIENT
Start: 2017-10-18 | End: 2017-11-09 | Stop reason: HOSPADM

## 2017-10-17 RX ORDER — HYDROCODONE BITARTRATE AND ACETAMINOPHEN 5; 325 MG/1; MG/1
2 TABLET ORAL EVERY 4 HOURS PRN
Status: CANCELLED | OUTPATIENT
Start: 2017-10-17

## 2017-10-17 RX ORDER — TAMSULOSIN HYDROCHLORIDE 0.4 MG/1
0.4 CAPSULE ORAL DAILY
Status: DISCONTINUED | OUTPATIENT
Start: 2017-10-18 | End: 2017-10-23

## 2017-10-17 RX ORDER — ACETAMINOPHEN 325 MG/1
650 TABLET ORAL EVERY 4 HOURS PRN
Status: DISCONTINUED | OUTPATIENT
Start: 2017-10-17 | End: 2017-11-09 | Stop reason: HOSPADM

## 2017-10-17 RX ORDER — HYDROCODONE BITARTRATE AND ACETAMINOPHEN 5; 325 MG/1; MG/1
1 TABLET ORAL EVERY 4 HOURS PRN
Status: CANCELLED | OUTPATIENT
Start: 2017-10-17

## 2017-10-17 RX ORDER — CLOPIDOGREL BISULFATE 75 MG/1
75 TABLET ORAL DAILY
Status: DISCONTINUED | OUTPATIENT
Start: 2017-10-18 | End: 2017-10-26

## 2017-10-17 RX ORDER — CLOPIDOGREL BISULFATE 75 MG/1
75 TABLET ORAL DAILY
Qty: 10 TABLET | Refills: 0 | Status: ON HOLD | OUTPATIENT
Start: 2017-10-18 | End: 2017-11-09 | Stop reason: HOSPADM

## 2017-10-17 RX ORDER — POTASSIUM CHLORIDE 1.5 G/1.77G
20 POWDER, FOR SOLUTION ORAL 2 TIMES DAILY
Status: DISCONTINUED | OUTPATIENT
Start: 2017-10-17 | End: 2017-11-02

## 2017-10-17 RX ORDER — HYDROCODONE BITARTRATE AND ACETAMINOPHEN 5; 325 MG/1; MG/1
2 TABLET ORAL EVERY 4 HOURS PRN
Status: DISCONTINUED | OUTPATIENT
Start: 2017-10-17 | End: 2017-11-09 | Stop reason: HOSPADM

## 2017-10-17 RX ORDER — PSEUDOEPHEDRINE HCL 30 MG
100 TABLET ORAL 2 TIMES DAILY PRN
Qty: 60 CAPSULE | Refills: 0 | Status: ON HOLD | OUTPATIENT
Start: 2017-10-17 | End: 2017-11-09 | Stop reason: HOSPADM

## 2017-10-17 RX ORDER — M-VIT,TX,IRON,MINS/CALC/FOLIC 27MG-0.4MG
1 TABLET ORAL DAILY
Status: DISCONTINUED | OUTPATIENT
Start: 2017-10-18 | End: 2017-11-09 | Stop reason: HOSPADM

## 2017-10-17 RX ORDER — INSULIN GLARGINE 100 [IU]/ML
35 INJECTION, SOLUTION SUBCUTANEOUS NIGHTLY
Status: CANCELLED | OUTPATIENT
Start: 2017-10-17

## 2017-10-17 RX ORDER — NICOTINE 21 MG/24HR
1 PATCH, TRANSDERMAL 24 HOURS TRANSDERMAL DAILY PRN
Status: DISCONTINUED | OUTPATIENT
Start: 2017-10-17 | End: 2017-11-09 | Stop reason: HOSPADM

## 2017-10-17 RX ORDER — NICOTINE 21 MG/24HR
1 PATCH, TRANSDERMAL 24 HOURS TRANSDERMAL DAILY PRN
Status: CANCELLED | OUTPATIENT
Start: 2017-10-17

## 2017-10-17 RX ORDER — DOCUSATE SODIUM 100 MG/1
100 CAPSULE, LIQUID FILLED ORAL DAILY
Status: DISCONTINUED | OUTPATIENT
Start: 2017-10-18 | End: 2017-11-09 | Stop reason: HOSPADM

## 2017-10-17 RX ADMIN — CIPROFLOXACIN HYDROCHLORIDE 500 MG: 500 TABLET, FILM COATED ORAL at 21:26

## 2017-10-17 RX ADMIN — Medication 35 UNITS: at 21:27

## 2017-10-17 RX ADMIN — ENOXAPARIN SODIUM 40 MG: 40 INJECTION SUBCUTANEOUS at 16:18

## 2017-10-17 RX ADMIN — POTASSIUM CHLORIDE 20 MEQ: 1.5 POWDER, FOR SOLUTION ORAL at 09:27

## 2017-10-17 RX ADMIN — CLOPIDOGREL 75 MG: 75 TABLET, FILM COATED ORAL at 09:28

## 2017-10-17 RX ADMIN — MULTIPLE VITAMINS W/ MINERALS TAB 1 TABLET: TAB at 09:27

## 2017-10-17 RX ADMIN — ATORVASTATIN CALCIUM 40 MG: 40 TABLET, FILM COATED ORAL at 09:28

## 2017-10-17 RX ADMIN — INSULIN LISPRO 1 UNITS: 100 INJECTION, SOLUTION INTRAVENOUS; SUBCUTANEOUS at 21:28

## 2017-10-17 RX ADMIN — FERROUS SULFATE TAB EC 325 MG (65 MG FE EQUIVALENT) 325 MG: 325 (65 FE) TABLET DELAYED RESPONSE at 09:27

## 2017-10-17 RX ADMIN — Medication 600 MG: at 09:28

## 2017-10-17 RX ADMIN — FAMOTIDINE 40 MG: 20 TABLET, FILM COATED ORAL at 09:27

## 2017-10-17 RX ADMIN — PAROXETINE HYDROCHLORIDE HEMIHYDRATE 10 MG: 10 TABLET, FILM COATED ORAL at 09:27

## 2017-10-17 RX ADMIN — METOPROLOL TARTRATE 25 MG: 25 TABLET ORAL at 09:27

## 2017-10-17 RX ADMIN — TAMSULOSIN HYDROCHLORIDE 0.4 MG: 0.4 CAPSULE ORAL at 09:27

## 2017-10-17 RX ADMIN — ASPIRIN 325 MG: 325 TABLET, COATED ORAL at 09:28

## 2017-10-17 RX ADMIN — POTASSIUM CHLORIDE 20 MEQ: 1.5 POWDER, FOR SOLUTION ORAL at 21:26

## 2017-10-17 RX ADMIN — FERROUS SULFATE TAB EC 325 MG (65 MG FE EQUIVALENT) 325 MG: 325 (65 FE) TABLET DELAYED RESPONSE at 16:18

## 2017-10-17 RX ADMIN — HYDROCODONE BITARTRATE AND ACETAMINOPHEN 2 TABLET: 5; 325 TABLET ORAL at 00:59

## 2017-10-17 RX ADMIN — CIPROFLOXACIN 500 MG: 500 TABLET, FILM COATED ORAL at 09:28

## 2017-10-17 RX ADMIN — Medication 10 ML: at 09:28

## 2017-10-17 ASSESSMENT — ENCOUNTER SYMPTOMS
SINUS PRESSURE: 0
COUGH: 0
ABDOMINAL PAIN: 0
NAUSEA: 0
VOMITING: 0
BLOOD IN STOOL: 0
WHEEZING: 0
VOICE CHANGE: 0
SORE THROAT: 0
DIARRHEA: 0
CONSTIPATION: 1

## 2017-10-17 ASSESSMENT — PAIN DESCRIPTION - DESCRIPTORS: DESCRIPTORS: ACHING;SHOOTING

## 2017-10-17 ASSESSMENT — PAIN DESCRIPTION - LOCATION: LOCATION: ABDOMEN;BACK

## 2017-10-17 ASSESSMENT — PAIN SCALES - GENERAL
PAINLEVEL_OUTOF10: 8
PAINLEVEL_OUTOF10: 0

## 2017-10-17 ASSESSMENT — PAIN DESCRIPTION - PAIN TYPE: TYPE: ACUTE PAIN

## 2017-10-17 ASSESSMENT — PAIN DESCRIPTION - FREQUENCY: FREQUENCY: INTERMITTENT

## 2017-10-17 ASSESSMENT — PAIN DESCRIPTION - ORIENTATION: ORIENTATION: RIGHT;LOWER

## 2017-10-17 ASSESSMENT — PAIN DESCRIPTION - ONSET: ONSET: SUDDEN

## 2017-10-17 NOTE — PROGRESS NOTES
Physical Therapy  Facility/Department: [facility] NEURO  Daily Treatment Note  NAME: Juwan Marshall  : 1950  MRN: [id number]    Date of Service: 10/17/2017    Patient Diagnosis(es):   Patient Active Problem List    Diagnosis Date Noted    Acute ischemic right MCA stroke ([address] 10/11/2017     Priority: High    Right-sided carotid artery disease ([address]     Anemia     Hypokalemia     Hydronephrosis of left kidney 10/10/2017    Acute cystitis with hematuria 10/10/2017    CKD (chronic kidney disease), stage III 10/10/2017    Hypotension arterial 10/10/2017    Essential hypertension 10/10/2017    Morbid obesity with BMI of 40.0-44.9, adult ([address] 10/10/2017    History of arterial ischemic stroke 10/10/2017    History of pulmonary embolus (PE) 10/10/2017    Paroxysmal atrial fibrillation ([address] 10/10/2017    Left ureteral stone 10/10/2017    Left-sided weakness     Non-intractable vomiting with nausea     Cerebrovascular accident (CVA) due to embolism of right middle cerebral artery (HCC)     Hypotension     Sepsis secondary to UTI ([address]     Elevated serum creatinine     Kidney stone        Past Medical History:   Diagnosis Date    CVA (cerebral vascular accident) ([address]     Diabetes mellitus ([address]     Patient in clinical research study 10/13/2017    Anticipated end date 2018    PE [facility]pulmonary throm[facility]embolism[facility])      Past Surgical History:   Procedure Laterality Date    CHOLECYSTECTOMY      CYSTOSCOPY Left 10/10/2017    CYSTOSCOPY, STENT INSERTION performed by Ralph Cavazos MD at [address]         Restrictions  Restrictions/Precautions  Restrictions/Precautions: General Precautions, Fall Risk  Required Braces or Orthoses?: No  Position Activity Restriction  Other position/activity restrictions: HIGH FALL RISK  Subjective   General  Chart Reviewed: Yes  Response To Previous Treatment: Patient with no complaints from previous session. Family / Caregiver Present:  Yes

## 2017-10-17 NOTE — PROGRESS NOTES
glucose, dextrose, glucagon (rDNA), dextrose    OBJECTIVE:   Vitals: BP (!) 110/42   Pulse 84   Temp 98.3 °F (36.8 °C) (Oral)   Resp 20   Ht 5' 2\" (1.575 m)   Wt 239 lb 3.2 oz (108.5 kg)   SpO2 96%   BMI 43.75 kg/m²     On exam today:   Awake, oriented to self, place ,age and situation  CN: mild left facial droop, pupils 2 mm reactive to light bilaterally  Sensory: intact to light touch in 4 extremities  Motor: LUE proximally 4/5, distally 0/5, LLE 4/5  Right hemibody -5/5    LABS:     CBC:   Lab Results   Component Value Date    WBC 9.6 10/17/2017    RBC 3.17 10/17/2017    HGB 9.1 10/17/2017    HCT 28.8 10/17/2017    MCV 90.7 10/17/2017    MCH 28.9 10/17/2017    MCHC 31.8 10/17/2017    RDW 18.6 10/17/2017     10/17/2017    MPV 8.3 10/17/2017     BMP:    Lab Results   Component Value Date     10/16/2017    K 4.4 10/16/2017     10/16/2017    CO2 20 10/16/2017    BUN 9 10/16/2017    LABALBU 2.8 10/10/2017    CREATININE 0.67 10/16/2017    CALCIUM 8.2 10/16/2017    GFRAA >60 10/16/2017    LABGLOM >60 10/16/2017    GLUCOSE 181 10/16/2017     IMPRESSIONS:     73 yo left handed female with PMH of obesity, DM, HTN, CKD, stroke(with no residual symptoms), A fib and PE who wad admitted for left kidney stone and underwent left side ureteral stent placement. During the work up of her right MCA stroke she was found to have right clinoid segment ICA significant stenosis for which she underwent angioplasty and stenting. PLANS:     Right MCA acute stroke  Right clinoid ICA stenosis  S/p clinoid ICA segment stenting  -Continue lipitor 40 mg daily.  -Considering her new ICA stenting, A fib and previous history of PE:  Plan for her anticoagulation and antiplatelet medications is as the following:    Until 10/26/17  Plavix 75 mg daily  Aspirin 325 mg daily  Hold eliquis    Starting 10/27/17  Stop plavix  Resume eliquis 5 mg daily. Change aspirin to 81 mg daily and continue.     Upon discharge, patient to

## 2017-10-17 NOTE — PLAN OF CARE
Problem: Risk for Impaired Skin Integrity  Goal: Tissue integrity - skin and mucous membranes  Structural intactness and normal physiological function of skin and  mucous membranes. Outcome: Ongoing  No new skin breakdown noted this shift. Patient is being assisted to turn with pillow support every 2 hours. Skin inspections completed regularly. Barrier cream is being used in sensitive areas and silver impregnated cloth being used in skin folds with healing taking place. Will continue to monitor.

## 2017-10-17 NOTE — PROGRESS NOTES
and patient was tachycardic 11 blood pressure was low 88/49. Patient had low hemoglobin 8.9 normocytic normochromic. UA was positive for large hemoglobin and moderate bacteria, moderate leukoesterase, negative nitrite. Patient was started on IV antibiotics for sepsis. Later patient was found to have left upper extremity weakness and decreased disturbance and facial droop. MRI showed multiple focal acute infarction right MCA distribution. MRA showed right ICA stenosis. Patient has been on anticoagulation Eliquis for pulmonary embolism. She has prior history of paroxysmal atrial fibrillation and was recommended Holter monitor which she did not used. Patient had diagnostic angiogram on 10/12/17. Right proximal ICA was not found to be stenotic as reported on MRA. Patient had hemodynamically significant stenosis of cavernous segment ICA and had stent placed. Patient continued to drop hemoglobin down to 6.5 on 10/13/17 and had 1 unit PRBC transfusion. She has chronic history of anemia. Guaiac stool has been negative as outpatient. PMH:  Past Medical History:   Diagnosis Date    [facility]     Diabetes mellitus ([facility]     Patient in clinical research study 10/13/2017    Anticipated end date 04/13/2018    PE (pulmonary thromboembolism) ([facility]       Allergies:    Allergies   Allergen Reactions    Iv Contrast [Iodides] Other (See Comments)     unknown      Medications :    metoprolol tartrate 25 mg Oral BID   aspirin 325 mg Oral Daily   insulin glargine 35 Units Subcutaneous Nightly   docusate sodium 100 mg Oral Daily   ciprofloxacin 500 mg Oral 2 times per day   clopidogrel 75 mg Oral Daily   ferrous sulfate 325 mg Oral BID WC   polyethylene glycol 17 g Oral Daily   atorvastatin 40 mg Oral Daily   calcium carbonate 1 tablet Oral Daily   famotidine 40 mg Oral Daily   therapeutic multivitamin-minerals 1 tablet Oral Daily   PARoxetine 10 mg Oral QAM   potassium chloride 20 mEq Oral Mouth/Throat: No oropharyngeal exudate. Eyes: Conjunctivae are normal. Pupils are equal, round, and reactive to light. No scleral icterus. Neck: No JVD present. No thyromegaly present. Cardiovascular: Normal rate, regular rhythm and normal heart sounds. Exam reveals no gallop and no friction rub. No murmur heard. Pulmonary/Chest: No accessory muscle usage. No respiratory distress. She has no wheezes. She has no rales. Abdominal: Soft. Bowel sounds are normal. She exhibits no distension and no mass. There is no tenderness. There is no rebound and no guarding. Lymphadenopathy:     She has no cervical adenopathy. Neurological: She is alert and oriented to person, place, and time. No cranial nerve deficit. She exhibits normal muscle tone. Left facial droop, left upper extremity weakness 3/5    Skin: She is not diaphoretic. Nursing note and vitals reviewed. Lower Extremities : No ankle Edema , No calf Tenderness     Laboratory findings:    Recent Labs      10/15/17   0648  10/16/17   1412  10/17/17   0719   WBC  9.2  11.2*  9.6   HGB  8.6*  10.1*  9.1*   HCT  26.9*  31.5*  28.8*   PLT  274  Platelet clumps present, count appears adequate. 374     Recent Labs      10/16/17   2036   NA  142   K  4.4   CL  111*   CO2  20   GLUCOSE  181*   BUN  9   CREATININE  0.67   MG  1.6   CALCIUM  8.2*     No results for input(s): PROT, LABALBU, LABA1C, Z4QQAAH, B6YFTCJ, FT4, TSH, AST, ALT, LDH, GGT, ALKPHOS, BILITOT, BILIDIR, AMMONIA, AMYLASE, LIPASE, LACTATE, CHOL, HDL, LDLCHOLESTEROL, CHOLHDLRATIO, TRIG, VLDL, BNP, TROPONINI, CKTOTAL, CKMB, CKMBINDEX, RF, NOEMY in the last 72 hours.      Lab Results   Component Value Date    IRON 25 (L) 10/13/2017    TIBC 148 (L) 10/13/2017    FERRITIN 180 (H) 10/13/2017     Lab Results   Component Value Date    [id number] 338 10/10/2017     Lab Results   Component Value Date    FOLATE 15.6 10/10/2017       Specific Gravity, UA   Date Value Ref Range Status   10/10/2017 1.010 is normal, inferior and lateral hypokinesis is noted. Estimated  ejection fraction is 50-55%  Evidence of diastolic dysfunction. Right ventricular dilatation with reduced systolic function. Bi-atrial enlargement. Moderate mitral regurgitation due to papillary muscle dysfunction. Mild tricuspid regurgitation. Estimated right ventricular systolic pressure is 40 mmHg. Clinical Course : stable  Assessment and Plan      1. Sepsis due to UTI and left pyelonephritis - status post left stent Placement 10/10/17. Hassan taken out. . No growth on culture . Cipro . Stop date 10/18/17  2. Bilateral kidney stones left 1 cm  Obstructive uropathy   - s/p stent . .   3. Type 2 diabetes mellitus - continue Lantus. Metformin on hold. 4. Multiple right MCA infarct - continue aspirin 325 mg and plavix till 10/27/17  then asa 325  and eliquis 5 mg BID. Continue Lipitor. likely embolic- . antihypertensive medications on hold . Blood pressure improved. Resume Lopressor 25 twice a day. 5. Right cavernous segment ICA s/p  stent 10-17 - Dual AP for until 10/27/17. .   6. PAF - in NSR - Hold eliquis while on dual antiplatelet ( 21 days ) then asa + eliquis  . Check stool for occult blood. Cardiology consult  7. Anemia of chronic disease with Iron deficiency - s/p 1 unit PRBC transfusion. No obvious blood loss. Continue iron supplement . Had 200 mg  IV iron . Discharge [facility] inpatient rehab. Discussed with son at bedside. Plan and updates discussed with patient's son and daughter at bed side  ,  answers  explained to satisfaction.     Plan discussed with Staff Seth Leonardo     (Please note that portions of this note were completed with a voice recognition program. Efforts were made to edit the dictations but occasionally words are mis-transcribed.)      Noe Ordaz MD  10/17/2017

## 2017-10-17 NOTE — CARE COORDINATION
Discharge [facility] Case Management Department  Written by: Jolyn Bumpers, LSW    Patient Name: Gabriella James  Attending Provider: Sesar Gomez MD  Admit Date: 10/9/2017 11:35 PM  MRN: [id number]  Account: [de-identified]                     : 1950  Discharge Date: 10/17/17      Disposition: To [facility] via [facility] at 6pm. SUSSY, discharge medication list faxed to rehab.  Pt & family are agreeable to     Jolyn Bumpers, LSW

## 2017-10-17 NOTE — CARE COORDINATION
Met with Dr. Joelle Talavera this am who states that pt is ready for discharge when pt has insurance precert. Left a message for Admissions at [address] [place] [address] with update & to verify precert status. Addendum 1:58pm  Still awaiting insurance precert for [address] [place] [address] per Admissions at rehab.

## 2017-10-17 NOTE — DISCHARGE SUMMARY
presenting with sudden onset abdominal pain with multiple episodes of vomiting. Initial evaluation showed leukocytosis 13.9, elevated creatinine 1.7 with CT abdomen showing bilateral kidney stones and left-sided hydronephrosis. Temperature was 99.3 on presentation and patient was tachycardic 11 blood pressure was low 88/49. Patient had low hemoglobin 8.9 normocytic normochromic. UA was positive for large hemoglobin and moderate bacteria, moderate leukoesterase, negative nitrite. Patient was started on IV antibiotics for sepsis. Later patient was found to have left upper extremity weakness and decreased disturbance and facial droop. MRI showed multiple focal acute infarction right MCA distribution. MRA showed right ICA stenosis. Patient has been on anticoagulation Eliquis for pulmonary embolism. She has prior history of paroxysmal atrial fibrillation and was recommended Holter monitor which she did not used. Patient had diagnostic angiogram on 10/12/17. Right proximal ICA was not found to be stenotic as reported on MRA. Patient had hemodynamically significant stenosis of cavernous segment ICA and had stent placed. Patient continued to drop hemoglobin down to 6.5 on 10/13/17 and had 1 unit PRBC transfusion. She has chronic history of anemia. Guaiac stool has been negative as outpatient. Patient was given IV infusion the hospital.    Patient was treated with antibiotics in the hospital .  Cipro and discharged start date 10/18/17. She was given aspirin 325, Plavix 75 post stent. Patient will continue medications until 10/27/17 and will stop Plavix. Patient advised to start Eliquis 5 mg twice daily on 10/27/17. Significant therapeutic interventions:   1. Sepsis due to UTI and left pyelonephritis - status post left stent Placement 10/10/17. Hassan taken out. . No growth on culture . Cipro . Stop date 10/18/17  2. Bilateral kidney stones left 1 cm  Obstructive uropathy   - s/p stent . .   3.  Type this discharge summary is in conjunction with any daily progress note from day of discharge. Discharge plan:     Disposition: Erica Bolton  acute rehab    Physician Follow Up: Chyrel Osler Raux, DO [address], [address] [facility]  [address] [facility] [address] [facility] ([facility])  [phone]    In 2 weeks  Follow up 2-4 weeks for definitive stone management with ureteroscopy/HLL    Dalila Cole CNP  [facility] [id number]  [address]  [phone]    On 11/27/2017  @10:40am arrival     Elsi Echeverria MD  [address]    In 2 weeks      Edwena Lundborg, [address] [place] [address]  MOB # [id number] [facility]  [phone]    Schedule an appointment as soon as possible for a visit in 2 weeks      Edwena Lundborg, MD  [address] [place] [address], [facility] [address]  MOB # [address] [facility]  [phone]    On 11/20/2017  at 2:30       Requiring Further Evaluation/Follow Up POST HOSPITALIZATION/Incidental Findings:  Follow up with cardiology, Endovascular  Neurology, urology    Diet: cardiac diet and diabetic diet    Activity: As tolerated    Instructions to Patient: As advised    Discharge Medications:      Medication List      START taking these medications    aspirin 325 MG EC tablet  Take 1 tablet by mouth daily  Start taking on:  10/18/2017     ciprofloxacin 500 MG tablet  Commonly known as:  CIPRO  Take 1 tablet by mouth every 12 hours for 1 day     clopidogrel 75 MG tablet  Commonly known as:  PLAVIX  Take 1 tablet by mouth daily for 10 days  Start taking on:  10/18/2017     docusate 100 MG Caps  Commonly known as:  COLACE, DULCOLAX  Take 100 mg by mouth 2 times daily as needed for Constipation     ferrous sulfate 325 (65 Fe) MG EC tablet  Take 1 tablet by mouth 2 times daily (with meals)     losartan 25 MG tablet  Commonly known as:  COZAAR  Take 1 tablet by mouth daily     metoprolol tartrate 25 MG tablet  Commonly known as:  LOPRESSOR  Take 1 tablet by mouth 2 times daily     tamsulosin 0.4 MG capsule  Commonly known as:  FLOMAX  Take 1 capsule by mouth daily  Start taking on:  10/18/2017        CHANGE how you take these medications    apixaban 5 MG Tabs tablet  Commonly known as:  ELIQUIS  Take 1 tablet by mouth 2 times daily  Start taking on:  10/28/2017  What changed:  how much to take     furosemide 40 MG tablet  Commonly known as:  LASIX  Take 1 tablet by mouth daily  What changed:  when to take this        CONTINUE taking these medications    atorvastatin 40 MG tablet  Commonly known as:  LIPITOR     calcium carbonate 600 MG Tabs tablet     famotidine 40 MG tablet  Commonly known as:  PEPCID     LANTUS SC     metFORMIN 500 MG tablet  Commonly known as:  GLUCOPHAGE     mycophenolate 500 MG tablet  Commonly known as:  CELLCEPT     ondansetron 4 MG disintegrating tablet  Commonly known as:  ZOFRAN ODT  Take 1 tablet by mouth every 8 hours as needed for Nausea     pantoprazole 40 MG tablet  Commonly known as:  PROTONIX     PARoxetine 10 MG tablet  Commonly known as:  PAXIL     POTASSIUM CHLORIDE PO     therapeutic multivitamin-minerals tablet        STOP taking these medications    amLODIPine 5 MG tablet  Commonly known as:  NORVASC     carvedilol 6.25 MG tablet  Commonly known as:  COREG     glipiZIDE 10 MG tablet  Commonly known as:  GLUCOTROL     HYZAAR 100-25 MG per tablet  Generic drug:  losartan-hydrochlorothiazide           Where to Get Your Medications      These medications were sent to [address] [address]    Phone:  [phone]   · apixaban 5 MG Tabs tablet  · aspirin 325 MG EC tablet  · ciprofloxacin 500 MG tablet  · clopidogrel 75 MG tablet  · docusate 100 MG Caps  · ferrous sulfate 325 (65 Fe) MG EC tablet  · furosemide 40 MG tablet  · losartan 25 MG tablet  · metoprolol tartrate

## 2017-10-17 NOTE — PROGRESS NOTES
NEUROLOGY INPATIENT PROGRESS NOTE    10/17/2017         Subjective: Jesenia Campbell is a  74 y.o. female admitted on 10/9/2017 with Ureterolithiasis [N20.1]   Chart reviewed and discussed with caregivers. Patient states her left sided weakness in her arm is slowly improving, requires a walker and assistance to ambulate. Denies headaches, changes in vision since admission, changes in hearing, denies h/o strokes even though chart states she has h/o stroke with no residual symptoms. Briefly, this is a  74 y.o. female PMH of obesity, DM, HTN, CKD, A. Fib and PE admitted on 10/9/2017 presented with sudden onset of abdominal pain with multiple episodes of vomiting. Patient was found to have left sided ureterolithiasis, she is s/p left side ureteral stent placement, and was septic treated with IV antibiotics. Neurology was consulted because later during hospital stay, patient developed left sided upper extremity weakness and left sided facial droop. MRI showed multiple focal infarctions in the right MCA distribution. MRA showed right ICA stenosis. Patient was on Eliquis for pulmonary embolism which is placed on hold until 10/27/17. She has h/o paroxysmal atrial fibrillation and Holter monitor was recommended but patient did not use. Diagnostic angiogram done 10/12/17 showed that right proximal ICA was not stenotic as reported on MRA. Was found to have hemodynamically significant stenosis of cavernous segment of ICA and stenting of a clinoid segment of right ICA was done. Patient is currently on Aspirin 325 mg daily, Plavix 75 mg daily with Eliquis on hold until 10/27/17. Plavix to be stopped later, and Aspirin to be switched to 81 mg daily and Eliquis to be resumed at 5 mg daily. Patient to be followed in neuro endovascular clinic in 2 weeks. Allergies: Jesenia Campbell is allergic to iv contrast [iodides].     Past Medical History:   Diagnosis Date    CVA (cerebral vascular accident) ([facility] [address]     Diabetes recall, normal speech, normal language, no hallucination or delusion   CRANIAL NERVES: II     -      PERRLA, Visual fields intact to confrontation  III,IV,VI -  EOMs full, peripheral vision diminished in both eyes, no LAMONT, no ptosis  V     -     Normal facial sensation   VII    -     Normal facial symmetry  VIII   -     Intact hearing   IX,X -     Symmetrical palate  XI    -     Symmetrical shoulder shrug  XII   -     Midline tongue, no atrophy    MOTOR FUNCTION:  significant for good strength of grade 5/5 in bilateral lower extremities and right upper extremity. 4/5 in proximal left upper extremity, 3/5 in distal left lower extremity  normal bulk, normal tone and no involuntary movements, no tremor   SENSORY FUNCTION:  Normal touch, normal pin, normal vibration, normal proprioception   CEREBELLAR FUNCTION:  Intact fine motor control over upper limbs   REFLEX FUNCTION:  Symmetric, no perverted reflex, left extensor plantar response and right equivocal plantar response   STATION and GAIT  Not tested         Data:    Lab Results:   CBC:   Recent Labs      10/15/17   0648  10/16/17   1412   WBC  9.2  11.2*   HGB  8.6*  10.1*   PLT  274  Platelet clumps present, count appears adequate. BMP:    Recent Labs      10/16/17   2036   NA  142   K  4.4   CL  111*   CO2  20   BUN  9   CREATININE  0.67   GLUCOSE  181*         Lab Results   Component Value Date    CHOL 73 10/10/2017    LDLCHOLESTEROL 21 10/10/2017    HDL 39 (L) 10/10/2017    TRIG 66 10/10/2017    ALT 10 10/10/2017    AST 19 10/10/2017    TSH 1.22 08/17/2017    LABA1C 6.2 (H) 10/10/2017    DZCYGHWK29 338 10/10/2017       No results found for: PHENYTOIN, PHENYTOIN, VALPROATE, CBMZ        Diagnostic data reviewed:  MRI Brain (w/o) (10/10/17) : Multifocal acute infarcts in right middle cerebral artery vascular distribution. These involve the right pre-and postcentral gyri. Chronic infarcts in occipital lobes and left cerebellar hemisphere.  Cerebral and

## 2017-10-17 NOTE — PROGRESS NOTES
Patients Occupation: Retired    Reviewed Lab and Diagnostic reports from Current Admission: Yes    Patients Prior Functional  Level: Prior Function  Lives With: [facility] Help From: Family  ADL Assistance: Independent  Homemaking Assistance: Independent  Ambulation Assistance: Independent  Transfer Assistance: Independent  Additional Comments: Patient was not oriented, question accuracy of home assessment questions     History of current illness:  Ms. Pineda Esposito is a 74 y.o. right handed female who was admitted to [facility] on 10/9/2017 with Emesis       66 y. o. female admitted on 10/9/2017 initially presented to [facility] facility for nausea and vomiting starting 2-3 hours PTA.  CT A/P showed an acute left obstructive uropathy w/ 6-7 mm calculus in the proximal ureter just distal to the UPJ.  Was found to have a leukocytosis and UTI.  Patient was started on antibiotics and was transferred to our facility. Nishant Mueller was tachycardic and hypotensive, meeting sepsis criteria.       Per family, they noticed patient was weak in am of 10/10-.  Specifically LUE weakness and gait disturbance.  Also noted small facial droop. She was taken for ureteral stent placement first and then taken to the MRI.       MRI showed multifocal acute infarcts in the R MCA distribution though within a relative narrow zone.  There were also chronic infarcts seen in the occipital lobes and left cerebellar hemisphere.  She was seen by neuro and Christian Hanson was made to transfer the patient to the Neuro ICU for closer monitoring.  Pt unable to get gadolinium for MRA neck due to 1.7 creatinine, and was not given IV contrast for CTA due to Hx/o dye allergy. Unenhanced neck MRA indicated possible severe R ICA stenosis.      Ureteral stone removed during stent placement 10/10 reportedly appeared as though it might be infected; 11 hour BCx2 (1800 10/10) no growth.  There is concern over possible sepsis and SBE, this concern magnified by scattered MRI acute infarct pattern (described above). Pt is on cefipime, was also on  Rocephin.       Within the last year the patient has had a stroke apparently in [facility] [employer] where she was in [facility] that the based on review by neuro with her current brain MRI which shows bilateral occipital lobe chronic infarctions, and the report from the patient and daughter-in-law that at the time she developed \"tunnel vision\" as the symptom of the stroke; and, during the course of workup a Holter monitor that was done for 3 days showed evidence of paroxysmal atrial fibrillation, and as well in the same general timeframe the patient had a pulmonary embolus, the patient has been maintained on Eliquis.  There is also a reported history as noted above of iodinated dye allergy which was the reason why she did not get a contrast CTA of head and neck yesterday.  However the daughter-in-law states that the only manifestation of this allergy is that she developed hives.  With this in mind she could get iodinated contrast with premedication. Current functional status for upper extremity ADLs:  UE Bathing: Setup, Maximum assistance  UE Dressing: Setup, Maximum assistance    Current functional status for lower extremity ADLs:  LE Bathing: Setup, Maximum assistance  LE Dressing: Setup, Maximum assistance    Current functional status for bed, chair, wheelchair transfers:  Transfers  Sit to Stand: Minimal Assistance, Moderate Assistance (completed 5x during session from low surface)  Stand to sit: Minimal Assistance  Comment: Patient completed 2x sit to stand transfers    Current functional status for toilet transfers:   Toilet Transfers  Toilet - Technique: Ambulating  Equipment Used: Standard toilet  Toilet Transfer: Maximum assistance    Current functional status for locomotion:  Ambulation  Ambulation?: Yes  More Ambulation?: Yes  Ambulation 1  Surface: level tile  Device:  (green platform walker)  Other Apparatus: Wheelchair follow  Assistance: Minimal assistance  Quality of Gait: decreased step length and decreased cinthia, cues for posture   Distance: 24ft x 2 with seated rest break  Comments: limited by fatigue and SOB    Current functional status for comprehension: Impaired    Current functional status for expression: TBD    Current functional status for social interaction: TBD    Current functional status for problem solving: Impaired    Current functional status for memory: Impaired    Current Deficits R/T Impairment: Weakness due to stroke and surgery, alterations and deficits to daily physical and mental capabilities     Required Therapy:   [x] Physical Therapy  [x] Occupational Therapy   [x] Speech Therapy    Additional Services:  [x]   [x] Recreational Therapy  [x] Nutrition             [] Prosthetist/Orthotist  [] Dialysis  [] Other:     Rehab Justification:  Needs 3 hrs therapy per day or 15 hours per week:  Yes  Identified Rehab Nursing needs: Yes  Intense Interdisciplinary need:  Yes  Need for 24 hr physician supervision:  Yes  Measurable improved quality of life:  Yes  Willingness to participate:  Yes  Medical Necessity:  Yes  Patient able to tolerate care proposed:  Yes    Expected Discharge Destination/Functional Level:  Modified Independent      Expected length of time to achieve that level of improvement: 7-10 days  Expected Post Discharge Treatments: Home Care     Other information relevant to the care needs:  Patient requires 24 hour nursing and physician monitoring for neurology and stroke continuation, internal medicine, endovascular neurosurgery, cardiology, and urology, patient requires monitoring for DVT prophylaxis, skin monitoring, fall precautions, infection monitoring, surgical site monitoring, and patient and family education. Acute Inpatient Rehabilitation Disclosure Statement provided to patient. Patient verbalized understanding. Copy placed on patient's light chart.     I have reviewed and concur with the findings and results of the pre-admission screening assessment completed by the Inpatient Rehabilitation Admissions Coordinator.

## 2017-10-18 LAB
ALBUMIN SERPL-MCNC: 2.4 G/DL (ref 3.5–5.2)
ALBUMIN/GLOBULIN RATIO: ABNORMAL (ref 1–2.5)
ALP BLD-CCNC: 42 U/L (ref 35–104)
ALT SERPL-CCNC: 14 U/L (ref 5–33)
ANION GAP SERPL CALCULATED.3IONS-SCNC: 9 MMOL/L (ref 9–17)
AST SERPL-CCNC: 16 U/L
BILIRUB SERPL-MCNC: 0.3 MG/DL (ref 0.3–1.2)
BUN BLDV-MCNC: 9 MG/DL (ref 8–23)
BUN/CREAT BLD: ABNORMAL (ref 9–20)
CALCIUM SERPL-MCNC: 8.5 MG/DL (ref 8.6–10.4)
CHLORIDE BLD-SCNC: 112 MMOL/L (ref 98–107)
CO2: 22 MMOL/L (ref 20–31)
CREAT SERPL-MCNC: 0.65 MG/DL (ref 0.5–0.9)
EKG ATRIAL RATE: 84 BPM
EKG P AXIS: 75 DEGREES
EKG P-R INTERVAL: 204 MS
EKG Q-T INTERVAL: 408 MS
EKG QRS DURATION: 146 MS
EKG QTC CALCULATION (BAZETT): 482 MS
EKG R AXIS: -13 DEGREES
EKG T AXIS: 56 DEGREES
EKG VENTRICULAR RATE: 84 BPM
GFR AFRICAN AMERICAN: >60 ML/MIN
GFR NON-AFRICAN AMERICAN: >60 ML/MIN
GFR SERPL CREATININE-BSD FRML MDRD: ABNORMAL ML/MIN/{1.73_M2}
GFR SERPL CREATININE-BSD FRML MDRD: ABNORMAL ML/MIN/{1.73_M2}
GLUCOSE BLD-MCNC: 133 MG/DL (ref 65–105)
GLUCOSE BLD-MCNC: 162 MG/DL (ref 65–105)
GLUCOSE BLD-MCNC: 213 MG/DL (ref 65–105)
GLUCOSE BLD-MCNC: 80 MG/DL (ref 65–105)
GLUCOSE BLD-MCNC: 86 MG/DL (ref 70–99)
POTASSIUM SERPL-SCNC: 4.6 MMOL/L (ref 3.7–5.3)
SODIUM BLD-SCNC: 143 MMOL/L (ref 135–144)
TOTAL PROTEIN: 4.9 G/DL (ref 6.4–8.3)

## 2017-10-18 PROCEDURE — 6370000000 HC RX 637 (ALT 250 FOR IP): Performed by: FAMILY MEDICINE

## 2017-10-18 PROCEDURE — 99222 1ST HOSP IP/OBS MODERATE 55: CPT | Performed by: INTERNAL MEDICINE

## 2017-10-18 PROCEDURE — 92523 SPEECH SOUND LANG COMPREHEN: CPT

## 2017-10-18 PROCEDURE — G8978 MOBILITY CURRENT STATUS: HCPCS

## 2017-10-18 PROCEDURE — 99222 1ST HOSP IP/OBS MODERATE 55: CPT | Performed by: PHYSICAL MEDICINE & REHABILITATION

## 2017-10-18 PROCEDURE — 97162 PT EVAL MOD COMPLEX 30 MIN: CPT

## 2017-10-18 PROCEDURE — 1180000000 HC REHAB R&B

## 2017-10-18 PROCEDURE — G8979 MOBILITY GOAL STATUS: HCPCS

## 2017-10-18 PROCEDURE — 80053 COMPREHEN METABOLIC PANEL: CPT

## 2017-10-18 PROCEDURE — 97112 NEUROMUSCULAR REEDUCATION: CPT

## 2017-10-18 PROCEDURE — 97110 THERAPEUTIC EXERCISES: CPT

## 2017-10-18 PROCEDURE — 6360000002 HC RX W HCPCS: Performed by: FAMILY MEDICINE

## 2017-10-18 PROCEDURE — 97165 OT EVAL LOW COMPLEX 30 MIN: CPT

## 2017-10-18 PROCEDURE — 97535 SELF CARE MNGMENT TRAINING: CPT

## 2017-10-18 PROCEDURE — 97530 THERAPEUTIC ACTIVITIES: CPT

## 2017-10-18 PROCEDURE — 36415 COLL VENOUS BLD VENIPUNCTURE: CPT

## 2017-10-18 PROCEDURE — 82947 ASSAY GLUCOSE BLOOD QUANT: CPT

## 2017-10-18 PROCEDURE — 97116 GAIT TRAINING THERAPY: CPT

## 2017-10-18 RX ADMIN — FERROUS SULFATE TAB 325 MG (65 MG ELEMENTAL FE) 325 MG: 325 (65 FE) TAB at 18:07

## 2017-10-18 RX ADMIN — POTASSIUM CHLORIDE 20 MEQ: 1.5 POWDER, FOR SOLUTION ORAL at 20:15

## 2017-10-18 RX ADMIN — ATORVASTATIN CALCIUM 40 MG: 40 TABLET, FILM COATED ORAL at 07:52

## 2017-10-18 RX ADMIN — FERROUS SULFATE TAB 325 MG (65 MG ELEMENTAL FE) 325 MG: 325 (65 FE) TAB at 07:52

## 2017-10-18 RX ADMIN — Medication 35 UNITS: at 20:16

## 2017-10-18 RX ADMIN — ENOXAPARIN SODIUM 30 MG: 30 INJECTION SUBCUTANEOUS at 20:15

## 2017-10-18 RX ADMIN — METOPROLOL TARTRATE 25 MG: 25 TABLET ORAL at 07:52

## 2017-10-18 RX ADMIN — PAROXETINE HYDROCHLORIDE 10 MG: 10 TABLET, FILM COATED ORAL at 07:52

## 2017-10-18 RX ADMIN — DOCUSATE SODIUM 100 MG: 100 CAPSULE, LIQUID FILLED ORAL at 07:51

## 2017-10-18 RX ADMIN — INSULIN LISPRO 2 UNITS: 100 INJECTION, SOLUTION INTRAVENOUS; SUBCUTANEOUS at 20:16

## 2017-10-18 RX ADMIN — TAMSULOSIN HYDROCHLORIDE 0.4 MG: 0.4 CAPSULE ORAL at 07:51

## 2017-10-18 RX ADMIN — Medication 1 TABLET: at 07:52

## 2017-10-18 RX ADMIN — CLOPIDOGREL BISULFATE 75 MG: 75 TABLET ORAL at 07:52

## 2017-10-18 RX ADMIN — METOPROLOL TARTRATE 25 MG: 25 TABLET ORAL at 20:15

## 2017-10-18 RX ADMIN — ASPIRIN 325 MG: 325 TABLET, DELAYED RELEASE ORAL at 07:52

## 2017-10-18 RX ADMIN — ENOXAPARIN SODIUM 30 MG: 30 INJECTION SUBCUTANEOUS at 07:51

## 2017-10-18 RX ADMIN — MULTIPLE VITAMINS W/ MINERALS TAB 1 TABLET: TAB at 07:51

## 2017-10-18 RX ADMIN — FAMOTIDINE 40 MG: 20 TABLET ORAL at 07:51

## 2017-10-18 ASSESSMENT — PAIN SCALES - GENERAL
PAINLEVEL_OUTOF10: 0
PAINLEVEL_OUTOF10: 0

## 2017-10-18 NOTE — PLAN OF CARE
Individualized Plan of Care  [facility] Rehabilitation Unit    Rehabilitation physician: Dr. Gino Samuels Date: 10/17/2017     Rehabilitation Diagnosis: CVA (cerebral vascular accident) [facility]) [I63.9]  H/O: CVA (cerebrovascular accident) [Z86.73]     Rehabilitation impairments: self care, mobility, motor dysfunction, bowel/bladder management, safety and cognitive function    Factors facilitating achievement of predicted outcomes: Motivated, Cooperative and Pleasant  Barriers to the achievement of predicted outcomes: Limited family support, Upper extremity weakness, Incontinence of bowel and Incontinence of bladder    Patient Goals: Improvement of mobility at a wheelchair level, Increase endurance, Increase self-awareness, Increase safety awareness, Increase community integration, Increase socialization, Functional communication with caregivers and Assure adequate nutritional option for discharge      NURSING:  Nursing goals for Lakesha Owusu while on the rehabilitation unit will include:  Adequate number of bowel movements, Urinate with no urinary retention >300ml in bladder, Complete bladder protocol with gant removal, Establish adequate pain control plan for discharge, Absence of skin breakdown while on the rehabilitation unit, Improved skin integrity via assessments including wound measurements and Freedom from injury during hospitalization     In order to achieve these goals, nursing interventions may include bowel/bladder training, education for medical assistive devices, medication education, O2 saturation management, energy conservation, stress management techniques, fall prevention, alarms protocol, seating and positioning, skin/wound care, pressure relief instruction, dressing changes, infection protection, DVT prophylaxis, assistance with safe transfers , and/or assistance with bathroom activities and hygiene.        PHYSICAL THERAPY:  Goals:        Short term goals  Time Frame for Short term goals: 7 days  Short term goal 1: Improve L UE and LE strength by 1/2 MMG  Short term goal 2: Increase endurance to good  Short term goal 3: Improve sitting balance to good  Short term goal 4: Improve bed mobility to min A x1  Short term goal 5: Improve transfers to min A x1  Long term goals  Time Frame for Long term goals : 21 days  Long term goal 1: Increase UEs and LEs strength to [facility] to maximize mobiility  Long term goal 2: Improve standing balance with appropriate device to good to increase safety  Long term goal 3: Improve bed mobility to independent to prepare for home  Long term goal 4: Improve transfers with appropriate device to mod I to prepare for home  Long term goal 5: Improve gait with appropriate device to mod I 150 ft to prepare for home  Long term goal 6: Improve stair negotiation to SBA on 4 steps with 1 rail use to allow pt to enter and exit home    Plan of Care: Pt to be seen by physical therapy services 1 Hour 30 Minutes per day at least 5 out of 7 days per week     Anticipated interventions may include therapeutic exercises, gait training, neuromuscular re-ed, transfer training, community reintegration, bed mobility, w/c mobility and training. OCCUPATIONAL THERAPY:  Goals:             Short term goals  Time Frame for Short term goals: One week,   Short term goal 1: Pt. will demo. CGA with bed mobility. Short term goal 2: Pt. will demo. min. A with toilet transfer and mod. A with toileting tasks with good . Short term goal 3: Pt. will demo. min. A with UB bathing/dressing, and MOd. A with LB bathing/dressing using AE as needed. Short term goal 4: Pt. will participate in left UE ROM and stgth ex. to assist with functional tasks. Short term goal 5: Pt. will tolerate facilitation tech. to elicit movement in left wrist and hand to assist with functional tasks.   Short term goal 6: Pt. will attend to left side of body and L visual field with min. vc's 90% of the time  Short term goal 7: Pt. will tolerate 5-8 min. functional standing activities to promote increased indep.with ADL's and mobility. Long term goals  Time Frame for Long term goals : By discharge,   Long term goal 1: Pt. will demo. Mod.I with bed mobility. Long term goal 2: Pt. will demo. SBA with functional ADL transfers using appropriate AD with good . Long term goal 3: Pt. will demo. SBA with UB bathing/dressing, and min. A with LB bathing/dressing using AE as needed. Long term goal 4: Pt.will demo. SBA with toileting tasks. Plan of Care: Patient to be seen by occupational therapy services 1 Hour 30 Minutes per day at least 5 out of 7 days per week     Anticipated interventions may include ADL and IADL retraining, strengthening, safety education and training, patient/caregiver education and training, equipment evaluation/ training/procurement, neuromuscular reeducation, wheelchair mobility training. SPEECH THERAPY:   Goals:             Short-term Goals  Goal 1: Pt will use compensatory strategies to orient herself to time and place c 100% accuracy. Goal 2: Pt will recall 3-5 units of information with 80% accuracy immediately and with a short delay. Goal 3: Pt will complete convergent thinking and abstract reasoning tasks with 80% accuracy. Goal 4: Pt will generate 10-12 items in a given category. Plan of Care: Pt to be seen by speech therapy services 1 Hour per day at least 5 out of 7 days per week    Anticipated interventions may include speech/language/communication therapy, cognitive training, group therapy, education, and/or dysphagia therapy based on the above goals. CASE MANAGEMENT:  Goals:   Assist patient/family with discharge planning, patient/family counseling,  and coordination with insurance during the inpatient rehabilitation stay.          Other members of the multidisciplinary rehabilitation team that will be involved in the patient's plan of care include recreational therapy,

## 2017-10-18 NOTE — PLAN OF CARE
Problem: Nutrition  Goal: Optimal nutrition therapy  Outcome: Ongoing  Nutrition Problem: Altered nutrition-related lab values  Intervention: Food and/or Nutrient Delivery: Continue current diet, Continue current ONS  Nutritional Goals: PO intakes greater than 75% at meals

## 2017-10-18 NOTE — PROGRESS NOTES
Nutrition Assessment    Type and Reason for Visit: Positive Nutrition Screen (Unplanned weight loss)    Nutrition Recommendations: Continue Carb control diet and Glucerna 1x/day. Malnutrition Assessment:  · Malnutrition Status: At risk for malnutrition  · Context: Acute illness or injury  · Findings of the 6 clinical characteristics of malnutrition (Minimum of 2 out of 6 clinical characteristics is required to make the diagnosis of moderate or severe Protein Calorie Malnutrition based on AND/ASPEN Guidelines):  1. Energy Intake-51% to 75%, greater than or equal to 1 month    2. Weight Loss-Unable to assess, in 1 month    Nutrition Diagnosis:   · Problem: Altered nutrition-related lab values  · Etiology: related to Endocrine dysfunction (diabetes mellitus)     Signs and symptoms:  as evidenced by Lab values    Nutrition Assessment:  · Subjective Assessment: Patient and son report some weight loss over the past month but amount is unknown. Patient reports that her appetite is fair. Patient is having some difficulty with memory but RN confirms that patient did have breakfast this morning. · Nutrition-Focused Physical Findings: +1 BUE and BLE  · Wound Type: None  · Current Nutrition Therapies:  · Oral Diet Orders: Carb Control 4 Carbs/Meal   · Oral Diet intake: 51-75%  · Oral Nutrition Supplement (ONS) Orders: Diabetic Oral Supplement  · ONS intake: Unable to assess  · Anthropometric Measures:  · Ht: 5' (152.4 cm)   · Current Body Wt: 248 lb (112.5 kg)  · Admission Body Wt: 248 lb (112.5 kg)  · Ideal Body Wt: 100 lb (45.4 kg), % Ideal Body 248%  · BMI Classification: BMI > or equal to 40.0 Obese Class III  · Comparative Standards (Estimated Nutrition Needs):  · Estimated Daily Total Kcal: 6830-8199  · Estimated Daily Protein (g): 54-64    Estimated Intake vs Estimated Needs: Intake Improving    Nutrition Risk Level:  Moderate    Nutrition Interventions:   Continue current diet, Continue current ONS  Continued Inpatient Monitoring, Education Not Indicated    Nutrition Evaluation:   · Evaluation: Goals set   · Goals: PO intakes greater than 75% at meals    · Monitoring: Meal Intake, Supplement Intake, Weight, Pertinent Labs, Diet Tolerance, Ascites/Edema    See Adult Nutrition Doc Flowsheet for more detail.      Blanquita ALLEN RCELINA, L.D,  Clinical Dietitian  Pager # [phone]

## 2017-10-18 NOTE — PLAN OF CARE
Problem: Risk for Impaired Skin Integrity  Goal: Tissue integrity - skin and mucous membranes  Structural intactness and normal physiological function of skin and  mucous membranes. Outcome: Ongoing      Problem: Falls - Risk of  Goal: Absence of falls  Outcome: Met This Shift  No falls or injuries sustained at this time. No attempts to get out of bed without nursing assistance. Call light within reach and pt. uses appropriately for assistance. Siderails up x 2. Nonskid footwear remains on. Bed in low and locked position. Hourly nursing rounds made. Pt. Alert and oriented, aware of limitations, and exhibits good safety judgement. Pt. understands individual fall risk factors. Pt. reminded to use call light with each nurse/patient interaction. Bed alarm remains engaged throughout the shift as a precaution.

## 2017-10-18 NOTE — H&P
chronic   microvascular white matter ischemic disease. MRA head 10/11:   Study limited by artifact, within this limit, there is apparent diminished   flow in the right ICA and MCA.       Short-segment focal stenosis P1 segment left PCA. MRA neck 10/11:  Flow limiting stenosis in the proximal right internal carotid artery. IR Angio carotic cerebral b/l 10/12:  Impression:   1.  Symptomatic hemodynamically significant stenosis of the right clinoid segment ICA. 2.  Severe hemodynamically significant stenosis of the right A1 SUNDEEP origin.     3.  The right clinoid segment stenosis was treated with a drug-eluting stent and balloon angioplasty, as described above. Post-treatment control angiography revealed no significant residual stenosis, and improved intracranial flow to distal branches of    the ICA, without evidence of arterial injury or distal thromboembolic event. VL dup B/l LE 10/17:    LEFT:    No evidence of superficial or deep venous thrombosis of the lower    extremity with limited visualization at the distal thigh, calf and ankle    due to edema and body habitus.  RIGHT:    No evidence of superficial or deep venous thrombosis in the lower    extremity. Edema noted. Premorbid function:  independent    Current Function:  PT:    Awaiting PT eval.                              OT: (10/16): awaiting new eval.   ADL  Equipment Provided: Feeding equipment  Feeding: Supervision;Setup (Pt L handed-using R UE)  Grooming: Minimal assistance;Setup;Verbal cueing; Increased time to complete (Seated at tray table)   Toileting: Maximal assistance (nursing using bedpan at end of tx)     Balance  Sitting Balance: Supervision (Sitting in bed HOB elevated)  Standing Balance:  (Pt just back to bed after PT.)      Vision  Low Vision: Adaptations  Visual Field Cut: Compensatory Techniques;Education  Visual Field Cut Comment: Per RN and son old field cuts left and right from old CVA 9/2016, worse on left since LORazepam, acetaminophen, magnesium hydroxide     Diagnostics:     Recent Results (from the past 24 hour(s))   POC Glucose Fingerstick    Collection Time: 10/17/17 12:14 PM   Result Value Ref Range    POC Glucose 109 (H) 65 - 105 mg/dL   POC Glucose Fingerstick    Collection Time: 10/17/17  4:29 PM   Result Value Ref Range    POC Glucose 122 (H) 65 - 105 mg/dL   POC Glucose Fingerstick    Collection Time: 10/17/17  9:17 PM   Result Value Ref Range    POC Glucose 162 (H) 65 - 105 mg/dL   POC Glucose Fingerstick    Collection Time: 10/18/17  6:55 AM   Result Value Ref Range    POC Glucose 80 65 - 105 mg/dL   Comprehensive metabolic panel    Collection Time: 10/18/17  6:58 AM   Result Value Ref Range    Glucose 86 70 - 99 mg/dL    BUN 9 8 - 23 mg/dL    CREATININE 0.65 0.50 - 0.90 mg/dL    Bun/Cre Ratio NOT REPORTED 9 - 20    Calcium 8.5 (L) 8.6 - 10.4 mg/dL    Sodium 143 135 - 144 mmol/L    Potassium 4.6 3.7 - 5.3 mmol/L    Chloride 112 (H) 98 - 107 mmol/L    CO2 22 20 - 31 mmol/L    Anion Gap 9 9 - 17 mmol/L    Alkaline Phosphatase 42 35 - 104 U/L    ALT 14 5 - 33 U/L    AST 16 <32 U/L    Total Bilirubin 0.30 0.3 - 1.2 mg/dL    Total Protein 4.9 (L) 6.4 - 8.3 g/dL    Alb 2.4 (L) 3.5 - 5.2 g/dL    Albumin/Globulin Ratio NOT REPORTED 1.0 - 2.5    GFR Non-African American >60 >60 mL/min    GFR African American >60 >60 mL/min    GFR Comment          GFR Staging NOT REPORTED          Social History:  Dwelling House - 1 story. Steps to enter:  2,  Bed/bathroom level:  1. Plans to live with son/daughter on discharge. Occupation: retired  Lives with: independent  Tobacco: none  Alcohol usage:  none  Activity level:  normal activities of daily living    Family History:   No family history on file. Review of Systems:  CONSTITUTIONAL:  Denies fevers, chills, sweats or fatigue. EYES:  Denies diplopia, blind spots, blurring. HEENT:  Denies hearing loss, trouble chewing or swallowing.   RESPIRATORY:  No wheezing, coughing, shortness of breath. CARDIOVASCULAR:  Denies chest pain, palpitations, lightheadedness. GASTROINTESTINAL:  Denies heartburn, nausea, constipation, diarrhea, abdominal pain. GENITOURINARY:  No urgency, frequency, incontinence, dysuria. ENDOCRINE:  Denies hot or cold intolerance. MUSCULOSKELETAL:  Denies focal joint pain, back pain, neck pain. NEUROLOGICAL:  Denies focal weakness, numbness, tingling, balance loss, headache. BEHAVIOR/PSYCH:  Denies depression, anxiety, memory loss, insomnia. SKIN:  No ulcers, rash, bruises. Physical Exam:  BP (!) 123/57   Pulse 60   Temp 98.1 °F (36.7 °C) (Oral)   Resp 18   Ht 5' (1.524 m)   Wt 248 lb (112.5 kg)   SpO2 99%   BMI 48.43 kg/m²    General: alert, cooperative, NAD  Mental status: AO x 1 person. HEENT: Mild Left sided facial droop. EOMI. Visual fields peripheral diminished. Hearing intact. Speech fluent, no dystarthria. Comprehension intact. Object naming intact. Repetition intact. Basic cognition intact. NECK:  ROM functional.  Carotid bruit negative. THORAX:  Symmetrical.    LUNGS:  Clear to ausculation. HEART:  Regular. No murmurs of gallops. ABDOMEN:  Non-distended. Normal bowel sounds. No guarding, tenderness, mass. BACK:  No ulcers or deformity. EXTREMITIES:  PROM within functional limits. No calf tenderness, edema. Feet warm. NEUROMUSCULAR:  Sensation intact, no extinction. Coordination smooth. Motor testing 3/5 LUE, 4-/5 LLE, R 5/5. Balance impaired. + Babinski on left  SKIN:  intact. Principal Diagnosis/plan:  ADL and mobility dysfunction s/p MCA CVA. She will benefit from intensive interdisciplinary therapies and rehab nursing care and is appropriate for inpatient rehabilitation. The post admission physician evaluation (LAUREN) is consistent with the pre-admission assessment. See above findings to reflect the elements required in the LAUREN.   Patient's admitting condition is consistent with the findings of the preadmission assessment by the rehabilitation admissions coordinator. Other Diagnoses/plan:  1. Sepsis due to UTI and left pylenephritis- leukocytosis- Cipro till 10/18 per IM  2. S/p urethrel stent, gant, b/l kidney stones - f/u uro as outpt, flomax  3. R MCA cva with LHP- embolic. Neuro consult for IP management   4. Continue asa and plavix until 10/27/17, then asa 325 mg and eliquis 5 mg BID. Continue lipitor. 5. PAF - Per cardio will restart eliquis in 2 weeks. F/u as OP in 2 wks. 6. Anemia- protonix, s/p 1 unit PRBC transfusion. 10/17 Hb 9.1 asymptomatic. No obvious blood loss. Continue iron supplementation. Will continue to monitor. 7. Constipation - Colace  8. Reflux - Pepcid  9. Sacral ulcer- wound care management. Change mepilex q 3 days    10. H/O PE - will restart eliquis per endo neurosurgery/ cardio instructions as above. 11. DM- Cont lantus. Consult IM for medical management   12. Hypokalemia- resolved. Continue to monitor    DVT Prophylaxis:  low molecular weight heparin    Estimated Length of Stay:  Less than 2  weeks.     Prognosis  good    Goals    Home at [facility]   Sandra Mancilla DO   Electronically signed by Mundo Goodman MD on 10/18/2017 at 2:19 PM

## 2017-10-18 NOTE — RESEARCH
Research Day of Discharge Note:    Patient enrolled in Management of Acute Stroke Patients on Treatment with New Oral Anticoagulants: Addressing Real-world Anticoagulant Management Issues in Stroke ([facility]ING[facility]) Registry NCT Number:  [id number]       Subject #  [id number]   Discharge Study Forms completed.     ______________________________   Cheyanne Santos RN  Clinical Research Nurse  For questions page the research nurse at [phone]

## 2017-10-18 NOTE — PROGRESS NOTES
Physical Therapy  [address] [facility]   Acute Rehabilitation Physical Therapy Progress Note    Date: 10/18/17  Patient Name: Ramana Barker       Room: 5630/9395-05  MRN: [id number]   Account: [de-identified]   : 1950  (75 y.o.) Gender: female        Diagnosis: R CVA, Acute ischmic right MCA stroke,   Past Medical History:  has a past medical history of CVA (cerebral vascular accident) ([facility] [address] Diabetes mellitus ([facility] [address] Patient in clinical research study; and [facility] Past Surgical History:   has a past surgical history that includes Cholecystectomy; hernia repair; and Cystoscopy (Left, 10/10/2017). Additional Pertinent Hx: Pt presents to ER on 10/9/17 with c/o vomiting and severe abdominal and back pain. Pt was found to have acute cystitis, kidney stone and hydronephrosis. On 10/10/17, pt dev L sided weakness and facial droop, found to have acute R MCA infarct. Pt underwent emergent cysto with ureteral stent placement 10/10/2017. Pt had angiogram and found to have ICA stenosis, stent placed 10/12/17. Additional PMH: CKD, A fib, morbid obesity, HTN, pt is in clinical trial that will end in 2018- unspecified. Overall Orientation Status: Within Normal Limits  Orientation Level: Oriented X4  Restrictions/Precautions  Restrictions/Precautions: General Precautions; Fall Risk  Required Braces or Orthoses?: No  Position Activity Restriction  Other position/activity restrictions: up with assistance    Subjective: Pt reports no pain at this time; pt states she is tired this afternoon  Comments: Pt is pleasant and cooperative. Pt is appears to be very fatigued this afternoon. Vital Signs  Patient Currently in Pain: No                   Bed Mobility:   Bed Mobility  Rolling: Maximal assistance;Rolling Right (Mod Ax1 to the Left)  Supine to Sit: Moderate assistance (assist with torso)  Sit to Supine:  Moderate assistance (Assist with bilat LE)  Scooting: Minimal assistance (to EOM)  Comment: Gym Mat with wedge & 3 pilllows  Bed mobility  Scooting: Minimal assistance (to EOM)    Transfers:  Sit to Stand: Minimal Assistance (x2)  Stand to sit: Minimal Assistance (x1)  Bed to Chair: Minimal assistance (x2)  Stand Pivot Transfers: Minimal Assistance (x1, standing with Left Platform RW)           Ambulation 1  Surface: level tile  Device:  (Walker Lift)  Assistance:  Moderate assistance (x2)  Quality of Gait: Wide BRYAN, forward flexed posture, minimal knee and dorsiflexion  Distance: 30ft  Comments: Slight SOB, vc's for upright posture, vc's to narrow BRYAN, vc's for heel/toe gait  Ambulation 2  Surface - 2: level tile  Device 2: Rolling Walker (Left Platform RW)  Assistance 2: Minimal assistance  Quality of Gait 2: Slow Pace, vc's for sequencing, tactile assist with Left LE retro amb toward w/c, poor posture, forward flexed hips, minimal knee flexion  Distance: 8ft  Comments: No LOB, steady     Stairs/Curb  Stairs?: No (not ready)              Wheelchair Activities  Propulsion: Yes  Propulsion 1  Propulsion: Manual  Level: Level Tile  Method: RLE;LLE  Level of Assistance: Minimal assistance  Description/ Details: pt having difficulty advancing Left LE at times, pt reports \"it's stuck\", vc's for sequencing and safety  Distance: 25ft                                     FIMS:                  BALANCE Posture: Fair  Sitting - Static: Fair (seated EOM)  Sitting - Dynamic: Fair (Seated EOM)  Standing - Static: Fair;+ (standing with Left Platform RW)  Standing - Dynamic: Fair (standing with Left Platform RW)  Comments: Posterior Lean while seated EOM,     EXERCISES    Other exercises?: Yes  Other exercises 1: Seated in w/c bilat LE therex, A/AAROM x10 1.5lb ankle weights  Other exercises 2: Sit to Stand x5  Other exercises 3: Bed Mobility x2 on gym mat (difficulty moving, education on improved techniques)  Other exercises 4: Seated EOM weight bearing into Left UE with push off for upright posture x10 Activity Tolerance: Patient limited by fatigue, Patient limited by endurance          Patient Education  New Education Provided: Safe Transfers  Learner:patient  Method: demonstration and explanation       Outcome: needs reinforcement     Current Treatment Recommendations: Strengthening, Transfer Training, Endurance Training, Cognitive Reorientation, Patient/Caregiver Education & Training, ROM, Balance Training, Gait Training, Functional Mobility Training, Stair training, Safety Education & Training    Conditions Requiring Skilled Therapeutic Intervention  Body structures, Functions, Activity limitations: Decreased functional mobility ; Decreased strength;Decreased ROM; Decreased endurance;Decreased safe awareness;Decreased balance  Treatment Diagnosis: CVA, L hemiplegia  Prognosis: Good  Patient Education: Safe Transfers  Barriers to Learning: unknown  REQUIRES PT FOLLOW UP: Yes  Discharge Recommendations: Home with nursing aide;Home with Home health PT;Home with assist PRN    Goals  Short term goals  Time Frame for Short term goals: 7 days  Short term goal 1: Improve L UE and LE strength by 1/2 MMG  Short term goal 2: Increase endurance to good  Short term goal 3: Improve sitting balance to good  Short term goal 4: Improve bed mobility to min A x1  Short term goal 5: Improve transfers to min A x1  Long term goals  Time Frame for Long term goals : 21 days  Long term goal 1:  Increase UEs and LEs strength to [facility] to maximize mobiility  Long term goal 2: Improve standing balance with appropriate device to good to increase safety  Long term goal 3: Improve bed mobility to independent to prepare for home  Long term goal 4: Improve transfers with appropriate device to mod I to prepare for home  Long term goal 5: Improve gait with appropriate device to mod I 150 ft to prepare for home  Long term goal 6: Improve stair negotiation to SBA on 4 steps with 1 rail use to allow pt to enter and exit home       10/18/17 2232

## 2017-10-18 NOTE — PROGRESS NOTES
recommendations: Patient    Education:  Patient Education Response: Verbalizes understanding       Therapy Time:   Individual Concurrent Group Co-treatment   Time In 7523         Time Out 26         Minutes [address],  clinician   10/18/2017 2:15 PM

## 2017-10-18 NOTE — PROGRESS NOTES
Physical Therapy    Facility/Department: [facility]  Initial Assessment    NAME: Minh Franks  : 1950  MRN: [id number]    Date of Service: 10/18/2017    Patient Diagnosis(es): There were no encounter diagnoses. has a past medical history of CVA (cerebral vascular accident) ([facility] [address] Diabetes mellitus ([facility] [address] Patient in clinical research study; and PE (pulmonary thromboembolism) ([facility] [address] has a past surgical history that includes Cholecystectomy; hernia repair; and Cystoscopy (Left, 10/10/2017). Restrictions  Restrictions/Precautions  Restrictions/Precautions: General Precautions, Fall Risk  Required Braces or Orthoses?: No  Position Activity Restriction  Other position/activity restrictions: up with assistance  Vision/Hearing  Vision: Impaired (pt has visual field cuts d/t h/o CVAs, pt also reports worsening of blurriness )  Vision Exceptions: Wears glasses at all times  Hearing: Within functional limits     Subjective  General  Chart Reviewed: Yes  Patient assessed for rehabilitation services?: Yes  Additional Pertinent Hx: Pt presents to ER on 10/9/17 with c/o vomiting and severe abdominal and back pain. Pt was found to have acute cystitis, kidney stone and hydronephrosis. On 10/10/17, pt dev L sided weakness and facial droop, found to have acute R MCA infarct. Pt underwent emergent cysto with ureteral stent placement 10/10/2017. Pt had angiogram and found to have ICA stenosis, stent placed 10/12/17. Additional PMH: CKD, A fib, morbid obesity, HTN, pt is in clinical trial that will end in 2018- unspecified. Response To Previous Treatment: Not applicable  Family / Caregiver Present: No  Diagnosis: R MCA CVA, ICA stenosis, cystitits  Follows Commands: Within Functional Limits  Subjective  Subjective: Pt is sitting in w/c, just finished OT.  Pt is agreeable to PT eval, denies pain  Pain Screening  Patient Currently in Pain: Denies  Pain Assessment  Pain Assessment: 0-10  Pain Level: 0  Multiple Pain Sites: No  Vital Signs  Patient Currently in Pain: No  Pre Treatment Pain Screening  Intervention List: Patient able to continue with treatment    Orientation  Orientation  Overall Orientation Status: Within Normal Limits  Orientation Level: Oriented X4    Social/Functional History  Social/Functional History  Lives With: Alone (has been living with daughter since last month)  Type of Home: House  Home Layout: One level  Home Access: Stairs to enter with rails  Entrance Stairs - Number of Steps: 1  Entrance Stairs - Rails: Left  Bathroom Shower/Tub: Tub/Shower unit  Bathroom Toilet: Handicap height  Bathroom Equipment: Grab bars in shower  Bathroom Accessibility: Accessible  Home Equipment: Standard walker  Receives Help From: Family  ADL Assistance: Independent  Homemaking Assistance: Needs assistance  Meal Prep: Moderate (family cooks meals, pt warms meals as needed)  Cleaning: Total  Gardening: Total  Yard Work: Total  Shopping: Total  Homemaking Responsibilities: Yes  Meal Prep Responsibility: No (famil cook meals and patient warms foof using microwave)  Laundry Responsibility: Primary  Cleaning Responsibility: Primary  Shopping Responsibility: No (family do the grocery shopping)  Ambulation Assistance: Independent (uses of RW)  Transfer Assistance: Independent  Active : No  Occupation: Retired  Additional Comments: Pt states she was living with dtr prior to admit. Pt states she can go to son or dtr's home at d/c, but will most likely return to dtr's.   Objective     Observation/Palpation  Posture: Fair  Observation: L sided neglect, NAD, denies pain  Body Mechanics: L wrist drop, needs reminders and assist to include L UE in tasks  Edema: none noted, however assessment is limited d/t body habitus    AROM RLE (degrees)  RLE AROM: WFL  AROM LLE (degrees)  LLE AROM : WFL  AROM RUE (degrees)  RUE AROM : WFL  PROM LUE (degrees)  LUE PROM: WNL  AROM LUE (degrees)  LUE AROM : Exceptions  L Shoulder Flexion 0-180: 0-75 degrees d/t weakness  L Shoulder ABduction 0-180: 0-75 degrees d/t weakness  L Wrist Flexion 0-80: no AROM L wrist  L Wrist Extension 0-70: no AROM L wrist  Left Hand AROM (degrees)  Left Hand AROM:  (no AROM L hand)  Strength RLE  Strength RLE: WFL  Strength LLE  Comment: L hip flex 4-/5, knee flex 4/5, knee ext 4/5, ankle DF and PF 4-/5  Strength RUE  Strength RUE: WFL  Strength LUE  Comment: L shldr flex 3+/5, elbow flex 3+/5, ext 3+/5, wrist and hand 0/5  Tone RLE  RLE Tone: Normotonic  Tone LLE  LLE Tone: Normotonic  Motor Control  Comments: L wrist drop, decreased L UE inclusion with tasks  Sensation  Overall Sensation Status: WFL (WFL with eyes closed and R compared to L)  Bed mobility  Rolling to Right: Moderate assistance  Supine to Sit: Moderate assistance (from mat)  Sit to Supine: Minimal assistance (with LEs)  Scooting: Minimal assistance  Transfers  Sit to Stand: Minimal Assistance (min A x2)  Stand to sit: Minimal Assistance (min A x2)  Bed to Chair: Minimal assistance (min A x2 without device)  Ambulation  Ambulation?: Yes  WB Status: no restrictions  More Ambulation?: No  Ambulation 1  Surface: level tile  Device: Parallel Bars  Other Apparatus: Wheelchair follow  Assistance: Minimal assistance  Quality of Gait: assist for L hand placement on bar, min unsteadiness no LOB, decreased step size, fatigued  Distance: 10 ft  Comments: min GARCIA noted  Stairs/Curb  Stairs?: No  Wheelchair Activities  Wheelchair Type: Recliner  Wheelchair Cushion: None  Wheelchair Parts Management: No  Propulsion: No     Balance  Posture: Fair  Sitting - Static: Fair;+  Sitting - Dynamic: Fair  Standing - Static: Fair  Standing - Dynamic: Fair  Comments: slight lean posteriorly with sitting unsupported at EOM        Assessment   Body structures, Functions, Activity limitations: Decreased functional mobility ; Decreased strength;Decreased ROM; Decreased endurance;Decreased safe awareness;Decreased balance  Assessment: Pt Goals  Short term goals  Time Frame for Short term goals: 7 days  Short term goal 1: Improve L UE and LE strength by 1/2 MMG  Short term goal 2: Increase endurance to good  Short term goal 3: Improve sitting balance to good  Short term goal 4: Improve bed mobility to min A x1  Short term goal 5: Improve transfers to min A x1  Long term goals  Time Frame for Long term goals : 21 days  Long term goal 1:  Increase UEs and LEs strength to [facility] to maximize mobiility  Long term goal 2: Improve standing balance with appropriate device to good to increase safety  Long term goal 3: Improve bed mobility to independent to prepare for home  Long term goal 4: Improve transfers with appropriate device to mod I to prepare for home  Long term goal 5: Improve gait with appropriate device to mod I 150 ft to prepare for home  Long term goal 6: Improve stair negotiation to SBA on 4 steps with 1 rail use to allow pt to enter and exit home  Patient Goals   Patient goals : walking on own for return to home        Therapy Time   Individual Concurrent Group Co-treatment   Time In 1045         Time Out 1125         Minutes Tamika 59, PT

## 2017-10-18 NOTE — PLAN OF CARE
Problem: Risk for Impaired Skin Integrity  Goal: Tissue integrity - skin and mucous membranes  Structural intactness and normal physiological function of skin and  mucous membranes. Outcome: Ongoing  No obvious new skin issues at this time. Problem: Falls - Risk of  Goal: Absence of falls  Outcome: Ongoing  No falls or injuries so far this shift. Call light in place.

## 2017-10-19 LAB
GLUCOSE BLD-MCNC: 131 MG/DL (ref 65–105)
GLUCOSE BLD-MCNC: 168 MG/DL (ref 65–105)
GLUCOSE BLD-MCNC: 248 MG/DL (ref 65–105)
GLUCOSE BLD-MCNC: 71 MG/DL (ref 65–105)

## 2017-10-19 PROCEDURE — 97530 THERAPEUTIC ACTIVITIES: CPT

## 2017-10-19 PROCEDURE — 97110 THERAPEUTIC EXERCISES: CPT

## 2017-10-19 PROCEDURE — 97535 SELF CARE MNGMENT TRAINING: CPT

## 2017-10-19 PROCEDURE — 6370000000 HC RX 637 (ALT 250 FOR IP): Performed by: FAMILY MEDICINE

## 2017-10-19 PROCEDURE — 97532 HC COGNITIVE THERAPY 15 MIN: CPT

## 2017-10-19 PROCEDURE — 99232 SBSQ HOSP IP/OBS MODERATE 35: CPT | Performed by: PHYSICAL MEDICINE & REHABILITATION

## 2017-10-19 PROCEDURE — 99231 SBSQ HOSP IP/OBS SF/LOW 25: CPT | Performed by: INTERNAL MEDICINE

## 2017-10-19 PROCEDURE — 6360000002 HC RX W HCPCS: Performed by: FAMILY MEDICINE

## 2017-10-19 PROCEDURE — 1180000000 HC REHAB R&B

## 2017-10-19 PROCEDURE — 82947 ASSAY GLUCOSE BLOOD QUANT: CPT

## 2017-10-19 PROCEDURE — 97116 GAIT TRAINING THERAPY: CPT

## 2017-10-19 RX ADMIN — Medication 1 TABLET: at 09:04

## 2017-10-19 RX ADMIN — POTASSIUM CHLORIDE 20 MEQ: 1.5 POWDER, FOR SOLUTION ORAL at 21:38

## 2017-10-19 RX ADMIN — Medication 35 UNITS: at 21:35

## 2017-10-19 RX ADMIN — MULTIPLE VITAMINS W/ MINERALS TAB 1 TABLET: TAB at 09:04

## 2017-10-19 RX ADMIN — CLOPIDOGREL BISULFATE 75 MG: 75 TABLET ORAL at 09:04

## 2017-10-19 RX ADMIN — TAMSULOSIN HYDROCHLORIDE 0.4 MG: 0.4 CAPSULE ORAL at 09:04

## 2017-10-19 RX ADMIN — ASPIRIN 325 MG: 325 TABLET, DELAYED RELEASE ORAL at 09:04

## 2017-10-19 RX ADMIN — FERROUS SULFATE TAB 325 MG (65 MG ELEMENTAL FE) 325 MG: 325 (65 FE) TAB at 17:30

## 2017-10-19 RX ADMIN — ENOXAPARIN SODIUM 30 MG: 30 INJECTION SUBCUTANEOUS at 09:05

## 2017-10-19 RX ADMIN — METOPROLOL TARTRATE 25 MG: 25 TABLET ORAL at 21:35

## 2017-10-19 RX ADMIN — METOPROLOL TARTRATE 25 MG: 25 TABLET ORAL at 09:04

## 2017-10-19 RX ADMIN — FAMOTIDINE 40 MG: 20 TABLET ORAL at 09:04

## 2017-10-19 RX ADMIN — ATORVASTATIN CALCIUM 40 MG: 40 TABLET, FILM COATED ORAL at 09:04

## 2017-10-19 RX ADMIN — FERROUS SULFATE TAB 325 MG (65 MG ELEMENTAL FE) 325 MG: 325 (65 FE) TAB at 09:04

## 2017-10-19 RX ADMIN — INSULIN LISPRO 2 UNITS: 100 INJECTION, SOLUTION INTRAVENOUS; SUBCUTANEOUS at 21:34

## 2017-10-19 RX ADMIN — DOCUSATE SODIUM 100 MG: 100 CAPSULE, LIQUID FILLED ORAL at 09:04

## 2017-10-19 RX ADMIN — PAROXETINE HYDROCHLORIDE 10 MG: 10 TABLET, FILM COATED ORAL at 09:02

## 2017-10-19 RX ADMIN — ENOXAPARIN SODIUM 30 MG: 30 INJECTION SUBCUTANEOUS at 21:35

## 2017-10-19 NOTE — PLAN OF CARE
Problem: Risk for Impaired Skin Integrity  Goal: Tissue integrity - skin and mucous membranes  Structural intactness and normal physiological function of skin and  mucous membranes. Outcome: Ongoing  Skin assessed see shift assessment    Problem: Falls - Risk of  Goal: Absence of falls  Outcome: Ongoing  Patient remains free from falls at this time. Bed locked and in lowest position. Call light within reach. Patient uses call light when assistance is needed in room.

## 2017-10-19 NOTE — PROGRESS NOTES
Physical Therapy  [facility] Physical Therapy Progress Note    Date: 10/19/17  Patient Name: Amira Schwab       Room: 1467/3317-26  MRN: [id number]   Account: [de-identified]   : 1950  (75 y.o.) Gender: female        Diagnosis: R CVA, Acute ischmic right MCA stroke,   Past Medical History:  has a past medical history of CVA (cerebral vascular accident) ([facility] [address] Diabetes mellitus ([facility] [address] Patient in clinical research study; and PE [facility]) [facility] Past Surgical History:   has a past surgical history that includes Cholecystectomy; hernia repair; and Cystoscopy (Left, 10/10/2017). Additional Pertinent Hx: Pt presents to ER on 10/9/17 with c/o vomiting and severe abdominal and back pain. Pt was found to have acute cystitis, kidney stone and hydronephrosis. On 10/10/17, pt dev L sided weakness and facial droop, found to have acute R MCA infarct. Pt underwent emergent cysto with ureteral stent placement 10/10/2017. Pt had angiogram and found to have ICA stenosis, stent placed 10/12/17. Additional PMH: CKD, A fib, morbid obesity, HTN, pt is in clinical trial that will end in 2018- unspecified. Overall Orientation Status: Within Normal Limits  Restrictions/Precautions  Restrictions/Precautions: General Precautions; Fall Risk  Required Braces or Orthoses?: No  Position Activity Restriction  Other position/activity restrictions: up with assistance    Subjective: Continues to report fatigue towards end off day. Comments: Pt is pleasant and cooperative. Vital Signs  Patient Currently in Pain: Denies                   Bed Mobility:   Rolling: Maximal assistance;Rolling Right (Mod Ax1 to the Left)  Supine to Sit: Moderate assistance (assist with torso)  Sit to Supine: Moderate assistance (Assist with bilat LE)  Scooting: Minimal assistance (to EOM)      Transfers:  Sit to Stand:  Moderate Assistance  Stand to sit: Minimal Assistance (x1)  Bed to Chair: Minimal assistance (x2)  Stand Pivot Transfers: Minimal Assistance (x1, standing with Left Platform RW)           Ambulation 1  Surface: level tile  Device: Platform Walker left  Other Apparatus: Wheelchair follow  Assistance: Minimal assistance (x2)  Quality of Gait: Wide BRYAN, forward flexed posture, minimal knee and dorsiflexion  Distance: 25 ft  AM and PM  Comments: Slight SOB, vc's for upright posture, vc's to narrow BRYAN, vc's for heel/toe gait        Stairs/Curb  Stairs?: No (not ready)              Wheelchair Activities  Propulsion: Yes  Propulsion 1  Propulsion: Manual  Level: Level Tile  Method: RLE;LLE  Level of Assistance: Minimal assistance  Description/ Details: pt having difficulty advancing Left LE at times, pt reports \"it's stuck\", vc's for sequencing and safety  Distance: 25ft                                     FIMS:      Transfers  Bed, Chair, Wheel Chair: 1 - Requires >75% assitance to transfer   Locomotion  Primary Mode: Walk  Distance Walked: 25 ft  Walk: 1 - Total Assistance Walks/operates wheelchair < 50 feet OR requires two or more people OR patient performs < 25% of locomotion effort  Stairs: 0 - Activity Does not Occur ( 0 only for the admission assessment)       BALANCE Posture: Fair  Sitting - Static: Fair (seated EOM)  Sitting - Dynamic: Fair (Seated EOM)  Standing - Static: Fair;+ (standing with Left Platform RW)  Standing - Dynamic: Fair (standing with Left Platform RW)  Comments: Posterior Lean while seated EOM,     EXERCISES    Other exercises?: Yes  Other exercises 1: Seated in w/c bilat LE therex, A/AAROM x10 1.5lb ankle weights  Other exercises 2: Sit to Stand x5  Other exercises 3: Bed Mobility x2 on gym mat (difficulty moving, education on improved techniques)  Other exercises 4: Seated EOM weight bearing into Left UE with push off for upright posture x10  Other exercises 5: UBE seated  5 mins FWD and BWD  L hand strapped           Activity Tolerance: Patient limited by fatigue, Minutes 45 45       Electronically signed by Sravani Rogers PTA on 10/19/17 at 5:08 PM

## 2017-10-19 NOTE — CONSULTS
A/P  UTI Sepsis s/p ureteral stent on cipro   MCA infarct Left hemiparesis on ASA eliquis lipitor   A fib on eliquis   DM on lantus     Cont present medications   Will follow     MD [facility]EFERINO [facility]  [address] [facility], [address] [place] [address].    Phone [phone]   Fax: [phone]  Answering Service: [phone]

## 2017-10-19 NOTE — PROGRESS NOTES
Speech Language Pathology  Speech Language Pathology  [facility]    Cognitive Treatment Note    Date: 10/19/2017  Patients Name: Fela Ge  MRN: [id number]  Diagnosis:   Patient Active Problem List   Diagnosis Code    Hydronephrosis of left kidney N13.30    Acute cystitis with hematuria N30.01    CKD (chronic kidney disease), stage III N18.3    Hypotension arterial I95.9    Essential hypertension I10    Morbid obesity with BMI of 40.0-44.9, adult ([facility] [address] E66.01, Z68.41    History of arterial ischemic stroke Z86.73    History of pulmonary embolus (PE) Z86.711    Paroxysmal atrial fibrillation (HCC) I48.0    Left ureteral stone N20.1    Left-sided weakness R53.1    Non-intractable vomiting with nausea R11.2    Cerebrovascular accident (CVA) due to embolism of right middle cerebral artery (HCC) I63.411    Hypotension I95.9    Sepsis secondary to UTI ([facility] [address] A41.9, N39.0    Elevated serum creatinine R79.89    Kidney stone N20.0    Anemia D64.9    Hypokalemia E87.6    Acute ischemic right MCA stroke (HCC) I63.511    Right-sided carotid artery disease (HCC) I77.9       Pain: 0/10    Cognitive Treatment    Treatment time: 6217-8908      Subjective: [x] Alert [x] Cooperative     [] Confused     [] Agitated    [] Lethargic      Objective/Assessment:  Attention: Pt able to sustain attn Argenis. Orientation: Pt oriented to date c reference to her digital calendar, although she struggled to read it accurately at first.  Pt oriented to type of location (hospital), but not name of hospital.  ST wrote it on pt whiteboard. Pt oriented to time of day. Recall: Pt completed a picture association task in which she was asked to create an association btwn 5 pairs of unrelated pictures, then recall one member of the pair given the other member. Pt able to describe an association Argenis following one clinician model.   0/5 associated pictures recalled Argenis; 5/5 associated pictures recalled given mod cuing (ST restating the association pt generated). Problem Solving/Reasoning: Pt completed word deduction task- 60% Argenis, 100% c min A. Pt completed concrete divergent naming task. ST educ pt re strategy of subcategorizing within a category to aid in generation of category items. Pt generated 16+ items in two categories, and 5 items in one category given mod A. Other: Prior to admission to ARU, pt lived with her son Porfirio Moreland). Pt stated she enjoys reading fiction and listening to music. Pt states that her reading ability has not been affected by her CVA; ST did not assess at this time.      Plan:  [x] Continue ST services    [] Discharge from ST:      Discharge recommendations: [] Inpatient Rehab   [] [place]   [] Outpatient Therapy  [] Follow up at trauma clinic   [] Other:       Treatment completed by: Gordon Tuttle,  clinician

## 2017-10-19 NOTE — PROGRESS NOTES
with min. vc's 90% of the time  Short term goal 7: Pt. will tolerate 5-8 min. functional standing activities to promote increased indep.with ADL's and mobility. Long term goals  Time Frame for Long term goals : By discharge,   Long term goal 1: Pt. will demo. Mod.I with bed mobility. Long term goal 2: Pt. will demo. SBA with functional ADL transfers using appropriate AD with good . Long term goal 3: Pt. will demo. SBA with UB bathing/dressing, and min. A with LB bathing/dressing using AE as needed. Long term goal 4: Pt.will demo. SBA with toileting tasks.        Electronically signed by GABE Wilson on 10/19/17 at 3:30 PM

## 2017-10-19 NOTE — PROGRESS NOTES
Physical Medicine & Rehabilitation  Progress Note    Chief Complaint and Reason for Rehabilitation Admission:   ADL and mobility deficits secondary CVA     Subjective:      Patient is well, and has had no acute complaints or problems    ROS:  Denies fevers, chills, sweats. No chest pain, palpitations, lightheadedness. Denies coughing, wheezing or shortness of breath. Denies abdominal pain, nausea, diarrhea or constipation. No new areas of joint pain. Denies new areas of numbness or weakness. Denies new anxiety or depression issues. No new skin problems. Rehabilitation:   Progressing in therapies. PT:  Restrictions/Precautions: General Precautions, Fall Risk  Other position/activity restrictions: up with assistance   Transfers  Sit to Stand: Minimal Assistance (x2)  Stand to sit: Minimal Assistance (x1)  Bed to Chair: Minimal assistance (x2)  Stand Pivot Transfers: Minimal Assistance (x1, standing with Left Platform RW)  Comment: standing with Walker Lift then progressed to Left Platform RW  WB Status: no restrictions  Ambulation 1  Surface: level tile  Device:  (Walker Lift)  Other Apparatus: Wheelchair follow  Assistance: Moderate assistance (x2)  Quality of Gait: Wide BRYAN, forward flexed posture, minimal knee and dorsiflexion  Distance: 30ft  Comments: Slight SOB, vc's for upright posture, vc's to narrow BRYAN, vc's for heel/toe gait    Transfers  Sit to Stand: Minimal Assistance (x2)  Stand to sit: Minimal Assistance (x1)  Bed to Chair: Minimal assistance (x2)  Stand Pivot Transfers: Minimal Assistance (x1, standing with Left Platform RW)  Comment: standing with Walker Lift then progressed to Left Platform RW  Ambulation  Ambulation?: Yes  WB Status: no restrictions  More Ambulation?: Yes  Ambulation 1  Surface: level tile  Device:  (Walker Lift)  Other Apparatus: Wheelchair follow  Assistance:  Moderate assistance (x2)  Quality of Gait: Wide BRYAN, forward flexed posture, minimal knee and dorsiflexion  Distance: 30ft  Comments: Slight SOB, vc's for upright posture, vc's to narrow BRYAN, vc's for heel/toe gait    Surface: level tile  Ambulation 1  Surface: level tile  Device:  (Walker Lift)  Other Apparatus: Wheelchair follow  Assistance: Moderate assistance (x2)  Quality of Gait: Wide BRYAN, forward flexed posture, minimal knee and dorsiflexion  Distance: 30ft  Comments: Slight SOB, vc's for upright posture, vc's to narrow BRYAN, vc's for heel/toe gait    OT:  FIM  Self-Care  GOAL: Eatin  Groomin - Requires setup/cues to do all tasks (assist with putting tooth paste on the tooth brush, washed face, and brushed teeth)  GOAL: Groomin  Bathin - Able to bathe 3-4 areas (pt. completed bathing sinkside)  GOAL: Bathin  Dressing-Upper: 2 - Requires assist with 3 tasks (assist with donning/fastening front openning bra, wears den sleeve over head trino, assist with kelechi- tech.)  GOAL: Dressing-Upper: 4  Dressing-Lower: 1 - Total assist with lower body dressing  GOAL: Dressing-Lower: 3  Toiletin - Total assist  GOAL: Toileting: 3  Toilet Transfer: 2 - Requires 50-74% assist getting off toilet  GOAL: Toilet: 4  Primary Mode: Shower  GOAL: Tub, Shower: 4  Shower Transfer: 0 - Activity does not occur    Objective:  BP (!) 109/51   Pulse 74   Temp 97.8 °F (36.6 °C) (Oral)   Resp 16   Ht 5' (1.524 m)   Wt 248 lb (112.5 kg)   SpO2 96%   BMI 48.43 kg/m²       Alert, no distress. Visual fields peripheral diminished. Good speech and langMild   Left sided facial droop. EOMI.uage function. Lungs clear. Heart regular. Abdomen non-distended, non-tender. No calf tenderness or edema. Motor testing 3/5 LUE, 4-/5 LLE, R 5/5. Diagnostics:     CBC:   Recent Labs      10/16/17   1412  10/17/17   0719   WBC  11.2*  9.6   RBC  3.53*  3.17*   HGB  10.1*  9.1*   HCT  31.5*  28.8*   MCV  89.3  90.7   RDW  18.6*  18.6*   PLT  Platelet clumps present, count appears adequate.   374     BMP:   Recent Labs 10/16/17   2036  10/18/17   0658   NA  142  143   K  4.4  4.6   CL  111*  112*   CO2  20  22   BUN  9  9   CREATININE  0.67  0.65     BNP: No results for input(s): BNP in the last 72 hours. PT/INR: No results for input(s): PROTIME, INR in the last 72 hours. APTT: No results for input(s): APTT in the last 72 hours. CARDIAC ENZYMES: No results for input(s): CKMB, CKMBINDEX, TROPONINT in the last 72 hours.     Invalid input(s): CKTOTAL;3  FASTING LIPID PANEL:  Lab Results   Component Value Date    CHOL 73 10/10/2017    HDL 39 (L) 10/10/2017    TRIG 66 10/10/2017     LIVER PROFILE:   Recent Labs      10/18/17   0658   AST  16   ALT  14   BILITOT  0.30   ALKPHOS  42        Current Medications:   Current Facility-Administered Medications: HYDROcodone-acetaminophen (NORCO) 5-325 MG per tablet 1 tablet, 1 tablet, Oral, Q4H PRN **OR** HYDROcodone-acetaminophen (NORCO) 5-325 MG per tablet 2 tablet, 2 tablet, Oral, Q4H PRN  nicotine (NICODERM CQ) 21 MG/24HR 1 patch, 1 patch, Transdermal, Daily PRN  ondansetron (ZOFRAN) injection 4 mg, 4 mg, Intravenous, Q6H PRN  tamsulosin (FLOMAX) capsule 0.4 mg, 0.4 mg, Oral, Daily  aspirin EC tablet 325 mg, 325 mg, Oral, Daily  atorvastatin (LIPITOR) tablet 40 mg, 40 mg, Oral, Daily  Calcium Carb-Cholecalciferol 600-400 MG-UNIT TABS 1 tablet, 1 tablet, Oral, Daily  clopidogrel (PLAVIX) tablet 75 mg, 75 mg, Oral, Daily  dextrose 5 % solution, 100 mL/hr, Intravenous, PRN  dextrose 50 % solution 12.5 g, 12.5 g, Intravenous, PRN  docusate sodium (COLACE) capsule 100 mg, 100 mg, Oral, Daily  enoxaparin (LOVENOX) injection 30 mg, 30 mg, Subcutaneous, BID  famotidine (PEPCID) tablet 40 mg, 40 mg, Oral, Daily  ferrous sulfate tablet 325 mg, 325 mg, Oral, BID WC  glucagon (rDNA) injection 1 mg, 1 mg, Intramuscular, PRN  glucose (GLUTOSE) 40 % oral gel 15 g, 15 g, Oral, PRN  insulin glargine (LANTUS) injection vial 35 Units, 35 Units, Subcutaneous, Nightly  insulin lispro (HUMALOG) injection

## 2017-10-20 LAB
GLUCOSE BLD-MCNC: 118 MG/DL (ref 65–105)
GLUCOSE BLD-MCNC: 136 MG/DL (ref 65–105)
GLUCOSE BLD-MCNC: 179 MG/DL (ref 65–105)
GLUCOSE BLD-MCNC: 214 MG/DL (ref 65–105)

## 2017-10-20 PROCEDURE — 99232 SBSQ HOSP IP/OBS MODERATE 35: CPT | Performed by: PHYSICAL MEDICINE & REHABILITATION

## 2017-10-20 PROCEDURE — 97110 THERAPEUTIC EXERCISES: CPT

## 2017-10-20 PROCEDURE — 97112 NEUROMUSCULAR REEDUCATION: CPT

## 2017-10-20 PROCEDURE — 97530 THERAPEUTIC ACTIVITIES: CPT

## 2017-10-20 PROCEDURE — 97116 GAIT TRAINING THERAPY: CPT

## 2017-10-20 PROCEDURE — 97532 HC COGNITIVE THERAPY 15 MIN: CPT

## 2017-10-20 PROCEDURE — 1180000000 HC REHAB R&B

## 2017-10-20 PROCEDURE — 6370000000 HC RX 637 (ALT 250 FOR IP): Performed by: FAMILY MEDICINE

## 2017-10-20 PROCEDURE — 99231 SBSQ HOSP IP/OBS SF/LOW 25: CPT | Performed by: INTERNAL MEDICINE

## 2017-10-20 PROCEDURE — 97535 SELF CARE MNGMENT TRAINING: CPT

## 2017-10-20 PROCEDURE — 82947 ASSAY GLUCOSE BLOOD QUANT: CPT

## 2017-10-20 PROCEDURE — 6360000002 HC RX W HCPCS: Performed by: FAMILY MEDICINE

## 2017-10-20 RX ADMIN — ASPIRIN 325 MG: 325 TABLET, DELAYED RELEASE ORAL at 09:25

## 2017-10-20 RX ADMIN — Medication 35 UNITS: at 20:34

## 2017-10-20 RX ADMIN — Medication 1 TABLET: at 09:25

## 2017-10-20 RX ADMIN — FERROUS SULFATE TAB 325 MG (65 MG ELEMENTAL FE) 325 MG: 325 (65 FE) TAB at 17:00

## 2017-10-20 RX ADMIN — FERROUS SULFATE TAB 325 MG (65 MG ELEMENTAL FE) 325 MG: 325 (65 FE) TAB at 09:24

## 2017-10-20 RX ADMIN — POLYETHYLENE GLYCOL 3350 17 G: 17 POWDER, FOR SOLUTION ORAL at 09:24

## 2017-10-20 RX ADMIN — PAROXETINE HYDROCHLORIDE 10 MG: 10 TABLET, FILM COATED ORAL at 09:26

## 2017-10-20 RX ADMIN — POTASSIUM CHLORIDE 20 MEQ: 1.5 POWDER, FOR SOLUTION ORAL at 20:34

## 2017-10-20 RX ADMIN — METOPROLOL TARTRATE 25 MG: 25 TABLET ORAL at 20:31

## 2017-10-20 RX ADMIN — DOCUSATE SODIUM 100 MG: 100 CAPSULE, LIQUID FILLED ORAL at 09:25

## 2017-10-20 RX ADMIN — INSULIN LISPRO 2 UNITS: 100 INJECTION, SOLUTION INTRAVENOUS; SUBCUTANEOUS at 20:35

## 2017-10-20 RX ADMIN — MULTIPLE VITAMINS W/ MINERALS TAB 1 TABLET: TAB at 09:25

## 2017-10-20 RX ADMIN — ENOXAPARIN SODIUM 30 MG: 30 INJECTION SUBCUTANEOUS at 09:24

## 2017-10-20 RX ADMIN — FAMOTIDINE 40 MG: 20 TABLET ORAL at 09:25

## 2017-10-20 RX ADMIN — TAMSULOSIN HYDROCHLORIDE 0.4 MG: 0.4 CAPSULE ORAL at 09:25

## 2017-10-20 RX ADMIN — ENOXAPARIN SODIUM 30 MG: 30 INJECTION SUBCUTANEOUS at 20:31

## 2017-10-20 RX ADMIN — INSULIN LISPRO 2 UNITS: 100 INJECTION, SOLUTION INTRAVENOUS; SUBCUTANEOUS at 17:00

## 2017-10-20 RX ADMIN — ATORVASTATIN CALCIUM 40 MG: 40 TABLET, FILM COATED ORAL at 09:25

## 2017-10-20 RX ADMIN — CLOPIDOGREL BISULFATE 75 MG: 75 TABLET ORAL at 09:25

## 2017-10-20 ASSESSMENT — PAIN SCALES - GENERAL
PAINLEVEL_OUTOF10: 0

## 2017-10-20 NOTE — PROGRESS NOTES
Physical Therapy  [facility]  Acute Rehabilitation Physical Therapy Progress Note    Date: 10/20/17  Patient Name: Lakesha Owusu       Room: 8263/4053-21  MRN: [id number]   Account: [de-identified]   : 1950  (75 y.o.) Gender: female        Diagnosis: R CVA, Acute ischmic right MCA stroke,   Past Medical History:  has a past medical history of CVA (cerebral vascular accident) ([facility] [facility] Diabetes mellitus ([facility] Patient in clinical research study; and PE (pulmonary thromboembolism) ([facility] Past Surgical History:   has a past surgical history that includes Cholecystectomy; hernia repair; and Cystoscopy (Left, 10/10/2017). Additional Pertinent Hx: Pt presents to ER on 10/9/17 with c/o vomiting and severe abdominal and back pain. Pt was found to have acute cystitis, kidney stone and hydronephrosis. On 10/10/17, pt dev L sided weakness and facial droop, found to have acute R MCA infarct. Pt underwent emergent cysto with ureteral stent placement 10/10/2017. Pt had angiogram and found to have ICA stenosis, stent placed 10/12/17. Additional PMH: CKD, A fib, morbid obesity, HTN, pt is in clinical trial that will end in 2018- unspecified. Overall Orientation Status: Within Normal Limits  Restrictions/Precautions  Restrictions/Precautions: General Precautions; Fall Risk  Required Braces or Orthoses?: No  Position Activity Restriction  Other position/activity restrictions: up with assistance    Subjective: Continues to report fatigue towards end off day. Comments: Pt is pleasant and cooperative. Vital Signs  Patient Currently in Pain: Denies                   Bed Mobility:   Rolling: Maximal assistance;Rolling Right (Mod Ax1 to the Left)  Supine to Sit: Moderate assistance (assist with torso)  Sit to Supine: Moderate assistance (Assist with bilat LE)  Scooting: Minimal assistance (to EOM)      Transfers:  Sit to Stand:  Moderate Assistance  Stand to sit: Minimal Assistance (x1)  Bed to Chair: Minimal assistance (x2)  Stand Pivot Transfers: Minimal Assistance (x1; with RW; Dysom on L )           Ambulation 1  Surface: level tile  Device: Rolling Walker (Red dysom on L )  Other Apparatus: Wheelchair follow  Assistance: Minimal assistance  Quality of Gait: Wide BRYAN, forward flexed posture, minimal knee and dorsiflexion  Distance: 70 ft  AM; 50 ft PM  Comments: Slight SOB, vc's for upright posture, vc's to narrow BRYAN, vc's for heel/toe gait        Stairs/Curb  Stairs?: No (not ready)              Wheelchair Activities  Propulsion: Yes  Propulsion 1  Propulsion: Manual  Level: Level Tile  Method: RLE;LLE  Level of Assistance: Minimal assistance  Description/ Details: pt having difficulty advancing Left LE at times, pt reports \"it's stuck\", vc's for sequencing and safety  Distance: 25ft          FIMS:      Transfers  Bed, Chair, Wheel Chair: 1 - Requires >75% assitance to transfer   Locomotion  Primary Mode: Walk  Distance Walked: 50 ft  Walk: 1 - Total Assistance Walks/operates wheelchair < 50 feet OR requires two or more people OR patient performs < 25% of locomotion effort  Stairs: 0 - Activity Does not Occur ( 0 only for the admission assessment)       BALANCE Posture: Fair  Sitting - Static: Fair (seated EOM)  Sitting - Dynamic: Fair (Seated EOM)  Standing - Static: Fair;+ (RW)  Standing - Dynamic: Fair (RW)  Comments: Posterior Lean while seated EOM,     EXERCISES    Other exercises?: Yes  Other exercises 1: Seated in w/c bilat LE therex, A/AAROM x10 1.5lb ankle weights  Other exercises 2: Sit to Stand x5  Other exercises 3: Bed Mobility x2 on gym mat (difficulty moving, education on improved techniques)  Other exercises 4: Seated EOM weight bearing into Left UE with push off for upright posture x10  Other exercises 5: UBE seated  5 mins FWD and BWD  L hand strapped  Other exercises 6: Seated at EOM reaching for cones           Activity Tolerance: Patient limited by fatigue, Patient limited by endurance          Patient Education  New Education Provided: Safe Transfers  Learner:patient  Method: demonstration and explanation       Outcome: needs reinforcement     Current Treatment Recommendations: Strengthening, Transfer Training, Endurance Training, Cognitive Reorientation, Patient/Caregiver Education & Training, ROM, Balance Training, Gait Training, Functional Mobility Training, Stair training, Safety Education & Training    Conditions Requiring Skilled Therapeutic Intervention  Body structures, Functions, Activity limitations: Decreased functional mobility ; Decreased strength;Decreased ROM; Decreased endurance;Decreased safe awareness;Decreased balance  Treatment Diagnosis: CVA, L hemiplegia  Prognosis: Good  Patient Education: Safe Transfers  REQUIRES PT FOLLOW UP: Yes  Discharge Recommendations: Home with nursing aide;Home with Home health PT;Home with assist PRN    Goals  Short term goals  Time Frame for Short term goals: 7 days  Short term goal 1: Improve L UE and LE strength by 1/2 MMG  Short term goal 2: Increase endurance to good  Short term goal 3: Improve sitting balance to good  Short term goal 4: Improve bed mobility to min A x1  Short term goal 5: Improve transfers to min A x1  Long term goals  Time Frame for Long term goals : 21 days  Long term goal 1:  Increase UEs and LEs strength to [facility] to maximize mobiility  Long term goal 2: Improve standing balance with appropriate device to good to increase safety  Long term goal 3: Improve bed mobility to independent to prepare for home  Long term goal 4: Improve transfers with appropriate device to mod I to prepare for home  Long term goal 5: Improve gait with appropriate device to mod I 150 ft to prepare for home  Long term goal 6: Improve stair negotiation to SBA on 4 steps with 1 rail use to allow pt to enter and exit home       10/20/17 1015 10/20/17 1510   PT Individual Minutes   Time In 1020 1510   Time Out 1100 1555   Minutes 40 45   PT Concurrent Minutes   Time In 2416 --    Time Out 1020 --    Minutes 5 --        Electronically signed by Sultana Pierce PTA on 10/20/17 at 5:16 PM

## 2017-10-20 NOTE — PROGRESS NOTES
Speech Language Pathology  Speech Language Pathology  [facility]    Cognitive Treatment Note    Date: 10/20/2017  Patients Name: Julianne Mitchell  MRN: [id number]  Diagnosis:   Patient Active Problem List   Diagnosis Code    Hydronephrosis of left kidney N13.30    Acute cystitis with hematuria N30.01    CKD (chronic kidney disease), stage III N18.3    Hypotension arterial I95.9    Essential hypertension I10    Morbid obesity with BMI of 40.0-44.9, adult ([facility] [address] E66.01, Z68.41    History of arterial ischemic stroke Z86.73    History of pulmonary embolus (PE) Z86.711    Paroxysmal atrial fibrillation (HCC) I48.0    Left ureteral stone N20.1    Left-sided weakness R53.1    Non-intractable vomiting with nausea R11.2    Cerebrovascular accident (CVA) due to embolism of right middle cerebral artery (HCC) I63.411    Hypotension I95.9    Sepsis secondary to UTI ([facility] [address] A41.9, N39.0    Elevated serum creatinine R79.89    Kidney stone N20.0    Anemia D64.9    Hypokalemia E87.6    Acute ischemic right MCA stroke (HCC) I63.511    Right-sided carotid artery disease (HCC) I77.9       Pain: 0/10    Cognitive Treatment    Treatment time: 4329-7974      Subjective: [x] Alert [x] Cooperative     [] Confused     [] Agitated    [] Lethargic      Objective/Assessment:  Attention: Pt able to sustain attn Argenis. Orientation: Pt oriented to date c reference to her digital calendar, although she struggled to read it accurately at first.  Pt oriented to the fact that she is in the rehab unit, but not name of hospital.  ST wrote it more boldly on pt whiteboard. Pt oriented to time of day. Recall: Pt completed a picture association task in which she was asked to create an association between 5 pairs of unrelated pictures, then recall one member of the pair given the other member.   Pt able to describe an association c min A.  0/5 associated pictures recalled Argenis; 5/5 associated pictures recalled given mod cuing

## 2017-10-20 NOTE — PROGRESS NOTES
Physical Medicine & Rehabilitation  Progress Note    Chief Complaint and Reason for Rehabilitation Admission:   ADL and mobility deficits secondary CVA     Subjective:      Patient is well, and has had no acute complaints or problems    ROS:  Denies fevers, chills, sweats. No chest pain, palpitations, lightheadedness. Denies coughing, wheezing or shortness of breath. Denies abdominal pain, nausea, diarrhea or constipation. No new areas of joint pain. Denies new areas of numbness or weakness. Denies new anxiety or depression issues. No new skin problems. Rehabilitation:   Progressing in therapies. ST:  Objective/Assessment:  Attention: Pt able to sustain attn Argenis.     Orientation: Pt oriented to date c reference to her digital calendar, although she struggled to read it accurately at first.  Pt oriented to the fact that she is in the rehab unit, but not name of hospital.  ST wrote it more boldly on pt whiteboard. Pt oriented to time of day.      Recall: Pt completed a picture association task in which she was asked to create an association between 5 pairs of unrelated pictures, then recall one member of the pair given the other member. Pt able to describe an association c min A.  0/5 associated pictures recalled Argenis; 5/5 associated pictures recalled given mod cuing (ST restating the association pt generated). Pt expressed feeling of discouragement at her performance during this task.      Problem Solving/Reasoning: Pt completed word deduction task- 90% Argenis, 100% c min A. Pt completed concrete divergent naming task- generated 10-11 items per category within 60 seconds.     Other: Pt attempted to read a mock prescription label (large, on a worksheet) and demonstrated left neglect. Pt demonstrated difficulty directing her visual attention appropriately, even when ST moved pt finger to target word. Also demonstrated difficulty reading one line of text straight across paper.    Pt. daughter-in-law in the last 72 hours. APTT: No results for input(s): APTT in the last 72 hours. CARDIAC ENZYMES: No results for input(s): CKMB, CKMBINDEX, TROPONINT in the last 72 hours.     Invalid input(s): CKTOTAL;3  FASTING LIPID PANEL:  Lab Results   Component Value Date    CHOL 73 10/10/2017    HDL 39 (L) 10/10/2017    TRIG 66 10/10/2017     LIVER PROFILE:   Recent Labs      10/18/17   0658   AST  16   ALT  14   BILITOT  0.30   ALKPHOS  42        Current Medications:   Current Facility-Administered Medications: HYDROcodone-acetaminophen (NORCO) 5-325 MG per tablet 1 tablet, 1 tablet, Oral, Q4H PRN **OR** HYDROcodone-acetaminophen (NORCO) 5-325 MG per tablet 2 tablet, 2 tablet, Oral, Q4H PRN  nicotine (NICODERM CQ) 21 MG/24HR 1 patch, 1 patch, Transdermal, Daily PRN  ondansetron (ZOFRAN) injection 4 mg, 4 mg, Intravenous, Q6H PRN  tamsulosin (FLOMAX) capsule 0.4 mg, 0.4 mg, Oral, Daily  aspirin EC tablet 325 mg, 325 mg, Oral, Daily  atorvastatin (LIPITOR) tablet 40 mg, 40 mg, Oral, Daily  Calcium Carb-Cholecalciferol 600-400 MG-UNIT TABS 1 tablet, 1 tablet, Oral, Daily  clopidogrel (PLAVIX) tablet 75 mg, 75 mg, Oral, Daily  dextrose 5 % solution, 100 mL/hr, Intravenous, PRN  dextrose 50 % solution 12.5 g, 12.5 g, Intravenous, PRN  docusate sodium (COLACE) capsule 100 mg, 100 mg, Oral, Daily  enoxaparin (LOVENOX) injection 30 mg, 30 mg, Subcutaneous, BID  famotidine (PEPCID) tablet 40 mg, 40 mg, Oral, Daily  ferrous sulfate tablet 325 mg, 325 mg, Oral, BID WC  glucagon (rDNA) injection 1 mg, 1 mg, Intramuscular, PRN  glucose (GLUTOSE) 40 % oral gel 15 g, 15 g, Oral, PRN  insulin glargine (LANTUS) injection vial 35 Units, 35 Units, Subcutaneous, Nightly  insulin lispro (HUMALOG) injection vial 0-12 Units, 0-12 Units, Subcutaneous, TID WC  insulin lispro (HUMALOG) injection vial 0-6 Units, 0-6 Units, Subcutaneous, Nightly  LORazepam (ATIVAN) injection 1 mg, 1 mg, Intravenous, Q6H PRN  metoprolol tartrate (LOPRESSOR) tablet 25 mg, 25 mg, Oral, BID  PARoxetine (PAXIL) tablet 10 mg, 10 mg, Oral, QAM  polyethylene glycol (GLYCOLAX) packet 17 g, 17 g, Oral, Daily  potassium chloride (KLOR-CON) packet 20 mEq, 20 mEq, Oral, BID  therapeutic multivitamin-minerals 1 tablet, 1 tablet, Oral, Daily  acetaminophen (TYLENOL) tablet 650 mg, 650 mg, Oral, Q4H PRN  magnesium hydroxide (MILK OF MAGNESIA) 400 MG/5ML suspension 30 mL, 30 mL, Oral, Daily PRN      Impression/Plan:  Patient is a 73-year-old female with ADL and mobility dysfunction s/p MCA CVA. 1. Inpatient rehab to work on transfers, self-care, ADLs, bed mobility, and ambulation. Patient will benefit from rehab nursing for medication management, bowel and bladder program, monitoring for skin breakdown, blood pressure, and monitoring for DVT. We'll monitor for extension of stroke, speech evaluation for cognition, and monitoring of glucose for diabetes. 2. Sepsis due to UTI and left pylenephritis- leukocytosis- Cipro till 10/18 per IM- completed. White count 9.6 on 10/17/2017.  3. S/p urethrel stent, gant, b/l kidney stones - f/u uro as outpt, flomax. 4. R MCA cva with LHP- embolic. Neuro consult for IP management . 5. Continue asa and plavix until 10/27/17, then asa 325 mg and eliquis 5 mg BID per note from internal medicine at [facility]. Continue lipitor. 6. PAF - Per cardio will restart eliquis in 2 weeks. F/u as OP in 2 wks.  We'll consult cardiology for clarification of anticoagulation with history of PE. Patient was seen by Dr. Marcellus Sierra at [facility]. 7. Anemia- protonix, s/p 1 unit PRBC transfusion. 10/17 Hb 9.1 asymptomatic. No obvious blood loss. Continue iron supplementation. Will continue to monitor. 8. Constipation - Colace  9. Reflux - Pepcid  10. Sacral ulcer- wound care management. Change mepilex q 3 days    11. H/O PE - will restart eliquis per endo neurosurgery/ cardio instructions as above. 12. DM- Cont lantus.   Consult IM for medical management 13. Hypokalemia- resolved. Continue to monitor  14. Consult to internal medicine for medical management. Electronically signed by Mango Swift MD on 10/20/2017 at 3:46 PM      This note was completed by using EMR and the assistance of a speech-recognition program. However, inadvertent computerized transcription errors may be present.  Although every effort was made to ensure accuracy, no guarantees can be provided that every mistake has been identified and corrected by editing

## 2017-10-21 LAB
GLUCOSE BLD-MCNC: 114 MG/DL (ref 65–105)
GLUCOSE BLD-MCNC: 134 MG/DL (ref 65–105)
GLUCOSE BLD-MCNC: 183 MG/DL (ref 65–105)
GLUCOSE BLD-MCNC: 189 MG/DL (ref 65–105)
GLUCOSE BLD-MCNC: 56 MG/DL (ref 65–105)
GLUCOSE BLD-MCNC: 74 MG/DL (ref 65–105)

## 2017-10-21 PROCEDURE — 99232 SBSQ HOSP IP/OBS MODERATE 35: CPT | Performed by: PHYSICAL MEDICINE & REHABILITATION

## 2017-10-21 PROCEDURE — 97535 SELF CARE MNGMENT TRAINING: CPT

## 2017-10-21 PROCEDURE — 1180000000 HC REHAB R&B

## 2017-10-21 PROCEDURE — 6360000002 HC RX W HCPCS: Performed by: FAMILY MEDICINE

## 2017-10-21 PROCEDURE — 82947 ASSAY GLUCOSE BLOOD QUANT: CPT

## 2017-10-21 PROCEDURE — 6370000000 HC RX 637 (ALT 250 FOR IP): Performed by: FAMILY MEDICINE

## 2017-10-21 PROCEDURE — 97116 GAIT TRAINING THERAPY: CPT

## 2017-10-21 PROCEDURE — 97110 THERAPEUTIC EXERCISES: CPT

## 2017-10-21 PROCEDURE — 97112 NEUROMUSCULAR REEDUCATION: CPT

## 2017-10-21 PROCEDURE — 6370000000 HC RX 637 (ALT 250 FOR IP): Performed by: INTERNAL MEDICINE

## 2017-10-21 RX ORDER — FUROSEMIDE 20 MG/1
20 TABLET ORAL DAILY
Status: DISCONTINUED | OUTPATIENT
Start: 2017-10-21 | End: 2017-10-25

## 2017-10-21 RX ORDER — CARVEDILOL 6.25 MG/1
6.25 TABLET ORAL 2 TIMES DAILY WITH MEALS
Status: ON HOLD | COMMUNITY
End: 2017-11-09 | Stop reason: HOSPADM

## 2017-10-21 RX ORDER — INSULIN GLARGINE 100 [IU]/ML
30 INJECTION, SOLUTION SUBCUTANEOUS NIGHTLY
Status: DISCONTINUED | OUTPATIENT
Start: 2017-10-21 | End: 2017-10-26

## 2017-10-21 RX ADMIN — INSULIN GLARGINE 30 UNITS: 100 INJECTION, SOLUTION SUBCUTANEOUS at 21:09

## 2017-10-21 RX ADMIN — FERROUS SULFATE TAB 325 MG (65 MG ELEMENTAL FE) 325 MG: 325 (65 FE) TAB at 17:08

## 2017-10-21 RX ADMIN — POTASSIUM CHLORIDE 20 MEQ: 1.5 POWDER, FOR SOLUTION ORAL at 08:52

## 2017-10-21 RX ADMIN — METOPROLOL TARTRATE 25 MG: 25 TABLET ORAL at 08:52

## 2017-10-21 RX ADMIN — DOCUSATE SODIUM 100 MG: 100 CAPSULE, LIQUID FILLED ORAL at 09:05

## 2017-10-21 RX ADMIN — ENOXAPARIN SODIUM 30 MG: 30 INJECTION SUBCUTANEOUS at 21:09

## 2017-10-21 RX ADMIN — MULTIPLE VITAMINS W/ MINERALS TAB 1 TABLET: TAB at 08:52

## 2017-10-21 RX ADMIN — POTASSIUM CHLORIDE 20 MEQ: 1.5 POWDER, FOR SOLUTION ORAL at 21:11

## 2017-10-21 RX ADMIN — ATORVASTATIN CALCIUM 40 MG: 40 TABLET, FILM COATED ORAL at 08:52

## 2017-10-21 RX ADMIN — TAMSULOSIN HYDROCHLORIDE 0.4 MG: 0.4 CAPSULE ORAL at 08:52

## 2017-10-21 RX ADMIN — ENOXAPARIN SODIUM 30 MG: 30 INJECTION SUBCUTANEOUS at 08:52

## 2017-10-21 RX ADMIN — FERROUS SULFATE TAB 325 MG (65 MG ELEMENTAL FE) 325 MG: 325 (65 FE) TAB at 08:52

## 2017-10-21 RX ADMIN — CLOPIDOGREL BISULFATE 75 MG: 75 TABLET ORAL at 08:52

## 2017-10-21 RX ADMIN — INSULIN LISPRO 2 UNITS: 100 INJECTION, SOLUTION INTRAVENOUS; SUBCUTANEOUS at 16:28

## 2017-10-21 RX ADMIN — METOPROLOL TARTRATE 25 MG: 25 TABLET ORAL at 21:09

## 2017-10-21 RX ADMIN — POLYETHYLENE GLYCOL 3350 17 G: 17 POWDER, FOR SOLUTION ORAL at 08:53

## 2017-10-21 RX ADMIN — INSULIN LISPRO 1 UNITS: 100 INJECTION, SOLUTION INTRAVENOUS; SUBCUTANEOUS at 21:09

## 2017-10-21 RX ADMIN — FAMOTIDINE 40 MG: 20 TABLET ORAL at 08:52

## 2017-10-21 RX ADMIN — ASPIRIN 325 MG: 325 TABLET, DELAYED RELEASE ORAL at 09:06

## 2017-10-21 RX ADMIN — FUROSEMIDE 20 MG: 20 TABLET ORAL at 17:08

## 2017-10-21 RX ADMIN — PAROXETINE HYDROCHLORIDE 10 MG: 10 TABLET, FILM COATED ORAL at 08:52

## 2017-10-21 RX ADMIN — Medication 1 TABLET: at 08:52

## 2017-10-21 ASSESSMENT — PAIN SCALES - GENERAL
PAINLEVEL_OUTOF10: 0
PAINLEVEL_OUTOF10: 0

## 2017-10-21 NOTE — PROGRESS NOTES
Physical Therapy  Facility/Department: [facility] ACUTE REHAB  Daily Treatment Note  NAME: Stevan Davis  : 1950  MRN: [id number]    Date of Service: 10/21/2017    Patient Diagnosis(es):   Patient Active Problem List    Diagnosis Date Noted    Acute ischemic right MCA stroke ([address] 10/11/2017     Priority: High    Right-sided carotid artery disease ([address]     Anemia     Hypokalemia     Hydronephrosis of left kidney 10/10/2017    Acute cystitis with hematuria 10/10/2017    CKD (chronic kidney disease), stage III 10/10/2017    Hypotension arterial 10/10/2017    Essential hypertension 10/10/2017    Morbid obesity with BMI of 40.0-44.9, adult ([address] 10/10/2017    History of arterial ischemic stroke 10/10/2017    History of pulmonary embolus (PE) 10/10/2017    Paroxysmal atrial fibrillation ([address] 10/10/2017    Left ureteral stone 10/10/2017    Left-sided weakness     Non-intractable vomiting with nausea     Cerebrovascular accident (CVA) due to embolism of right middle cerebral artery (HCC)     Hypotension     Sepsis secondary to UTI ([address]     Elevated serum creatinine     Kidney stone        Past Medical History:   Diagnosis Date    CVA (cerebral vascular accident) ([address]     Diabetes mellitus ([address]     Patient in clinical research study 10/13/2017    Anticipated end date 2018    PE [facility]pulmonary throm[facility]embolism[facility])      Past Surgical History:   Procedure Laterality Date    CHOLECYSTECTOMY      CYSTOSCOPY Left 10/10/2017    CYSTOSCOPY, STENT INSERTION performed by Juanito Fields MD at [address] [place] [address] ([facility])         Restrictions  Restrictions/Precautions  Restrictions/Precautions: General Precautions, Fall Risk  Required Braces or Orthoses?: No  Position Activity Restriction  Other position/activity restrictions: up with assistance  Subjective   Subjective  Subjective: no complains of pain  Pain Screening  Patient Currently in Pain: No  Vital Signs  Patient Currently in Pain: No Orientation  Orientation  Overall Orientation Status: Within Normal Limits  Objective      Transfers  Sit to Stand: Minimal Assistance  Stand to sit: Contact guard assistance  Bed to Chair: Minimal assistance  Stand Pivot Transfers: Minimal Assistance  Ambulation 1  Surface: level tile  Device: Rolling Walker  Other Apparatus: Wheelchair follow  Assistance: Minimal assistance  Distance: 30ftx2 AM,60ftx2 PM  Stairs/Curb  Stairs?: No  Propulsion 1  Propulsion: Manual  Level: Level Tile  Method: RUE;RLE;LLE  Level of Assistance: Supervision  Description/ Details: limited by low endurance/weakness  Distance: 20ft     Other exercises  Other exercises 1: supine ex BLE 10x  Other exercises 2: nustep L1 5'  Other exercises 3: seated ex BLE 10x  Other exercises 4: sit to stand 3x  Other exercises 5: UBE seated  10 min  L hand strapped  Other exercises 6: Red Tband BLE 15x     Assessment   REQUIRES PT FOLLOW UP: Yes  Activity Tolerance  Activity Tolerance: Patient limited by fatigue;Patient limited by endurance       Discharge Recommendations:  Home with nursing aide, Home with Home health PT, Home with assist PRN    Goals  Short term goals  Time Frame for Short term goals: 7 days  Short term goal 1: Improve L UE and LE strength by 1/2 MMG  Short term goal 2: Increase endurance to good  Short term goal 3: Improve sitting balance to good  Short term goal 4: Improve bed mobility to min A x1  Short term goal 5: Improve transfers to min A x1  Long term goals  Time Frame for Long term goals : 21 days  Long term goal 1:  Increase UEs and LEs strength to [facility] to maximize mobiility  Long term goal 2: Improve standing balance with appropriate device to good to increase safety  Long term goal 3: Improve bed mobility to independent to prepare for home  Long term goal 4: Improve transfers with appropriate device to mod I to prepare for home  Long term goal 5: Improve gait with appropriate device to mod I 150 ft to prepare for home  Long term goal 6: Improve stair negotiation to SBA on 4 steps with 1 rail use to allow pt to enter and exit home  Patient Goals   Patient goals : walking on own for return to home     Plan    Plan  Times per week: 1.5 hrs/day, 5-7 days/wk  Times per day: Twice a day  Plan weeks: 3  Current Treatment Recommendations: Strengthening, Transfer Training, Endurance Training, Cognitive Reorientation, Patient/Caregiver Education & Training, ROM, Balance Training, Gait Training, Functional Mobility Training, Stair training, Safety Education & Training  Safety Devices  Type of devices: Gait belt, Left in chair  Restraints  Initially in place: No     Therapy Time     10/21/17 0930 10/21/17 1529   PT Individual Minutes   Time In 0930 1430   Time Out [id number]   Minutes 39 45   Electronically signed by: Eddi Calzada PT

## 2017-10-21 NOTE — PLAN OF CARE
Problem: Risk for Impaired Skin Integrity  Goal: Tissue integrity - skin and mucous membranes  Structural intactness and normal physiological function of skin and  mucous membranes. Outcome: Ongoing  Various [place] like areas on torso and legs from  A previous skin condition son stated. Gluteal fold is reddened.  Posterior upper thighs with areas of purple apparent pressure areas, non blanchable    Problem: Pain:  Goal: Pain level will decrease  Pain level will decrease   Outcome: Ongoing  Denied any pain or discomfort

## 2017-10-21 NOTE — PROGRESS NOTES
MCV, RDW, PLT in the last 72 hours. BMP:   No results for input(s): NA, K, CL, CO2, PHOS, BUN, CREATININE in the last 72 hours. Invalid input(s): CA  BNP: No results for input(s): BNP in the last 72 hours. PT/INR: No results for input(s): PROTIME, INR in the last 72 hours. APTT: No results for input(s): APTT in the last 72 hours. CARDIAC ENZYMES: No results for input(s): CKMB, CKMBINDEX, TROPONINT in the last 72 hours. Invalid input(s): CKTOTAL;3  FASTING LIPID PANEL:  Lab Results   Component Value Date    CHOL 73 10/10/2017    HDL 39 (L) 10/10/2017    TRIG 66 10/10/2017     LIVER PROFILE:   No results for input(s): AST, ALT, ALB, BILIDIR, BILITOT, ALKPHOS in the last 72 hours.      Current Medications:   Current Facility-Administered Medications: [START ON 10/22/2017] influenza quadrivalent split vaccine (FLUZONE;FLUARIX;FLULAVAL;AFLURIA) injection 0.5 mL, 0.5 mL, Intramuscular, Once  HYDROcodone-acetaminophen (NORCO) 5-325 MG per tablet 1 tablet, 1 tablet, Oral, Q4H PRN **OR** HYDROcodone-acetaminophen (NORCO) 5-325 MG per tablet 2 tablet, 2 tablet, Oral, Q4H PRN  nicotine (NICODERM CQ) 21 MG/24HR 1 patch, 1 patch, Transdermal, Daily PRN  ondansetron (ZOFRAN) injection 4 mg, 4 mg, Intravenous, Q6H PRN  tamsulosin (FLOMAX) capsule 0.4 mg, 0.4 mg, Oral, Daily  aspirin EC tablet 325 mg, 325 mg, Oral, Daily  atorvastatin (LIPITOR) tablet 40 mg, 40 mg, Oral, Daily  Calcium Carb-Cholecalciferol 600-400 MG-UNIT TABS 1 tablet, 1 tablet, Oral, Daily  clopidogrel (PLAVIX) tablet 75 mg, 75 mg, Oral, Daily  dextrose 5 % solution, 100 mL/hr, Intravenous, PRN  dextrose 50 % solution 12.5 g, 12.5 g, Intravenous, PRN  docusate sodium (COLACE) capsule 100 mg, 100 mg, Oral, Daily  enoxaparin (LOVENOX) injection 30 mg, 30 mg, Subcutaneous, BID  famotidine (PEPCID) tablet 40 mg, 40 mg, Oral, Daily  ferrous sulfate tablet 325 mg, 325 mg, Oral, BID WC  glucagon (rDNA) injection 1 mg, 1 mg, Intramuscular, PRN  glucose (GLUTOSE) 40 % oral gel 15 g, 15 g, Oral, PRN  insulin glargine (LANTUS) injection vial 35 Units, 35 Units, Subcutaneous, Nightly  insulin lispro (HUMALOG) injection vial 0-12 Units, 0-12 Units, Subcutaneous, TID WC  insulin lispro (HUMALOG) injection vial 0-6 Units, 0-6 Units, Subcutaneous, Nightly  LORazepam (ATIVAN) injection 1 mg, 1 mg, Intravenous, Q6H PRN  metoprolol tartrate (LOPRESSOR) tablet 25 mg, 25 mg, Oral, BID  PARoxetine (PAXIL) tablet 10 mg, 10 mg, Oral, QAM  polyethylene glycol (GLYCOLAX) packet 17 g, 17 g, Oral, Daily  potassium chloride (KLOR-CON) packet 20 mEq, 20 mEq, Oral, BID  therapeutic multivitamin-minerals 1 tablet, 1 tablet, Oral, Daily  acetaminophen (TYLENOL) tablet 650 mg, 650 mg, Oral, Q4H PRN  magnesium hydroxide (MILK OF MAGNESIA) 400 MG/5ML suspension 30 mL, 30 mL, Oral, Daily PRN      Impression/Plan:  Patient is a 78-year-old female with ADL and mobility dysfunction s/p MCA CVA. 1. Inpatient rehab to work on transfers, self-care, ADLs, bed mobility, and ambulation. Patient will benefit from rehab nursing for medication management, bowel and bladder program, monitoring for skin breakdown, blood pressure, and monitoring for DVT. We'll monitor for extension of stroke, speech evaluation for cognition, and monitoring of glucose for diabetes. 2. Sepsis due to UTI and left pylenephritis- leukocytosis- Cipro till 10/18 per IM- completed. White count 9.6 on 10/17/2017.  3. S/p urethrel stent, gant, b/l kidney stones - f/u uro as outpt, flomax. 4. R MCA cva with LHP- embolic. Neuro consult for IP management . 5. Continue asa and plavix until 10/27/17, then asa 325 mg and eliquis 5 mg BID per note from internal medicine at [facility]. Continue lipitor. 6. PAF - Per cardio will restart eliquis in 2 weeks. F/u as OP in 2 wks.  We'll consult cardiology for clarification of anticoagulation with history of PE. Patient was seen by Dr. Kisha Keller at [facility].   7. Anemia- protonix, s/p 1

## 2017-10-21 NOTE — PLAN OF CARE
Problem: Risk for Impaired Skin Integrity  Goal: Tissue integrity - skin and mucous membranes  Structural intactness and normal physiological function of skin and  mucous membranes. Outcome: Ongoing  Skin assessment completed this shift. Nutrition and Hydration status assessed with adequate intake. Bandar Score as charted. Pressure Relief Overlay remains intact and inflated to patient's bed throughout the shift. Bilateral heels remain elevated on pillows throughout the shift. Patient tolerates repositioning by staff at least every 2 hours. Patient verbalizes understanding of pressure ulcer prevention measures. Skin integrity maintained. No new skin breakdown noted. Skin to high risk pressure areas including coccyx and heels are clear. [place] / Incontinence care provided as needed throughout the shift. Aloe Vesta Moisture Barrier ointment applied to buttocks as a preventative measure. Problem: Falls - Risk of  Goal: Absence of falls  Outcome: Ongoing  No falls or injury this shift. Bed in low locked position, call light in reach. Assistance provided for transfers and toileting.

## 2017-10-21 NOTE — PROGRESS NOTES
[facility]: [facility] AWAD   ACUTE REHABILITATION OCCUPATIONAL THERAPY  DAILY NOTE    Date: 10/21/17  Patient Name: Pamela Smith      Room: 8671/2237-36    MRN: [id number]   : 1950  (74 y.o.)  Gender: female      Diagnosis: R CVA, Acute ischmic right MCA stroke,   Additional Pertinent Hx: UTI, s/p Ureteral stone removed dueing stent placement 10/10/17, h/o CVA a year ago    Restrictions  Restrictions/Precautions: General Precautions, Fall Risk  Other position/activity restrictions: up with assistance  Position Activity Restriction  Other position/activity restrictions: up with assistance     Subjective  Subjective: \"I was afraid it would come to that. \"  need 24 hour care upon discharge, pt wanting to become indep again but is realizing she will need more assist  Comments: initially declined shower, but agreed post BM on toilet, declined to wash hair             Objective  Cognition  Arousal/Alertness: Delayed responses to stimuli  Following Commands:  Follows one step commands with increased time  Attention Span: Attends with cues to redirect  Memory: Decreased recall of recent events;Decreased long term memory;Decreased short term memory  Safety Judgement: Decreased awareness of need for assistance;Decreased awareness of need for safety  Problem Solving: Assistance required to generate solutions;Assistance required to identify errors made  Insights: Decreased awareness of deficits  Initiation: Requires cues for some  Sequencing: Requires cues for some  Cognition Comment: pt did not remember in pm that she had showered in am, pt needed cues for L body neglect during shower  Perception  Unilateral Attention: Cues to attend to left side of body;Cues to attend left visual field;Cues to maintain midline in sitting (pt unable to find silverware on L side of tray indep)  Initiation: Cues to initiate tasks  Balance  Sitting Balance: Minimal assistance (initially SBA seated on tub bench, as fatigued, significant lean to R with assist needed to maintain balance)  Transfers  Stand Pivot Transfers: Dependent/Total (mod x 1 50%, min- mod x 2 50% of the time)  Sit to stand: Dependent/Total (min x 2 50% , mod x 1 50 %)  Stand to sit: Minimal assistance  Standing Balance  Time: 2-4 min x 5   Activity: adls  Sit to stand: Dependent/Total (min x 2 50% , mod x 1 50 %)  Stand to sit: Minimal assistance     Upper Extremity Function  NDT Treatment: Upper extremity  Fine Motor: pt has no functional use of LUE for adls , no  or pinch, was dominant hand prior              Neuromuscular Education  Neuromuscular education: Yes  NDT Treatment: Upper extremity  Vibration: shld to fingers with good results to all but finger extension, pt with partial AROM at shld and elbow without vibration, practiced vibration and resistance simultaneously to strengthen, post ex, pt able to achieve full elbow flex, ,ext against gravity and shld flex, abd to 90 AROM no vibration, good response to pro,sup, wrist flex, ext and finger flex with vibration, trace mvmt without vibration                OT FIM:   Eatin - Feeds self with setup/supervision/cues and/or requires only setup/supervision/cues to perform tube feedings (pt unable to find silverware on L side of tray indep, assist with all 2 handed tasks)  Groomin - Requires setup/cues to do all tasks  Bathin - Able to bathe 3-4 areas  Dressing-Upper: 2 - Requires assist with 3 tasks  Dressing-Lower: 1 - Total assist with lower body dressing  Toiletin - Total assist  Toilet Transfer: 1 - Requires > 75% assist getting on & off toilet  Shower Transfer: 1 - Total assistance, pt. expends less than 25% effort       Problem Solving: 3 - Patient solves simple/routine tasks 50%-74%  Memory: 3 - Patient remembers 50%-74% of the time    Assessment     Activity Tolerance: Patient limited by fatigue  Activity Tolerance: pt needing more assist with sit bal on tub bench due to fatigue 10/21/17 1656 10/21/17 1657   OT Individual Minutes   Time In 8087 [id number]   Time Out 0829 1259   Minutes 60 30          Patient Education:  Patient Goals   Patient goals : to be able to do things for myself  Patient Education: pt's son and DIL ed in pm, re L visual neglect, L body neglect, and they observed neuro darnell LUE with vibration, they note decreased memory and L visual field post 1st CVA 1 year ago, they realize pt will need 24 hour care upon discharge  Learner:family  Method: demonstration and explanation       Outcome: acknowledged understanding    Plan  Plan  Times per week: 5-7x/week  Current Treatment Recommendations: Balance Training, Functional Mobility Training, Endurance Training, Safety Education & Training, Self-Care / ADL, Equipment Evaluation, Education, & procurement, Patient/Caregiver Education & Training, Strengthening, ROM, Neuromuscular Re-education, Cognitive Reorientation, Cognitive/Perceptual Training  Patient Goals   Patient goals : to be able to do things for myself  Short term goals  Time Frame for Short term goals: One week,   Short term goal 1: Pt. will demo. CGA with bed mobility. Short term goal 2: Pt. will demo. min. A with toilet transfer and mod. A with toileting tasks with good . Short term goal 3: Pt. will demo. min. A with UB bathing/dressing, and MOd. A with LB bathing/dressing using AE as needed. Short term goal 4: Pt. will participate in left UE ROM and stgth ex. to assist with functional tasks. Short term goal 5: Pt. will tolerate facilitation tech. to elicit movement in left wrist and hand to assist with functional tasks. Short term goal 6: Pt. will attend to left side of body and L visual field with min. vc's 90% of the time  Short term goal 7: Pt. will tolerate 5-8 min. functional standing activities to promote increased indep.with ADL's and mobility. Long term goals  Time Frame for Long term goals : By discharge,   Long term goal 1: Pt. will demo.

## 2017-10-21 NOTE — PROGRESS NOTES
clubbing. Neurologic: Cranial nerves II-XII grossly intact; LUE 3/5 LLE 4/5           Assessment / Plan      Patient Active Problem List   Diagnosis    Hydronephrosis of left kidney    Acute cystitis with hematuria    CKD (chronic kidney disease), stage III    Hypotension arterial    Essential hypertension    Morbid obesity with BMI of 40.0-44.9, adult (HCC)    History of arterial ischemic stroke    History of pulmonary embolus (PE)    Paroxysmal atrial fibrillation (HCC)    Left ureteral stone    Left-sided weakness    Non-intractable vomiting with nausea    Cerebrovascular accident (CVA) due to embolism of right middle cerebral artery (HCC)    Hypotension    [facility]epsis [facility] to UTI [facility] [address]    Elevated serum creatinine    Kidney stone    Anemia    Hypokalemia    Acute ischemic right MCA stroke ([facility] [facility]    Right-sided carotid artery disease (HCC)       A/P  UTI Sepsis s/p ureteral stent on cipro   MCA infarct Left hemiparesis on ASA eliquis lipitor   A fib on eliquis   DM on lantus  to 248    Cont present medications   Will follow     Adriane Anderson MD  [address]    Phone [phone]   Fax: [phone]  Answering Service: [phone]

## 2017-10-21 NOTE — PLAN OF CARE
Problem: Pain:  Goal: Pain level will decrease  Pain level will decrease  Outcome: Ongoing  Interventions:  -Control environmental factors  -Medicate for pain prior to treatment/therapy  -Assess pain using appropriate pain scale tool  -Administer analgesic as ordered  -Handle areas of discomfort gently  -Assess and document results of pain interventions  -Initiate appropriate non-invasive pain relief measures  -Turn and reposition patient as appropriate for comfort    Evaluations:  Pain assessment completed. Pt. able to rest.  Pt. denies pain this shift. Pt. denies need for oral analgesic. Verbalizes understanding of nonpharmacologic strategies that provide comfort. Pt. repositioned for comfort. Nonverbal cues indicate absence of pain.

## 2017-10-21 NOTE — PROGRESS NOTES
[facility]      Patient name:  Esther Garcia  Date of admission:  10/17/2017  7:07 PM  MRN:   [id number]  YOB: 1950      Cc  CVA       HPI   Pt admitted to rehab for CVA   LUE weakness MRI showed multifocal infarcts R    There were also chronic infarcts seen in the occipital lobes and left cerebellar hemisphere  Underwent ICA stenting     hospitalization complicated by ureteral stone removal   UTI sepsis     Today feel well bp controlled     Past Medical History:   Diagnosis Date    CVA (cerebral vascular accident) ([address]     Diabetes mellitus ([address]     Patient in clinical research study 10/13/2017    Anticipated end date 04/13/2018    PE (pulmonary thromboembolism) ([address]      No family history on file. reports that she has never smoked. She has never used smokeless tobacco. She reports that she does not drink alcohol or use drugs. Past Medical History:   Diagnosis Date    CVA (cerebral vascular accident) ([address]     Diabetes mellitus ([facility] [address]     Patient in clinical research study 10/13/2017    Anticipated end date 04/13/2018    PE [facility]embolism[facility]      Allergies   Allergen Reactions    Iv Contrast [Iodides] Other (See Comments)     unknown       Review of systems    Constitutional: Negative for fever and fatigue. Respiratory: Negative for cough and shortness of breath. Cardiovascular: Negative for chest pain, palpitations and leg swelling. Gastrointestinal: Negative for abdominal pain, diarrhea and blood in stool. ROS  Physical exam   BP (!) 98/51   Pulse 68   Temp 98.3 °F (36.8 °C)   Resp 17   Ht 5' (1.524 m)   Wt 248 lb (112.5 kg)   SpO2 96%   BMI 48.43 kg/m²      General appearance: well nourished in no distress  Lungs: normal effort, clear to auscultation. CVS sinus with no murmurs.   Abdomen: Soft, non-tender; no masses or HSM,   Extremities: no edema; no digital cyanosis or clubbing. Neurologic: Cranial nerves II-XII grossly intact; LUE 3/5 LLE 4/5           Assessment / Plan      Patient Active Problem List   Diagnosis    Hydronephrosis of left kidney    Acute cystitis with hematuria    CKD (chronic kidney disease), stage III    Hypotension arterial    Essential hypertension    Morbid obesity with BMI of 40.0-44.9, adult (HCC)    History of arterial ischemic stroke    History of pulmonary embolus (PE)    Paroxysmal atrial fibrillation (HCC)    Left ureteral stone    Left-sided weakness    Non-intractable vomiting with nausea    Cerebrovascular accident (CVA) due to embolism of right middle cerebral artery (HCC)    Hypotension    Sepsis secondary to UTI ([facility] [address]    Elevated serum creatinine    Kidney stone    Anemia    Hypokalemia    Acute ischemic right MCA stroke ([facility] [address] [facility]    Right-sided carotid artery disease (HCC)       A/P  UTI Sepsis s/p ureteral stent on cipro   MCA infarct Left hemiparesis on ASA  lipitor   A fib on eliquis   DM on lantus  to 248    10/20  Pt on lovenox prophylacic dose  cotn present dose of lantus  Plan to start eliquis after 2 weeks previously that will be 10/26   flomax to be stopped soon has hypotension    MD ZEFERINO Shaikh [address], [address] [place] [address].    Phone [phone]   Fax: [phone]  Answering Service: [phone]

## 2017-10-22 LAB
GLUCOSE BLD-MCNC: 118 MG/DL (ref 65–105)
GLUCOSE BLD-MCNC: 186 MG/DL (ref 65–105)
GLUCOSE BLD-MCNC: 191 MG/DL (ref 65–105)
GLUCOSE BLD-MCNC: 204 MG/DL (ref 65–105)

## 2017-10-22 PROCEDURE — 82947 ASSAY GLUCOSE BLOOD QUANT: CPT

## 2017-10-22 PROCEDURE — 6370000000 HC RX 637 (ALT 250 FOR IP): Performed by: FAMILY MEDICINE

## 2017-10-22 PROCEDURE — 6360000002 HC RX W HCPCS: Performed by: INTERNAL MEDICINE

## 2017-10-22 PROCEDURE — G0008 ADMIN INFLUENZA VIRUS VAC: HCPCS | Performed by: PHYSICAL MEDICINE & REHABILITATION

## 2017-10-22 PROCEDURE — 1180000000 HC REHAB R&B

## 2017-10-22 PROCEDURE — 97112 NEUROMUSCULAR REEDUCATION: CPT

## 2017-10-22 PROCEDURE — 97535 SELF CARE MNGMENT TRAINING: CPT

## 2017-10-22 PROCEDURE — 6370000000 HC RX 637 (ALT 250 FOR IP): Performed by: INTERNAL MEDICINE

## 2017-10-22 PROCEDURE — 97116 GAIT TRAINING THERAPY: CPT

## 2017-10-22 PROCEDURE — 97530 THERAPEUTIC ACTIVITIES: CPT

## 2017-10-22 PROCEDURE — 97110 THERAPEUTIC EXERCISES: CPT

## 2017-10-22 PROCEDURE — 2500000003 HC RX 250 WO HCPCS: Performed by: PHYSICAL MEDICINE & REHABILITATION

## 2017-10-22 PROCEDURE — 99232 SBSQ HOSP IP/OBS MODERATE 35: CPT | Performed by: PHYSICAL MEDICINE & REHABILITATION

## 2017-10-22 PROCEDURE — 6360000002 HC RX W HCPCS: Performed by: FAMILY MEDICINE

## 2017-10-22 PROCEDURE — 90686 IIV4 VACC NO PRSV 0.5 ML IM: CPT | Performed by: PHYSICAL MEDICINE & REHABILITATION

## 2017-10-22 PROCEDURE — 6360000002 HC RX W HCPCS: Performed by: PHYSICAL MEDICINE & REHABILITATION

## 2017-10-22 RX ORDER — MYCOPHENOLATE MOFETIL 250 MG/1
1500 CAPSULE ORAL 2 TIMES DAILY
Status: DISCONTINUED | OUTPATIENT
Start: 2017-10-22 | End: 2017-11-09 | Stop reason: HOSPADM

## 2017-10-22 RX ADMIN — CLOPIDOGREL BISULFATE 75 MG: 75 TABLET ORAL at 09:07

## 2017-10-22 RX ADMIN — TAMSULOSIN HYDROCHLORIDE 0.4 MG: 0.4 CAPSULE ORAL at 09:07

## 2017-10-22 RX ADMIN — POLYETHYLENE GLYCOL 3350 17 G: 17 POWDER, FOR SOLUTION ORAL at 09:06

## 2017-10-22 RX ADMIN — FERROUS SULFATE TAB 325 MG (65 MG ELEMENTAL FE) 325 MG: 325 (65 FE) TAB at 09:06

## 2017-10-22 RX ADMIN — POTASSIUM CHLORIDE 20 MEQ: 1.5 POWDER, FOR SOLUTION ORAL at 19:55

## 2017-10-22 RX ADMIN — INSULIN LISPRO 2 UNITS: 100 INJECTION, SOLUTION INTRAVENOUS; SUBCUTANEOUS at 11:28

## 2017-10-22 RX ADMIN — DOCUSATE SODIUM 100 MG: 100 CAPSULE, LIQUID FILLED ORAL at 09:06

## 2017-10-22 RX ADMIN — FUROSEMIDE 20 MG: 20 TABLET ORAL at 09:06

## 2017-10-22 RX ADMIN — MICONAZOLE NITRATE: 1.42 POWDER TOPICAL at 15:04

## 2017-10-22 RX ADMIN — ATORVASTATIN CALCIUM 40 MG: 40 TABLET, FILM COATED ORAL at 09:06

## 2017-10-22 RX ADMIN — FERROUS SULFATE TAB 325 MG (65 MG ELEMENTAL FE) 325 MG: 325 (65 FE) TAB at 17:43

## 2017-10-22 RX ADMIN — INSULIN GLARGINE 30 UNITS: 100 INJECTION, SOLUTION SUBCUTANEOUS at 20:02

## 2017-10-22 RX ADMIN — MICONAZOLE NITRATE: 1.42 POWDER TOPICAL at 19:55

## 2017-10-22 RX ADMIN — INSULIN LISPRO 4 UNITS: 100 INJECTION, SOLUTION INTRAVENOUS; SUBCUTANEOUS at 16:23

## 2017-10-22 RX ADMIN — MULTIPLE VITAMINS W/ MINERALS TAB 1 TABLET: TAB at 09:07

## 2017-10-22 RX ADMIN — POTASSIUM CHLORIDE 20 MEQ: 1.5 POWDER, FOR SOLUTION ORAL at 09:07

## 2017-10-22 RX ADMIN — INSULIN LISPRO 1 UNITS: 100 INJECTION, SOLUTION INTRAVENOUS; SUBCUTANEOUS at 20:02

## 2017-10-22 RX ADMIN — Medication 1 TABLET: at 09:06

## 2017-10-22 RX ADMIN — PAROXETINE HYDROCHLORIDE 10 MG: 10 TABLET, FILM COATED ORAL at 10:19

## 2017-10-22 RX ADMIN — MYCOPHENOLATE MOFETIL 1500 MG: 250 CAPSULE ORAL at 12:07

## 2017-10-22 RX ADMIN — ASPIRIN 325 MG: 325 TABLET, DELAYED RELEASE ORAL at 09:06

## 2017-10-22 RX ADMIN — ENOXAPARIN SODIUM 30 MG: 30 INJECTION SUBCUTANEOUS at 19:55

## 2017-10-22 RX ADMIN — INFLUENZA VIRUS VACCINE 0.5 ML: 15; 15; 15; 15 SUSPENSION INTRAMUSCULAR at 14:53

## 2017-10-22 RX ADMIN — MYCOPHENOLATE MOFETIL 1500 MG: 250 CAPSULE ORAL at 19:55

## 2017-10-22 RX ADMIN — FAMOTIDINE 40 MG: 20 TABLET ORAL at 09:06

## 2017-10-22 RX ADMIN — ENOXAPARIN SODIUM 30 MG: 30 INJECTION SUBCUTANEOUS at 09:06

## 2017-10-22 ASSESSMENT — PAIN SCALES - GENERAL
PAINLEVEL_OUTOF10: 0

## 2017-10-22 NOTE — PROGRESS NOTES
Physical Medicine & Rehabilitation  Progress Note    Chief Complaint and Reason for Rehabilitation Admission:   ADL and mobility deficits secondary CVA     Subjective:      Patient is well, and has had no acute complaints or problems. Slept well. Bowel movement on Friday. No pain. ROS:  Denies fevers, chills, sweats. No chest pain, palpitations, lightheadedness. Denies coughing, wheezing or shortness of breath. Denies abdominal pain, nausea, diarrhea or constipation. No new areas of joint pain. Denies new areas of numbness or weakness. Denies new anxiety or depression issues. No new skin problems. Rehabilitation:   OT FIM:   Eatin - Feeds self with setup/supervision/cues and/or requires only setup/supervision/cues to perform tube feedings (pt unable to find silverware on L side of tray indep, assist with all 2 handed tasks)  Groomin - Requires setup/cues to do all tasks  Bathin - Able to bathe 3-4 areas  Dressing-Upper: 2 - Requires assist with 3 tasks  Dressing-Lower: 1 - Total assist with lower body dressing  Toiletin - Total assist  Toilet Transfer: 1 - Requires > 75% assist getting on & off toilet  Shower Transfer: 1 - Total assistance, pt. expends less than 25% effort        Problem Solving: 3 - Patient solves simple/routine tasks 50%-74%  Memory: 3 - Patient remembers 50%-74% of the time          Objective:  BP (!) 112/40   Pulse 74 Comment: heart regular  Temp 98.3 °F (36.8 °C) (Oral)   Resp 18   Ht 5' (1.524 m)   Wt 266 lb 1.6 oz (120.7 kg)   SpO2 99%   BMI 51.97 kg/m²       Alert, no distress. Visual fields peripheral diminished. Good to fair speech and language. Left sided facial droop. EOMI.uage function. Lungs clear. Heart regular. Abdomen non-distended, non-tender. No calf tenderness or edema. Motor testing 3+/5 LUE, 4-/5 LLE, R 5/5. Diagnostics:     CBC:   No results for input(s): WBC, RBC, HGB, HCT, MCV, RDW, PLT in the last 72 hours.   BMP:   No results for input(s): NA, K, CL, CO2, PHOS, BUN, CREATININE in the last 72 hours. Invalid input(s): CA  BNP: No results for input(s): BNP in the last 72 hours. PT/INR: No results for input(s): PROTIME, INR in the last 72 hours. APTT: No results for input(s): APTT in the last 72 hours. CARDIAC ENZYMES: No results for input(s): CKMB, CKMBINDEX, TROPONINT in the last 72 hours. Invalid input(s): CKTOTAL;3  FASTING LIPID PANEL:  Lab Results   Component Value Date    CHOL 73 10/10/2017    HDL 39 (L) 10/10/2017    TRIG 66 10/10/2017     LIVER PROFILE:   No results for input(s): AST, ALT, ALB, BILIDIR, BILITOT, ALKPHOS in the last 72 hours.      Current Medications:   Current Facility-Administered Medications: mycophenolate (CELLCEPT) capsule 1,500 mg, 1,500 mg, Oral, BID  influenza quadrivalent split vaccine (FLUZONE;FLUARIX;FLULAVAL;AFLURIA) injection 0.5 mL, 0.5 mL, Intramuscular, Once  insulin glargine (LANTUS) injection vial 30 Units, 30 Units, Subcutaneous, Nightly  furosemide (LASIX) tablet 20 mg, 20 mg, Oral, Daily  HYDROcodone-acetaminophen (NORCO) 5-325 MG per tablet 1 tablet, 1 tablet, Oral, Q4H PRN **OR** HYDROcodone-acetaminophen (NORCO) 5-325 MG per tablet 2 tablet, 2 tablet, Oral, Q4H PRN  nicotine (NICODERM CQ) 21 MG/24HR 1 patch, 1 patch, Transdermal, Daily PRN  ondansetron (ZOFRAN) injection 4 mg, 4 mg, Intravenous, Q6H PRN  tamsulosin (FLOMAX) capsule 0.4 mg, 0.4 mg, Oral, Daily  aspirin EC tablet 325 mg, 325 mg, Oral, Daily  atorvastatin (LIPITOR) tablet 40 mg, 40 mg, Oral, Daily  Calcium Carb-Cholecalciferol 600-400 MG-UNIT TABS 1 tablet, 1 tablet, Oral, Daily  clopidogrel (PLAVIX) tablet 75 mg, 75 mg, Oral, Daily  dextrose 5 % solution, 100 mL/hr, Intravenous, PRN  dextrose 50 % solution 12.5 g, 12.5 g, Intravenous, PRN  docusate sodium (COLACE) capsule 100 mg, 100 mg, Oral, Daily  enoxaparin (LOVENOX) injection 30 mg, 30 mg, Subcutaneous, BID  famotidine (PEPCID) tablet 40 mg, 40 mg, Oral,

## 2017-10-22 NOTE — PROGRESS NOTES
ex, pt able to achieve shld flex, abd to 90 AROM no vibration, good response to pro,sup, wrist flex, ext and finger flex with vibration, trace mvmt without vibration  Weight Bearing  Weight Bearing Technique: Yes  LUE Weight Bearing: Forearm standing; Extended arm standing  Response To Weight Bearing Technique: tolerated well                OT FIM:   Eatin - Feeds self with setup/supervision/cues and/or requires only setup/supervision/cues to perform tube feedings  Groomin - Requires setup/cues to do all tasks  Bathing: 3 - Able to bathe 5-7 areas (LHS to reach feet, cues to wash LUE, LLE)  Dressing-Upper: 3 - Requires assist with 2 tasks (max bra, min pullover shirt)  Dressing-Lower: 1 - Total assist with lower body dressing (using reachers to don pants over feet, pants, briefs, socks, no teds )  Toiletin - Total assist  Toilet Transfer: 3 - Requires 25-49% assist getting off toilet  Shower Transfer: 0 - Activity does not occur (bathed at sink today)    Social Interaction: 6 - Patient requires medication for mood and/or effect  Problem Solving: 3 - Patient solves simple/routine tasks 50%-74%  Memory: 4 - Patient remembers 75-90%+ of the time    Assessment     Activity Tolerance: Patient Tolerated treatment well            10/22/17 1514 10/22/17 1515   OT Individual Minutes   Time In 8661 5630   Time Out 0830 1340   Minutes 62 34          Patient Education:  Patient Goals   Patient goals : to be able to do things for myself   AROM ex LUE to do indep  Learner:patient  Method: demonstration and explanation       Outcome: demonstrated understanding and needs reinforcement     Plan  Plan  Times per week: 5-7x/week  Current Treatment Recommendations: Balance Training, Functional Mobility Training, Endurance Training, Safety Education & Training, Self-Care / ADL, Equipment Evaluation, Education, & procurement, Patient/Caregiver Education & Training, Strengthening, ROM, Neuromuscular Re-education, Cognitive Reorientation, Cognitive/Perceptual Training  Patient Goals   Patient goals : to be able to do things for myself  Short term goals  Time Frame for Short term goals: One week,   Short term goal 1: Pt. will demo. CGA with bed mobility. Short term goal 2: Pt. will demo. min. A with toilet transfer and mod. A with toileting tasks with good . Short term goal 3: Pt. will demo. min. A with UB bathing/dressing, and MOd. A with LB bathing/dressing using AE as needed. Short term goal 4: Pt. will participate in left UE ROM and stgth ex. to assist with functional tasks. Short term goal 5: Pt. will tolerate facilitation tech. to elicit movement in left wrist and hand to assist with functional tasks. Short term goal 6: Pt. will attend to left side of body and L visual field with min. vc's 90% of the time  Short term goal 7: Pt. will tolerate 5-8 min. functional standing activities to promote increased indep.with ADL's and mobility. Long term goals  Time Frame for Long term goals : By discharge,   Long term goal 1: Pt. will demo. Mod.I with bed mobility. Long term goal 2: Pt. will demo. SBA with functional ADL transfers using appropriate AD with good . Long term goal 3: Pt. will demo. SBA with UB bathing/dressing, and min. A with LB bathing/dressing using AE as needed. Long term goal 4: Pt.will demo. SBA with toileting tasks.        Electronically signed by Parminder Garner OT on 10/22/17 at 3:35 PM

## 2017-10-22 NOTE — PROGRESS NOTES
Physical Therapy  Facility/Department: [facility] [place] ACUTE REHAB  Daily Treatment Note  NAME: Ramana Barker  : 1950  MRN: [id number]    Date of Service: 10/22/2017    Patient Diagnosis(es):   Patient Active Problem List    Diagnosis Date Noted    Acute ischemic right MCA stroke ([address] 10/11/2017     Priority: High    Right-sided carotid artery disease ([address]     Anemia     Hypokalemia     Hydronephrosis of left kidney 10/10/2017    Acute cystitis with hematuria 10/10/2017    CKD (chronic kidney disease), stage III 10/10/2017    Hypotension arterial 10/10/2017    Essential hypertension 10/10/2017    Morbid obesity with BMI of 40.0-44.9, adult ([address] 10/10/2017    History of arterial ischemic stroke 10/10/2017    History of pulmonary embolus (PE) 10/10/2017    Paroxysmal atrial fibrillation ([address] 10/10/2017    Left ureteral stone 10/10/2017    Left-sided weakness     Non-intractable vomiting with nausea     Cerebrovascular accident (CVA) due to embolism of right middle cerebral artery (HCC)     Hypotension     Sepsis secondary to UTI ([address]     Elevated serum creatinine     Kidney stone        Past Medical History:   Diagnosis Date    CVA (cerebral vascular accident) ([address]     Diabetes mellitus ([address]     Other disorders of skin and subcutaneous tissue in diseases classified elsewhere several years ago    Bullous Pemphkgoid    Patient in clinical research study 10/13/2017    Anticipated end date 2018    PE [facility]pulmonary [facility]ism[facility])      Past Surgical History:   Procedure Laterality Date    CHOLECYSTECTOMY      CYSTOSCOPY Left 10/10/2017    CYSTOSCOPY, STENT INSERTION performed by Elaine Gustafson MD at [address]         Restrictions  Restrictions/Precautions  Restrictions/Precautions: General Precautions, Fall Risk  Required Braces or Orthoses?: No  Position Activity Restriction  Other position/activity restrictions: up with assistance  Subjective              Orientation Objective   Bed mobility  Rolling to Right: Moderate assistance  Supine to Sit: Moderate assistance  Sit to Supine: Moderate assistance  Scooting: Minimal assistance  Transfers  Sit to Stand: Minimal Assistance  Stand to sit: Contact guard assistance  Bed to Chair: Minimal assistance  Stand Pivot Transfers: Minimal Assistance  Ambulation 1  Surface: level tile  Device: Rolling Walker  Other Apparatus: Wheelchair follow (no second person)  Assistance: Minimal assistance  Quality of Gait: Wide BRYAN, forward flexed posture, minimal knee and dorsiflexion  Distance: 60 feet  Comments: Slight SOB, vc's for upright posture, vc's to narrow BRYAN, vc's for heel/toe gait  Stairs/Curb  Stairs?: Yes  Stairs  # Steps : 6  Stairs Height: 4\"  Device: Hand Held Assist (left side)  Assistance: Minimal assistance; Moderate assistance (2 person assist)  Comment: patient fearful of stairs tolerated well with constant cueing and sequencing technique  Propulsion 1  Propulsion: Manual  Level: Level Tile  Method: RUE;RLE;LLE  Level of Assistance: Supervision  Description/ Details: limited by low endurance/weakness  Distance: 50 feet        Other exercises  Other exercises 1: supine ex BLE 10x  Other exercises 2: nustep L1 5'  Other exercises 3: seated ex BLE 10x  Other exercises 4: sit to stand 3x  Other exercises 5: UBE seated  10 min  L hand strapped  Other exercises 6: Red Tband BLE 15x                        Assessment              Discharge Recommendations:  Home with nursing aide, Home with Home health PT, Home with assist PRN    G-Code     OutComes Score                                                      Goals  Short term goals  Time Frame for Short term goals: 7 days  Short term goal 1: Improve L UE and LE strength by 1/2 MMG  Short term goal 2:  Increase endurance to good  Short term goal 3: Improve sitting balance to good  Short term goal 4: Improve bed mobility to min A x1  Short term goal 5: Improve transfers to min A x1  Long

## 2017-10-22 NOTE — PLAN OF CARE
Problem: Risk for Impaired Skin Integrity  Goal: Tissue integrity - skin and mucous membranes  Structural intactness and normal physiological function of skin and  mucous membranes. Outcome: Ongoing  No new skin issues. See head to toe assessment. Will continue to monitor. Problem: Falls - Risk of  Goal: Absence of falls  Outcome: Ongoing  Remains free from falls. Call light in reach. Rounds continue. Problem: Pain:  Goal: Pain level will decrease  Pain level will decrease   Outcome: Ongoing  No c/o pain. Rests quietly in bed. Will continue to monitor.

## 2017-10-23 LAB
GLUCOSE BLD-MCNC: 194 MG/DL (ref 65–105)
GLUCOSE BLD-MCNC: 211 MG/DL (ref 65–105)
GLUCOSE BLD-MCNC: 219 MG/DL (ref 65–105)
GLUCOSE BLD-MCNC: 96 MG/DL (ref 65–105)

## 2017-10-23 PROCEDURE — 99232 SBSQ HOSP IP/OBS MODERATE 35: CPT | Performed by: INTERNAL MEDICINE

## 2017-10-23 PROCEDURE — 97535 SELF CARE MNGMENT TRAINING: CPT

## 2017-10-23 PROCEDURE — 6360000002 HC RX W HCPCS: Performed by: INTERNAL MEDICINE

## 2017-10-23 PROCEDURE — 97110 THERAPEUTIC EXERCISES: CPT

## 2017-10-23 PROCEDURE — 1180000000 HC REHAB R&B

## 2017-10-23 PROCEDURE — 6360000002 HC RX W HCPCS: Performed by: FAMILY MEDICINE

## 2017-10-23 PROCEDURE — 97530 THERAPEUTIC ACTIVITIES: CPT

## 2017-10-23 PROCEDURE — 97116 GAIT TRAINING THERAPY: CPT

## 2017-10-23 PROCEDURE — 97532 HC COGNITIVE THERAPY 15 MIN: CPT

## 2017-10-23 PROCEDURE — 6370000000 HC RX 637 (ALT 250 FOR IP): Performed by: FAMILY MEDICINE

## 2017-10-23 PROCEDURE — 82947 ASSAY GLUCOSE BLOOD QUANT: CPT

## 2017-10-23 PROCEDURE — 6370000000 HC RX 637 (ALT 250 FOR IP): Performed by: INTERNAL MEDICINE

## 2017-10-23 PROCEDURE — 99232 SBSQ HOSP IP/OBS MODERATE 35: CPT | Performed by: PHYSICAL MEDICINE & REHABILITATION

## 2017-10-23 RX ADMIN — INSULIN LISPRO 2 UNITS: 100 INJECTION, SOLUTION INTRAVENOUS; SUBCUTANEOUS at 19:37

## 2017-10-23 RX ADMIN — FUROSEMIDE 20 MG: 20 TABLET ORAL at 07:46

## 2017-10-23 RX ADMIN — MICONAZOLE NITRATE: 1.42 POWDER TOPICAL at 11:12

## 2017-10-23 RX ADMIN — POTASSIUM CHLORIDE 20 MEQ: 1.5 POWDER, FOR SOLUTION ORAL at 19:35

## 2017-10-23 RX ADMIN — ASPIRIN 325 MG: 325 TABLET, DELAYED RELEASE ORAL at 07:46

## 2017-10-23 RX ADMIN — FAMOTIDINE 40 MG: 20 TABLET ORAL at 07:46

## 2017-10-23 RX ADMIN — MYCOPHENOLATE MOFETIL 1500 MG: 250 CAPSULE ORAL at 07:52

## 2017-10-23 RX ADMIN — MICONAZOLE NITRATE: 1.42 POWDER TOPICAL at 19:33

## 2017-10-23 RX ADMIN — TAMSULOSIN HYDROCHLORIDE 0.4 MG: 0.4 CAPSULE ORAL at 07:46

## 2017-10-23 RX ADMIN — FERROUS SULFATE TAB 325 MG (65 MG ELEMENTAL FE) 325 MG: 325 (65 FE) TAB at 16:13

## 2017-10-23 RX ADMIN — MYCOPHENOLATE MOFETIL 1500 MG: 250 CAPSULE ORAL at 19:35

## 2017-10-23 RX ADMIN — CLOPIDOGREL BISULFATE 75 MG: 75 TABLET ORAL at 07:46

## 2017-10-23 RX ADMIN — INSULIN LISPRO 4 UNITS: 100 INJECTION, SOLUTION INTRAVENOUS; SUBCUTANEOUS at 16:10

## 2017-10-23 RX ADMIN — FERROUS SULFATE TAB 325 MG (65 MG ELEMENTAL FE) 325 MG: 325 (65 FE) TAB at 07:47

## 2017-10-23 RX ADMIN — ENOXAPARIN SODIUM 30 MG: 30 INJECTION SUBCUTANEOUS at 07:46

## 2017-10-23 RX ADMIN — Medication 1 TABLET: at 07:46

## 2017-10-23 RX ADMIN — INSULIN LISPRO 2 UNITS: 100 INJECTION, SOLUTION INTRAVENOUS; SUBCUTANEOUS at 11:45

## 2017-10-23 RX ADMIN — ATORVASTATIN CALCIUM 40 MG: 40 TABLET, FILM COATED ORAL at 07:46

## 2017-10-23 RX ADMIN — POTASSIUM CHLORIDE 20 MEQ: 1.5 POWDER, FOR SOLUTION ORAL at 07:53

## 2017-10-23 RX ADMIN — PAROXETINE HYDROCHLORIDE 10 MG: 10 TABLET, FILM COATED ORAL at 11:11

## 2017-10-23 RX ADMIN — MULTIPLE VITAMINS W/ MINERALS TAB 1 TABLET: TAB at 07:47

## 2017-10-23 RX ADMIN — ENOXAPARIN SODIUM 30 MG: 30 INJECTION SUBCUTANEOUS at 19:33

## 2017-10-23 RX ADMIN — INSULIN GLARGINE 30 UNITS: 100 INJECTION, SOLUTION SUBCUTANEOUS at 20:10

## 2017-10-23 ASSESSMENT — PAIN SCALES - GENERAL: PAINLEVEL_OUTOF10: 0

## 2017-10-23 NOTE — PROGRESS NOTES
Speech Language Pathology  Speech Language Pathology  [facility]    Cognitive Treatment Note    Date: 10/23/2017  Patients Name: David Prabhakar  MRN: [id number]  Diagnosis:   Patient Active Problem List   Diagnosis Code    Hydronephrosis of left kidney N13.30    Acute cystitis with hematuria N30.01    CKD (chronic kidney disease), stage III N18.3    Hypotension arterial I95.9    Essential hypertension I10    Morbid obesity with BMI of 40.0-44.9, adult ([facility] E66.01, Z68.41    History of arterial ischemic stroke Z86.73    History of pulmonary embolus (PE) Z86.711    Paroxysmal atrial fibrillation (HCC) I48.0    Left ureteral stone N20.1    Left-sided weakness R53.1    Non-intractable vomiting with nausea R11.2    Cerebrovascular accident (CVA) due to embolism of right middle cerebral artery (HCC) I63.411    Hypotension I95.9    Sepsis secondary to UTI ([facility] A41.9, N39.0    Elevated serum creatinine R79.89    Kidney stone N20.0    Anemia D64.9    Hypokalemia E87.6    Acute ischemic right MCA stroke (HCC) I63.511    Right-sided carotid artery disease (HCC) I77.9       Pain: 0/10    Cognitive Treatment    Treatment time: 2360-8622      Subjective: [x] Alert [x] Cooperative     [] Confused     [] Agitated    [] Lethargic      Objective/Assessment:  Attention: Pt able to sustain attn Argenis. Orientation: Pt oriented to date c reference to her digital calendar; required cue to look at it. Pt oriented to the fact that she is in the rehab unit, but not name of hospital.  Pt oriented to time of day. Recall: Pt completed a memory and mental manipulation task in which she was asked to immediately recall a list of 3-4 words and order them: immediate recall 3 words, 100% Argenis; ordering 3 words 75% Argenis inc to 100% c min A; recall 4 words 88% inc to 100% c min A; ordering 4 words 0% inc to 100% c max A. Problem Solving/Reasoning: Pt completed word deduction task- 100% Argenis.      Other: Pt

## 2017-10-23 NOTE — PLAN OF CARE
Problem: Nutrition  Goal: Optimal nutrition therapy  Outcome: Ongoing  Nutrition Problem: Altered nutrition-related lab values  Intervention: Food and/or Nutrient Delivery: Continue current diet, Discontinue ONS  Nutritional Goals: PO intakes greater than 75% at meals

## 2017-10-23 NOTE — PROGRESS NOTES
Physical Medicine & Rehabilitation  Progress Note    10/23/2017 12:18 PM     CC: Ambulatory and ADL dysfunction due to CVA    Subjective:   Positive BM, no pain, good spirits. ROS:  Denies fevers, chills, sweats. No chest pain, palpitations, lightheadedness. Denies coughing, wheezing or shortness of breath. Denies abdominal pain, nausea, diarrhea or constipation. No new areas of joint pain. Denies new areas of numbness or weakness. Denies new anxiety or depression issues. No new skin problems. Rehabilitation:   PT:  Restrictions/Precautions: General Precautions, Fall Risk  Other position/activity restrictions: up with assistance   Transfers  Sit to Stand: Moderate Assistance  Stand to sit: Minimal Assistance (x1)  Bed to Chair: Minimal assistance (x2)  Stand Pivot Transfers: Minimal Assistance (x1; with RW; Dysom on L )  Comment: standing with Walker Lift then progressed to Left Platform RW  WB Status: no restrictions  Ambulation 1  Surface: level tile  Device: Rolling Walker (Red dysom on L )  Other Apparatus: Wheelchair follow  Assistance: Minimal assistance  Quality of Gait: Wide BRYAN, forward flexed posture, minimal knee and dorsiflexion  Distance: 70 ft  AM; 50 ft PM  Comments: Slight SOB, vc's for upright posture, vc's to narrow BRYAN, vc's for heel/toe gait    Transfers  Sit to Stand:  Moderate Assistance  Stand to sit: Minimal Assistance (x1)  Bed to Chair: Minimal assistance (x2)  Stand Pivot Transfers: Minimal Assistance (x1; with RW; Dysom on L )  Comment: standing with Walker Lift then progressed to Left Platform RW  Ambulation  Ambulation?: Yes  WB Status: no restrictions  More Ambulation?: Yes  Ambulation 1  Surface: level tile  Device: Rolling Walker (Red dysom on L )  Other Apparatus: Wheelchair follow  Assistance: Minimal assistance  Quality of Gait: Wide BRYAN, forward flexed posture, minimal knee and dorsiflexion  Distance: 70 ft  AM; 50 ft PM  Comments: Slight SOB, vc's for upright posture, vc's to narrow BRYAN, vc's for heel/toe gait    Surface: level tile  Ambulation 1  Surface: level tile  Device: Rolling Walker (Red dysom on L )  Other Apparatus: Wheelchair follow  Assistance: Minimal assistance  Quality of Gait: Wide BRYAN, forward flexed posture, minimal knee and dorsiflexion  Distance: 70 ft  AM; 50 ft PM  Comments: Slight SOB, vc's for upright posture, vc's to narrow BRYAN, vc's for heel/toe gait      OT:   OT FIM:   Eatin - Feeds self with setup/supervision/cues and/or requires only setup/supervision/cues to perform tube feedings  Groomin - Requires setup/cues to do all tasks  Bathing: 3 - Able to bathe 5-7 areas (LHS to reach feet, cues to wash LUE, LLE)  Dressing-Upper: 3 - Requires assist with 2 tasks (max bra, min pullover shirt)  Dressing-Lower: 1 - Total assist with lower body dressing (using reachers to don pants over feet, pants, briefs, socks, no teds )  Toiletin - Total assist  Toilet Transfer: 3 - Requires 25-49% assist getting off toilet  Shower Transfer: 0 - Activity does not occur (bathed at sink today)     Social Interaction: 6 - Patient requires medication for mood and/or effect  Problem Solving: 3 - Patient solves simple/routine tasks 50%-74%  Memory: 4 - Patient remembers 75-90%+ of the time      Objective:  BP (!) 125/46   Pulse 83   Temp 97.9 °F (36.6 °C) (Oral)   Resp 14   Ht 5' (1.524 m)   Wt 266 lb 1.6 oz (120.7 kg)   SpO2 97%   BMI 51.97 kg/m²  I Body mass index is 51.97 kg/m². I   Wt Readings from Last 1 Encounters:   10/21/17 266 lb 1.6 oz (120.7 kg)      Temp (24hrs), Av.9 °F (36.6 °C), Min:97.8 °F (36.6 °C), Max:97.9 °F (36.6 °C)      Alert, no distress. Good speech and language function. Lungs clear. Heart regular. Abdomen non-distended, non-tender. No calf tenderness or edema.   Left upper extremity 3+/4 minus out of 5, left lower extremity 4 out of 5 .right extremity 5 out of 5      Medications   Scheduled Meds:   POCGLU  191*  204*  186*  96       No results for input(s): CLARITYU, COLORU, PHUR, SPECGRAV, PROTEINU, RBCUA, BLOODU, BACTERIA, NITRU, WBCUA, LEUKOCYTESUR, YEAST, GLUCOSEU, BILIRUBINUR in the last 72 hours. Impression/Plan:    Patient is a 68-year-old female with ADL and mobility dysfunction s/p MCA CVA.     1. Ambulatory and ADL dysfunction secondary to MCA CVA -continue rehab efforts, making progress, team conference in a.m.  2. Sepsis due to UTI and left pylenephritis- leukocytosis- Cipro till 10/18 per IM- completed. White count 9.6 on 10/17/2017.  3. S/p urethrel stent, gant, b/l kidney stones - f/u uro as outpt, flomax. 4. R MCA cva with LHP- embolic. Shel[facility] Setting Continue asa and plavix until 10/27/17, then asa 325 mg and eliquis 5 mg BID per note from internal medicine at [address] [place] [address]. [facility].  Continue lipitor.    5. PAF - Per cardio will restart eliquis in 2 weeks.  F/u as OP in 2 wks. [facility] consult cardiology for clarification of anticoagulation with history of PE. Patient was seen by Dr. Huong Islas at [address] [place] [address]. Louie. Cardiology follow-up pending  6. Anemia- protonix, s/p 1 unit PRBC transfusion.  10/17 Hb 9.1 asymptomatic. No obvious blood loss. Continue iron supplementation. Will continue to monitor. Recheck in a.m.  7. Constipation - Colace  8. Reflux - Pepcid  9. Bulbous pemphigus- had discussion with the patient's son this morning. He states that his mother has been on CellCept 1500 mg twice a day for approximately 15 years for bulbous pemphigus. 10. Sacral ulcer- wound care management. Change mepilex q 3 days    11. H/O PE - will restart eliquis per endo neurosurgery/ cardio instructions as above. 12. DM- Cont lantus. IM following for medical management. 13. Hypokalemia- resolved. Continue to monitor. 14. Consult to internal medicine for medical management. Sabiha Somers. Nohemy Kaiser MD

## 2017-10-23 NOTE — PROGRESS NOTES
Physical Therapy  [address]  Acute Rehabilitation Physical Therapy Progress Note    Date: 10/23/17  Patient Name: Srinivas Sultana       Room: 5854/2660-00  MRN: [id number]   Account: [de-identified]   : 1950  (75 y.o.) Gender: female        Diagnosis: R CVA, Acute ischmic right MCA stroke,   Past Medical History:  has a past medical history of CVA (cerebral vascular accident) ([facility] Diabetes mellitus ([facility] Other disorders of skin and subcutaneous tissue in diseases classified elsewhere; Patient in clinical research study; and PE (pulmonary thromboembolism) ([facility] Past Surgical History:   has a past surgical history that includes Cholecystectomy; hernia repair; and Cystoscopy (Left, 10/10/2017). Additional Pertinent Hx: Pt presents to ER on 10/9/17 with c/o vomiting and severe abdominal and back pain. Pt was found to have acute cystitis, kidney stone and hydronephrosis. On 10/10/17, pt dev L sided weakness and facial droop, found to have acute R MCA infarct. Pt underwent emergent cysto with ureteral stent placement 10/10/2017. Pt had angiogram and found to have ICA stenosis, stent placed 10/12/17. Additional PMH: CKD, A fib, morbid obesity, HTN, pt is in clinical trial that will end in 2018- unspecified. Overall Orientation Status: Within Normal Limits  Restrictions/Precautions  Restrictions/Precautions: General Precautions; Fall Risk  Required Braces or Orthoses?: No  Position Activity Restriction  Other position/activity restrictions: up with assistance    Subjective: Patient reports she lives alone but that her children will be available to assist if needed. Comments: Pt is pleasant and cooperative. Patient Currently in Pain: Denies                   Bed Mobility:   Rolling: Moderate assistance;Rolling Left;Rolling Right  Supine to Sit: Moderate assistance (assist with torso)  Sit to Supine:  Moderate assistance (Assist with bilat LE)  Scooting: Minimal assistance (to EOM)      Transfers:  Sit to Stand: Minimal Assistance (Cues for technique.)  Stand to sit: Contact guard assistance  Bed to Chair: Minimal assistance (with RW)  Stand Pivot Transfers: Minimal Assistance (x1; with RW)           Ambulation 1  Surface: level tile  Device: Rolling Walker  Assistance: Minimal assistance  Quality of Gait: Wide BRYAN, forward flexed posture, minimal knee and dorsiflexion  Distance: 60 ft AM;  100 ft PM  Comments: Slight SOB, vc's for upright posture, vc's to narrow BRYAN, vc's for heel/toe gait        Stairs/Curb  Stairs?: Yes  Stairs  # Steps : 6  Stairs Height: 4\"  Rails: Right ascending  Device: Hand Held Assist (Lt. side)  Assistance: Minimal assistance; Moderate assistance (2 person assist)  Comment: patient fearful of stairs tolerated well with constant cueing and sequencing technique                    FIMS:      Transfers  Bed, Chair, Wheel Chair: 3 - Requires 25-49% assistance to transfer   Locomotion  Primary Mode: Walk  Distance Walked: 60 ft  Walk: 2 - Maximal Assistance Requires up to Maximal Assistance AND requires assistance of one person to walk/operate wheelchair between  feet (Patient performs 25-49% of locomotion effort or goes between  feet)  Stairs: 1- Total Assistance perfoms less than 25% of the effort, or requirs the assistance of two people, or goes up and down fewer than 4 stairs (2 person assist)       BALANCE Posture: Fair  Sitting - Static: Fair (seated EOM)  Sitting - Dynamic: Fair (Seated EOM)  Standing - Static: Fair;+ (RW)  Standing - Dynamic: Fair (RW)  Comments: Posterior Lean while seated EOM,     EXERCISES    Other exercises?: Yes  Other exercises 1: Seated in w/c bilat LE therex, A/AAROM x10 1.5lb ankle weights  Other exercises 2: Sit to Stand x5  Other exercises 4: Seated EOM weight bearing into Left UE with push off for upright posture x10  Other exercises 5: UBE seated  5 mins FWD and BWD  L hand strapped  Other exercises 6: Seated at [facility] reaching for cones  Other exercises 7: NuStep  x 10 mins. Workload 3           Activity Tolerance: Patient limited by fatigue, Patient limited by endurance          Patient Education  New Education Provided: Safe Transfers  Learner:patient  Method: demonstration and explanation       Outcome: needs reinforcement     Current Treatment Recommendations: Strengthening, Transfer Training, Endurance Training, Cognitive Reorientation, Patient/Caregiver Education & Training, ROM, Balance Training, Gait Training, Functional Mobility Training, Stair training, Safety Education & Training    Conditions Requiring Skilled Therapeutic Intervention  Body structures, Functions, Activity limitations: Decreased functional mobility ; Decreased strength;Decreased ROM; Decreased endurance;Decreased safe awareness;Decreased balance  Treatment Diagnosis: CVA, L hemiplegia  Prognosis: Good  Patient Education: Safe Transfers  REQUIRES PT FOLLOW UP: Yes  Discharge Recommendations: Home with nursing aide;Home with Home health PT;Home with assist PRN    Goals  Short term goals  Time Frame for Short term goals: 7 days  Short term goal 1: Improve L UE and LE strength by 1/2 MMG  Short term goal 2: Increase endurance to good  Short term goal 3: Improve sitting balance to good  Short term goal 4: Improve bed mobility to min A x1  Short term goal 5: Improve transfers to min A x1  Long term goals  Time Frame for Long term goals : 21 days  Long term goal 1:  Increase UEs and LEs strength to [facility] to maximize mobiility  Long term goal 2: Improve standing balance with appropriate device to good to increase safety  Long term goal 3: Improve bed mobility to independent to prepare for home  Long term goal 4: Improve transfers with appropriate device to mod I to prepare for home  Long term goal 5: Improve gait with appropriate device to mod I 150 ft to prepare for home  Long term goal 6: Improve stair negotiation to SBA on 4 steps with 1 rail use to allow pt to enter and exit home       10/23/17 0845 10/23/17 1530   PT Individual Minutes   Time In 0845 1530   Time Out [id number]   Minutes 45 45       Electronically signed by Monet Elias PTA on 10/23/17 at 4:32 PM

## 2017-10-23 NOTE — PATIENT CARE CONFERENCE
[facility]: RAY AWAD   ACUTE REHABILITATION  TEAM CONFERENCE NOTE  Date: 10/23/17  Patient Name: Juwan Marshall       Room: 9108/1196-61  MRN: [id number]       : 1950  (75 y.o.)     Gender: female      Diagnosis: R MCA CVA, ICA stenosis, cystitits    NURSING  FIMS:  Bladder: 4 - Requires touching assistance to manage or place device (e.g. place urinal) < 25% assist  Bladder Level of Assistance: 2- Maximal Assistance (Subject equal 25% or more)  Bladder Frequency of Accidents: 6 - No accidents: uses device  Bowel: 2 - Requires 50-74% assistance with device (suppository with positioning and cleanup)  Bowel Level of Assistance: 1- Total Assistance (Subject less than 25%)  Bowel Frequency of Accidents: 6 - No accidents: uses device   Bladder  Continent  Bowel   Continent  Intervention    Bowel Program    Wounds/Incisions/Ulcers: No skin issues identified  Medication Education Program: Patient able to manage medications and being educated by nursing  Pain: no pain concerns to address    Fall Risk:  Falling star program initiated    PHYSICAL THERAPY  Bed mobility  Rolling to Right: Moderate assistance  Supine to Sit: Moderate assistance  Sit to Supine: Moderate assistance  Scooting: Minimal assistance (to EOM)  Bed Mobility  Rolling: Moderate assistance;Rolling Left;Rolling Right  Supine to Sit: Moderate assistance (assist with torso)  Sit to Supine:  Moderate assistance (Assist with bilat LE)  Scooting: Minimal assistance (to EOM)      Transfers:  Sit to Stand: Minimal Assistance (Cues for technique.)  Stand to sit: Contact guard assistance  Bed to Chair: Minimal assistance (with RW)  Stand Pivot Transfers: Minimal Assistance (x1; with RW)    Ambulation 1  Surface: level tile  Device: Rolling Walker  Other Apparatus: Wheelchair follow (no second person)  Assistance: Minimal assistance  Quality of Gait: Wide BRYAN, forward flexed posture, minimal knee and dorsiflexion  Distance: 60 ft AM;  100 ft PM  Comments: Slight SOB, vc's for upright posture, vc's to narrow BRYAN, vc's for heel/toe gait    Stairs  # Steps : 6  Stairs Height: 4\"  Rails: Right ascending  Device: Hand Held Assist (Lt. side)  Assistance: Minimal assistance; Moderate assistance (2 person assist)  Comment: patient fearful of stairs tolerated well with constant cueing and sequencing technique    FIMS:  Bed, Chair, Wheel Chair: 3 - Requires 25-49% assistance to transfer  Walk: 2 - Maximal Assistance Requires up to Maximal Assistance AND requires assistance of one person to walk/operate wheelchair between  feet (Patient performs 25-49% of locomotion effort or goes between  feet)  Distance Walked: 60 ft  Wheel Chair: 1 - Total Assistance Walks/operates wheelchair < 50 feet OR requires two or more people OR patient performs < 25% of locomotion effort  Distance Traveled in Wheel Chair: 50 feet  Stairs: 1- Total Assistance perfoms less than 25% of the effort, or requirs the assistance of two people, or goes up and down fewer than 4 stairs (2 person assist)    PT Equipment Recommendations  Other: TBD    Assessment: Pt presents s/p CVA with L hemiplegia, ICA occlusion requiring stent, and cystitis, hydronephritis, and kidney stone requiring ureteral stent. Pt demo's impaired balance, strength, endurance, safety awareness and needs assist with moibility. Cont per POC to prepare for home. Will need to determine appropriate home set up (dtr vs son vs pt's own home) as rehab progresses. Goals  Short term goals  Time Frame for Short term goals: 7 days  Short term goal 1: Improve L UE and LE strength by 1/2 MMG  Short term goal 2:  Increase endurance to good  Short term goal 3: Improve sitting balance to good  Short term goal 4: Improve bed mobility to min A x1  Short term goal 5: Improve transfers to min A x1    OCCUPATIONAL THERAPY  FIMS:  Eatin - Feeds self with setup/supervision/cues and/or requires only setup/supervision/cues to perform 266 lb 1.6 oz (120.7 kg) / Body mass index is 51.97 kg/m². Diet Rx: Carb Control diet. Patient is eating well at meals/ intake appears adequate. Ref. Range 10/22/2017 05:49 10/22/2017 11:23 10/22/2017 16:08 10/22/2017 19:53 10/23/2017 06:07   POC Glucose Latest Ref Range: 65 - 105 mg/dL 118 (H) 191 (H) 204 (H) 186 (H) 96   Please see nutrition note for details. SOCIAL WORK ASSESSMENT  Assessment: dtr  Pre-Admission Status:  Lives With: Alone (has been living with daughter since last month)  Type of Home: House  Home Layout: One level  Home Access: Stairs to enter with rails  Entrance Stairs - Number of Steps: 1  Entrance Stairs - Rails: Left  Bathroom Shower/Tub: Tub/Shower unit  Bathroom Toilet: Handicap height  Bathroom Equipment: Grab bars in shower  Bathroom Accessibility: Accessible  Home Equipment: Standard walker  Receives Help From: Family  ADL Assistance: Independent  Homemaking Assistance: Needs assistance  Meal Prep: Moderate (family cooks meals, pt warms meals as needed)  Cleaning: Total  Gardening: Total  Yard Work: Total  Shopping: Total  Homemaking Responsibilities: Yes  Meal Prep Responsibility: No (famil cook meals and patient warms foof using microwave)  Laundry Responsibility: Primary  Cleaning Responsibility: Primary  Shopping Responsibility: No (family do the grocery shopping)  Ambulation Assistance: Independent (uses of RW)  Transfer Assistance: Independent  Active : No  Occupation: Retired  Additional Comments: Pt states she was living with dtr prior to admit. Pt states she can go to son or dtr's home at d/c, but will most likely return to dtr's.      Family Education: Need to make contact with family to initiate education    Risk for Readmission: Moderate 10-13.9   Readmission Risk              Readmission Risk:        10.75       Age 72 or Greater:  1    Admitted from SNF or Requires Paid or Family Care:  0    Currently has CHF,COPD,ARF,CRI,or is on dialysis:  0    Takes more than 5 Prescription Medications:  4    Takes Digoxin,Insulin,Anticoagulants,Narcotics or ASA/Plavix:  2    Hospital Admit in Past 12 Months:  0    On Disability:  0    Patient Considers own Health:  3.75        Critical Items:     Problem / Barrier Intervention / Plan  Results   Alteration in ADL Functional Status  Training in use of devices and modified care techniques for safety and independence in care tasks     L neglect Verbal, visual cues    Impaired recall Retraining. Impaired mobility Transfer/gait training with AD; therapeutic exer; balance activities; stair training                     Functional FIM Gain  Admission Score:  50  Progress:  51  Goal:  89   `  Discharge Plan   Estimated Discharge Date: 11/9/17  Overnight or Day Pass: No  Factors facilitating achievement of predicted outcomes: Family support, Motivated and Cooperative  Barriers to the achievement of predicted outcomes: Cognitive deficit, Anxiety, Upper extremity weakness, Lower extremity weakness and Skin Care    Functional Goals at discharge:  Home with family Close supervision  Discharge therapy goals:  PT: Long term goals  Time Frame for Long term goals : 21 days  Long term goal 1: Increase UEs and LEs strength to [facility] to maximize mobiility  Long term goal 2: Improve standing balance with appropriate device to good to increase safety  Long term goal 3: Improve bed mobility to independent to prepare for home  Long term goal 4: Improve transfers with appropriate device to mod I to prepare for home  Long term goal 5: Improve gait with appropriate device to mod I 150 ft to prepare for home  Long term goal 6: Improve stair negotiation to SBA on 4 steps with 1 rail use to allow pt to enter and exit home  OT:Long term goals  Time Frame for Long term goals : By discharge,   Long term goal 1: Pt. will demo. Mod.I with bed mobility. Long term goal 2: Pt. will demo. SBA with functional ADL transfers using appropriate AD with good .   Long term goal 3: Pt. will demo. SBA with UB bathing/dressing, and min. A with LB bathing/dressing using AE as needed. Long term goal 4: Pt.will demo. SBA with toileting tasks. ST: mod I problem solving, supervision level memory    Team Members Present at Conference:  :  Hilaria Campa  LSW  Occupational Therapist: Jaswinder Zambrano OT   [address] PT  Speech Therapist: Lamont SEGURACCC/SLP  Nurse: Sam Marley RN   Recreation Therapy: Lars Cheek  Dietary/Nutrition: Alis Castañeda RD LD  Pastoral Care: Jessie Centeno  CMG:  Silvia Salmon RN    I approve the established interdisciplinary plan of care as documented within the medical record of Henri Islas. Enedelia Bautista.  Sarah Contreras MD

## 2017-10-23 NOTE — PROGRESS NOTES
[address]   ACUTE REHABILITATION OCCUPATIONAL THERAPY  DAILY NOTE    Date: 10/23/17  Patient Name: Jacqueline Wall      Room: 3056/8001-83    MRN: [id number]   : 1950  (74 y.o.)  Gender: female      Diagnosis: R CVA, Acute ischmic right MCA stroke,   Additional Pertinent Hx: UTI, s/p Ureteral stone removed dueing stent placement 10/10/17, h/o CVA a year ago    Restrictions  Restrictions/Precautions: General Precautions, Fall Risk  Other position/activity restrictions: up with assistance  Position Activity Restriction  Other position/activity restrictions: up with assistance     Subjective  Subjective: \"I dont need to eat, I am fine. \" pt only  had one sausage for breakfast with max encouragement from RN and writer to eat breakfast.   Comments: declined shower task, required extended amount for all tasks. will continue ADL tx this PM  Patient Currently in Pain: No  Pain Level: 0  Restrictions/Precautions: General Precautions; Fall Risk          Objective  Perception  Unilateral Attention: Cues to attend to left side of body;Cues to attend left visual field;Cues to maintain midline in sitting  Initiation: Cues to initiate tasks  Balance  Sitting Balance: Stand by assistance  Standing Balance: Contact guard assistance  Bed mobility  Rolling to Right: Moderate assistance  Supine to Sit: Moderate assistance  Scooting: Minimal assistance  Transfers  Sit to stand: Minimal assistance  Stand to sit: Minimal assistance  Standing Balance  Time: - min x2 in PM  Activity: adls, transfers, static standing at RW  Sit to stand: Minimal assistance  Stand to sit: Minimal assistance  Comment: pt demo one complete LOB with lateral lean to left side with requiring mod a from writer to correct.    Functional Mobility  Functional - Mobility Device: Rolling Walker  Activity: Other (minimal steps with functional transfers)  Assist Level: Minimal assistance  Functional Mobility Comments: 1 complete LOB Exercises  Shoulder Flexion: 3 sets of 5   Shoulder Extension: 3 sets of 5   Chair Push-ups: pt completed 10xs with assistance for LUE/hand placement   Elbow Flexion: 3 sets of 5   Elbow Extension: 3 sets of 5   Grasp/Release: 3 place and hold fist, 3 full fist extension  Weight Bearing  Weight Bearing Technique: Yes  LUE Weight Bearing: Forearm standing; Extended arm standing  Response To Weight Bearing Technique: tolerated well        Weight Bearing  Weight Bearing Technique: Yes  LUE Weight Bearing: Forearm standing; Extended arm standing  Response To Weight Bearing Technique: tolerated well        OT FIM:   Eatin - Feeds self with setup/supervision/cues and/or requires only setup/supervision/cues to perform tube feedings (vc required secondary to decreased vision in L side)  Groomin - Requires setup/cues to do all tasks (oral/face hygeine at sink, brushed hair at wc level )  Bathin - Did not occur (pt stated she had already been cleaned up on this date)  Dressing-Upper: 5 - Requires setup/supervision/cues and/or requires assist with presthesis/brace only (pt don/doff pull over shirt c max vc for technique/sequence )  Dressing-Lower: 2 - Requires assist with 4-5 parts of dressing (with use of reacher and sock aid, inc diff c sock aid)  Toiletin - Able to perform 1 task only (e.g. hygiene) (c CGA pt comlete front ana hygeine at RW )  Toilet Transfer: 3 - Requires 25-49% assist getting off toilet  Primary Mode: Shower  Tub Transfer: 0 - Activity does not occur  Shower Transfer: 0 - Activity does not occur (per pt request to bathe at sink )    Social Interaction: 6 - Patient requires medication for mood and/or effect  Problem Solving: 3 - Patient solves simple/routine tasks 50%-74%  Memory: 4 - Patient remembers 75-90%+ of the time    Assessment  Performance deficits / Impairments: Decreased functional mobility ; Decreased balance;Decreased ADL status; Decreased endurance;Decreased strength;Decreased vision/visual deficit; Decreased coordination;Decreased cognition;Decreased safe awareness;Decreased ROM  Prognosis: Good  Discharge Recommendations: 24 hour supervision or assist  Activity Tolerance: Patient Tolerated treatment well             Patient Education:  Patient Goals   Patient goals : to be able to do things for myself  Patient Education: AE/DME, kelechi technique, angie integration with functional activity   Learner:patient  Method: demonstration and explanation       Outcome: demonstrated understanding and needs reinforcement     Plan  Plan  Times per week: 5-7x/week  Current Treatment Recommendations: Balance Training, Functional Mobility Training, Endurance Training, Safety Education & Training, Self-Care / ADL, Equipment Evaluation, Education, & procurement, Patient/Caregiver Education & Training, Strengthening, ROM, Neuromuscular Re-education, Cognitive Reorientation, Cognitive/Perceptual Training  Patient Goals   Patient goals : to be able to do things for myself  Short term goals  Time Frame for Short term goals: One week,   Short term goal 1: Pt. will demo. CGA with bed mobility. Short term goal 2: Pt. will demo. min. A with toilet transfer and mod. A with toileting tasks with good . Short term goal 3: Pt. will demo. min. A with UB bathing/dressing, and MOd. A with LB bathing/dressing using AE as needed. Short term goal 4: Pt. will participate in left UE ROM and stgth ex. to assist with functional tasks. Short term goal 5: Pt. will tolerate facilitation tech. to elicit movement in left wrist and hand to assist with functional tasks. Short term goal 6: Pt. will attend to left side of body and L visual field with min. vc's 90% of the time  Short term goal 7: Pt. will tolerate 5-8 min. functional standing activities to promote increased indep.with ADL's and mobility. Long term goals  Time Frame for Long term goals : By discharge,   Long term goal 1: Pt. will demo.  Mod.I with bed mobility. Long term goal 2: Pt. will demo. SBA with functional ADL transfers using appropriate AD with good . Long term goal 3: Pt. will demo. SBA with UB bathing/dressing, and min. A with LB bathing/dressing using AE as needed. Long term goal 4: Pt.will demo. SBA with toileting tasks.         10/23/17 1516 10/23/17 1517   OT Individual Minutes   Time In 0268 1301   Time Out [id number]   Minutes 65 32     Electronically signed by GABE Hidalgo on 10/23/17 at 3:21 PM

## 2017-10-23 NOTE — PLAN OF CARE
Problem: Risk for Impaired Skin Integrity  Goal: Tissue integrity - skin and mucous membranes  Structural intactness and normal physiological function of skin and  mucous membranes. Outcome: Ongoing  Patient skin assessed this shift. No new skin issues noted. Problem: Falls - Risk of  Goal: Absence of falls  Outcome: Ongoing  Patient remains free from falls at this time. Bed locked and in lowest position. Call light within reach. Patient uses call light when assistance is needed in room. Problem: Pain:  Goal: Control of acute pain  Control of acute pain   Outcome: Ongoing  Patients pain was well controlled this shift. Patient in bed resting comfortably at this time. No complaints.

## 2017-10-24 ENCOUNTER — APPOINTMENT (OUTPATIENT)
Dept: CT IMAGING | Age: 67
DRG: 056 | End: 2017-10-24
Attending: PHYSICAL MEDICINE & REHABILITATION
Payer: MEDICARE

## 2017-10-24 PROBLEM — L25.8 INCONTINENCE ASSOCIATED DERMATITIS: Status: ACTIVE | Noted: 2017-10-24

## 2017-10-24 PROBLEM — R32 INCONTINENCE ASSOCIATED DERMATITIS: Status: ACTIVE | Noted: 2017-10-24

## 2017-10-24 LAB
-: ABNORMAL
AMORPHOUS: ABNORMAL
ANION GAP SERPL CALCULATED.3IONS-SCNC: 11 MMOL/L (ref 9–17)
BACTERIA: ABNORMAL
BILIRUBIN URINE: NEGATIVE
BUN BLDV-MCNC: 11 MG/DL (ref 8–23)
BUN/CREAT BLD: ABNORMAL (ref 9–20)
CALCIUM SERPL-MCNC: 8.6 MG/DL (ref 8.6–10.4)
CASTS UA: ABNORMAL /LPF
CHLORIDE BLD-SCNC: 106 MMOL/L (ref 98–107)
CO2: 27 MMOL/L (ref 20–31)
COLOR: YELLOW
COMMENT UA: ABNORMAL
CREAT SERPL-MCNC: 0.72 MG/DL (ref 0.5–0.9)
CRYSTALS, UA: ABNORMAL /HPF
EPITHELIAL CELLS UA: ABNORMAL /HPF
GFR AFRICAN AMERICAN: >60 ML/MIN
GFR NON-AFRICAN AMERICAN: >60 ML/MIN
GFR SERPL CREATININE-BSD FRML MDRD: ABNORMAL ML/MIN/{1.73_M2}
GFR SERPL CREATININE-BSD FRML MDRD: ABNORMAL ML/MIN/{1.73_M2}
GLUCOSE BLD-MCNC: 157 MG/DL (ref 65–105)
GLUCOSE BLD-MCNC: 181 MG/DL (ref 65–105)
GLUCOSE BLD-MCNC: 206 MG/DL (ref 65–105)
GLUCOSE BLD-MCNC: 61 MG/DL (ref 70–99)
GLUCOSE BLD-MCNC: 62 MG/DL (ref 65–105)
GLUCOSE URINE: ABNORMAL
HCT VFR BLD CALC: 28.7 % (ref 36–46)
HEMOGLOBIN: 9.2 G/DL (ref 12–16)
KETONES, URINE: NEGATIVE
LEUKOCYTE ESTERASE, URINE: ABNORMAL
MCH RBC QN AUTO: 29.9 PG (ref 26–34)
MCHC RBC AUTO-ENTMCNC: 32.1 G/DL (ref 31–37)
MCV RBC AUTO: 93.1 FL (ref 80–100)
MUCUS: ABNORMAL
NITRITE, URINE: NEGATIVE
OTHER OBSERVATIONS UA: ABNORMAL
PDW BLD-RTO: 18.6 % (ref 11.5–14.9)
PH UA: 6 (ref 5–8)
PLATELET # BLD: 231 K/UL (ref 150–450)
PMV BLD AUTO: 9 FL (ref 6–12)
POTASSIUM SERPL-SCNC: 4.3 MMOL/L (ref 3.7–5.3)
PROTEIN UA: ABNORMAL
RBC # BLD: 3.08 M/UL (ref 4–5.2)
RBC UA: ABNORMAL /HPF
RENAL EPITHELIAL, UA: ABNORMAL /HPF
SODIUM BLD-SCNC: 144 MMOL/L (ref 135–144)
SPECIFIC GRAVITY UA: 1.02 (ref 1–1.03)
TRICHOMONAS: ABNORMAL
TURBIDITY: ABNORMAL
URINE HGB: ABNORMAL
UROBILINOGEN, URINE: NORMAL
WBC # BLD: 5.6 K/UL (ref 3.5–11)
WBC UA: ABNORMAL /HPF
YEAST: ABNORMAL

## 2017-10-24 PROCEDURE — 97530 THERAPEUTIC ACTIVITIES: CPT

## 2017-10-24 PROCEDURE — 97110 THERAPEUTIC EXERCISES: CPT

## 2017-10-24 PROCEDURE — 80048 BASIC METABOLIC PNL TOTAL CA: CPT

## 2017-10-24 PROCEDURE — 86403 PARTICLE AGGLUT ANTBDY SCRN: CPT

## 2017-10-24 PROCEDURE — 6360000002 HC RX W HCPCS: Performed by: INTERNAL MEDICINE

## 2017-10-24 PROCEDURE — 97532 HC OT DEVELOP COGNITIVE SKILLS 15MIN: CPT

## 2017-10-24 PROCEDURE — 87086 URINE CULTURE/COLONY COUNT: CPT

## 2017-10-24 PROCEDURE — 6370000000 HC RX 637 (ALT 250 FOR IP): Performed by: FAMILY MEDICINE

## 2017-10-24 PROCEDURE — 6360000002 HC RX W HCPCS: Performed by: FAMILY MEDICINE

## 2017-10-24 PROCEDURE — 6370000000 HC RX 637 (ALT 250 FOR IP): Performed by: INTERNAL MEDICINE

## 2017-10-24 PROCEDURE — 85027 COMPLETE CBC AUTOMATED: CPT

## 2017-10-24 PROCEDURE — 81001 URINALYSIS AUTO W/SCOPE: CPT

## 2017-10-24 PROCEDURE — 1180000000 HC REHAB R&B

## 2017-10-24 PROCEDURE — 82947 ASSAY GLUCOSE BLOOD QUANT: CPT

## 2017-10-24 PROCEDURE — 6370000000 HC RX 637 (ALT 250 FOR IP): Performed by: PHYSICAL MEDICINE & REHABILITATION

## 2017-10-24 PROCEDURE — 97116 GAIT TRAINING THERAPY: CPT

## 2017-10-24 PROCEDURE — 99232 SBSQ HOSP IP/OBS MODERATE 35: CPT | Performed by: PHYSICAL MEDICINE & REHABILITATION

## 2017-10-24 PROCEDURE — 97532 HC COGNITIVE THERAPY 15 MIN: CPT

## 2017-10-24 PROCEDURE — 99231 SBSQ HOSP IP/OBS SF/LOW 25: CPT | Performed by: NURSE PRACTITIONER

## 2017-10-24 PROCEDURE — 97535 SELF CARE MNGMENT TRAINING: CPT

## 2017-10-24 PROCEDURE — 70450 CT HEAD/BRAIN W/O DYE: CPT

## 2017-10-24 PROCEDURE — 99231 SBSQ HOSP IP/OBS SF/LOW 25: CPT | Performed by: INTERNAL MEDICINE

## 2017-10-24 PROCEDURE — 36415 COLL VENOUS BLD VENIPUNCTURE: CPT

## 2017-10-24 RX ORDER — CIPROFLOXACIN 500 MG/1
500 TABLET, FILM COATED ORAL EVERY 12 HOURS
Status: DISCONTINUED | OUTPATIENT
Start: 2017-10-24 | End: 2017-10-26

## 2017-10-24 RX ADMIN — MULTIPLE VITAMINS W/ MINERALS TAB 1 TABLET: TAB at 08:05

## 2017-10-24 RX ADMIN — FUROSEMIDE 20 MG: 20 TABLET ORAL at 08:06

## 2017-10-24 RX ADMIN — INSULIN LISPRO 1 UNITS: 100 INJECTION, SOLUTION INTRAVENOUS; SUBCUTANEOUS at 22:31

## 2017-10-24 RX ADMIN — ASPIRIN 325 MG: 325 TABLET, DELAYED RELEASE ORAL at 08:05

## 2017-10-24 RX ADMIN — DOCUSATE SODIUM 100 MG: 100 CAPSULE, LIQUID FILLED ORAL at 08:05

## 2017-10-24 RX ADMIN — ATORVASTATIN CALCIUM 40 MG: 40 TABLET, FILM COATED ORAL at 08:05

## 2017-10-24 RX ADMIN — POTASSIUM CHLORIDE 20 MEQ: 1.5 POWDER, FOR SOLUTION ORAL at 08:07

## 2017-10-24 RX ADMIN — ENOXAPARIN SODIUM 30 MG: 30 INJECTION SUBCUTANEOUS at 22:31

## 2017-10-24 RX ADMIN — MICONAZOLE NITRATE: 1.42 POWDER TOPICAL at 22:37

## 2017-10-24 RX ADMIN — FERROUS SULFATE TAB 325 MG (65 MG ELEMENTAL FE) 325 MG: 325 (65 FE) TAB at 08:05

## 2017-10-24 RX ADMIN — Medication 1 TABLET: at 08:06

## 2017-10-24 RX ADMIN — CLOPIDOGREL BISULFATE 75 MG: 75 TABLET ORAL at 08:05

## 2017-10-24 RX ADMIN — MYCOPHENOLATE MOFETIL 1500 MG: 250 CAPSULE ORAL at 08:06

## 2017-10-24 RX ADMIN — FAMOTIDINE 40 MG: 20 TABLET ORAL at 08:05

## 2017-10-24 RX ADMIN — POLYETHYLENE GLYCOL 3350 17 G: 17 POWDER, FOR SOLUTION ORAL at 08:05

## 2017-10-24 RX ADMIN — POTASSIUM CHLORIDE 20 MEQ: 1.5 POWDER, FOR SOLUTION ORAL at 22:36

## 2017-10-24 RX ADMIN — INSULIN LISPRO 4 UNITS: 100 INJECTION, SOLUTION INTRAVENOUS; SUBCUTANEOUS at 17:48

## 2017-10-24 RX ADMIN — ENOXAPARIN SODIUM 30 MG: 30 INJECTION SUBCUTANEOUS at 08:05

## 2017-10-24 RX ADMIN — MICONAZOLE NITRATE: 1.42 POWDER TOPICAL at 08:07

## 2017-10-24 RX ADMIN — CIPROFLOXACIN HYDROCHLORIDE 500 MG: 500 TABLET, FILM COATED ORAL at 22:31

## 2017-10-24 RX ADMIN — FERROUS SULFATE TAB 325 MG (65 MG ELEMENTAL FE) 325 MG: 325 (65 FE) TAB at 17:49

## 2017-10-24 RX ADMIN — PAROXETINE HYDROCHLORIDE 10 MG: 10 TABLET, FILM COATED ORAL at 08:08

## 2017-10-24 RX ADMIN — INSULIN GLARGINE 30 UNITS: 100 INJECTION, SOLUTION SUBCUTANEOUS at 22:31

## 2017-10-24 RX ADMIN — MYCOPHENOLATE MOFETIL 1500 MG: 250 CAPSULE ORAL at 22:31

## 2017-10-24 ASSESSMENT — PAIN SCALES - GENERAL: PAINLEVEL_OUTOF10: 0

## 2017-10-24 NOTE — PROGRESS NOTES
96%   BMI 48.43 kg/m²      General appearance: well nourished in no distress  Lungs: normal effort, clear to auscultation. CVS sinus with no murmurs. Abdomen: Soft, non-tender; no masses or HSM,   Extremities: no edema; no digital cyanosis or clubbing. Neurologic: Cranial nerves II-XII grossly intact; LUE 3/5 LLE 4/5           Assessment / Plan      Patient Active Problem List   Diagnosis    Hydronephrosis of left kidney    Acute cystitis with hematuria    CKD (chronic kidney disease), stage III    Hypotension arterial    Essential hypertension    Morbid obesity with BMI of 40.0-44.9, adult (HCC)    History of arterial ischemic stroke    History of pulmonary embolus (PE)    Paroxysmal atrial fibrillation (HCC)    Left ureteral stone    Left-sided weakness    Non-intractable vomiting with nausea    Cerebrovascular accident (CVA) due to embolism of right middle cerebral artery (HCC)    Hypotension    Sepsis secondary to UTI ([facility]    Elevated serum creatinine    Kidney stone    Anemia    Hypokalemia    Acute ischemic right MCA stroke ([facility] [address] [facility]    Right-sided carotid artery disease (HCC)       A/P  UTI Sepsis s/p ureteral stent on cipro   MCA infarct Left hemiparesis on ASA  lipitor   A fib on eliquis   DM on lantus  to 248    10/20  Pt on lovenox prophylacic dose  cotn present dose of lantus  Plan to start eliquis after 2 weeks previously that will be 10/26   flomax to be stopped soon has hypotension    10/23  Dc flomax   Bmp at am     MD ZEFERINO Trammell [address], [address] [place] [address].    Phone [phone]   Fax: [phone]  Answering Service: [phone]

## 2017-10-24 NOTE — PLAN OF CARE
Problem: Risk for Impaired Skin Integrity  Goal: Tissue integrity - skin and mucous membranes  Structural intactness and normal physiological function of skin and  mucous membranes. Outcome: Ongoing  Red coccyx, cream applied. Wearing depends, changed when damp. Using waffle cushion in WC, and on bed. Problem: Falls - Risk of  Goal: Absence of falls  Outcome: Met This Shift  No fall this shift. Stands and pivots well. Stand by assist needed    Problem: Pain:  Goal: Pain level will decrease  Pain level will decrease   Outcome: Met This Shift  Pt verbalizes no complaint of pain this shift.   Pleasant with all requests

## 2017-10-24 NOTE — PROGRESS NOTES
Pt newly confused. Pt unsure of why in hospital and what room she is in. Bloody nose from left nares. Dr Narinder sol, orders received   [phone] pt alert, conversation more normal, accurate  Bloody nose lasted only few minutes.   persoanl alarm placed on patient

## 2017-10-24 NOTE — PROGRESS NOTES
Speech Language Pathology  Speech Language Pathology  [facility]    Cognitive Treatment Note    Date: 10/24/2017  Patients Name: Ashley Joya  MRN: [id number]  Diagnosis:   Patient Active Problem List   Diagnosis Code    Hydronephrosis of left kidney N13.30    Acute cystitis with hematuria N30.01    CKD (chronic kidney disease), stage III N18.3    Hypotension arterial I95.9    Essential hypertension I10    Morbid obesity with BMI of 40.0-44.9, adult ([facility] [address] E66.01, Z68.41    History of arterial ischemic stroke Z86.73    History of pulmonary embolus (PE) Z86.711    Paroxysmal atrial fibrillation (HCC) I48.0    Left ureteral stone N20.1    Left-sided weakness R53.1    Non-intractable vomiting with nausea R11.2    Cerebrovascular accident (CVA) due to embolism of right middle cerebral artery (HCC) I63.411    Hypotension I95.9    Sepsis secondary to UTI ([facility] [address] A41.9, N39.0    Elevated serum creatinine R79.89    Kidney stone N20.0    Anemia D64.9    Hypokalemia E87.6    Acute ischemic right MCA stroke (HCC) I63.511    Right-sided carotid artery disease (HCC) I77.9    Incontinence associated dermatitis L30.8, R32       Pain: 0/10    Cognitive Treatment    Treatment time: 4552-7727      Subjective: [x] Alert [x] Cooperative     [] Confused     [] Agitated    [] Lethargic      Objective/Assessment:  Attention: Pt able to sustain attn Argenis. Orientation: Pt oriented to date c reference to her digital calendar; required cue to look at it. Pt oriented to hospital name c reference to whiteboard; required cue to look at it. Pt oriented to time of day. Recall: Pt completed short paragraph retention task in which she was asked to respond to questions re a short paragraph read aloud by ST- 69% Argenis, 100% c mod A. Problem Solving/Reasoning: Pt completed concrete divergent naming task- generated 6-7 category items Argenis; generated additional 3-7 items c mod A.       Other: Pt completed visual scanning tasks (picture scene and letters). ST farideh thick, colored line on left edge of page as cue for pt. Pt required 1-3 redirections per task to scan left until she saw the colored line to ensure she was seeing entire page to complete task (either identifying pictures or crossing out letters).       Plan:  [x] Continue ST services    [] Discharge from ST:      Discharge recommendations: [] Inpatient Rehab   [] [place]   [] Outpatient Therapy  [] Follow up at trauma clinic   [] Other:       Treatment completed by: Gordon Tuttle,  clinician

## 2017-10-24 NOTE — PROGRESS NOTES
[facility]  Wound Care Inpatient Consult Note               [facility] [place]  AGE: 74 y.o. GENDER: female  : 1950  TODAY'S DATE:  10/24/2017  Consulted for wound care by:Gabrielle Moy MD    Subjective:        HISTORY of PRESENT ILLNESS HPI   Shantanu Swain is a 74 y.o. female who presents today for wound evaluation. Wound Type:moisture associated skin breakdown  Wound Location:intergluteal crease erosion, bilateral buttock friction and moisture damaged skin. Modifying factors:diabetes, decreased mobility, shear force, obesity, immunosuppression, anticoagulation therapy, incontinence of urine, poor hygiene and non-adherence  Initial visit at  on 10/11 with consult for moisture associated skin breakdown. Transferred to Acute Rehab. Purple areas noted on initial visit. Incontinence care including incontinence wipes and zinc barrier cream ordered at that time. PAST MEDICAL HISTORY        Diagnosis Date    CVA (cerebral vascular accident) ([address]     Diabetes mellitus ([address]     Other disorders of skin and subcutaneous tissue in diseases classified elsewhere several years ago    Bullous Pemphkgoid    Patient in clinical research study 10/13/2017    Anticipated end date 2018    PE (pulmonary thromboembolism) ([address]        PAST SURGICAL HISTORY    Past Surgical History:   Procedure Laterality Date    CHOLECYSTECTOMY      CYSTOSCOPY Left 10/10/2017    CYSTOSCOPY, STENT INSERTION performed [facility]ers [facility]    No family history on file. SOCIAL HISTORY    Social History   Substance Use Topics    Smoking status: Never Smoker    Smokeless tobacco: Never Used    Alcohol use No         ALLERGIES    Allergies   Allergen Reactions    Iv Contrast [Iodides] Other (See Comments)     unknown           REVIEW OF SYSTEMS    Pertinent items are noted in HPI.        Objective:      BP (!) 127/47   Pulse 91   Temp 97.9 °F (36.6 °C) (Oral) Resp 16   Ht 5' (1.524 m)   Wt 266 lb 1.6 oz (120.7 kg)   SpO2 98%   BMI 51.97 kg/m²   General Appearance: well-developed and well nourished, in no acute distress and alert  Skin: warm and dry and few scattered areas of light purple discoloration in medial buttocks and posterior medial upper thighs - skin is blanchable. Area at intergluteal crease intact. Remainder of skin is intact. The patients pain isPain Level: 0  .        Assessment:     Patient Active Problem List   Diagnosis    Hydronephrosis of left kidney    Acute cystitis with hematuria    CKD (chronic kidney disease), stage III    Hypotension arterial    Essential hypertension    Morbid obesity with BMI of 40.0-44.9, adult ([facility]    History of arterial ischemic stroke    History of pulmonary embolus (PE)    Paroxysmal atrial fibrillation (HCC)    Left ureteral stone    Left-sided weakness    Non-intractable vomiting with nausea    Cerebrovascular accident (CVA) due to embolism of right middle cerebral artery (HCC)    Hypotension    Sepsis secondary to UTI ([facility]    Elevated serum creatinine    Kidney stone    Anemia    Hypokalemia    Acute ischemic right MCA stroke ([facility]    Right-sided carotid artery disease (HCC)       Measurements:  Pressure Ulcer 10/18/17 Buttocks Inner;Right Purple (Active)   Marianna-wound Assessment Blanchable erythema 10/24/2017  8:32 AM   Pressure Ulcer Staging Suspected Deep Tissue Injury 10/24/2017  8:32 AM   Wound Assessment Light purple 10/24/2017  8:32 AM   Dressing/Treatment Moisture barrier 10/24/2017  8:32 AM   Wound Cleansed Other (Comment) 10/19/2017  8:00 PM   Odor None 10/24/2017  8:32 AM   Purple%Wound Bed 100 10/23/2017  7:00 AM   Number of days: 5       Pressure Ulcer 10/21/17 Other (Comment) Proximal;Upper upper posterior thighs with purple skin that is intact (Active)   Marianna-wound Assessment Blanchable erythema 10/24/2017  8:32 AM   Marianna-Wound Color Purple 10/21/2017  8:00 AM   Pressure Ulcer Staging Unstagable 10/24/2017  8:32 AM   Wound Length (cm) 3 cm 10/21/2017  8:00 AM   Wound Width (cm) 3 cm 10/21/2017  8:00 AM   Calculated Wound Size (cm^2) (l*w) 9 cm^2 10/21/2017  8:00 AM   Dressing/Treatment Moisture barrier 10/24/2017  8:32 AM   Wound Cleansed Wound cleanser 10/21/2017  8:00 AM   Drainage Amount None 10/24/2017  8:32 AM   Purple%Wound Bed 100 10/24/2017  8:32 AM   Number of days: 3       Pressure Ulcer 10/21/17 Other (Comment) Right  (Active)   Wound Length (cm) 1 cm 10/22/2017  8:00 AM   Wound Width (cm) 2 cm 10/22/2017  8:00 AM   Calculated Wound Size (cm^2) (l*w) 2 cm^2 10/22/2017  8:00 AM   Change in Wound Size % (l*w) 0 10/22/2017  8:00 AM   Dressing/Treatment Foam 10/23/2017  7:00 AM   Drainage Amount None 10/23/2017  7:00 AM   Purple%Wound Bed 100 10/23/2017  7:00 AM   Number of days: 3       Wound 10/11/17 gluteal crease (Active)   Wound Type Wound 10/24/2017  8:32 AM   Wound Skin Tear 10/17/2017  7:45 AM   Dressing Status Clean;Dry 10/24/2017  8:32 AM   Dressing/Treatment Protective barrier 10/24/2017  8:32 AM   Wound Assessment Clean;Pink 10/24/2017  8:32 AM   Marianna-wound Assessment Clean;Dry; Intact 10/24/2017  8:32 AM   [place]%Wound Bed 100 10/11/2017 12:30 PM   Drainage Amount None 10/24/2017  8:32 AM   Odor None 10/13/2017  4:00 PM   Number of days: 12       Wound 10/12/17 Other (Comment) Back Mid;Right; Lower approx [id number] circular redness (Active)   Wound Type Wound 10/24/2017  8:32 AM   Wound Skin Tear 10/17/2017  7:45 AM   Dressing Status Clean;Dry; Intact 10/24/2017  8:32 AM   Dressing Changed Changed/New 10/17/2017  4:00 AM   Dressing/Treatment Open to air 10/24/2017  8:32 AM   Wound Assessment Red 10/13/2017  4:00 PM   Marianna-wound Assessment Clean;Dry; Intact 10/17/2017  4:00 AM   Drainage Amount None 10/24/2017  8:32 AM   Odor None 10/24/2017  8:32 AM   Number of days: 12                  Plan:     Treatment:      Routine incontinence care with Héctor barrier cloths and zinc oxide cream.   Apply zinc oxide cream BID to the low buttock open areas and prn incontinence.       will follow as needed    Electronically signed by Tim Street NP on 10/24/2017 at 12:12 PM

## 2017-10-24 NOTE — PROGRESS NOTES
10/24/17 1106 10/24/17 1549   OT Individual Minutes   Time In 0930 1450   Time Out 1100 [id number]   [address]   ACUTE REHABILITATION OCCUPATIONAL THERAPY  DAILY NOTE    Date: 10/24/17  Patient Name: Lakesha Owusu      Room: 4653/9576-76    MRN: [id number]   : 1950  (74 y.o.)  Gender: female      Diagnosis: R CVA, Acute ischmic right MCA stroke,   Additional Pertinent Hx: UTI, s/p Ureteral stone removed dueing stent placement 10/10/17, h/o CVA a year ago    Restrictions  Restrictions/Precautions: General Precautions, Fall Risk  Other position/activity restrictions: up with assistance  Position Activity Restriction  Other position/activity restrictions: up with assistance     Subjective \"I thought I was going home yesterday\"  Pain Level: 0     Overall Orientation Status: Impaired  Orientation Level: Oriented to person       Objective Reassurred patient about how rehab works, need to talk to son if she wants to go home now and discussed with PT and nursing if son is upset about d/c date set. Left note in patients room for son to correspond with different disciplines secondary to patients decreased memory. Patient can follow simple 2 step directions 33% of time with moderate redirection to correct errors. Continue POC  Cognition  Arousal/Alertness: Delayed responses to stimuli  Following Commands:  Follows one step commands with increased time  Attention Span: Attends with cues to redirect  Memory: Decreased recall of recent events;Decreased long term memory;Decreased short term memory  Safety Judgement: Decreased awareness of need for assistance;Decreased awareness of need for safety  Problem Solving: Assistance required to generate solutions;Assistance required to identify errors made  Insights: Decreased awareness of deficits  Initiation: Requires cues for some        Standing Balance  Time: - min x2 in PM  Activity: adls, transfers, static standing goal 3: Pt. will demo. min. A with UB bathing/dressing, and MOd. A with LB bathing/dressing using AE as needed. Short term goal 4: Pt. will participate in left UE ROM and stgth ex. to assist with functional tasks. Short term goal 5: Pt. will tolerate facilitation tech. to elicit movement in left wrist and hand to assist with functional tasks. Short term goal 6: Pt. will attend to left side of body and L visual field with min. vc's 90% of the time  Short term goal 7: Pt. will tolerate 5-8 min. functional standing activities to promote increased indep.with ADL's and mobility. Long term goals  Time Frame for Long term goals : By discharge,   Long term goal 1: Pt. will demo. Mod.I with bed mobility. Long term goal 2: Pt. will demo. SBA with functional ADL transfers using appropriate AD with good . Long term goal 3: Pt. will demo. SBA with UB bathing/dressing, and min. A with LB bathing/dressing using AE as needed. Long term goal 4: Pt.will demo. SBA with toileting tasks.        Electronically signed by GABE Goodman on 10/24/17 at 3:51 PM

## 2017-10-24 NOTE — PROGRESS NOTES
pylenephritis, urethral stent, bilateral kidney stones- leukocytosis- Cipro till 10/18 per IM- completed. White count 9.6 on 10/17/2017, f/u uro as outpt, flomax. 3. R MCA cva with LHP- embolic. Kip Ho Continue asa and plavix until 10/27/17, then asa 325 mg and eliquis 5 mg BID per note from internal medicine at [facility]. [facility].  Continue lipitor. 4. Bloody nose this eveningimproved, added nasal mist, monitor closely, head of the bed at 30-45° , if persists we will need ENT evaluation and  reevaluation of anticoagulation  5. PAF - Per cardio will restart eliquis in 2 weeks.  F/u as OP in 2 wks.  consult cardiology for clarification of anticoagulation with history of PE. Patient was seen by Dr. Himanshu Johnson at [facility]. [facility]. Cardiology follow-up pending  6. Anemia- protonix, s/p 1 unit PRBC transfusion.  10/24 Hb 9.2 asymptomatic. No obvious blood loss. Continue iron supplementation. Will continue to monitor. .  7. Constipation - Colace  8. Reflux - Pepcid  9. Bulbous pemphigus- had discussion with the patient's son this morning. He states that his mother has been on CellCept 1500 mg twice a day for approximately 15 years for bulbous pemphigus. 10. Sacral ulcer- wound care management. Change mepilex q 3 days    11. H/O PE - will restart eliquis per endo neurosurgery/ cardio instructions as above. 12. DM- Cont lantus. IM following for medical management. 13. Hypokalemia- resolved. Continue to monitor. 14. Mild confusion tonight- neurologic exam no significant change except for some mild confusion, repeat CT done no acute change, question sundowning versus UTIwe'll check UA, continue neuro checks  15. internal medicine for medical management. Ricky Abraham. Sharonda Mcdaniel MD

## 2017-10-24 NOTE — PROGRESS NOTES
(to EOM)    Transfers:  Sit to Stand: Minimal Assistance (Cues for technique.)  Stand to sit: Contact guard assistance  Bed to Chair: Minimal assistance (with RW)  Stand Pivot Transfers: Minimal Assistance (x1; with RW)           Ambulation 1  Surface: level tile  Device: Rolling Walker  Assistance: Minimal assistance  Quality of Gait: Wide BRYAN, forward flexed posture, minimal knee and dorsiflexion  Distance: 60 ft AM;  100 ft PM        Stairs/Curb  Stairs?: Yes  Stairs  # Steps : 6  Stairs Height: 4\"  Rails: Right ascending  Device: Hand Held Assist (Lt. side)  Assistance: Minimal assistance; Moderate assistance (2 person assist)  Comment: patient fearful of stairs tolerated well with constant cueing and sequencing technique . Also completed 4\" platform  step in // bars with 2 person assist x 4 steps.                                                     FIMS:      Transfers  Bed, Chair, Wheel Chair: 3 - Requires 25-49% assistance to transfer   Locomotion  Primary Mode: Walk  Distance Walked: 60 ft  Distance Traveled in [address] [place] [address] Street: 50 ft  Walk: 2 - Maximal Assistance Requires up to [address] requires assistance of one person to walk/operate wheelchair between  feet (Patient performs 25-49% of locomotion effort or goes between  feet)  Wheel Chair: 1 - Total Assistance Walks/operates wheelchair < 50 feet OR requires two or more people OR patient performs < 25% of locomotion effort  Stairs: 2- Maximal Assistance Performs 25-49% of the effort to go up and down 4 to 6 stairs and requires the assistance of one person only       BALANCE Posture: Fair  Sitting - Static: Fair (seated EOM)  Sitting - Dynamic: Fair (Seated EOM)  Standing - Static: Fair;+ (RW)  Standing - Dynamic: Fair (RW)  Comments: Posterior Lean while seated EOM,     EXERCISES    Other exercises?: Yes  Other exercises 1: Seated in w/c bilat LE therex, A/AAROM x10 1.5lb ankle weights  Other exercises 2: Sit to Stand x5  Other 150 ft to prepare for home  Long term goal 6: Improve stair negotiation to SBA on 4 steps with 1 rail use to allow pt to enter and exit home       10/24/17 0845 10/24/17 1515   PT Individual Minutes   Time In 0845 1515   Time Out 0930 1600   Minutes 45 45       Electronically signed by Juan Irvin PTA on 10/24/17 at 5:12 PM

## 2017-10-24 NOTE — PLAN OF CARE
Problem: Risk for Impaired Skin Integrity  Goal: Tissue integrity - skin and mucous membranes  Structural intactness and normal physiological function of skin and  mucous membranes. Outcome: Ongoing  Patient skin assessed this shift. No new skin issues noted. Pt has skin condition that caused her to have blisters as she was off her medication    Problem: Falls - Risk of  Goal: Absence of falls  Outcome: Ongoing  Patient remains free from falls at this time. Bed locked and in lowest position. Call light within reach. Patient uses call light when assistance is needed in room.    Bed alarm on

## 2017-10-25 LAB
GLUCOSE BLD-MCNC: 128 MG/DL (ref 65–105)
GLUCOSE BLD-MCNC: 191 MG/DL (ref 65–105)
GLUCOSE BLD-MCNC: 53 MG/DL (ref 65–105)
GLUCOSE BLD-MCNC: 94 MG/DL (ref 65–105)
GLUCOSE BLD-MCNC: 98 MG/DL (ref 65–105)
HCT VFR BLD CALC: 30.8 % (ref 36–46)
HEMOGLOBIN: 9.5 G/DL (ref 12–16)
MCH RBC QN AUTO: 30.2 PG (ref 26–34)
MCHC RBC AUTO-ENTMCNC: 30.8 G/DL (ref 31–37)
MCV RBC AUTO: 98 FL (ref 80–100)
PDW BLD-RTO: 18.9 % (ref 11.5–14.9)
PLATELET # BLD: 273 K/UL (ref 150–450)
PMV BLD AUTO: 9.2 FL (ref 6–12)
RBC # BLD: 3.14 M/UL (ref 4–5.2)
WBC # BLD: 5.9 K/UL (ref 3.5–11)

## 2017-10-25 PROCEDURE — 97532 HC COGNITIVE THERAPY 15 MIN: CPT

## 2017-10-25 PROCEDURE — 6360000002 HC RX W HCPCS: Performed by: INTERNAL MEDICINE

## 2017-10-25 PROCEDURE — 97530 THERAPEUTIC ACTIVITIES: CPT

## 2017-10-25 PROCEDURE — 1180000000 HC REHAB R&B

## 2017-10-25 PROCEDURE — 6370000000 HC RX 637 (ALT 250 FOR IP): Performed by: FAMILY MEDICINE

## 2017-10-25 PROCEDURE — 97110 THERAPEUTIC EXERCISES: CPT

## 2017-10-25 PROCEDURE — 6370000000 HC RX 637 (ALT 250 FOR IP): Performed by: INTERNAL MEDICINE

## 2017-10-25 PROCEDURE — 6370000000 HC RX 637 (ALT 250 FOR IP): Performed by: PHYSICAL MEDICINE & REHABILITATION

## 2017-10-25 PROCEDURE — 85027 COMPLETE CBC AUTOMATED: CPT

## 2017-10-25 PROCEDURE — 6360000002 HC RX W HCPCS: Performed by: FAMILY MEDICINE

## 2017-10-25 PROCEDURE — 99231 SBSQ HOSP IP/OBS SF/LOW 25: CPT | Performed by: INTERNAL MEDICINE

## 2017-10-25 PROCEDURE — 97116 GAIT TRAINING THERAPY: CPT

## 2017-10-25 PROCEDURE — 36415 COLL VENOUS BLD VENIPUNCTURE: CPT

## 2017-10-25 PROCEDURE — 99232 SBSQ HOSP IP/OBS MODERATE 35: CPT | Performed by: PHYSICAL MEDICINE & REHABILITATION

## 2017-10-25 PROCEDURE — 97535 SELF CARE MNGMENT TRAINING: CPT

## 2017-10-25 PROCEDURE — 82947 ASSAY GLUCOSE BLOOD QUANT: CPT

## 2017-10-25 RX ADMIN — Medication 1 TABLET: at 08:38

## 2017-10-25 RX ADMIN — CIPROFLOXACIN HYDROCHLORIDE 500 MG: 500 TABLET, FILM COATED ORAL at 11:19

## 2017-10-25 RX ADMIN — ASPIRIN 325 MG: 325 TABLET, DELAYED RELEASE ORAL at 08:38

## 2017-10-25 RX ADMIN — POTASSIUM CHLORIDE 20 MEQ: 1.5 POWDER, FOR SOLUTION ORAL at 08:46

## 2017-10-25 RX ADMIN — INSULIN LISPRO 1 UNITS: 100 INJECTION, SOLUTION INTRAVENOUS; SUBCUTANEOUS at 20:37

## 2017-10-25 RX ADMIN — MICONAZOLE NITRATE: 1.42 POWDER TOPICAL at 08:47

## 2017-10-25 RX ADMIN — POTASSIUM CHLORIDE 20 MEQ: 1.5 POWDER, FOR SOLUTION ORAL at 20:41

## 2017-10-25 RX ADMIN — POLYETHYLENE GLYCOL 3350 17 G: 17 POWDER, FOR SOLUTION ORAL at 08:38

## 2017-10-25 RX ADMIN — DEXTROSE 15 G: 15 GEL ORAL at 06:46

## 2017-10-25 RX ADMIN — PAROXETINE HYDROCHLORIDE 10 MG: 10 TABLET, FILM COATED ORAL at 08:44

## 2017-10-25 RX ADMIN — MYCOPHENOLATE MOFETIL 1500 MG: 250 CAPSULE ORAL at 08:42

## 2017-10-25 RX ADMIN — ENOXAPARIN SODIUM 30 MG: 30 INJECTION SUBCUTANEOUS at 08:38

## 2017-10-25 RX ADMIN — CLOPIDOGREL BISULFATE 75 MG: 75 TABLET ORAL at 08:38

## 2017-10-25 RX ADMIN — MYCOPHENOLATE MOFETIL 1500 MG: 250 CAPSULE ORAL at 20:40

## 2017-10-25 RX ADMIN — FAMOTIDINE 40 MG: 20 TABLET ORAL at 08:38

## 2017-10-25 RX ADMIN — MICONAZOLE NITRATE: 1.42 POWDER TOPICAL at 21:00

## 2017-10-25 RX ADMIN — FERROUS SULFATE TAB 325 MG (65 MG ELEMENTAL FE) 325 MG: 325 (65 FE) TAB at 08:38

## 2017-10-25 RX ADMIN — FUROSEMIDE 20 MG: 20 TABLET ORAL at 08:38

## 2017-10-25 RX ADMIN — MULTIPLE VITAMINS W/ MINERALS TAB 1 TABLET: TAB at 08:38

## 2017-10-25 RX ADMIN — ATORVASTATIN CALCIUM 40 MG: 40 TABLET, FILM COATED ORAL at 08:38

## 2017-10-25 RX ADMIN — ENOXAPARIN SODIUM 30 MG: 30 INJECTION SUBCUTANEOUS at 20:41

## 2017-10-25 RX ADMIN — FERROUS SULFATE TAB 325 MG (65 MG ELEMENTAL FE) 325 MG: 325 (65 FE) TAB at 18:08

## 2017-10-25 RX ADMIN — INSULIN GLARGINE 30 UNITS: 100 INJECTION, SOLUTION SUBCUTANEOUS at 20:36

## 2017-10-25 ASSESSMENT — PAIN DESCRIPTION - FREQUENCY: FREQUENCY: INTERMITTENT

## 2017-10-25 ASSESSMENT — PAIN DESCRIPTION - PROGRESSION: CLINICAL_PROGRESSION: GRADUALLY IMPROVING

## 2017-10-25 ASSESSMENT — PAIN DESCRIPTION - DESCRIPTORS: DESCRIPTORS: ACHING

## 2017-10-25 ASSESSMENT — PAIN DESCRIPTION - ORIENTATION: ORIENTATION: RIGHT;PROXIMAL

## 2017-10-25 ASSESSMENT — PAIN SCALES - GENERAL: PAINLEVEL_OUTOF10: 2

## 2017-10-25 ASSESSMENT — PAIN DESCRIPTION - PAIN TYPE: TYPE: CHRONIC PAIN

## 2017-10-25 NOTE — PROGRESS NOTES
Dr. Wilfredo Somers at [facility]. [facility]. Cardiology follow-up pending  6. Mild bilateral edemapatient on Lasix, continue to monitor, elevate  7. Anemia- protonix, s/p 1 unit PRBC transfusion.  10/25 Hb 9.5 asymptomatic. No obvious blood loss. Continue iron supplementation. Will continue to monitor. .  8. Constipation - Colace  9. Reflux - Pepcid  10. Bulbous pemphigus-  [facility] had discussion with the patient's son- He states that his mother has been on CellCept 1500 mg twice a day for approximately 15 years for bulbous pemphigus. 11. Sacral ulcer- wound care management. Change mepilex q 3 days    12. H/O PE - will restart eliquis per endo neurosurgery/ cardio instructions as above. 13. DM- Cont lantus. IM following for medical management. , hypoglycemic this morningmedications adjusted per internal medicine    14. Hypokalemia- resolved. Continue to monitor. 15. Mild confusion 1024- neurologic exam no significant change except for some mild confusion, repeat CT done no acute change, question sundowning versus UTIUA as above   16. DVT prophylaxiscontinue on Lovenox  17. internal medicine for medical management. Sanaz Tirado. Maren Yates MD

## 2017-10-25 NOTE — PROGRESS NOTES
Speech Language Pathology  Speech Language Pathology  [facility]    C[facility]    Date: 10/25/2017  Patients Name: Minh Franks  MRN: [id number]  Diagnosis:   Patient Active Problem List   Diagnosis Code    Hydronephrosis of left kidney N13.30    Acute cystitis with hematuria N30.01    CKD (chronic kidney disease), stage III N18.3    Hypotension arterial I95.9    Essential hypertension I10    Morbid obesity with BMI of 40.0-44.9, adult ([facility] E66.01, Z68.41    History of arterial ischemic stroke Z86.73    History of pulmonary embolus (PE) Z86.711    Paroxysmal atrial fibrillation (HCC) I48.0    Left ureteral stone N20.1    Left-sided weakness R53.1    Non-intractable vomiting with nausea R11.2    Cerebrovascular accident (CVA) due to embolism of right middle cerebral artery (HCC) I63.411    Hypotension I95.9    Sepsis secondary to UTI ([facility] A41.9, N39.0    Elevated serum creatinine R79.89    Kidney stone N20.0    Anemia D64.9    Hypokalemia E87.6    Acute ischemic right MCA stroke (HCC) I63.511    Right-sided carotid artery disease (HCC) I77.9    Incontinence associated dermatitis L30.8, R32       Pain: 0/10    Cognitive Treatment    Treatment time: 3916-6829      Subjective: [x] Alert [x] Cooperative     [] Confused     [] Agitated    [] Lethargic      Objective/Assessment:  Attention: Pt able to sustain attn Argenis. Orientation: Pt oriented to date c reference to her digital calendar; looked at it Argenis. Pt oriented to hospital name c reference to whiteboard; required cue to look at it. Pt oriented to time of day. Recall: Pt completed mental manipulation ordering task in which she was asked to repeat a list of 3 words and order them appropriately when list presented aloud by ST- 100% accuracy Argenis. Problem Solving/Reasoning: Pt completed analogy completion task- 100% accuracy Argenis. Other: Pt completed visual scanning tasks (letters and word find).   In 1/2 letter scanning trials, pt required 2 reminders to scan left; 1/2 letter scanning trials pt required 1 reminder to scan left, and independently scanned left later in task. In 2/2 word find trials, pt required 1 reminder to scan left.      Plan:  [x] Continue ST services    [] Discharge from ST:      Discharge recommendations: [] Inpatient Rehab   [] [place]   [] Outpatient Therapy  [] Follow up at trauma clinic   [] Other:       Treatment completed by: Edwin Sepulveda,  clinician

## 2017-10-25 NOTE — PROGRESS NOTES
[facility].      Patient name:  Ashley Joya  Date of admission:  10/17/2017  7:07 PM  MRN:   [id number]  YOB: 1950      Cc  CVA       HPI   Pt admitted to rehab for CVA   LUE weakness MRI showed multifocal infarcts R    There were also chronic infarcts seen in the occipital lobes and left cerebellar hemisphere  Underwent ICA stenting     hospitalization complicated by ureteral stone removal   UTI sepsis     Today feel well bp low     Past Medical History:   Diagnosis Date    CVA (cerebral vascular accident) ([address]     Diabetes mellitus ([address]     Other disorders of skin and subcutaneous tissue in diseases classified elsewhere several years ago    Bullous Pemphkgoid    Patient in clinical research study 10/13/2017    Anticipated end date 04/13/2018    PE (pulmonary thromboembolism) ([address]      No family history on file. reports that she has never smoked. She has never used smokeless tobacco. She reports that she does not drink alcohol or use drugs. Past Medical History:   Diagnosis Date    CVA (cerebral vascular accident) ([address]     Diabetes mellitus ([address]     Other disorders of skin and subcutaneous tissue in diseases classified elsewhere several years ago    Bullous Pemphkgoid    Patient in clinical research study 10/13/2017    Anticipated end date 04/13/2018    PE (pulmonary thromboembolism) ([address]      Allergies   Allergen Reactions    Iv Contrast [Iodides] Other (See Comments)     unknown       Review of systems    Constitutional: Negative for fever and fatigue. Respiratory: Negative for cough and shortness of breath. Cardiovascular: Negative for chest pain, palpitations and leg swelling. Gastrointestinal: Negative for abdominal pain, diarrhea and blood in stool.        ROS  Physical exam   BP (!) 98/51   Pulse 68   Temp 98.3 °F (36.8 °C)   Resp 17   Ht 5' (1.524 m)   Wt 248 lb (112.5 kg)   SpO2

## 2017-10-25 NOTE — PROGRESS NOTES
10/25/17 0859 10/25/17 1440   OT Individual Minutes   Time In 0730 1230   Time Out 0830 [facility]   ACUTE REHABILITATION OCCUPATIONAL THERAPY  DAILY NOTE    Date: 10/25/17  Patient Name: Shantanu Swain      Room: 0922/5952-71    MRN: [id number]   : 1950  (74 y.o.)  Gender: female      Diagnosis: R CVA, Acute ischmic right MCA stroke,   Additional Pertinent Hx: UTI, s/p Ureteral stone removed dueing stent placement 10/10/17, h/o CVA a year ago    Restrictions  Restrictions/Precautions: General Precautions, Fall Risk  Other position/activity restrictions: up with assistance  Position Activity Restriction  Other position/activity restrictions: up with assistance     Subjective \"I don't know how many I've done\"   Patient Currently in Pain: No     Orientation Level: Oriented to person       Objective Pt needs redirection secondary to decreased concentration to task. Weightbearing through LUE to fascilitate extension. Supervision with bed mobility.   Continue POC  Cognition  Memory: Decreased recall of recent events;Decreased long term memory;Decreased short term memory  Safety Judgement: Decreased awareness of need for assistance;Decreased awareness of need for safety (cues to safely move LUE when using w/c and transferring , also sit to stand )  Initiation: Requires cues for some  Balance  Standing Balance: Contact guard assistance (7 to 8 minute static with 1 UE stabilization )              Type of ROM/Therapeutic Exercise  Type of ROM/Therapeutic Exercise: AROM  Exercises  Wrist Flexion: theraputty  Wrist Extension: theraputty  Finger Flexion: theraputty  Finger Extension: theraputty   Grasp/Release: theraputty   Other: isometrics with LUE                            OT FIM:   Groomin - Requires setup/cues to do all tasks  Bathin - Able to bathe 3-4 areas  Dressing-Upper: 4 - Requires assist with buttons/zippers only and/or requires min. vc's 90% of the time  Short term goal 7: Pt. will tolerate 5-8 min. functional standing activities to promote increased indep.with ADL's and mobility. Long term goals  Time Frame for Long term goals : By discharge,   Long term goal 1: Pt. will demo. Mod.I with bed mobility. Long term goal 2: Pt. will demo. SBA with functional ADL transfers using appropriate AD with good . Long term goal 3: Pt. will demo. SBA with UB bathing/dressing, and min. A with LB bathing/dressing using AE as needed. Long term goal 4: Pt.will demo. SBA with toileting tasks.        Electronically signed by GABE Goodman on 10/25/17 at 2:41 PM

## 2017-10-25 NOTE — PROGRESS NOTES
96%   BMI 48.43 kg/m²      General appearance: well nourished in no distress  Lungs: normal effort, clear to auscultation. CVS sinus with no murmurs. Abdomen: Soft, non-tender; no masses or HSM,   Extremities: no edema; no digital cyanosis or clubbing. Neurologic: Cranial nerves II-XII grossly intact; LUE 3/5 LLE 4/5           Assessment / Plan      Patient Active Problem List   Diagnosis    Hydronephrosis of left kidney    Acute cystitis with hematuria    CKD (chronic kidney disease), stage III    Hypotension arterial    Essential hypertension    Morbid obesity with BMI of 40.0-44.9, adult (HCC)    History of arterial ischemic stroke    History of pulmonary embolus (PE)    Paroxysmal atrial fibrillation (HCC)    Left ureteral stone    Left-sided weakness    Non-intractable vomiting with nausea    Cerebrovascular accident (CVA) due to embolism of right middle cerebral artery (HCC)    Hypotension    Sepsis secondary to UTI ([address]    Elevated serum creatinine    Kidney stone    Anemia    Hypokalemia    Acute ischemic right MCA stroke ([facility]    Right-sided carotid artery disease (HCC)    Incontinence associated dermatitis       A/P  UTI Sepsis s/p ureteral stent on cipro   MCA infarct Left hemiparesis on ASA  lipitor   A fib on eliquis   DM on lantus  to 248    10/20  Pt on lovenox prophylacic dose  cotn present dose of lantus  Plan to start eliquis after 2 weeks previously that will be 10/26   flomax to be stopped soon has hypotension    10/24  Dc flomax   Bmp cr stable     10/25  Urine cx pending   Cont cipro   Stp lasix hypotension   MD ZEFERINO Eastman [address]    Phone [phone]   Fax: [phone]  Answering Service: [phone]

## 2017-10-25 NOTE — PROGRESS NOTES
assistance  Subjective   General  Additional Pertinent Hx: Pt presents to ER on 10/9/17 with c/o vomiting and severe abdominal and back pain. Pt was found to have acute cystitis, kidney stone and hydronephrosis. On 10/10/17, pt dev L sided weakness and facial droop, found to have acute R MCA infarct. Pt underwent emergent cysto with ureteral stent placement 10/10/2017. Pt had angiogram and found to have ICA stenosis, stent placed 10/12/17. Additional PMH: CKD, A fib, morbid obesity, HTN, pt is in clinical trial that will end in 2018- unspecified. Response To Previous Treatment: Patient with no complaints from previous session. Family / Caregiver Present: No  Subjective  Subjective: Pt was found to have UTI yesterday, also has hypoglycemia issues overnight. Pt states she generally does not have symptoms of hypoglycemia. General Comment  Comments: Pt is pleasant and cooperative. No confusion noted in AM, but does not remember previous session activities or training.  Increased confusion on PM.  Pain Screening  Patient Currently in Pain: Yes  Pain Assessment  Pain Assessment: 0-10  Pain Level: 2  Pain Type: Chronic pain  Pain Orientation: Right;Proximal  Pain Descriptors: Aching  Pain Frequency: Intermittent  Clinical Progression: Gradually improving  Pain Intervention(s): Ambulation/Increased activity;Repositioned  Vital Signs  Patient Currently in Pain: Yes       Orientation  Orientation  Overall Orientation Status: Within Functional Limits  Orientation Level: Oriented X4 (Pt is more confused this PM. Pt does not demo any carry over of gait training from this AM. Needs constant cueing for safety and mobility)  Objective      Transfers  Sit to Stand: Minimal Assistance  Stand to sit: Contact guard assistance  Stand Pivot Transfers: Minimal Assistance  Comment: pt performed transfers with RW initially in am, then progressed to hemiwalker use d/t difficulty with maintaining L hand placement and poor control of RW  Ambulation  Ambulation?: Yes  WB Status: no restrictions  More Ambulation?: Yes  Ambulation 1  Surface: level tile  Device: Rolling Walker  Other Apparatus: Wheelchair follow  Assistance: Minimal assistance  Quality of Gait: needs assist with RW control d/t L UE weakness, decreased L LE swing through, flexed posture  Distance: 60 ft AM  Comments:  vc's for upright posture, vc's for heel/toe gait  Ambulation 2  Surface - 2: level tile  Device 2: Hemiwalker  Assistance 2: Contact guard assistance  Quality of Gait 2: cues for sequencing and hemiwalker placement, improved ability to stand upright, small steps, wide BRYAN  Distance: 20 ft AM  Ambulation 3  Surface - 3: level tile  Device 3: Hemiwalker  Assistance 3: Minimal assistance  Quality of Gait 3: increased cues needed this PM d/t increased confusion, flexed posture, poor placement of hemiwalker this PM  Distance: 75 ft  Stairs/Curb  Stairs?: No     Balance  Posture: Fair  Sitting - Static: Fair;+  Sitting - Dynamic: Fair;+  Standing - Static: Fair  Standing - Dynamic: Fair  Comments: standing with RW and hemiwalker CGA, flexed posture  Other exercises  Other exercises?: Yes  Other exercises 1: Seated in w/c bilat LE therexs x15 1.5lb ankle weights, green t band ankle PF and heel slides x15 ea  Other exercises 2: amb in // bars 20 ft with R sided rail only   Other exercises 3: supine B LE ther exs 10x, AROM>AAROM, unable to tolerate ankle wts  Other exercises 4: standing UBE 3 min FWD/BACK with seated break in between,  L hand wrapped to handle                        Assessment   Body structures, Functions, Activity limitations: Decreased functional mobility ; Decreased strength;Decreased ROM; Decreased endurance;Decreased safe awareness;Decreased balance  Assessment: Pt presents s/p CVA with L hemiplegia, ICA occlusion requiring stent, and cystitis, hydronephritis, and kidney stone requiring ureteral stent.  Pt demo's impaired balance, strength, endurance, safety Education & Training, ROM, Balance Training, Gait Training, Functional Mobility Training, Stair training, Safety Education & Training  Safety Devices  Type of devices: Gait belt, Left in chair, Call light within reach  Restraints  Initially in place: No     Therapy Time   Individual Individual Concurrent Group Co-treatment   Time In [address] [place] [address]         Minutes [phone], PT

## 2017-10-26 LAB
CULTURE: NORMAL
CULTURE: NORMAL
GLUCOSE BLD-MCNC: 155 MG/DL (ref 65–105)
GLUCOSE BLD-MCNC: 205 MG/DL (ref 65–105)
GLUCOSE BLD-MCNC: 211 MG/DL (ref 65–105)
GLUCOSE BLD-MCNC: 90 MG/DL (ref 65–105)
Lab: NORMAL
SPECIMEN DESCRIPTION: NORMAL
SPECIMEN DESCRIPTION: NORMAL
STATUS: NORMAL

## 2017-10-26 PROCEDURE — 97532 HC COGNITIVE THERAPY 15 MIN: CPT

## 2017-10-26 PROCEDURE — 6370000000 HC RX 637 (ALT 250 FOR IP): Performed by: FAMILY MEDICINE

## 2017-10-26 PROCEDURE — 6360000002 HC RX W HCPCS: Performed by: INTERNAL MEDICINE

## 2017-10-26 PROCEDURE — 97530 THERAPEUTIC ACTIVITIES: CPT

## 2017-10-26 PROCEDURE — 6370000000 HC RX 637 (ALT 250 FOR IP): Performed by: INTERNAL MEDICINE

## 2017-10-26 PROCEDURE — 97110 THERAPEUTIC EXERCISES: CPT

## 2017-10-26 PROCEDURE — 82947 ASSAY GLUCOSE BLOOD QUANT: CPT

## 2017-10-26 PROCEDURE — 1180000000 HC REHAB R&B

## 2017-10-26 PROCEDURE — 6360000002 HC RX W HCPCS: Performed by: FAMILY MEDICINE

## 2017-10-26 PROCEDURE — 99232 SBSQ HOSP IP/OBS MODERATE 35: CPT | Performed by: INTERNAL MEDICINE

## 2017-10-26 PROCEDURE — 97535 SELF CARE MNGMENT TRAINING: CPT

## 2017-10-26 PROCEDURE — 99232 SBSQ HOSP IP/OBS MODERATE 35: CPT | Performed by: PHYSICAL MEDICINE & REHABILITATION

## 2017-10-26 PROCEDURE — 97116 GAIT TRAINING THERAPY: CPT

## 2017-10-26 RX ORDER — CLOPIDOGREL BISULFATE 75 MG/1
75 TABLET ORAL DAILY
Status: DISCONTINUED | OUTPATIENT
Start: 2017-10-27 | End: 2017-11-09 | Stop reason: HOSPADM

## 2017-10-26 RX ORDER — PAROXETINE HYDROCHLORIDE 20 MG/1
20 TABLET, FILM COATED ORAL EVERY MORNING
Status: DISCONTINUED | OUTPATIENT
Start: 2017-10-27 | End: 2017-11-09 | Stop reason: HOSPADM

## 2017-10-26 RX ORDER — INSULIN GLARGINE 100 [IU]/ML
24 INJECTION, SOLUTION SUBCUTANEOUS NIGHTLY
Status: DISCONTINUED | OUTPATIENT
Start: 2017-10-26 | End: 2017-11-09

## 2017-10-26 RX ADMIN — APIXABAN 5 MG: 5 TABLET, FILM COATED ORAL at 20:52

## 2017-10-26 RX ADMIN — ASPIRIN 325 MG: 325 TABLET, DELAYED RELEASE ORAL at 07:48

## 2017-10-26 RX ADMIN — POTASSIUM CHLORIDE 20 MEQ: 1.5 POWDER, FOR SOLUTION ORAL at 07:50

## 2017-10-26 RX ADMIN — FERROUS SULFATE TAB 325 MG (65 MG ELEMENTAL FE) 325 MG: 325 (65 FE) TAB at 16:05

## 2017-10-26 RX ADMIN — MICONAZOLE NITRATE: 1.42 POWDER TOPICAL at 20:52

## 2017-10-26 RX ADMIN — DOCUSATE SODIUM 100 MG: 100 CAPSULE, LIQUID FILLED ORAL at 07:48

## 2017-10-26 RX ADMIN — MICONAZOLE NITRATE: 1.42 POWDER TOPICAL at 07:49

## 2017-10-26 RX ADMIN — INSULIN LISPRO 4 UNITS: 100 INJECTION, SOLUTION INTRAVENOUS; SUBCUTANEOUS at 11:26

## 2017-10-26 RX ADMIN — FAMOTIDINE 40 MG: 20 TABLET ORAL at 07:48

## 2017-10-26 RX ADMIN — MYCOPHENOLATE MOFETIL 1500 MG: 250 CAPSULE ORAL at 20:55

## 2017-10-26 RX ADMIN — PAROXETINE HYDROCHLORIDE 10 MG: 10 TABLET, FILM COATED ORAL at 07:50

## 2017-10-26 RX ADMIN — ATORVASTATIN CALCIUM 40 MG: 40 TABLET, FILM COATED ORAL at 07:48

## 2017-10-26 RX ADMIN — Medication 24 UNITS: at 20:53

## 2017-10-26 RX ADMIN — POTASSIUM CHLORIDE 20 MEQ: 1.5 POWDER, FOR SOLUTION ORAL at 20:52

## 2017-10-26 RX ADMIN — FERROUS SULFATE TAB 325 MG (65 MG ELEMENTAL FE) 325 MG: 325 (65 FE) TAB at 07:47

## 2017-10-26 RX ADMIN — ENOXAPARIN SODIUM 30 MG: 30 INJECTION SUBCUTANEOUS at 07:49

## 2017-10-26 RX ADMIN — CLOPIDOGREL BISULFATE 75 MG: 75 TABLET ORAL at 07:47

## 2017-10-26 RX ADMIN — Medication 1 TABLET: at 07:48

## 2017-10-26 RX ADMIN — MYCOPHENOLATE MOFETIL 1500 MG: 250 CAPSULE ORAL at 07:50

## 2017-10-26 RX ADMIN — MULTIPLE VITAMINS W/ MINERALS TAB 1 TABLET: TAB at 07:48

## 2017-10-26 RX ADMIN — POLYETHYLENE GLYCOL 3350 17 G: 17 POWDER, FOR SOLUTION ORAL at 07:51

## 2017-10-26 NOTE — PLAN OF CARE
Problem: Risk for Impaired Skin Integrity  Goal: Tissue integrity - skin and mucous membranes  Structural intactness and normal physiological function of skin and  mucous membranes. Outcome: Ongoing  No new skin issues noted. See head to toe assessment. Will continue to monitor. Problem: Falls - Risk of  Goal: Absence of falls  Outcome: Ongoing  Remains free from falls. Call light in reach. Bed alarm on. Rounds continue. Problem: Pain:  Goal: Control of acute pain  Control of acute pain   Outcome: Ongoing  No c/o pain. Rests quietly in bed. Will continue to monitor.

## 2017-10-26 NOTE — CONSULTS
[facility]   Consult Note                 Date:   10/26/2017  Date of admission:  10/17/2017  7:07 PM  MRN:   [id number]  YOB: 1950    Reason for Consult:  Anticoagulation recommendation    HISTORY OF PRESENT ILLNESS:    The patient is a 74 y.o.  female who is admitted to originally admitted in out lying with emesis , found obstructive uropathy , was transfered to [address] [place] [address] DrAbdoul CURT had ureteric stent , then developed weakness found new CVA . Now in patient  rehab after CVA . At this time denies any CP , palpitation , SOB , doing well with rehab . Pt wants to go home . Pt was seen with our gp in [facility] . Question of long term anticoagulation . Past Medical History:   has a past medical history of CVA (cerebral vascular accident) ([facility] Diabetes mellitus ([facility] Other disorders of skin and subcutaneous tissue in diseases classified elsewhere; Patient in clinical research study; and PE (pulmonary thromboembolism) ([facility] Past Surgical History:   has a past surgical history that includes Cholecystectomy; hernia repair; and Cystoscopy (Left, 10/10/2017). Home Medications:    Prior to Admission medications    Medication Sig Start Date End Date Taking?  Authorizing Provider   carvedilol (COREG) 6.25 MG tablet Take 6.25 mg by mouth 2 times daily (with meals)   Yes Historical Provider, MD   aspirin 325 MG EC tablet Take 1 tablet by mouth daily 10/18/17   Richard Duran MD   apixaban (ELIQUIS) 5 MG TABS tablet Take 1 tablet by mouth 2 times daily 10/28/17   Richard Duran MD   metoprolol tartrate (LOPRESSOR) 25 MG tablet Take 1 tablet by mouth 2 times daily 10/17/17   Richard Duran MD   furosemide (LASIX) 40 MG tablet Take 1 tablet by mouth daily 10/17/17   Richard Duran MD   ferrous sulfate 325 (65 Fe) MG EC tablet Take 1 tablet by mouth 2 times daily (with meals) 10/17/17   Richard Duran MD   docusate sodium (COLACE, DULCOLAX) 100 MG CAPS Take 100 mg by mouth 2 times daily as needed for Constipation 10/17/17   Noe Ordaz MD   tamsulosin [facility]) 0.4 MG capsule Take 1 capsule by mouth daily 10/18/17   Noe Ordaz MD   clopidogrel (PLAVIX) 75 MG tablet Take 1 tablet by mouth daily for 10 days 10/18/17 10/28/17  Noe Ordaz MD   losartan (COZAAR) 25 MG tablet Take 1 tablet by mouth daily 10/17/17   Noe Ordaz MD   mycophenolate (CELLCEPT) 500 MG tablet Take 1,500 mg by mouth 2 times daily    Historical Provider, MD   Multiple Vitamins-Minerals (THERAPEUTIC MULTIVITAMIN-MINERALS) tablet Take 1 tablet by mouth daily    Historical Provider, MD   ondansetron (ZOFRAN ODT) 4 MG disintegrating tablet Take 1 tablet by mouth every 8 hours as needed for Nausea 8/14/17   Yesi Chau MD   atorvastatin (LIPITOR) 40 MG tablet Take 40 mg by mouth daily    Historical Provider, MD   calcium carbonate 600 MG TABS tablet Take 1 tablet by mouth daily    Historical Provider, MD   famotidine (PEPCID) 40 MG tablet Take 40 mg by mouth daily    Historical Provider, MD   Insulin Glargine (LANTUS SC) Inject 40 Units into the skin nightly     Historical Provider, MD   metFORMIN (GLUCOPHAGE) 500 MG tablet Take 500 mg by mouth 2 times daily (with meals)    Historical Provider, MD   pantoprazole (PROTONIX) 40 MG tablet Take 40 mg by mouth daily    Historical Provider, MD   PARoxetine (PAXIL) 10 MG tablet Take 10 mg by mouth every morning    Historical Provider, MD   POTASSIUM CHLORIDE PO Take 20 mEq by mouth daily     Historical Provider, MD       Current Medications: Scheduled Meds:   ciprofloxacin  500 mg Oral Q12H    mycophenolate  1,500 mg Oral BID    miconazole   Topical BID    insulin glargine  30 Units Subcutaneous Nightly    aspirin  325 mg Oral Daily    atorvastatin  40 mg Oral Daily    Calcium Carb-Cholecalciferol  1 tablet Oral Daily    clopidogrel  75 mg Oral Daily    docusate sodium  100 mg Oral Daily    enoxaparin  30 mg Subcutaneous BID    famotidine  40 mg Oral Daily    ferrous non-tender, non-distended with normal active bowel sounds   SKIN: no rash       DATA:    ECG: 10/9/17 NSR , RBBB  Echo:10/11/17  Left ventricle is moderately enlarged, global left ventricular systolic  function is normal, inferior and lateral hypokinesis is noted. Estimated  ejection fraction is 50-55%  Evidence of diastolic dysfunction. Right ventricular dilatation with reduced systolic function. Bi-atrial enlargement. Moderate mitral regurgitation due to papillary muscle dysfunction. Mild tricuspid regurgitation. Estimated right ventricular systolic pressure is 40 mmHg. Stress:  Cath:    Labs:     CBC:   Recent Labs      10/24/17   0707  10/25/17   0718   WBC  5.6  5.9   HGB  9.2*  9.5*   HCT  28.7*  30.8*   PLT  231  273     BMP: Recent Labs      10/24/17   0707   NA  144   K  4.3   CO2  27   BUN  11   CREATININE  0.72   LABGLOM  >60   GLUCOSE  61*     BNP: No results for input(s): BNP in the last 72 hours. PT/INR: No results for input(s): PROTIME, INR in the last 72 hours. APTT:No results for input(s): APTT in the last 72 hours. CARDIAC ENZYMES:No results for input(s): CKTOTAL, CKMB, CKMBINDEX, TROPONINI in the last 72 hours. FASTING LIPID PANEL:  Lab Results   Component Value Date    HDL 39 10/10/2017    TRIG 66 10/10/2017     LIVER PROFILE:No results for input(s): AST, ALT, LABALBU in the last 72 hours.     Intake/Output Summary (Last 24 hours) at 10/26/17 1117  Last data filed at 10/25/17 1808   Gross per 24 hour   Intake                0 ml   Output              200 ml   Net             -200 ml       IMPRESSION:    Patient Active Problem List   Diagnosis    Hydronephrosis of left kidney    Acute cystitis with hematuria    CKD (chronic kidney disease), stage III    Hypotension arterial    Essential hypertension    Morbid obesity with BMI of 40.0-44.9, adult ([address]    History of arterial ischemic stroke    History of pulmonary embolus (PE)    Paroxysmal atrial fibrillation (HCC)    Left

## 2017-10-26 NOTE — PROGRESS NOTES
10/26/17 0829 10/26/17 1432 10/26/17 1543   OT Individual Minutes   Time In 0717 1230 1130   Time Out 0817 733 [facility] OCCUPATIONAL [facility]ERAP[facility]  DAILY NOTE    Date: 10/26/17  Patient Name: Carmelita Child      Room: 3539/8843-44    MRN: [id number]   : 1950  (74 y.o.)  Gender: female      Diagnosis: R CVA, Acute ischmic right MCA stroke,   Additional Pertinent Hx: UTI, s/p Ureteral stone removed dueing stent placement 10/10/17, h/o CVA a year ago    Restrictions  Restrictions/Precautions: General Precautions, Fall Risk  Other position/activity restrictions: up with assistance  Position Activity Restriction  Other position/activity restrictions: up with assistance     Subjective   Patient Currently in Pain: No             Objective Son voiced that he was unable to look in on his mom due to work schedule next week and worried about her emotional status. Therapist introduced rec therapy who would gladly look in on Ophelia Gipson and help her with affect. Continue POC  Cognition  Overall Cognitive Status: Exceptions  Following Commands:  Follows one step commands with increased time  Memory: Decreased recall of recent events;Decreased long term memory;Decreased short term memory  Safety Judgement: Decreased awareness of need for assistance;Decreased awareness of need for safety  Problem Solving: Assistance required to generate solutions;Assistance required to identify errors made  Insights: Decreased awareness of deficits  Initiation: Requires cues for some     Transfers  Sit to stand: Contact guard assistance  Stand to sit: Stand by assistance  Standing Balance  Sit to stand: Contact guard assistance  Stand to sit: Stand by assistance        Type of ROM/Therapeutic Exercise  Type of ROM/Therapeutic Exercise: AROM  Exercises  Shoulder Flexion: 2 sets of 5   Shoulder Extension: 2 sets of 5   Horizontal ABduction: 2 sets of 5 Horizontal ADduction: 2 sets of 5   Elbow Flexion: 2 set of 5   Elbow Extension: 2 sets of 5   Wrist Flexion: 2 sets of 5   Wrist Extension: 2 sets of 5   Finger Extension: PROM   Grasp/Release: 2 sets of 5                             OT FIM:   Groomin - Requires setup/cues to do all tasks  Bathin - Able to bathe 3-4 areas  Dressing-Upper: 2 - Requires assist with 3 tasks (long sleeve shirt this date vs short sleeve other days )  Dressing-Lower: 1 - Total assist with lower body dressing  Toiletin - Total assist  Shower Transfer: 0 - Activity does not occur (sitting eob have patient lay down for adipose areas )               Assessment  Performance deficits / Impairments: Decreased functional mobility ; Decreased balance;Decreased ADL status; Decreased endurance;Decreased strength;Decreased vision/visual deficit; Decreased coordination;Decreased cognition;Decreased safe awareness;Decreased ROM  Prognosis: Fair  Activity Tolerance: Patient Tolerated treatment well  Safety Devices in place: Yes  Type of devices: Call light within reach;Gait belt;Left in chair          Patient Education:  Patient Goals   Patient goals : to be able to do things for myself     Learner:patient  Method: explanation       Outcome: needs reinforcement     Plan  Plan  Times per week: 5-7x/week  Current Treatment Recommendations: Balance Training, Functional Mobility Training, Endurance Training, Safety Education & Training, Self-Care / ADL, Equipment Evaluation, Education, & procurement, Patient/Caregiver Education & Training, Strengthening, ROM, Neuromuscular Re-education, Cognitive Reorientation, Cognitive/Perceptual Training  Patient Goals   Patient goals : to be able to do things for myself  Short term goals  Time Frame for Short term goals: One week,   Short term goal 1: Pt. will demo. CGA with bed mobility. Short term goal 2: Pt. will demo. min. A with toilet transfer and mod. A with toileting tasks with good safety

## 2017-10-26 NOTE — PROGRESS NOTES
short sleeve other days )  Dressing-Lower: 1 - Total assist with lower body dressing  Toiletin - Total assist  Shower Transfer: 0 - Activity does not occur (sitting eob have patient lay down for adipose areas )    Objective:  BP (!) 112/46   Pulse 88   Temp 97.9 °F (36.6 °C) (Oral)   Resp 16   Ht 5' (1.524 m)   Wt 266 lb 1.6 oz (120.7 kg)   SpO2 96%   BMI 51.97 kg/m²  I Body mass index is 51.97 kg/m². I   Wt Readings from Last 1 Encounters:   10/21/17 266 lb 1.6 oz (120.7 kg)      Temp (24hrs), Av.9 °F (36.6 °C), Min:97.9 °F (36.6 °C), Max:97.9 °F (36.6 °C)      Alert, no distress. Good speech and language function. Lungs clear. Heart regular. Abdomen non-distended, non-tender. No calf tenderness or 1+ edema.   B/l   Left upper extremity 3+/4 minus out of 5, left lower extremity 4 out of 5 .right extremity 5 out of 5, year , president Derick with choices, do she was in the hospital, follow 2 step commands, name objects, has right visual field deficit- no change      Medications   Scheduled Meds:   ciprofloxacin  500 mg Oral Q12H    mycophenolate  1,500 mg Oral BID    miconazole   Topical BID    insulin glargine  30 Units Subcutaneous Nightly    aspirin  325 mg Oral Daily    atorvastatin  40 mg Oral Daily    Calcium Carb-Cholecalciferol  1 tablet Oral Daily    clopidogrel  75 mg Oral Daily    docusate sodium  100 mg Oral Daily    enoxaparin  30 mg Subcutaneous BID    famotidine  40 mg Oral Daily    ferrous sulfate  325 mg Oral BID WC    insulin lispro  0-12 Units Subcutaneous TID WC    insulin lispro  0-6 Units Subcutaneous Nightly    PARoxetine  10 mg Oral QAM    polyethylene glycol  17 g Oral Daily    potassium chloride  20 mEq Oral BID    therapeutic multivitamin-minerals  1 tablet Oral Daily     Continuous Infusions:   dextrose       PRN Meds:.sodium chloride, HYDROcodone 5 mg - acetaminophen **OR** HYDROcodone 5 mg - acetaminophen, nicotine, ondansetron, dextrose, son today - need for family training and 24/7 care on dc and dc date reviewed with pt/son. 2. Sepsis due to UTI and left pylenephritis, urethral stent, bilateral kidney stones- leukocytosis- Cipro till 10/18 per IM- completed. White count 9.6 on 10/17/2017, f/u uro as outpt, flomax.,  Repeat UA plus/minusc/s neg - dc cipro  3. R MCA cva with LHP- embolic. Tosin Lewis Continue asa 325 and plavix 75 until 10/27/17, then asa 81 mg and eliquis 5 mg daily per note of endovasc 10/17/17. However, DC summary notes on 10/27  and eliquis 5 BID and  Card rec Eliquis 5 mg BID, stop asa and cont plavix -  ?- Clarify with IM and Card tomorrow Continue lipitor. 4. Bloody nose 10/24improved, added nasal mist, monitor closely, head of the bed at 30-45° ,  5. PAF - Per cardio will restart eliquis in 2 weeks.  F/u as OP in 2 wks.  discussed with card - rec as above-need to clarify as diff from dc summary and neuroint rec  6. Mild bilateral edema- off Lasix due to low BP per IM, continue to monitor,   7. Anemia- protonix, s/p 1 unit PRBC transfusion.  10/25 Hb 9.5 asymptomatic. No obvious blood loss. Continue iron supplementation. Will continue to monitor. .  8. Constipation - Colace  9. Reflux - Pepcid  10. De[ - on paxil 10 - increase to 20 per pt and son request  6. Bulbous pemphigus- Dr Heidi Lee had discussion with the patient's son- He states that his mother has been on CellCept 1500 mg twice a day for approximately 15 years for bulbous pemphigus. 12. Sacral ulcer- wound care management. Change mepilex q 3 days    13. H/O PE - will restart eliquis per endo neurosurgery/ cardio instructions as above. - clarify qD or BID and ? Asa/Plavix  14. DM- Cont lantus. IM following for medical management. , medications adjusted per internal medicine    15. Hypokalemia- resolved. Continue to monitor.   16. Mild confusion 10/24- neurologic exam no significant change except for some mild confusion, repeat CT done no acute change, question sundowning versus UTIUA neg   17. DVT prophylaxiscontinue on Lovenox  18. internal medicine for medical management. Aleja Prieto. Mariama Shane MD

## 2017-10-26 NOTE — FLOWSHEET NOTE
Talked with Dr Perla Wick regarding plan for cardiology to start pt on eliquis and to Dc the aspirin. He stated that he was ok with that.

## 2017-10-26 NOTE — PROGRESS NOTES
25% of locomotion effort  Stairs: 1- Total Assistance perfoms less than 25% of the effort, or requirs the assistance of two people, or goes up and down fewer than 4 stairs       BALANCE Posture: Fair  Sitting - Static: Fair;+  Sitting - Dynamic: Fair;+  Standing - Static: Fair  Standing - Dynamic: Fair  Comments: standing with RW and hemiwalker CGA, flexed posture    EXERCISES    Other exercises?: Yes  Other exercises 1: Seated in w/c bilat LE therexs x15 1.5lb ankle weights, green t band ankle PF and heel slides x15 ea  Other exercises 2: amb in // bars 20 ft with R sided rail only   Other exercises 3: supine B LE ther exs 10x, AROM>AAROM, unable to tolerate ankle wts  Other exercises 4: standing UBE 3 min FWD/BACK with seated break in between,  L hand wrapped to handle           Activity Tolerance: Patient limited by endurance          Patient Education  New Education Provided: kelechi walker use, safe mobility  Learner:patient  Method: demonstration and explanation       Outcome: demonstrated understanding and needs reinforcement     Current Treatment Recommendations: Strengthening, Transfer Training, Endurance Training, Cognitive Reorientation, Patient/Caregiver Education & Training, ROM, Balance Training, Gait Training, Functional Mobility Training, Stair training, Safety Education & Training    Conditions Requiring Skilled Therapeutic Intervention  Body structures, Functions, Activity limitations: Decreased functional mobility ; Decreased strength;Decreased ROM; Decreased endurance;Decreased safe awareness;Decreased balance  Treatment Diagnosis: CVA, L hemiplegia  Prognosis: Good  Patient Education: kelechi walker use, safe mobility  REQUIRES PT FOLLOW UP: Yes  Discharge Recommendations: Home with nursing aide;Home with Home health PT;Home with assist PRN    Goals  Short term goals  Time Frame for Short term goals: 7 days  Short term goal 1: Improve L UE and LE strength by 1/2 MMG  Short term goal 2:  Increase

## 2017-10-27 LAB
-: ABNORMAL
AMORPHOUS: ABNORMAL
ANION GAP SERPL CALCULATED.3IONS-SCNC: 12 MMOL/L (ref 9–17)
BACTERIA: ABNORMAL
BILIRUBIN URINE: NEGATIVE
BUN BLDV-MCNC: 15 MG/DL (ref 8–23)
BUN/CREAT BLD: ABNORMAL (ref 9–20)
CALCIUM SERPL-MCNC: 9.3 MG/DL (ref 8.6–10.4)
CASTS UA: ABNORMAL /LPF
CHLORIDE BLD-SCNC: 101 MMOL/L (ref 98–107)
CO2: 27 MMOL/L (ref 20–31)
COLOR: ABNORMAL
COMMENT UA: ABNORMAL
CREAT SERPL-MCNC: 0.76 MG/DL (ref 0.5–0.9)
CRYSTALS, UA: ABNORMAL /HPF
EPITHELIAL CELLS UA: ABNORMAL /HPF
GFR AFRICAN AMERICAN: >60 ML/MIN
GFR NON-AFRICAN AMERICAN: >60 ML/MIN
GFR SERPL CREATININE-BSD FRML MDRD: ABNORMAL ML/MIN/{1.73_M2}
GFR SERPL CREATININE-BSD FRML MDRD: ABNORMAL ML/MIN/{1.73_M2}
GLUCOSE BLD-MCNC: 112 MG/DL (ref 65–105)
GLUCOSE BLD-MCNC: 167 MG/DL (ref 65–105)
GLUCOSE BLD-MCNC: 197 MG/DL (ref 65–105)
GLUCOSE BLD-MCNC: 228 MG/DL (ref 65–105)
GLUCOSE BLD-MCNC: 249 MG/DL (ref 70–99)
GLUCOSE URINE: ABNORMAL
KETONES, URINE: NEGATIVE
LEUKOCYTE ESTERASE, URINE: ABNORMAL
MUCUS: ABNORMAL
NITRITE, URINE: NEGATIVE
OTHER OBSERVATIONS UA: ABNORMAL
PH UA: 6 (ref 5–8)
POTASSIUM SERPL-SCNC: 4.4 MMOL/L (ref 3.7–5.3)
PROTEIN UA: ABNORMAL
RBC UA: ABNORMAL /HPF
RENAL EPITHELIAL, UA: ABNORMAL /HPF
SODIUM BLD-SCNC: 140 MMOL/L (ref 135–144)
SPECIFIC GRAVITY UA: 1.02 (ref 1–1.03)
TRICHOMONAS: ABNORMAL
TURBIDITY: ABNORMAL
URINE HGB: ABNORMAL
UROBILINOGEN, URINE: NORMAL
WBC UA: ABNORMAL /HPF
YEAST: ABNORMAL

## 2017-10-27 PROCEDURE — 97530 THERAPEUTIC ACTIVITIES: CPT

## 2017-10-27 PROCEDURE — 97110 THERAPEUTIC EXERCISES: CPT

## 2017-10-27 PROCEDURE — 97532 HC COGNITIVE THERAPY 15 MIN: CPT

## 2017-10-27 PROCEDURE — 6370000000 HC RX 637 (ALT 250 FOR IP): Performed by: INTERNAL MEDICINE

## 2017-10-27 PROCEDURE — 80048 BASIC METABOLIC PNL TOTAL CA: CPT

## 2017-10-27 PROCEDURE — 6370000000 HC RX 637 (ALT 250 FOR IP): Performed by: FAMILY MEDICINE

## 2017-10-27 PROCEDURE — 99232 SBSQ HOSP IP/OBS MODERATE 35: CPT | Performed by: PHYSICAL MEDICINE & REHABILITATION

## 2017-10-27 PROCEDURE — 6370000000 HC RX 637 (ALT 250 FOR IP): Performed by: PHYSICAL MEDICINE & REHABILITATION

## 2017-10-27 PROCEDURE — 82947 ASSAY GLUCOSE BLOOD QUANT: CPT

## 2017-10-27 PROCEDURE — 6360000002 HC RX W HCPCS: Performed by: INTERNAL MEDICINE

## 2017-10-27 PROCEDURE — 87086 URINE CULTURE/COLONY COUNT: CPT

## 2017-10-27 PROCEDURE — 99231 SBSQ HOSP IP/OBS SF/LOW 25: CPT | Performed by: INTERNAL MEDICINE

## 2017-10-27 PROCEDURE — 36415 COLL VENOUS BLD VENIPUNCTURE: CPT

## 2017-10-27 PROCEDURE — 97116 GAIT TRAINING THERAPY: CPT

## 2017-10-27 PROCEDURE — 81001 URINALYSIS AUTO W/SCOPE: CPT

## 2017-10-27 PROCEDURE — 51798 US URINE CAPACITY MEASURE: CPT

## 2017-10-27 PROCEDURE — 1180000000 HC REHAB R&B

## 2017-10-27 PROCEDURE — 97535 SELF CARE MNGMENT TRAINING: CPT

## 2017-10-27 RX ORDER — CIPROFLOXACIN 500 MG/1
500 TABLET, FILM COATED ORAL EVERY 12 HOURS SCHEDULED
Status: DISCONTINUED | OUTPATIENT
Start: 2017-10-27 | End: 2017-10-30

## 2017-10-27 RX ADMIN — APIXABAN 5 MG: 5 TABLET, FILM COATED ORAL at 22:01

## 2017-10-27 RX ADMIN — Medication 1 TABLET: at 07:32

## 2017-10-27 RX ADMIN — MICONAZOLE NITRATE: 1.42 POWDER TOPICAL at 07:32

## 2017-10-27 RX ADMIN — DOCUSATE SODIUM 100 MG: 100 CAPSULE, LIQUID FILLED ORAL at 07:32

## 2017-10-27 RX ADMIN — PAROXETINE HYDROCHLORIDE 20 MG: 20 TABLET, FILM COATED ORAL at 07:33

## 2017-10-27 RX ADMIN — FAMOTIDINE 40 MG: 20 TABLET ORAL at 07:31

## 2017-10-27 RX ADMIN — INSULIN LISPRO 2 UNITS: 100 INJECTION, SOLUTION INTRAVENOUS; SUBCUTANEOUS at 11:17

## 2017-10-27 RX ADMIN — FERROUS SULFATE TAB 325 MG (65 MG ELEMENTAL FE) 325 MG: 325 (65 FE) TAB at 07:32

## 2017-10-27 RX ADMIN — POTASSIUM CHLORIDE 20 MEQ: 1.5 POWDER, FOR SOLUTION ORAL at 22:01

## 2017-10-27 RX ADMIN — MICONAZOLE NITRATE: 1.42 POWDER TOPICAL at 22:03

## 2017-10-27 RX ADMIN — MYCOPHENOLATE MOFETIL 1500 MG: 250 CAPSULE ORAL at 22:02

## 2017-10-27 RX ADMIN — MULTIPLE VITAMINS W/ MINERALS TAB 1 TABLET: TAB at 07:32

## 2017-10-27 RX ADMIN — ATORVASTATIN CALCIUM 40 MG: 40 TABLET, FILM COATED ORAL at 07:31

## 2017-10-27 RX ADMIN — FERROUS SULFATE TAB 325 MG (65 MG ELEMENTAL FE) 325 MG: 325 (65 FE) TAB at 18:23

## 2017-10-27 RX ADMIN — CIPROFLOXACIN HYDROCHLORIDE 500 MG: 500 TABLET, FILM COATED ORAL at 22:02

## 2017-10-27 RX ADMIN — MYCOPHENOLATE MOFETIL 1500 MG: 250 CAPSULE ORAL at 07:34

## 2017-10-27 RX ADMIN — CLOPIDOGREL BISULFATE 75 MG: 75 TABLET ORAL at 07:31

## 2017-10-27 RX ADMIN — Medication 24 UNITS: at 22:02

## 2017-10-27 RX ADMIN — POTASSIUM CHLORIDE 20 MEQ: 1.5 POWDER, FOR SOLUTION ORAL at 07:34

## 2017-10-27 RX ADMIN — INSULIN LISPRO 4 UNITS: 100 INJECTION, SOLUTION INTRAVENOUS; SUBCUTANEOUS at 15:52

## 2017-10-27 RX ADMIN — APIXABAN 5 MG: 5 TABLET, FILM COATED ORAL at 07:34

## 2017-10-27 ASSESSMENT — PAIN SCALES - GENERAL
PAINLEVEL_OUTOF10: 0
PAINLEVEL_OUTOF10: 0

## 2017-10-27 NOTE — PROGRESS NOTES
Physical Medicine & Rehabilitation  Progress Note    10/27/2017 12:39 PM     CC: Ambulatory and ADL dysfunction due to CVA    Subjective:   Doing better today. Bowels and bladder okay. No pain. .  No further nosebleed    ROS:  Denies fevers, chills, sweats. No chest pain, palpitations, lightheadedness. Denies coughing, wheezing or shortness of breath. Denies abdominal pain, nausea, diarrhea or constipation. No new areas of joint pain. Denies new areas of numbness or weakness. Denies new anxiety or depression issues. No new skin problems. Rehabilitation:   Bed Mobility:   Supine to Sit: Moderate assistance  Sit to Supine: Moderate assistance (assist with LEs )  Scooting: Minimal assistance        Transfers:  Sit to Stand: Minimal Assistance  Stand to sit: Contact guard assistance  Bed to Chair: Minimal assistance (With AD)  Stand Pivot Transfers: Minimal Assistance        WB Status: no restrictions  Ambulation 1  Surface: level tile  Device: Rolling Walker  Assistance: Minimal assistance  Quality of Gait: needs assist with RW control d/t L UE weakness, decreased L LE swing through, flexed posture  Distance: 60 ft AM  Comments:  vc's for upright posture, vc's for heel/toe gait  Ambulation 2  Surface - 2: level tile  Device 2: Hemiwalker  Assistance 2: Minimal assistance  Quality of Gait 2: cues for sequencing and hemiwalker placement, improved ability to stand upright, small steps, wide BRYAN  Distance: 60 ft x2         Objective:  BP (!) 135/53   Pulse 83   Temp 98.6 °F (37 °C) (Oral)   Resp 16   Ht 5' (1.524 m)   Wt 266 lb 1.6 oz (120.7 kg)   SpO2 100%   BMI 51.97 kg/m²  I Body mass index is 51.97 kg/m². I   Wt Readings from Last 1 Encounters:   10/21/17 266 lb 1.6 oz (120.7 kg)      Temp (24hrs), Av.5 °F (36.9 °C), Min:98.4 °F (36.9 °C), Max:98.6 °F (37 °C)      Alert, no distress. Good speech and language function. Lungs clear. Heart regular. Abdomen non-distended, non-tender.   No calf tenderness or 1+ edema. B/l   Left upper extremity 3+/4 minus out of 5, left lower extremity 4 out of 5 .right extremity 5 out of 5,   Neuro - mental status - no change      Medications   Scheduled Meds:   PARoxetine  20 mg Oral QAM    insulin glargine  24 Units Subcutaneous Nightly    apixaban  5 mg Oral BID    clopidogrel  75 mg Oral Daily    mycophenolate  1,500 mg Oral BID    miconazole   Topical BID    atorvastatin  40 mg Oral Daily    Calcium Carb-Cholecalciferol  1 tablet Oral Daily    docusate sodium  100 mg Oral Daily    famotidine  40 mg Oral Daily    ferrous sulfate  325 mg Oral BID WC    insulin lispro  0-12 Units Subcutaneous TID WC    insulin lispro  0-6 Units Subcutaneous Nightly    polyethylene glycol  17 g Oral Daily    potassium chloride  20 mEq Oral BID    therapeutic multivitamin-minerals  1 tablet Oral Daily     Continuous Infusions:   dextrose       PRN Meds:.sodium chloride, HYDROcodone 5 mg - acetaminophen **OR** HYDROcodone 5 mg - acetaminophen, nicotine, ondansetron, dextrose, dextrose, glucagon (rDNA), glucose, LORazepam, acetaminophen, magnesium hydroxide     Diagnostics:     CBC:   Recent Labs      10/25/17   0718   WBC  5.9   RBC  3.14*   HGB  9.5*   HCT  30.8*   MCV  98.0   RDW  18.9*   PLT  273     BMP:   No results for input(s): NA, K, CL, CO2, PHOS, BUN, CREATININE in the last 72 hours. Invalid input(s): CA  BNP: No results for input(s): BNP in the last 72 hours. PT/INR: No results for input(s): PROTIME, INR in the last 72 hours. APTT: No results for input(s): APTT in the last 72 hours. CARDIAC ENZYMES: No results for input(s): CKMB, CKMBINDEX, TROPONINT in the last 72 hours. Invalid input(s): CKTOTAL;3  FASTING LIPID PANEL:  Lab Results   Component Value Date    CHOL 73 10/10/2017    HDL 39 (L) 10/10/2017    TRIG 66 10/10/2017     LIVER PROFILE: No results for input(s): AST, ALT, ALB, BILIDIR, BILITOT, ALKPHOS in the last 72 hours. I/O (24Hr):   No intake or output data in the 24 hours ending 10/27/17 1239    Glu last 24 hour  Recent Labs      10/26/17   1550  10/26/17   1931  10/27/17   0626  10/27/17   1102   POCGLU  155*  205*  112*  197*       Recent Labs      10/24/17   2050   COLORU  YELLOW   PHUR  6.0   SPECGRAV  1.016   PROTEINU  2+*   RBCUA  50    BACTERIA  FEW*   NITRU  NEGATIVE   WBCUA  10 TO 20   LEUKOCYTESUR  MOD*   YEAST  NOT REPORTED   GLUCOSEU  TRACE*   BILIRUBINUR  NEGATIVE     Urine   Culture 10/24/2017  9:15  [address]   NO SIGNIFICANT GROWTH          Impression/Plan:    Patient is a 68-year-old female with ADL and mobility dysfunction s/p MCA CVA.     1. Ambulatory and ADL dysfunction secondary to MCA CVA -continue rehab efforts, making progress, team conference reviewed, will need 24/ 7 care, discharge plan 11/9/2017 , discussed with son 10/26 - need for family training and 24/7 care on dc and dc date reviewed with pt/son. Again reviewed with patient  2. Sepsis due to UTI and left pylenephritis, urethral stent, bilateral kidney stones- leukocytosis- Cipro till 10/18 per IM- completed. White count 9.6 on 10/17/2017, f/u uro as outpt, flomax.,  Repeat UA plus/minusc/s neg - dc cipro, monitor  3. R MCA cva with LHP- embolic. . Cardiology recommendations noted and internal medicine orders notedpatient now on Eliquis 5 mg twice a day and Plavix, Lovenox discontinued, - discussed with Dr Sera Dailey  on 10/26- notes cont eliquis and plavix, no asa,  IM note - cont eliquis but stop lovenox and plavix?- clarify with card as not written in note re: plavix?,  Continue lipitor. 4. Bloody nose 10/24improved, added nasal mist, monitor closely, head of the bed at 30-45° , no further episodes  5. PAF - Per cardio will restart eliquis in 2 weeks.  F/u as OP in 2 wks.  discussed with card 10/26  6.  Mild bilateral edema- off Lasix due to low BP per IM, continue to monitor, continues on potassiumrecheck BMPquestionable need for continuation of potassium  7. Anemia- protonix, s/p 1 unit PRBC transfusion.  10/25 Hb 9.5 asymptomatic. No obvious blood loss. Continue iron supplementation. Will continue to monitor. Recheck Monday. 8. Constipation - Colace  9. Reflux - Pepcid  10. Dep - on paxil 20 monitor  11. Bulbous pemphigus- Dr Alfa Jansen had discussion with the patient's son- He states that his mother has been on CellCept 1500 mg twice a day for approximately 15 years for bulbous pemphigus. 12. Sacral ulcer- wound care management. Change mepilex q 3 days    13. H/O PE - now back on Eliquis  14. DM- Cont lantus. IM following for medical management. , medications adjusted per internal medicine    15. Hypokalemia- resolved. Continue to monitor. 16. Mild confusion 10/24- neurologic exam no significant change except for some mild confusion, repeat CT done no acute change, question sundowning versus UTIurine culture negative  17. DVT prophylaxison Eliquis  18. internal medicine for medical management. Camron Thornton. Kam Garcia MD

## 2017-10-27 NOTE — PROGRESS NOTES
[facility]  Wound Care Inpatient Consult Note               Tate Flatten Colindres  AGE: 74 y.o. GENDER: female  : 1950  TODAY'S DATE:  10/27/2017  Consulted for wound care by:Gabrielle Vazquez MD    Subjective:      HISTORY of PRESENT ILLNESS HPI   Mikki Parra is a 74 y.o. female who presents today for wound evaluation. Wound Type:moisture associated skin breakdown  Wound Location:intergluteal crease erosion, bilateral buttock friction and moisture damaged skin. Modifying factors:diabetes, decreased mobility, shear force, obesity, immunosuppression, anticoagulation therapy, incontinence of urine, poor hygiene and non-adherence  Initial visit at [facility] on 10/11 with consult for moisture associated skin breakdown. Transferred to Acute Rehab. Purple areas noted on initial visit that were blanchable and not in areas of bony prominences. At initial assessment on 10/11, there were documented deep purple areas on bilateral buttocks which chandrakant with pressure. Patient's family at [facility] states Reji De La O is on methotrexate for those\". Patient med list shows mycophenolate. No diagnosis listed to indicate use of this med. Reviewed Care Everywhere. Unclear what purple vasculitic lesions on bilateral buttocks are. Incontinence care including incontinence wipes and zinc barrier cream ordered at that time. 10/27 areas of purple discoloration on buttocks and posterior thighs remain blanchable reinforcing chronic skin changes. There is an open wound on abdomen near umbilicus- mepilex foam dressing in place. Skin folds intact under abdominal pannus and in bilateral groin areas.      PAST MEDICAL HISTORY        Diagnosis Date    CVA (cerebral vascular accident) ([address]     Diabetes mellitus ([address]     Other disorders of skin and subcutaneous tissue in diseases classified elsewhere several years ago    Bullous Pemphkgoid    Patient in clinical research study 10/13/2017    Anticipated end date 2018    PE [facility]pulmonary thromboembolism) [facility]        PAST SURGICAL HISTORY    Past Surgical History:   Procedure Laterality Date    CHOLECYSTECTOMY      CYSTOSCOPY Left 10/10/2017    CYSTOSCOPY, STENT INSERTION performed by Yvon Bashir MD at [facility]    No family history on file. SOCIAL HISTORY    Social History   Substance Use Topics    Smoking status: Never Smoker    Smokeless tobacco: Never Used    Alcohol use No         ALLERGIES    Allergies   Allergen Reactions    Iv Contrast [Iodides] Other (See Comments)     unknown           REVIEW OF SYSTEMS    Pertinent items are noted in HPI. Objective:      BP (!) 135/53   Pulse 83   Temp 98.6 °F (37 °C) (Oral)   Resp 16   Ht 5' (1.524 m)   Wt 266 lb 1.6 oz (120.7 kg)   SpO2 100%   BMI 51.97 kg/m²   General Appearance: alert and oriented to person, place and time, well-developed and well-nourished, in no acute distress  Skin: warm and dry and  areas of light purple discoloration in medial buttocks and posterior medial upper thighs - skin is blanchable. Area at intergluteal crease intact. Small open wound on abdomen near umbilicus measuring [id number]- covered with mepilex  The patients pain isPain Level: 0 Pain Type: Chronic pain.        Assessment:     Patient Active Problem List   Diagnosis    Hydronephrosis of left kidney    Acute cystitis with hematuria    CKD (chronic kidney disease), stage III    Hypotension arterial    Essential hypertension    Morbid obesity with BMI of 40.0-44.9, adult ([facility])    History of arterial ischemic stroke    History of pulmonary embolus (PE)    Paroxysmal atrial fibrillation ([facility])    Left ureteral stone    Left-sided weakness    Non-intractable vomiting with nausea    Cerebrovascular accident (CVA) due to embolism of right middle cerebral artery ([facility])    Hypotension    Sepsis secondary to UTI ([facility])    Elevated serum creatinine    Kidney stone    Anemia    Hypokalemia   

## 2017-10-27 NOTE — PROGRESS NOTES
Speech Language Pathology  Speech Language Pathology  [facility]    Cognitive Treatment Note    Date: 10/27/2017  Patients Name: Colin Lopez  MRN: [id number]  Diagnosis:   Patient Active Problem List   Diagnosis Code    Hydronephrosis of left kidney N13.30    Acute cystitis with hematuria N30.01    CKD (chronic kidney disease), stage III N18.3    Hypotension arterial I95.9    Essential hypertension I10    Morbid obesity with BMI of 40.0-44.9, adult ([address] E66.01, Z68.41    History of arterial ischemic stroke Z86.73    History of pulmonary embolus (PE) Z86.711    Paroxysmal atrial fibrillation (HCC) I48.0    Left ureteral stone N20.1    Left-sided weakness R53.1    Non-intractable vomiting with nausea R11.2    Cerebrovascular accident (CVA) due to embolism of right middle cerebral artery (HCC) I63.411    Hypotension I95.9    Sepsis secondary to UTI ([address] A41.9, N39.0    Elevated serum creatinine R79.89    Kidney stone N20.0    Anemia D64.9    Hypokalemia E87.6    Acute ischemic right MCA stroke (HCC) I63.511    Right-sided carotid artery disease (HCC) I77.9    Incontinence associated dermatitis 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 Orientation: Pt oriented to date c independent reference to her digital calendar. Pt oriented to hospital name c independent reference to whiteboard. Pt oriented to time of day. Recall: Pt completed first letter mnemonic recall task- recalled mnemonic word created 0% Argenis, 100% c min-mod A; recalled list items 71% Argenis, 100% c mod-max A. Problem Solving/Reasoning: Pt completed sequencing ADLs task (4 steps)- 100% Argenis c appropriate processing time.

## 2017-10-27 NOTE — PROGRESS NOTES
[address] [place] [address] [address]   ACUTE REHABILITATION OCCUPATIONAL THERAPY  DAILY NOTE    Date: 10/27/17  Patient Name: Gabriella James      Room: 2482/9449-62    MRN: [id number]   : 1950  (74 y.o.)  Gender: female      Diagnosis: R CVA, Acute ischmic right MCA stroke,   Additional Pertinent Hx: UTI, s/p Ureteral stone removed dueing stent placement 10/10/17, h/o CVA a year ago    Restrictions  Restrictions/Precautions: General Precautions, Fall Risk  Other position/activity restrictions: up with assistance  Position Activity Restriction  Other position/activity restrictions: up with assistance     Subjective  Subjective: pt agreeable to OT session but declines showering tasks at request to get cleaned up at sinkside  Pain Level: 0  Restrictions/Precautions: General Precautions; Fall Risk     Patient Observation  Observations: PT switch from RW to kelechi cane     Objective     Balance  Sitting Balance: Stand by assistance  Standing Balance: Contact guard assistance  Bed mobility  Rolling to Right: Minimal assistance  Supine to Sit: Minimal assistance  Scooting: Contact guard assistance;Minimal assistance  Comment: hob slightly raised and assist c LUE placement   Transfers  Sit to stand: Contact guard assistance  Stand to sit: Stand by assistance  Standing Balance  Time: 12 min   Activity: adls, transfers, dynamic table top activity with BUE reaching across midline  Sit to stand: Contact guard assistance  Stand to sit: Stand by assistance  Comment: no lob noted on this date, pt reports discomfort in L knee.    Functional Mobility  Functional - Mobility Device: Rolling Walker  Activity: To/from bathroom  Assist Level: Minimal assistance  Functional Mobility Comments: no lob      Type of ROM/Therapeutic Exercise  Type of ROM/Therapeutic Exercise: AROM (at table top to cross midline to targets c use of cloth )  Exercises  Elbow Flexion: 2 set of 5   Elbow Extension: 2 sets of 5   Supination: 3 sets of 5  Grasp/Release: 2 sets of 5   Other: isometrics with LUE              OT FIM:   Eatin - Feeds self with setup/supervision/cues and/or requires only setup/supervision/cues to perform tube feedings  Groomin - Requires setup/cues to do all tasks (hair management, oral/face hygeine seated at sink)  Bathing: 3 - Able to bathe 5-7 areas (seated on toilet c fair adaptive strategies)  Dressing-Upper: 4 - Requires assist with buttons/zippers only and/or requires assist with one arm only (vc necessary for sequencing, pt demo good technique)  Dressing-Lower: 2 - Requires assist with 4-5 parts of dressing (mod a c brief, min a with pants, a c pulling over hips at RW)  Toiletin - Able to perform 1 task only (e.g. hygiene) (pt complete ana hygeine c CGA to min a at [employer])  Toilet Transfer: 4 - Requires steadying assistance only < 25% assist  Primary Mode: Shower  Tub Transfer: 0 - Activity does not occur    Social Interaction: 6 - Patient requires medication for mood and/or effect  Problem Solvin - Patient solves simple/routine tasks 75-90%+   Memory: 3 - Patient remembers 50%-74% of the time    Assessment  Performance deficits / Impairments: Decreased functional mobility ; Decreased balance;Decreased ADL status; Decreased endurance;Decreased strength;Decreased vision/visual deficit; Decreased coordination;Decreased cognition;Decreased safe awareness;Decreased ROM  Assessment: pt is progressing towards goals with increased standing endurance and balance.  Pt presents c increased visual acuity with dynamic activity at table top   Prognosis: Fair  Discharge Recommendations: 24 hour supervision or assist  Activity Tolerance: Patient Tolerated treatment well  Activity Tolerance: pt tolerated approx 12 min standing this PM session with good tolerance and no lob noted  Type of devices:  (pt taken to Speech at conclusion of OT session)          Patient Education:  Patient Goals   Patient goals : to be able to do

## 2017-10-27 NOTE — PROGRESS NOTES
Physical Therapy  [address] [facility]  Acute Rehabilitation Physical Therapy Progress Note    Date: 10/27/17  Patient Name: Carmelita Child       Room: 2188/9452-57  MRN: [id number]   Account: [de-identified]   : 1950  (75 y.o.) Gender: female        Diagnosis: R CVA, Acute ischmic right MCA stroke,   Past Medical History:  has a past medical history of CVA (cerebral vascular accident) ([facility] [address] Diabetes mellitus ([facility] [address] [facility] Other disorders of skin and subcutaneous tissue in diseases classified elsewhere; Patient in clinical research study; and PE (pulmonary thromboembolism) ([facility] [address] Past Surgical History:   has a past surgical history that includes Cholecystectomy; hernia repair; and Cystoscopy (Left, 10/10/2017). Additional Pertinent Hx: Pt presents to ER on 10/9/17 with c/o vomiting and severe abdominal and back pain. Pt was found to have acute cystitis, kidney stone and hydronephrosis. On 10/10/17, pt dev L sided weakness and facial droop, found to have acute R MCA infarct. Pt underwent emergent cysto with ureteral stent placement 10/10/2017. Pt had angiogram and found to have ICA stenosis, stent placed 10/12/17. Additional PMH: CKD, A fib, morbid obesity, HTN, pt is in clinical trial that will end in 2018- unspecified. Overall Orientation Status: Within Functional Limits  Orientation Level: Oriented X4  Restrictions/Precautions  Restrictions/Precautions: General Precautions; Fall Risk  Required Braces or Orthoses?: No  Position Activity Restriction  Other position/activity restrictions: up with assistance    Subjective: Patient reports she feels \"shaky\" this AM but not dizzy. Comments: Pt is pleasant and cooperative. No confusion noted, but does not remember previous session activities or training. Vital Signs  Patient Currently in Pain: Denies                   Bed Mobility:   Supine to Sit: Moderate assistance  Sit to Supine:  Moderate assistance (assist with LEs )  Scooting: Minimal Long term goals : 21 days  Long term goal 1:  Increase UEs and LEs strength to [facility] to maximize mobiility  Long term goal 2: Improve standing balance with appropriate device to good to increase safety  Long term goal 3: Improve bed mobility to independent to prepare for home  Long term goal 4: Improve transfers with appropriate device to mod I to prepare for home  Long term goal 5: Improve gait with appropriate device to mod I 150 ft to prepare for home  Long term goal 6: Improve stair negotiation to SBA on 4 steps with 1 rail use to allow pt to enter and exit home       10/27/17 0940 10/27/17 1500   PT Individual Minutes   Time In 0940 1500   Time Out 1030 1530   Minutes 50 30   PT Concurrent Minutes   Time In 1030 --    Time Out 1040 --    Minutes 10 --        Electronically signed by Luisa Glasgow PTA on 10/27/17 at 5:21 PM

## 2017-10-28 LAB
CULTURE: NORMAL
CULTURE: NORMAL
GLUCOSE BLD-MCNC: 105 MG/DL (ref 65–105)
GLUCOSE BLD-MCNC: 159 MG/DL (ref 65–105)
GLUCOSE BLD-MCNC: 184 MG/DL (ref 65–105)
GLUCOSE BLD-MCNC: 194 MG/DL (ref 65–105)
HCT VFR BLD CALC: 28.6 % (ref 36–46)
HEMOGLOBIN: 8.8 G/DL (ref 12–16)
Lab: NORMAL
MCH RBC QN AUTO: 30.4 PG (ref 26–34)
MCHC RBC AUTO-ENTMCNC: 30.9 G/DL (ref 31–37)
MCV RBC AUTO: 98.4 FL (ref 80–100)
PDW BLD-RTO: 18.5 % (ref 11.5–14.9)
PLATELET # BLD: 202 K/UL (ref 150–450)
PMV BLD AUTO: 9 FL (ref 6–12)
RBC # BLD: 2.91 M/UL (ref 4–5.2)
SPECIMEN DESCRIPTION: NORMAL
SPECIMEN DESCRIPTION: NORMAL
STATUS: NORMAL
WBC # BLD: 4.3 K/UL (ref 3.5–11)

## 2017-10-28 PROCEDURE — 99232 SBSQ HOSP IP/OBS MODERATE 35: CPT | Performed by: PHYSICAL MEDICINE & REHABILITATION

## 2017-10-28 PROCEDURE — 97530 THERAPEUTIC ACTIVITIES: CPT

## 2017-10-28 PROCEDURE — 82947 ASSAY GLUCOSE BLOOD QUANT: CPT

## 2017-10-28 PROCEDURE — 85027 COMPLETE CBC AUTOMATED: CPT

## 2017-10-28 PROCEDURE — 6370000000 HC RX 637 (ALT 250 FOR IP): Performed by: FAMILY MEDICINE

## 2017-10-28 PROCEDURE — 97110 THERAPEUTIC EXERCISES: CPT

## 2017-10-28 PROCEDURE — 36415 COLL VENOUS BLD VENIPUNCTURE: CPT

## 2017-10-28 PROCEDURE — 99231 SBSQ HOSP IP/OBS SF/LOW 25: CPT | Performed by: INTERNAL MEDICINE

## 2017-10-28 PROCEDURE — 1180000000 HC REHAB R&B

## 2017-10-28 PROCEDURE — 97535 SELF CARE MNGMENT TRAINING: CPT

## 2017-10-28 PROCEDURE — 97116 GAIT TRAINING THERAPY: CPT

## 2017-10-28 PROCEDURE — 97112 NEUROMUSCULAR REEDUCATION: CPT

## 2017-10-28 PROCEDURE — 6370000000 HC RX 637 (ALT 250 FOR IP): Performed by: PHYSICAL MEDICINE & REHABILITATION

## 2017-10-28 PROCEDURE — 6370000000 HC RX 637 (ALT 250 FOR IP): Performed by: INTERNAL MEDICINE

## 2017-10-28 PROCEDURE — 6360000002 HC RX W HCPCS: Performed by: INTERNAL MEDICINE

## 2017-10-28 RX ADMIN — POTASSIUM CHLORIDE 20 MEQ: 1.5 POWDER, FOR SOLUTION ORAL at 21:20

## 2017-10-28 RX ADMIN — APIXABAN 5 MG: 5 TABLET, FILM COATED ORAL at 22:24

## 2017-10-28 RX ADMIN — POTASSIUM CHLORIDE 20 MEQ: 1.5 POWDER, FOR SOLUTION ORAL at 08:17

## 2017-10-28 RX ADMIN — FERROUS SULFATE TAB 325 MG (65 MG ELEMENTAL FE) 325 MG: 325 (65 FE) TAB at 16:45

## 2017-10-28 RX ADMIN — PAROXETINE HYDROCHLORIDE 20 MG: 20 TABLET, FILM COATED ORAL at 08:16

## 2017-10-28 RX ADMIN — MICONAZOLE NITRATE: 1.42 POWDER TOPICAL at 21:20

## 2017-10-28 RX ADMIN — Medication 1 TABLET: at 08:13

## 2017-10-28 RX ADMIN — APIXABAN 5 MG: 5 TABLET, FILM COATED ORAL at 08:16

## 2017-10-28 RX ADMIN — MYCOPHENOLATE MOFETIL 1500 MG: 250 CAPSULE ORAL at 08:17

## 2017-10-28 RX ADMIN — Medication 24 UNITS: at 22:23

## 2017-10-28 RX ADMIN — MICONAZOLE NITRATE: 1.42 POWDER TOPICAL at 08:21

## 2017-10-28 RX ADMIN — MYCOPHENOLATE MOFETIL 1500 MG: 250 CAPSULE ORAL at 22:23

## 2017-10-28 RX ADMIN — MULTIPLE VITAMINS W/ MINERALS TAB 1 TABLET: TAB at 08:13

## 2017-10-28 RX ADMIN — INSULIN LISPRO 2 UNITS: 100 INJECTION, SOLUTION INTRAVENOUS; SUBCUTANEOUS at 16:45

## 2017-10-28 RX ADMIN — CIPROFLOXACIN HYDROCHLORIDE 500 MG: 500 TABLET, FILM COATED ORAL at 21:20

## 2017-10-28 RX ADMIN — CLOPIDOGREL BISULFATE 75 MG: 75 TABLET ORAL at 08:13

## 2017-10-28 RX ADMIN — CIPROFLOXACIN HYDROCHLORIDE 500 MG: 500 TABLET, FILM COATED ORAL at 08:13

## 2017-10-28 RX ADMIN — FERROUS SULFATE TAB 325 MG (65 MG ELEMENTAL FE) 325 MG: 325 (65 FE) TAB at 08:13

## 2017-10-28 RX ADMIN — FAMOTIDINE 40 MG: 20 TABLET ORAL at 08:13

## 2017-10-28 RX ADMIN — ATORVASTATIN CALCIUM 40 MG: 40 TABLET, FILM COATED ORAL at 08:13

## 2017-10-28 ASSESSMENT — PAIN DESCRIPTION - LOCATION
LOCATION: ABDOMEN
LOCATION: KNEE

## 2017-10-28 ASSESSMENT — PAIN DESCRIPTION - DESCRIPTORS: DESCRIPTORS: SORE

## 2017-10-28 ASSESSMENT — PAIN SCALES - GENERAL
PAINLEVEL_OUTOF10: 3
PAINLEVEL_OUTOF10: 0
PAINLEVEL_OUTOF10: 0

## 2017-10-28 ASSESSMENT — PAIN DESCRIPTION - PAIN TYPE
TYPE: ACUTE PAIN
TYPE: CHRONIC PAIN

## 2017-10-28 ASSESSMENT — PAIN DESCRIPTION - PROGRESSION: CLINICAL_PROGRESSION: GRADUALLY IMPROVING

## 2017-10-28 ASSESSMENT — PAIN DESCRIPTION - ORIENTATION: ORIENTATION: RIGHT

## 2017-10-28 ASSESSMENT — PAIN DESCRIPTION - FREQUENCY: FREQUENCY: INTERMITTENT

## 2017-10-28 NOTE — PROGRESS NOTES
[address] [place] [address] [address]   ACUTE REHABILITATION OCCUPATIONAL THERAPY  DAILY NOTE    Date: 10/28/17  Patient Name: Pepper Wiseman      Room: 4595/0676-56    MRN: [id number]   : 1950  (74 y.o.)  Gender: female      Diagnosis: R CVA, Acute ischmic right MCA stroke,   Additional Pertinent Hx: UTI, s/p Ureteral stone removed dueing stent placement 10/10/17, h/o CVA a year ago    Restrictions  Restrictions/Precautions: General Precautions, Fall Risk  Other position/activity restrictions: up with assistance  Position Activity Restriction  Other position/activity restrictions: up with assistance     Subjective  Subjective: 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 Comments: Pt pleasant and cooperative during OT. OT told pt's son that therapy would practice tub transfers w his Mom this afternoon. Patient Currently in Pain: Yes (No pain in a.m but rt knee pain in p.m )  Pain Level: 3  Pain Location: Knee  Pain Orientation: Right  Restrictions/Precautions: General Precautions; Fall Risk          Objective        Transfers  Stand Step Transfers: Minimal assistance  Sit to stand: Minimal assistance  Stand to sit: Minimal assistance  Standing Balance  Time: a.m 2 minute increments. p.m 3 sets of 1 minute for standing near tub. Activity: A.M  - ADLs - pt standing near sink for ana care and LE clothing management. P.M pt standing for ADL toilet transfers and standing in bathroom for tub transfer. Sit to stand: Minimal assistance  Stand to sit: Minimal assistance  Toilet Transfers  Toilet - Technique: Stand step; To right  Equipment Used: Grab bars  Toilet Transfer: Minimal assistance  Tub Transfers  Tub - Transfer From: Wheelchair  Tub - Transfer To: Transfer tub bench  Tub - Technique: To left  Tub Transfers:  Moderate assistance  Tub Transfers Comments: Begun tub transfer w pt practicing standing up using grab bar on wall w min assist. Then practiced w pt stand (Pt needs cues during ADLs)  Memory: 3 - Patient remembers 50%-74% of the time    Assessment  Performance deficits / Impairments: Decreased functional mobility ; Decreased balance;Decreased ADL status; Decreased endurance;Decreased strength;Decreased vision/visual deficit; Decreased coordination;Decreased cognition;Decreased safe awareness;Decreased ROM  Assessment: See FIM for a.m ADL. See OT exercises for pm tx. Pt states she needs to get her confidence up for tub transfers to extended tub bench. Pt got as far as sitting on side of extended tub bench but didn't attempt putting her legs in the tub. Pt needs verbal cues to breathe when doing lt arm AROM exercises. Pt has moderate difficulty extending lt hand . Prognosis: Fair  Discharge Recommendations: 24 hour supervision or assist  Activity Tolerance: Patient Tolerated treatment well  Safety Devices in place: Yes  Type of devices: Gait belt;Left in chair;Patient at risk for falls; Chair alarm in place (OT took pt to PT after a.m ADL. OT took pt to her room after p.m OT)          Patient Education:  Patient Goals   Patient goals : to be able to do things for myself  Patient Education: Naman ADL dressing techniques. Tub transfer from w/c to extended tub bench. Lt hand AROM, grasp/release yellow sponge foam block, and picking up and placing foam block on table using lt hand.    Barriers to Learning: safety issues  Learner:patient  Method: demonstration and explanation       Outcome: demonstrated understanding and needs reinforcement     Plan  Plan  Times per week: 5-7x/week  Times per day: Twice a day  Current Treatment Recommendations: Balance Training, Functional Mobility Training, Endurance Training, Safety Education & Training, Self-Care / ADL, Equipment Evaluation, Education, & procurement, Patient/Caregiver Education & Training, Strengthening, ROM, Neuromuscular Re-education, Cognitive Reorientation, Cognitive/Perceptual Training, Home Management Training  Plan Comment: continue OT  Patient Goals   Patient goals : to be able to do things for myself  Short term goals  Time Frame for Short term goals: One week,   Short term goal 1: Pt. will demo. CGA with bed mobility. Short term goal 2: Pt. will demo. min. A with toilet transfer and mod. A with toileting tasks with good . Short term goal 3: Pt. will demo. min. A with UB bathing/dressing, and MOd. A with LB bathing/dressing using AE as needed. Short term goal 4: Pt. will participate in left UE ROM and stgth ex. to assist with functional tasks. Short term goal 5: Pt. will tolerate facilitation tech. to elicit movement in left wrist and hand to assist with functional tasks. Short term goal 6: Pt. will attend to left side of body and L visual field with min. vc's 90% of the time  Short term goal 7: Pt. will tolerate 5-8 min. functional standing activities to promote increased indep.with ADL's and mobility. Long term goals  Time Frame for Long term goals : By discharge,   Long term goal 1: Pt. will demo. Mod.I with bed mobility. Long term goal 2: Pt. will demo. SBA with functional ADL transfers using appropriate AD with good . Long term goal 3: Pt. will demo. SBA with UB bathing/dressing, and min. A with LB bathing/dressing using AE as needed. Long term goal 4: Pt.will demo. SBA with toileting tasks.   Timed Code Treatment Minutes: 45 Minutes     10/28/17 0840 10/28/17 1331   OT Individual Minutes   Time In 7943 0533   Time Out 4746 0896   Minutes 47 27   OT Concurrent Minutes   Time In --  5961   Time Out --  1346   Minutes --  15   Time Code Minutes    Timed Code Treatment Minutes 55 Minutes 45 Minutes     Electronically signed by Veverly Goldmann, OT on 10/28/17 at 4:49 PM

## 2017-10-28 NOTE — PROGRESS NOTES
Physical Medicine & Rehabilitation  Progress Note    10/28/2017 10:33 AM     CC: Ambulatory and ADL dysfunction due to CVA    Subjective:   Doing better today. Bowels and bladder okay. No pain. .  No further nosebleed    ROS:  Denies fevers, chills, sweats. No chest pain, palpitations, lightheadedness. Denies coughing, wheezing or shortness of breath. Denies abdominal pain, nausea, diarrhea or constipation. No new areas of joint pain. Denies new areas of numbness or weakness. Denies new anxiety or depression issues. No new skin problems. Rehabilitation:   Bed Mobility:   Supine to Sit: Moderate assistance  Sit to Supine: Moderate assistance (assist with LEs )  Scooting: Minimal assistance        Transfers:  Sit to Stand: Minimal Assistance  Stand to sit: Contact guard assistance  Bed to Chair: Minimal assistance (With AD)  Stand Pivot Transfers: Minimal Assistance        WB Status: no restrictions  Ambulation 1  Surface: level tile  Device: Rolling Walker  Assistance: Minimal assistance  Quality of Gait: needs assist with RW control d/t L UE weakness, decreased L LE swing through, flexed posture  Distance: 60 ft AM  Comments:  vc's for upright posture, vc's for heel/toe gait  Ambulation 2  Surface - 2: level tile  Device 2: Hemiwalker  Assistance 2: Minimal assistance  Quality of Gait 2: cues for sequencing and hemiwalker placement, improved ability to stand upright, small steps, wide BRYAN  Distance: 60 ft x2         Objective:  BP (!) 128/42   Pulse 84   Temp 98.4 °F (36.9 °C) (Oral)   Resp 16   Ht 5' (1.524 m)   Wt 266 lb 1.6 oz (120.7 kg)   SpO2 96%   BMI 51.97 kg/m²  I Body mass index is 51.97 kg/m². I   Wt Readings from Last 1 Encounters:   10/21/17 266 lb 1.6 oz (120.7 kg)      Temp (24hrs), Av.2 °F (36.8 °C), Min:98 °F (36.7 °C), Max:98.4 °F (36.9 °C)      Alert, no distress. Good speech and language function. Lungs clear.   Heart Irregularly irregular  Abdomen non-distended, nasal mist, monitor closely, head of the bed at 30-45° , no further episodes  5. PAF - Per cardio on Eliquis as above  6. Mild bilateral edema- off Lasix due to low BP per IM, continue to monitor, continues on potassiumrecheck BMPpotassium 4.4 with supplementcontinue to monitor and continue supplementrecheck few days  7. Anemia- protonix, s/p 1 unit PRBC transfusion.  10/28 Hb 8.8 asymptomatic. No obvious blood loss. Continue iron supplementation. Recheck Monday.  if Continue to trend down may need to reconsider anticoagulationdefer to cardiology and internal medicine  8. Constipation - Colace  9. Reflux - Pepcid  10. Dep - on paxil 20 monitor  11. Bulbous pemphigus- Dr Corrinne Primer had discussion with the patient's son- He states that his mother has been on CellCept 1500 mg twice a day for approximately 15 years for bulbous pemphigus. 12. Sacral ulcer- wound care management. Change mepilex q 3 days    13. H/O PE - now back on Eliquis  14. DM- Cont lantus. IM following for medical management. , medications adjusted per internal medicine    15. Hypokalemia- resolved. Continue to monitor. 16. Mild confusion 10/24- neurologic exam no significant change except for some mild confusion, repeat CT done no acute change, stable exam  17. DVT prophylaxison Eliquis  18. internal medicine for medical management. Ki Levin. Larry Wu MD

## 2017-10-28 NOTE — PROGRESS NOTES
(112.5 kg)   SpO2 96%   BMI 48.43 kg/m²      General appearance: well nourished in no distress  Lungs: normal effort, clear to auscultation. CVS sinus with no murmurs. Abdomen: Soft, non-tender; no masses or HSM,   Extremities: no edema; no digital cyanosis or clubbing. Neurologic: Cranial nerves II-XII grossly intact; LUE 3/5 LLE 4/5           Assessment / Plan      Patient Active Problem List   Diagnosis    Hydronephrosis of left kidney    Acute cystitis with hematuria    CKD (chronic kidney disease), stage III    Hypotension arterial    Essential hypertension    Morbid obesity with BMI of 40.0-44.9, adult ([facility])    History of arterial ischemic stroke    History of pulmonary embolus (PE)    Paroxysmal atrial fibrillation (HCC)    Left ureteral stone    Left-sided weakness    Non-intractable vomiting with nausea    Cerebrovascular accident (CVA) due to embolism of right middle cerebral artery (HCC)    Hypotension    Sepsis secondary to UTI ([address]    Elevated serum creatinine    Kidney stone    Anemia    Hypokalemia    Acute ischemic right MCA stroke ([address]    Right-sided carotid artery disease ([facility])    Incontinence associated dermatitis    Cerebrovascular accident (CVA) due to stenosis of right carotid artery ([facility])       A/P  UTI Sepsis s/p ureteral stent on cipro   MCA infarct Left hemiparesis on ASA  lipitor   A fib on eliquis   DM on lantus  to 248    10/20  Pt on lovenox prophylacic dose  cotn present dose of lantus  Plan to start eliquis after 2 weeks previously that will be 10/26   flomax to be stopped soon has hypotension    10/24  Dc flomax   Bmp cr stable     10/25  Urine cx pending   Cont cipro   Stp lasix hypotension     10/26  Change lantus to 24 units from 30   Add eliquis   Stop lovenox stop plavix     10/27  FS well controlled  U/a WBC + on cipro   On eliquis follow h/h  MD ZEFERINO Christie [address], [address] [place] [address].    Phone [phone]   Fax: [phone]  Answering Service: [phone]

## 2017-10-28 NOTE — PROGRESS NOTES
No  Position Activity Restriction  Other position/activity restrictions: up with assistance  Subjective   Subjective  Subjective: P t had no new c/o tihis a.m. General Comment  Comments: Pt is pleasant and cooperative. No confusion noted, but does not remember previous session activities or training. Pain Assessment  Pain Assessment: 0-10  Pain Level: 0  Pain Type: Chronic pain  Pain Location: Abdomen  Clinical Progression: Gradually improving  Response to Pain Intervention: Asleep with RR greater than 10       Orientation     Objective      Transfers  Sit to Stand: Minimal Assistance  Stand to sit: Contact guard assistance  Bed to Chair: Minimal assistance  Stand Pivot Transfers: Minimal Assistance  Ambulation  Ambulation?: Yes  WB Status: no restrictions  More Ambulation?: Yes  Ambulation 1  Surface: level tile  Device: Hemiwalker  Other Apparatus: Wheelchair follow  Assistance: Minimal assistance;Contact guard assistance  Quality of Gait: Slow steady pace;  Cues to increase step length to L LE;  HHA to L UE due to patient wt. shifts well and this provides WB to L UE during amb. Distance: 110 ft  AM;  60 ft PM  Comments:  vc's for upright posture, vc's for heel/toe gait  Ambulation 2  Surface - 2: level tile  Device 2: Hemiwalker  Other Apparatus 2: Wheelchair follow  Assistance 2: Minimal assistance  Quality of Gait 2: cues for sequencing and hemiwalker placement, improved ability to stand upright, small steps, wide BRYAN  Distance: 60 ft x2  Comments: No LOB, steady  Ambulation 3  Surface - 3: level tile  Device 3: Hemiwalker  Assistance 3: Minimal assistance  Quality of Gait 3: increased cues needed this PM d/t increased confusion, flexed posture, poor placement of hemiwalker this PM  Distance: 75 ft  Stairs  # Steps : 6  Stairs Height: 4\"  Rails: Right ascending  Device: Hand Held Assist  Assistance: Minimal assistance;Contact guard assistance  Comment: Ascending FWD and Descending Retro.   Patient fearful when descending FWD due to visual deficits. Propulsion 1  Propulsion: Manual  Level: Level Tile  Method: RUE;RLE;LLE  Level of Assistance: Supervision  Distance: 50 feet  Neuromuscular Education  Neuromuscular education: Yes  Balance  Posture: Fair  Sitting - Static: Fair;+  Sitting - Dynamic: Fair;+  Standing - Static: Fair  Standing - Dynamic: Fair  Other exercises  Other exercises?: Yes  Other exercises 1: Seated in w/c bilat LE therexs x15 2lb ankle weights, green t band ankle PF and heel slides x15 ea  Other exercises 2: amb in // bars 20 ft with R sided rail only   Other exercises 3: supine B LE ther exs 10x, AROM>AAROM, unable to tolerate ankle wts  Other exercises 4: standing UBE 3 min FWD/BACK with seated break in between,  L hand wrapped to handle  Other exercises 5: Standing ex. bilat. LE in // bars  x 10 reps  Other exercises 6: Seated at EOM reaching for cones  Other exercises 7: NuStep  x 10 mins. Workload 3                        Assessment   Body structures, Functions, Activity limitations: Decreased functional mobility ; Decreased strength;Decreased ROM; Decreased endurance;Decreased safe awareness;Decreased balance  Assessment: Pt presents s/p CVA with L hemiplegia, ICA occlusion requiring stent, and cystitis, hydronephritis, and kidney stone requiring ureteral stent. Pt demo's impaired balance, strength, endurance, safety awareness and needs assist with moibility. Cont per POC to prepare for home. Will need to determine appropriate home set up (dtr vs son vs pt's own home) as rehab progresses.   Treatment Diagnosis: CVA, L hemiplegia  Specific instructions for Next Treatment: (P) continue with hemiwalker use  Prognosis: Good  Patient Education: kelechi walker use, safe mobility  REQUIRES PT FOLLOW UP: Yes  Activity Tolerance  Activity Tolerance: Patient limited by endurance  Activity Tolerance: low Hgb and BP  PT Equipment Recommendations  Other: TBD       Discharge Recommendations:  Home with nursing aide, Home with Home health PT, Home with assist PRN    G-Code     OutComes Score                                                      Goals  Short term goals  Time Frame for Short term goals: 7 days  Short term goal 1: Improve L UE and LE strength by 1/2 MMG  Short term goal 2: Increase endurance to good  Short term goal 3: Improve sitting balance to good  Short term goal 4: Improve bed mobility to min A x1  Short term goal 5: Improve transfers to min A x1  Long term goals  Time Frame for Long term goals : 21 days  Long term goal 1:  Increase UEs and LEs strength to [facility] to maximize mobiility  Long term goal 2: Improve standing balance with appropriate device to good to increase safety  Long term goal 3: Improve bed mobility to independent to prepare for home  Long term goal 4: Improve transfers with appropriate device to mod I to prepare for home  Long term goal 5: Improve gait with appropriate device to mod I 150 ft to prepare for home  Long term goal 6: Improve stair negotiation to SBA on 4 steps with 1 rail use to allow pt to enter and exit home  Patient Goals   Patient goals : walking on own for return to home     Plan    Plan  Times per week: (P) 1.5 hrs/day, 5-7 days/wk  Times per day: (P) Twice a day  Plan weeks: (P) 3  Specific instructions for Next Treatment: (P) continue with hemiwalker use  Current Treatment Recommendations: (P) Strengthening, Transfer Training, Endurance Training, Cognitive Reorientation, Patient/Caregiver Education & Training, ROM, Balance Training, Gait Training, Functional Mobility Training, Stair training, Safety Education & Training  Safety Devices  Type of devices: (P) Gait belt, All fall risk precautions in place  Restraints  Initially in place: (P) No     Therapy Time   Individual Concurrent Group Co-treatment   Time In 0940/1430         Time Out 1040/1500         Minutes [facility], [facility]   Electronically signed by Jameel Barker PTA on 10/28/2017 at 2:21

## 2017-10-28 NOTE — PROGRESS NOTES
Physical Therapy  Facility/Department: [facility] ACUTE REHAB  Daily Treatment Note  NAME: Jacqueline Wall  : 1950  MRN: [id number]    Date of Service: 10/28/2017    Patient Diagnosis(es):   Patient Active Problem List    Diagnosis Date Noted    Acute ischemic right MCA stroke ([address] 10/11/2017     Priority: High    Cerebrovascular accident (CVA) due to stenosis of right carotid artery ([address]     Incontinence associated dermatitis 10/24/2017    Right-sided carotid artery disease ([address]     Anemia     Hypokalemia     Hydronephrosis of left kidney 10/10/2017    Acute cystitis with hematuria 10/10/2017    CKD (chronic kidney disease), stage III 10/10/2017    Hypotension arterial 10/10/2017    Essential hypertension 10/10/2017    Morbid obesity with BMI of 40.0-44.9, adult ([address] 10/10/2017    History of arterial ischemic stroke 10/10/2017    History of pulmonary embolus (PE) 10/10/2017    Paroxysmal atrial fibrillation ([address] 10/10/2017    Left ureteral stone 10/10/2017    Left-sided weakness     Non-intractable vomiting with nausea     Cerebrovascular accident (CVA) due to embolism of right middle cerebral artery (HCC)     Hypotension     Sepsis secondary to UTI ([address]     Elevated serum creatinine     Kidney stone        Past Medical History:   Diagnosis Date    CVA (cerebral vascular accident) ([address]     Diabetes mellitus ([address]     Other disorders of skin and subcutaneous tissue in diseases classified elsewhere several years ago    Bullous Pemphkgoid    Patient in clinical research study 10/13/2017    Anticipated end date 2018    PE (pulmonary thromboembolism) [facility])      Past Surgical History:   Procedure Laterality Date    CHOLECYSTECTOMY      CYSTOSCOPY Left 10/10/2017    CYSTOSCOPY, STENT INSERTION performed by Rickie Vazquez MD at [address]         Restrictions  Restrictions/Precautions  Restrictions/Precautions: General Precautions, Fall Risk  Required Braces or Orthoses?: No  Position Activity Restriction  Other position/activity restrictions: up with assistance  Subjective   Subjective  Subjective: (P) P t had no new c/o tihis a.m. General Comment  Comments: (P) Pt is pleasant and cooperative. No confusion noted, but does not remember previous session activities or training. Pain Assessment  Pain Assessment: (P) 0-10  Pain Level: (P) 0  Pain Type: (P) Chronic pain  Pain Location: (P) Abdomen  Clinical Progression: (P) Gradually improving  Response to Pain Intervention: (P) Asleep with RR greater than 10       Orientation     Objective      Transfers  Sit to Stand: (P) Minimal Assistance  Stand to sit: (P) Contact guard assistance  Bed to Chair: (P) Minimal assistance  Stand Pivot Transfers: (P) Minimal Assistance  Ambulation  Ambulation?: (P) Yes  WB Status: (P) no restrictions  More Ambulation?: (P) Yes  Ambulation 1  Surface: (P) level tile  Device: (P) Hemiwalker  Other Apparatus: (P) Wheelchair follow  Assistance: (P) Minimal assistance;Contact guard assistance  Quality of Gait: (P) Slow steady pace;  Cues to increase step length to L LE;  HHA to L UE due to patient wt. shifts well and this provides WB to L UE during amb.   Distance: (P) 110 ft  AM;  60 ft PM  Comments: (P)  vc's for upright posture, vc's for heel/toe gait  Ambulation 2  Surface - 2: (P) level tile  Device 2: (P) Hemiwalker  Other Apparatus 2: (P) Wheelchair follow  Assistance 2: (P) Minimal assistance  Quality of Gait 2: (P) cues for sequencing and hemiwalker placement, improved ability to stand upright, small steps, wide BRYAN  Distance: (P) 60 ft x2  Comments: (P) No LOB, steady  Ambulation 3  Surface - 3: (P) level tile  Device 3: (P) Hemiwalker  Assistance 3: (P) Minimal assistance  Quality of Gait 3: (P) increased cues needed this PM d/t increased confusion, flexed posture, poor placement of hemiwalker this PM  Distance: (P) 75 ft  Stairs  # Steps : (P) 6  Stairs Height: (P) 4\"  Rails: (P) Right ascending  Device: (P) Hand Held Assist  Assistance: (P) Minimal assistance;Contact guard assistance  Comment: (P) Ascending FWD and Descending Retro. Patient fearful when descending FWD due to visual deficits. Propulsion 1  Propulsion: (P) Manual  Level: (P) Level Tile  Method: (P) RUE;RLE;LLE  Level of Assistance: (P) Supervision  Distance: (P) 50 feet  Neuromuscular Education  Neuromuscular education: (P) Yes  Balance  Posture: (P) Fair  Sitting - Static: (P) Fair;+  Sitting - Dynamic: (P) Fair;+  Standing - Static: (P) Fair  Standing - Dynamic: (P) Fair  Other exercises  Other exercises?: (P) Yes  Other exercises 1: (P) Seated in w/c bilat LE therexs x15 2lb ankle weights, green t band ankle PF and heel slides x15 ea  Other exercises 2: (P) amb in // bars 20 ft with R sided rail only   Other exercises 3: (P) supine B LE ther exs 10x, AROM>AAROM, unable to tolerate ankle wts  Other exercises 4: (P) standing UBE 3 min FWD/BACK with seated break in between,  L hand wrapped to handle  Other exercises 5: (P) Standing ex. bilat. LE in // bars  x 10 reps  Other exercises 6: (P) Seated at EOM reaching for cones  Other exercises 7: (P) NuStep  x 10 mins. Workload 3                        Assessment   Body structures, Functions, Activity limitations: (P) Decreased functional mobility ; Decreased strength;Decreased ROM; Decreased endurance;Decreased safe awareness;Decreased balance  Assessment: (P) Pt presents s/p CVA with L hemiplegia, ICA occlusion requiring stent, and cystitis, hydronephritis, and kidney stone requiring ureteral stent. Pt demo's impaired balance, strength, endurance, safety awareness and needs assist with moibility. Cont per POC to prepare for home. Will need to determine appropriate home set up (dtr vs son vs pt's own home) as rehab progresses.   Treatment Diagnosis: (P) CVA, L hemiplegia  Prognosis: (P) Good  Patient Education: (P) kelechi walker use, safe mobility  REQUIRES PT FOLLOW UP: (P)

## 2017-10-29 LAB
GLUCOSE BLD-MCNC: 109 MG/DL (ref 65–105)
GLUCOSE BLD-MCNC: 208 MG/DL (ref 65–105)
GLUCOSE BLD-MCNC: 91 MG/DL (ref 65–105)
GLUCOSE BLD-MCNC: 98 MG/DL (ref 65–105)

## 2017-10-29 PROCEDURE — 6370000000 HC RX 637 (ALT 250 FOR IP): Performed by: PHYSICAL MEDICINE & REHABILITATION

## 2017-10-29 PROCEDURE — 82947 ASSAY GLUCOSE BLOOD QUANT: CPT

## 2017-10-29 PROCEDURE — 97116 GAIT TRAINING THERAPY: CPT

## 2017-10-29 PROCEDURE — 1180000000 HC REHAB R&B

## 2017-10-29 PROCEDURE — 6360000002 HC RX W HCPCS: Performed by: INTERNAL MEDICINE

## 2017-10-29 PROCEDURE — 99231 SBSQ HOSP IP/OBS SF/LOW 25: CPT | Performed by: INTERNAL MEDICINE

## 2017-10-29 PROCEDURE — 6370000000 HC RX 637 (ALT 250 FOR IP): Performed by: INTERNAL MEDICINE

## 2017-10-29 PROCEDURE — 97530 THERAPEUTIC ACTIVITIES: CPT

## 2017-10-29 PROCEDURE — 97110 THERAPEUTIC EXERCISES: CPT

## 2017-10-29 PROCEDURE — 6370000000 HC RX 637 (ALT 250 FOR IP): Performed by: FAMILY MEDICINE

## 2017-10-29 PROCEDURE — 99232 SBSQ HOSP IP/OBS MODERATE 35: CPT | Performed by: PHYSICAL MEDICINE & REHABILITATION

## 2017-10-29 RX ADMIN — ACETAMINOPHEN 650 MG: 325 TABLET ORAL at 20:24

## 2017-10-29 RX ADMIN — PAROXETINE HYDROCHLORIDE 20 MG: 20 TABLET, FILM COATED ORAL at 08:52

## 2017-10-29 RX ADMIN — MYCOPHENOLATE MOFETIL 1500 MG: 250 CAPSULE ORAL at 20:24

## 2017-10-29 RX ADMIN — APIXABAN 5 MG: 5 TABLET, FILM COATED ORAL at 08:52

## 2017-10-29 RX ADMIN — MYCOPHENOLATE MOFETIL 1500 MG: 250 CAPSULE ORAL at 08:51

## 2017-10-29 RX ADMIN — INSULIN LISPRO 4 UNITS: 100 INJECTION, SOLUTION INTRAVENOUS; SUBCUTANEOUS at 11:43

## 2017-10-29 RX ADMIN — MICONAZOLE NITRATE: 1.42 POWDER TOPICAL at 08:48

## 2017-10-29 RX ADMIN — CLOPIDOGREL BISULFATE 75 MG: 75 TABLET ORAL at 08:48

## 2017-10-29 RX ADMIN — FERROUS SULFATE TAB 325 MG (65 MG ELEMENTAL FE) 325 MG: 325 (65 FE) TAB at 08:48

## 2017-10-29 RX ADMIN — FERROUS SULFATE TAB 325 MG (65 MG ELEMENTAL FE) 325 MG: 325 (65 FE) TAB at 17:22

## 2017-10-29 RX ADMIN — MICONAZOLE NITRATE: 1.42 POWDER TOPICAL at 20:23

## 2017-10-29 RX ADMIN — POTASSIUM CHLORIDE 20 MEQ: 1.5 POWDER, FOR SOLUTION ORAL at 20:23

## 2017-10-29 RX ADMIN — CIPROFLOXACIN HYDROCHLORIDE 500 MG: 500 TABLET, FILM COATED ORAL at 20:24

## 2017-10-29 RX ADMIN — Medication 24 UNITS: at 20:24

## 2017-10-29 RX ADMIN — ATORVASTATIN CALCIUM 40 MG: 40 TABLET, FILM COATED ORAL at 08:48

## 2017-10-29 RX ADMIN — CIPROFLOXACIN HYDROCHLORIDE 500 MG: 500 TABLET, FILM COATED ORAL at 08:47

## 2017-10-29 RX ADMIN — APIXABAN 5 MG: 5 TABLET, FILM COATED ORAL at 20:23

## 2017-10-29 RX ADMIN — MULTIPLE VITAMINS W/ MINERALS TAB 1 TABLET: TAB at 08:47

## 2017-10-29 RX ADMIN — Medication 1 TABLET: at 08:48

## 2017-10-29 RX ADMIN — POTASSIUM CHLORIDE 20 MEQ: 1.5 POWDER, FOR SOLUTION ORAL at 08:48

## 2017-10-29 RX ADMIN — FAMOTIDINE 40 MG: 20 TABLET ORAL at 08:48

## 2017-10-29 ASSESSMENT — PAIN SCALES - GENERAL
PAINLEVEL_OUTOF10: 3
PAINLEVEL_OUTOF10: 0
PAINLEVEL_OUTOF10: 0

## 2017-10-29 NOTE — PROGRESS NOTES
(112.5 kg)   SpO2 96%   BMI 48.43 kg/m²      General appearance: well nourished in no distress  Lungs: normal effort, clear to auscultation. CVS sinus with no murmurs. Abdomen: Soft, non-tender; no masses or HSM,   Extremities: no edema; no digital cyanosis or clubbing.   Neurologic: Cranial nerves II-XII grossly intact; LUE 3/5 LLE 4/5           Assessment / Plan      Patient Active Problem List   Diagnosis    Hydronephrosis of left kidney    Acute cystitis with hematuria    CKD (chronic kidney disease), stage III    Hypotension arterial    Essential hypertension    Morbid obesity with BMI of 40.0-44.9, adult ([facility]    History of arterial ischemic stroke    History of pulmonary embolus (PE)    Paroxysmal atrial fibrillation (HCC)    Left ureteral stone    Left-sided weakness    Non-intractable vomiting with nausea    Cerebrovascular accident (CVA) due to embolism of right middle cerebral artery (HCC)    Hypotension    Sepsis secondary to UTI ([facility]    Elevated serum creatinine    Kidney stone    Anemia    Hypokalemia    Acute ischemic right MCA stroke ([facility]    Right-sided carotid artery disease (HCC)    Incontinence associated dermatitis    Cerebrovascular accident (CVA) due to stenosis of right carotid artery (HCC)       A/P  UTI Sepsis s/p ureteral stent on cipro   MCA infarct Left hemiparesis on ASA  lipitor   A fib on eliquis   DM on lantus  to 248    10/20  Pt on lovenox prophylacic dose  cotn present dose of lantus  Plan to start eliquis after 2 weeks previously that will be 10/26   flomax to be stopped soon has hypotension    10/24  Dc flomax   Bmp cr stable     10/25  Urine cx pending   Cont cipro   Stp lasix hypotension     10/26  Change lantus to 24 units from 30   Add eliquis   Stop lovenox stop plavix     10/27  FS well controlled  U/a WBC + on cipro   On eliquis follow h/h    10/28  Drop in hb to 8.8   UTI on cipro from 10/27 will follow     Cintia Ventura MD  [facility]

## 2017-10-29 NOTE — PROGRESS NOTES
Urology Progress Note    Subjective: Had left stent placed 10/10 by Dr. Jamie Velásquez for a 7mm left ureteral stone. Denies incontinence.     Patient Vitals for the past 24 hrs:   BP Temp Temp src Pulse Resp SpO2 Weight   10/29/17 0910 (!) 133/53 97.6 °F (36.4 °C) Oral 91 16 97 % -   10/29/17 0515 - - - - - - 269 lb 10 oz (122.3 kg)   10/28/17 1834 (!) 143/52 97.7 °F (36.5 °C) Oral 94 18 100 % -     No intake or output data in the 24 hours ending 10/29/17 1126    Recent Labs      10/28/17   0740   WBC  4.3   HGB  8.8*   HCT  28.6*   MCV  98.4   PLT  202     Recent Labs      10/27/17   1506   NA  140   K  4.4   CL  101   CO2  27   BUN  15   CREATININE  0.76       Recent Labs      10/27/17   1630   COLORU  DARK YELLOW*   PHUR  6.0   WBCUA  20 TO 50   RBCUA  50    MUCUS  NOT REPORTED   TRICHOMONAS  NOT REPORTED   YEAST  NOT REPORTED   BACTERIA  FEW*   SPECGRAV  1.022   LEUKOCYTESUR  MOD*   UROBILINOGEN  Normal   BILIRUBINUR  NEGATIVE       Additional Lab/culture results:    Physical Exam: soft, nontender, nondistended, no masses or organomegaly    Interval Imaging Findings:    Impression:      Patient Active Problem List   Diagnosis    Hydronephrosis of left kidney    Acute cystitis with hematuria    CKD (chronic kidney disease), stage III    Hypotension arterial    Essential hypertension    Morbid obesity with BMI of 40.0-44.9, adult ([facility]    History of arterial ischemic stroke    History of pulmonary embolus (PE)    Paroxysmal atrial fibrillation (HCC)    Left ureteral stone    Left-sided weakness    Non-intractable vomiting with nausea    Cerebrovascular accident (CVA) due to embolism of right middle cerebral artery (HCC)    Hypotension    Sepsis secondary to UTI (HCC)    Elevated serum creatinine    Kidney stone    Anemia    Hypokalemia    Acute ischemic right MCA stroke ([facility]    Right-sided carotid artery disease (HCC)    Incontinence associated dermatitis    Cerebrovascular accident (CVA) due to stenosis of right carotid artery ([facility]       Plan: Will discuss with Dr. Zulema Mary outpatient treatment for stone.     Sindi Siddiqui  11:26 AM 10/29/2017

## 2017-10-29 NOTE — PROGRESS NOTES
Physical Therapy  Facility/Department: [facility]  Daily Treatment Note  NAME: Jesenia Campbell  : 1950  MRN: [id number]    Date of Service: 10/29/2017    Patient Diagnosis(es):   Patient Active Problem List    Diagnosis Date Noted    Acute ischemic right MCA stroke ([address] 10/11/2017     Priority: High    Cerebrovascular accident (CVA) due to stenosis of right carotid artery ([address]     Incontinence associated dermatitis 10/24/2017    Right-sided carotid artery disease ([address]     Anemia     Hypokalemia     Hydronephrosis of left kidney 10/10/2017    Acute cystitis with hematuria 10/10/2017    CKD (chronic kidney disease), stage III 10/10/2017    Hypotension arterial 10/10/2017    Essential hypertension 10/10/2017    Morbid obesity with BMI of 40.0-44.9, adult ([address] 10/10/2017    History of arterial ischemic stroke 10/10/2017    History of pulmonary embolus (PE) 10/10/2017    Paroxysmal atrial fibrillation ([address] 10/10/2017    Left ureteral stone 10/10/2017    Left-sided weakness     Non-intractable vomiting with nausea     Cerebrovascular accident (CVA) due to embolism of right middle cerebral artery (HCC)     Hypotension     Sepsis secondary to UTI ([address]     Elevated serum creatinine     Kidney stone        Past Medical History:   Diagnosis Date    CVA (cerebral vascular accident) ([address]     Diabetes mellitus ([address]     Other disorders of skin and subcutaneous tissue in diseases classified elsewhere several years ago    Bullous Pemphkgoid    Patient in clinical research study 10/13/2017    Anticipated end date 2018    PE [facility]pulmonary throm[facility]embolism[facility])      Past Surgical History:   Procedure Laterality Date    CHOLECYSTECTOMY      CYSTOSCOPY Left 10/10/2017    CYSTOSCOPY, STENT INSERTION performed by Tameka Combs MD at [address]         Restrictions  Restrictions/Precautions  Restrictions/Precautions: General Precautions, Fall Risk  Required Braces or Orthoses?: No  Position Activity Restriction  Other position/activity restrictions: up with assistance  Subjective   Subjective  Subjective: (P) P t had no new c/o this a.m. General Comment  Comments: (P) Pt is pleasant and cooperative. No confusion noted, but does not remember previous session activities or training. Pain Assessment  Pain Assessment: (P) 0-10  Pain Level: (P) 0       Orientation     Objective   Bed mobility  Scooting: (P) Minimal assistance  Transfers  Sit to Stand: (P) Minimal Assistance  Stand to sit: (P) Contact guard assistance  Bed to Chair: (P) Minimal assistance  Stand Pivot Transfers: (P) Minimal Assistance  Comment: (P) Naman cane  Ambulation  Ambulation?: (P) Yes  WB Status: (P) no restrictions  More Ambulation?: (P) Yes  Ambulation 1  Surface: (P) level tile  Device: (P) Hemiwalker  Other Apparatus: (P) Wheelchair follow  Assistance: (P) Minimal assistance;Contact guard assistance  Quality of Gait: (P) Slow steady pace;  Cues to increase step length to L LE;  HHA to L UE due to patient wt. shifts well and this provides WB to L UE during amb. Distance: (P) 110' a.m 70' p.m  Comments: (P)  vc's for upright posture, vc's for heel/toe gait  Ambulation 2  Surface - 2: (P) level tile  Device 2: (P) Hemiwalker  Other Apparatus 2: (P) Wheelchair follow  Assistance 2: (P) Minimal assistance  Quality of Gait 2: (P) cues for sequencing and hemiwalker placement, improved ability to stand upright, small steps, wide BRYAN  Distance: (P) 60 ft x2  Comments: (P) No LOB, steady  Stairs/Curb  Stairs?: (P) Yes  Stairs  # Steps : (P) 6  Stairs Height: (P) 4\"  Rails: (P) Right ascending  Device: (P) Hand Held Assist  Assistance: (P) Minimal assistance;Contact guard assistance  Comment: (P) Ascending FWD and Descending Retro. Patient fearful when descending FWD due to visual deficits.   Propulsion 1  Propulsion: (P) Manual  Level: (P) Level Tile  Method: (P) RUE;RLE;LLE  Level of Assistance: (P) term goals  Time Frame for Short term goals: (P) 7 days  Short term goal 1: (P) Improve L UE and LE strength by 1/2 MMG  Short term goal 2: (P) Increase endurance to good  Short term goal 3: (P) Improve sitting balance to good  Short term goal 4: (P) Improve bed mobility to min A x1  Short term goal 5: (P) Improve transfers to min A x1  Long term goals  Time Frame for Long term goals : (P) 21 days  Long term goal 1: (P) Increase UEs and LEs strength to [facility] to maximize mobiility  Long term goal 2: (P) Improve standing balance with appropriate device to good to increase safety  Long term goal 3: (P) Improve bed mobility to independent to prepare for home  Long term goal 4: (P) Improve transfers with appropriate device to mod I to prepare for home  Long term goal 5: (P) Improve gait with appropriate device to mod I 150 ft to prepare for home  Long term goal 6: (P) Improve stair negotiation to SBA on 4 steps with 1 rail use to allow pt to enter and exit home  Patient Goals   Patient goals : (P) walking on own for return to home     Plan    Plan  Times per week: (P) 1.5 hrs/day, 5-7 days/wk  Times per day: (P) Twice a day  Plan weeks: (P) 3  Specific instructions for Next Treatment: (P) continue with hemiwalker use  Current Treatment Recommendations: Strengthening, Transfer Training, Endurance Training, Cognitive Reorientation, Patient/Caregiver Education & Training, ROM, Balance Training, Gait Training, Functional Mobility Training, Stair training, Safety Education & Training  Safety Devices  Type of devices: (P) Gait belt, All fall risk precautions in place  Restraints  Initially in place: No     Therapy Time   Individual Concurrent Group Co-treatment   Time In 965/4380         Time Out 1045/1503         Minutes [address] [place] [address] [facility]   Electronically signed by Rosales Singleton PTA on 10/29/2017 at 4:11 PM

## 2017-10-29 NOTE — PROGRESS NOTES
Physical Medicine & Rehabilitation  Progress Note    10/29/2017 12:50 PM     CC: Ambulatory and ADL dysfunction due to CVA    Subjective:   Doing better today. Bowels and bladder okay. No pain. .  No further nosebleed. Notes frequent urinationno incontinence    ROS:  Denies fevers, chills, sweats. No chest pain, palpitations, lightheadedness. Denies coughing, wheezing or shortness of breath. Denies abdominal pain, nausea, diarrhea or constipation. No new areas of joint pain. Denies new areas of numbness or weakness. Denies new anxiety or depression issues. No new skin problems. Rehabilitation:    Transfers  Sit to Stand: Minimal Assistance  Stand to sit: Contact guard assistance  Bed to Chair: Minimal assistance  Stand Pivot Transfers: Minimal Assistance  Ambulation  Ambulation?: Yes  WB Status: no restrictions  More Ambulation?: Yes  Ambulation 1  Surface: level tile  Device: Hemiwalker  Other Apparatus: Wheelchair follow  Assistance: Minimal assistance;Contact guard assistance  Quality of Gait: Slow steady pace;  Cues to increase step length to L LE;  HHA to L UE due to patient wt. shifts well and this provides WB to L UE during amb. Distance: 110 ft  AM;  60 ft PM  Comments:  vc's for upright posture, vc's for heel/toe gait    OT FIM:   Groomin - Requires setup/cues to do all tasks  Bathin - Able to bathe 8-9 areas (Pt uses LH sponge. Pt needs assist w washing rt arm and butt.)  Dressing-Upper: 4 - Requires assist with buttons/zippers only and/or requires assist with one arm only (Pt wears pullover shirt)  Dressing-Lower: 2 - Requires assist with 4-5 parts of dressing (Pt wears pants,disposable underpant, footies , sneakers . Max assist w pants/underpants.  Pt uses reacher to doff footies,and sockaid to jed footies after OT puts them on sockaide)  Toiletin - Able to perform 1 task only (e.g. hygiene) (Pt does hygiene and assist some w clothing management)  Toilet Transfer: 4 - Requires 10/18 per IM- completed. White count 4.3 on 10/28/2017,, flomax.,  Repeat UA due to frequent urination, resume Cipro, urology consulted due to continued hematuria and frequent urinationfollow up noted, await urine culture and sensitivityquestionable continue Cipro  3. R MCA cva with LHP- embolic. . Cardiology recommendations noted and internal medicine orders notedpatient now on Eliquis 5 mg twice a day and Plavix, Lovenox discontinued, - discussed with Dr Safia Price  on 10/26 and 10/28 personally- notes cont eliquis and plavix, no asa,- notes he has talked this over with neurologist at [facility]. V's  prior and continue with above recommendations,  Continue lipitor. Reviewed with patient and son as well. 4. Bloody nose 10/24improved, added nasal mist, monitor closely, head of the bed at 30-45° , no further episodes  5. PAF - Per cardio on Eliquis as above  6. Mild bilateral edema- off Lasix due to low BP per IM, continue to monitor, continues on potassiumrecheck BMPpotassium 4.4 with supplementcontinue to monitor and continue supplementrecheck in a.m.  7. Anemia- protonix, s/p 1 unit PRBC transfusion.  10/28 Hb 8.8 asymptomatic. No obvious blood loss. Continue iron supplementation. Discussed with son and patient  Recheck Monday. defer anticoagulation to cardiology and internal medicine  8. Constipation - Colace  9. Reflux - Pepcid  10. Dep - on paxil 20 monitor  11. Bulbous pemphigus- Dr Heidi Lee had discussion with the patient's son- He states that his mother has been on CellCept 1500 mg twice a day for approximately 15 years for bulbous pemphigus. 12. Sacral ulcer- wound care management. Change mepilex q 3 days    13. H/O PE - now back on Eliquis  14. DM- Cont lantus. IM following for medical management. , medications adjusted per internal medicine    15. Hypokalemia- resolved. Continue to monitor.   16. Mild confusion 10/24- neurologic exam no significant change except for some mild confusion, repeat CT done no acute change, stable exam  17. DVT prophylaxison Eliquis  18. internal medicine for medical management. Heather Rocha. Lencho Daniels MD

## 2017-10-30 LAB
ANION GAP SERPL CALCULATED.3IONS-SCNC: 12 MMOL/L (ref 9–17)
BUN BLDV-MCNC: 11 MG/DL (ref 8–23)
BUN/CREAT BLD: ABNORMAL (ref 9–20)
CALCIUM SERPL-MCNC: 8.5 MG/DL (ref 8.6–10.4)
CHLORIDE BLD-SCNC: 107 MMOL/L (ref 98–107)
CO2: 24 MMOL/L (ref 20–31)
CREAT SERPL-MCNC: 0.67 MG/DL (ref 0.5–0.9)
GFR AFRICAN AMERICAN: >60 ML/MIN
GFR NON-AFRICAN AMERICAN: >60 ML/MIN
GFR SERPL CREATININE-BSD FRML MDRD: ABNORMAL ML/MIN/{1.73_M2}
GFR SERPL CREATININE-BSD FRML MDRD: ABNORMAL ML/MIN/{1.73_M2}
GLUCOSE BLD-MCNC: 164 MG/DL (ref 65–105)
GLUCOSE BLD-MCNC: 189 MG/DL (ref 65–105)
GLUCOSE BLD-MCNC: 202 MG/DL (ref 65–105)
GLUCOSE BLD-MCNC: 84 MG/DL (ref 65–105)
GLUCOSE BLD-MCNC: 89 MG/DL (ref 70–99)
HCT VFR BLD CALC: 31.5 % (ref 36–46)
HEMOGLOBIN: 9.7 G/DL (ref 12–16)
MCH RBC QN AUTO: 30.1 PG (ref 26–34)
MCHC RBC AUTO-ENTMCNC: 30.7 G/DL (ref 31–37)
MCV RBC AUTO: 97.9 FL (ref 80–100)
PDW BLD-RTO: 18.5 % (ref 11.5–14.9)
PLATELET # BLD: 194 K/UL (ref 150–450)
PMV BLD AUTO: 9.2 FL (ref 6–12)
POTASSIUM SERPL-SCNC: 4.1 MMOL/L (ref 3.7–5.3)
RBC # BLD: 3.22 M/UL (ref 4–5.2)
SODIUM BLD-SCNC: 143 MMOL/L (ref 135–144)
WBC # BLD: 4.1 K/UL (ref 3.5–11)

## 2017-10-30 PROCEDURE — 80048 BASIC METABOLIC PNL TOTAL CA: CPT

## 2017-10-30 PROCEDURE — 51798 US URINE CAPACITY MEASURE: CPT

## 2017-10-30 PROCEDURE — 1180000000 HC REHAB R&B

## 2017-10-30 PROCEDURE — 6360000002 HC RX W HCPCS: Performed by: INTERNAL MEDICINE

## 2017-10-30 PROCEDURE — 82947 ASSAY GLUCOSE BLOOD QUANT: CPT

## 2017-10-30 PROCEDURE — 97116 GAIT TRAINING THERAPY: CPT

## 2017-10-30 PROCEDURE — 6370000000 HC RX 637 (ALT 250 FOR IP): Performed by: INTERNAL MEDICINE

## 2017-10-30 PROCEDURE — 97535 SELF CARE MNGMENT TRAINING: CPT

## 2017-10-30 PROCEDURE — 97110 THERAPEUTIC EXERCISES: CPT

## 2017-10-30 PROCEDURE — 97532 HC COGNITIVE THERAPY 15 MIN: CPT

## 2017-10-30 PROCEDURE — 6370000000 HC RX 637 (ALT 250 FOR IP): Performed by: PHYSICAL MEDICINE & REHABILITATION

## 2017-10-30 PROCEDURE — 99231 SBSQ HOSP IP/OBS SF/LOW 25: CPT | Performed by: INTERNAL MEDICINE

## 2017-10-30 PROCEDURE — 6370000000 HC RX 637 (ALT 250 FOR IP): Performed by: FAMILY MEDICINE

## 2017-10-30 PROCEDURE — 97530 THERAPEUTIC ACTIVITIES: CPT

## 2017-10-30 PROCEDURE — 99232 SBSQ HOSP IP/OBS MODERATE 35: CPT | Performed by: PHYSICAL MEDICINE & REHABILITATION

## 2017-10-30 PROCEDURE — 36415 COLL VENOUS BLD VENIPUNCTURE: CPT

## 2017-10-30 PROCEDURE — 85027 COMPLETE CBC AUTOMATED: CPT

## 2017-10-30 RX ADMIN — POTASSIUM CHLORIDE 20 MEQ: 1.5 POWDER, FOR SOLUTION ORAL at 22:49

## 2017-10-30 RX ADMIN — MYCOPHENOLATE MOFETIL 1500 MG: 250 CAPSULE ORAL at 22:48

## 2017-10-30 RX ADMIN — Medication 1 TABLET: at 07:44

## 2017-10-30 RX ADMIN — POTASSIUM CHLORIDE 20 MEQ: 1.5 POWDER, FOR SOLUTION ORAL at 07:44

## 2017-10-30 RX ADMIN — CLOPIDOGREL BISULFATE 75 MG: 75 TABLET ORAL at 07:44

## 2017-10-30 RX ADMIN — APIXABAN 5 MG: 5 TABLET, FILM COATED ORAL at 22:49

## 2017-10-30 RX ADMIN — MULTIPLE VITAMINS W/ MINERALS TAB 1 TABLET: TAB at 07:44

## 2017-10-30 RX ADMIN — PAROXETINE HYDROCHLORIDE 20 MG: 20 TABLET, FILM COATED ORAL at 07:47

## 2017-10-30 RX ADMIN — FAMOTIDINE 40 MG: 20 TABLET ORAL at 07:44

## 2017-10-30 RX ADMIN — ATORVASTATIN CALCIUM 40 MG: 40 TABLET, FILM COATED ORAL at 07:44

## 2017-10-30 RX ADMIN — INSULIN LISPRO 1 UNITS: 100 INJECTION, SOLUTION INTRAVENOUS; SUBCUTANEOUS at 22:55

## 2017-10-30 RX ADMIN — FERROUS SULFATE TAB 325 MG (65 MG ELEMENTAL FE) 325 MG: 325 (65 FE) TAB at 07:44

## 2017-10-30 RX ADMIN — MICONAZOLE NITRATE: 1.42 POWDER TOPICAL at 07:45

## 2017-10-30 RX ADMIN — APIXABAN 5 MG: 5 TABLET, FILM COATED ORAL at 07:45

## 2017-10-30 RX ADMIN — FERROUS SULFATE TAB 325 MG (65 MG ELEMENTAL FE) 325 MG: 325 (65 FE) TAB at 16:35

## 2017-10-30 RX ADMIN — CIPROFLOXACIN HYDROCHLORIDE 500 MG: 500 TABLET, FILM COATED ORAL at 07:44

## 2017-10-30 RX ADMIN — DOCUSATE SODIUM 100 MG: 100 CAPSULE, LIQUID FILLED ORAL at 07:44

## 2017-10-30 RX ADMIN — MYCOPHENOLATE MOFETIL 1500 MG: 250 CAPSULE ORAL at 07:45

## 2017-10-30 RX ADMIN — MICONAZOLE NITRATE: 1.42 POWDER TOPICAL at 22:47

## 2017-10-30 RX ADMIN — Medication 24 UNITS: at 22:55

## 2017-10-30 ASSESSMENT — PAIN SCALES - GENERAL: PAINLEVEL_OUTOF10: 0

## 2017-10-30 NOTE — PROGRESS NOTES
setup/supervision/cues and/or requires assist with presthesis/brace only (pt demo good sequencing with kelechi technique )  Dressing-Lower: 3 - Requires assist with 2-3 parts of dressing (a c sock aid, pt demo fair+ understanding of reacher )  Toiletin - Able to perform 1 task only (e.g. hygiene)  Toilet Transfer: 4 - Requires steadying assistance only < 25% assist  Primary Mode: Shower  Tub Transfer: 0 - Activity does not occur  Shower Transfer: 0 - Activity does not occur    Social Interaction: 6 - Patient requires medication for mood and/or effect  Problem Solvin - Patient solves simple/routine tasks 75-90%+   Memory: 3 - Patient remembers 50%-74% of the time    Assessment  Performance deficits / Impairments: Decreased functional mobility ; Decreased balance;Decreased ADL status; Decreased endurance;Decreased strength;Decreased vision/visual deficit; Decreased coordination;Decreased cognition;Decreased safe awareness;Decreased ROM  Assessment: pt jakub increased standing balance/tolerance with functional activiities on this date but would benifit from contd skilled therapies to increase overall strength, endurance and balance necessary to promote ease with safe return to functional transfers and adl engagement. Prognosis: Fair  Discharge Recommendations: 24 hour supervision or assist  Activity Tolerance: Patient Tolerated treatment well  Safety Devices in place: Yes  Type of devices:  All fall risk precautions in place;Call light within reach;Gait belt;Left in chair;Nurse notified          Patient Education:  Patient Goals   Patient goals : to be able to do things for myself  Patient Education: compensatory techniques, safety c functional mobility/transfers  Learner:patient  Method: demonstration and explanation       Outcome: demonstrated understanding and needs reinforcement     Plan  Plan  Times per week: 5-7x/week  Times per day: Twice a day  Current Treatment Recommendations: Balance Training, Functional

## 2017-10-30 NOTE — PROGRESS NOTES
increased time and mod A. Pt completed word problems c menu task- 20% Argenis, 100% c min A. Other: During word problems c menu task, pt required 2 cues to scan further left in order to see entire menu to answer questions completely.      Plan:  [x] Continue ST services    [] Discharge from ST:      Discharge recommendations: [] Inpatient Rehab   [] [place]   [] Outpatient Therapy  [] Follow up at trauma clinic   [] Other:       Treatment completed by: Riley Orona,  clinician

## 2017-10-30 NOTE — PATIENT CARE CONFERENCE
[address]   ACUTE REHABILITATION  TEAM CONFERENCE NOTE  Date: 10/30/17  Patient Name: Colin Lopez       Room: 7879/4170-23  MRN: [id number]       : 1950  (75 y.o.)     Gender: female      Diagnosis: R MCA CVA, ICA stenosis, cystitits    NURSING  FIMS:  Bladder: 6 - Uses device independently (including EMPTYING of device, or uses medication)  Bladder Level of Assistance: 3- Moderate Assistance (Subject equal 50% or more)  Bladder Frequency of Accidents: 6 - No accidents: uses device  Bowel: 6 - Uses toilet independently with device or oral medication(s)  Bowel Level of Assistance: 3- Moderate Assistance (Subject equal 50% or more)  Bowel Frequency of Accidents: 6 - No accidents: uses device   Bladder  Continent  Bowel   Continent  Intervention    Both Bowel & Bladder Program     Wounds/Incisions/Ulcers: No skin issues identified  Medication Education Program: Patient able to manage medications and being educated by nursing  Pain: no pain concerns to address    Fall Risk:  Falling star program initiated    PHYSICAL THERAPY  Bed mobility  Scooting: Minimal assistance  Bed Mobility  Supine to Sit: Minimal assistance  Sit to Supine: Moderate assistance (assist with LEs )  Scooting: Minimal assistance      Transfers:  Sit to Stand: Minimal Assistance  Stand to sit: Contact guard assistance  Bed to Chair: Minimal assistance (With AD)  Stand Pivot Transfers: Minimal Assistance    WB Status: no restrictions  Ambulation 1  Surface: level tile  Device: Hemiwalker  Assistance: Minimal assistance;Contact guard assistance  Quality of Gait: Slow steady pace;  Cues to increase step length to L LE;  HHA to L UE due to patient wt. shifts well and this provides WB to L UE during amb.   Distance: 110 ft  AM;  60 ft PM (Fatigue post amb.)    Stairs  # Steps : 6  Stairs Height: 4\"  Rails: Right ascending  Device: Hand Held Assist (L hand)  Assistance: Minimal assistance;Contact guard assistance (2person assist)  Comment: Ascending FWD and Descending Retro. Patient fearful when descending FWD due to visual deficits. FIMS:  Bed, Chair, Wheel Chair: 3 - Requires 25-49% assistance to transfer  Walk: 2 - Maximal Assistance Requires up to Maximal Assistance AND requires assistance of one person to walk/operate wheelchair between  feet (Patient performs 25-49% of locomotion effort or goes between  feet)  Distance Walked: 60 ft  Wheel Chair: 2 - Maximal Assistance Requires up to [address] requires assistance of one person to walk/operate wheelchair between [address] in [address]: 60 ft  Stairs: 1- Total Assistance perfoms less than 25% of the effort, or requirs the assistance of two people, or goes up and down fewer than 4 stairs    PT Equipment Recommendations  Other: TBD    Assessment: Pt presents s/p CVA with L hemiplegia, ICA occlusion requiring stent, and cystitis, hydronephritis, and kidney stone requiring ureteral stent. Pt demo's impaired balance, strength, endurance, safety awareness and needs assist with moibility. Cont per POC to prepare for home. Will need to determine appropriate home set up (dtr vs son vs pt's own home) as rehab progresses. Goals  Short term goals  Time Frame for Short term goals: 7 days  Short term goal 1: Improve L UE and LE strength by 1/2 MMG  Short term goal 2:  Increase endurance to good  Short term goal 3: Improve sitting balance to good  Short term goal 4: Improve bed mobility to min A x1  Short term goal 5: Improve transfers to min A x1        OCCUPATIONAL THERAPY  FIMS:  Eatin - Feeds self with setup/supervision/cues and/or requires only setup/supervision/cues to perform tube feedings  Groomin - Requires setup/cues to do all tasks (oral/face hygeine at sink, hair management at wc level )  Bathin - Able to bathe 8-9 areas (completed in wc per pt request. KERWIN Sultana )  Dressing-Upper: 5 - Requires setup/supervision/cues and/or requires assist with presthesis/brace only (pt demo good sequencing with kelcehi technique )  Dressing-Lower: 3 - Requires assist with 2-3 parts of dressing (a c sock aid, pt demo fair+ understanding of reacher )  Toiletin - Able to perform 1 task only (e.g. hygiene)  Toilet Transfer: 4 - Requires steadying assistance only < 25% assist  Primary Mode: Shower  Tub Transfer: 0 - Activity does not occur  Shower Transfer: 0 - Activity does not occur         Assessment: pt demo increased standing balance/tolerance with functional activiities on this date but would benifit from contd skilled therapies to increase overall strength, endurance and balance necessary to promote ease with safe return to functional transfers and adl engagement. Short term goals  Time Frame for Short term goals: One week,   Short term goal 1: Pt. will demo. CGA with bed mobility. Short term goal 2: Pt. will demo. min. A with toilet transfer and mod. A with toileting tasks with good . Short term goal 3: Pt. will demo. min. A with UB bathing/dressing, and MOd. A with LB bathing/dressing using AE as needed. Short term goal 4: Pt. will participate in left UE ROM and stgth ex. to assist with functional tasks. Short term goal 5: Pt. will tolerate facilitation tech. to elicit movement in left wrist and hand to assist with functional tasks. Short term goal 6: Pt. will attend to left side of body and L visual field with min. vc's 90% of the time  Short term goal 7: Pt. will tolerate 5-8 min. functional standing activities to promote increased indep.with ADL's and mobility. SPEECH THERAPY   Mod A memory, supervision problem solving  Short Term Goal: min A memory, mod I problem solving. RECREATIONAL THERAPY  Comment/Participation: Participating in unit program      NUTRITION  Weight: 269 lb 10 oz (122.3 kg) / Body mass index is 52.66 kg/m². Diet Rx: CHO Control. PO intake meets needs. Ref.  Range Patient Considers own Health:  3.75        Critical Items:     Problem / Barrier Intervention / Plan  Results   Alteration in ADL Functional Status  Training in use of devices and modified care techniques for safety and independence in care tasks      L neglect Verbal, visual cues     Impaired recall Retraining.      Impaired mobility Transfer/gait training with AD; therapeutic exer; balance activities; stair training                       Functional FIM Gain  Admission Score:  50  Progress:  51, 67  Goal:  89   `  Discharge Plan   Estimated Discharge Date: 11/09/17   Overnight or Day Pass: No  Factors facilitating achievement of predicted outcomes: Family support, Motivated and Cooperative  Barriers to the achievement of predicted outcomes: Cognitive deficit, Anxiety, Decreased endurance, Upper extremity weakness, Lower extremity weakness, Skin Care and Medication managment    Functional Goals at discharge:  Home with family Minimal contact assistance  Discharge therapy goals:  PT: Long term goals  Time Frame for Long term goals : 21 days  Long term goal 1: Increase UEs and LEs strength to [facility] to maximize mobiility  Long term goal 2: Improve standing balance with appropriate device to good to increase safety  Long term goal 3: Improve bed mobility to independent to prepare for home  Long term goal 4: Improve transfers with appropriate device to mod I to prepare for home  Long term goal 5: Improve gait with appropriate device to mod I 150 ft to prepare for home  Long term goal 6: Improve stair negotiation to SBA on 4 steps with 1 rail use to allow pt to enter and exit home  OT:Long term goals  Time Frame for Long term goals : By discharge,   Long term goal 1: Pt. will demo. Mod.I with bed mobility. Long term goal 2: Pt. will demo. SBA with functional ADL transfers using appropriate AD with good . Long term goal 3: Pt. will demo. SBA with UB bathing/dressing, and min.  A with LB bathing/dressing using AE

## 2017-10-30 NOTE — PROGRESS NOTES
· Monitoring: Meal Intake, Weight, Pertinent Labs, Diet Tolerance, Ascites/Edema    See Adult Nutrition Doc Flowsheet for more detail. Yossi Alfredo R.D., L.TRENA.   Pager: [phone]

## 2017-10-30 NOTE — PROGRESS NOTES
Physical Medicine & Rehabilitation  Progress Note    10/30/2017 11:29 AM     CC: Ambulatory and ADL dysfunction due to CVA    Subjective:   Doing better today. Bowels and bladder okay. No pain. .  No further nosebleed. Cont frequent urinationno incontinence    ROS:  Denies fevers, chills, sweats. No chest pain, palpitations, lightheadedness. Denies coughing, wheezing or shortness of breath. Denies abdominal pain, nausea, diarrhea or constipation. No new areas of joint pain. Denies new areas of numbness or weakness. Denies new anxiety or depression issues. No new skin problems. Rehabilitation:   Transfers  Sit to Stand: (P) Minimal Assistance  Stand to sit: (P) Contact guard assistance  Bed to Chair: (P) Minimal assistance  Stand Pivot Transfers: (P) Minimal Assistance  Comment: (P) Naman cane  Ambulation  Ambulation?: (P) Yes  WB Status: (P) no restrictions  More Ambulation?: (P) Yes  Ambulation 1  Surface: (P) level tile  Device: (P) Hemiwalker  Other Apparatus: (P) Wheelchair follow  Assistance: (P) Minimal assistance;Contact guard assistance  Quality of Gait: (P) Slow steady pace;  Cues to increase step length to L LE;  HHA to L UE due to patient wt. shifts well and this provides WB to L UE during amb. Distance: (P) 110' a.m 70' p.m  Comments: (P)  vc's for upright posture, vc's for heel/toe gait       Objective:  BP (!) 135/55   Pulse 82   Temp 98.1 °F (36.7 °C) (Oral)   Resp 16   Ht 5' (1.524 m)   Wt 269 lb 10 oz (122.3 kg)   SpO2 95%   BMI 52.66 kg/m²  I Body mass index is 52.66 kg/m². I   Wt Readings from Last 1 Encounters:   10/29/17 269 lb 10 oz (122.3 kg)      Temp (24hrs), Av.2 °F (36.8 °C), Min:98.1 °F (36.7 °C), Max:98.2 °F (36.8 °C)      Alert, no distress. Good speech and language function. Lungs clear. Heart Irregularly irregular  Abdomen non-distended, non-tender. No calf tenderness or 1+ edema.   B/l   Left upper extremity  4/f 5, left lower extremity 4 out of 5 .right last 72 hours. I/O (24Hr): No intake or output data in the 24 hours ending 10/30/17 1129    Glu last 24 hour  Recent Labs      10/29/17   1117  10/29/17   1557  10/29/17   2004  10/30/17   0702   POCGLU  208*  91  98  84       Recent Labs      10/27/17   1630   COLORU  DARK YELLOW*   PHUR  6.0   SPECGRAV  1.022   PROTEINU  2+*   RBCUA  50    BACTERIA  FEW*   NITRU  NEGATIVE   WBCUA  20 TO 50   LEUKOCYTESUR  MOD*   YEAST  NOT REPORTED   GLUCOSEU  2+*   BILIRUBINUR  NEGATIVE     Urine   Culture 10/24/2017  9:15  [address]   NO SIGNIFICANT GROWTH      Urin cx  Culture 10/27/2017  5:20  [address]   NO SIGNIFICANT GROWTH          Impression/Plan:    Patient is a 49-year-old female with ADL and mobility dysfunction s/p MCA CVA.     1. Ambulatory and ADL dysfunction secondary to MCA CVA -continue rehab efforts, making progress, team conference reviewed, will need 24/ 7 care, discharge plan 11/9/2017 , Noted family training set for end of this week, team conference tomorrow  2. Sepsis due to UTI and left pylenephritis, urethral stent, bilateral kidney stones- leukocytosis- Cipro till 10/18 per IM- completed. .,  Repeat UA due to frequent urination,  Cipro, urology consulted due to continued hematuria and frequent urinationfollow up noted, urine culture negative ×2DC Cipro, continue frequent urination, PVR still pendingdiscussed with nursing, urology follow-up appreciated  3. R MCA cva with LHP- embolic. . Cardiology recommendations noted and internal medicine orders notedpatient now on Eliquis 5 mg twice a day and Plavix, Lovenox discontinued, - discussed with Dr Diomedes Jimenez  on 10/26 and 10/28 personally- notes cont eliquis and plavix, no asa,- notes he has talked this over with neurologist at [facility]. V's  prior and continue with above recommendations,  Continue lipitor. Reviewed with patient and son as well.     4. Bloody nose 10/24improved, added nasal mist, monitor closely, head of the bed at 30-45° , no further episodes  5. PAF - Per cardio on Eliquis as above  6. Mild bilateral edema- off Lasix due to low BP per IM, continue to monitor, continues on potassiumrecheck BMPpotassium 4.1 with supplementcontinue to monitor and continue supplement  7. Anemia- protonix, s/p 1 unit PRBC transfusion.  10/30 Hb 9.7 asymptomatic. No obvious blood loss. Continue iron supplementation. Discussed with son and patient    8. Constipation - Colace  9. Reflux - Pepcid  10. Dep - on paxil 20 monitor  11. Bulbous pemphigus- Dr Rosalie Salazar had discussion with the patient's son- He states that his mother has been on CellCept 1500 mg twice a day for approximately 15 years for bulbous pemphigus. 12. Sacral ulcer- wound care management. Change mepilex q 3 days    13. H/O PE - now back on Eliquis  14. DM- Cont lantus. IM following for medical management. , medications adjusted per internal medicine    15. Mild confusion 10/24- neurologic exam no significant change except for some mild confusion, repeat CT done no acute change, stable exam  16. DVT prophylaxison Eliquis  17. internal medicine for medical management. [place] Perc[place]. Griffin Haley MD

## 2017-10-30 NOTE — PROGRESS NOTES
Minimal Assistance  Stand to sit: Contact guard assistance  Bed to Chair: Minimal assistance (With AD)  Stand Pivot Transfers: Minimal Assistance        WB Status: no restrictions  Ambulation 1  Surface: level tile  Device: Hemiwalker  Assistance: Minimal assistance;Contact guard assistance  Quality of Gait: Slow steady pace;  Cues to increase step length to L LE;  HHA to L UE due to patient wt. shifts well and this provides WB to L UE during amb. Distance: 110 ft  AM;  60 ft PM (Fatigue post amb.)        Stairs/Curb  Stairs?: Yes  Stairs  # Steps : 6  Stairs Height: 4\"  Rails: Right ascending  Device: Hand Held Assist (L hand)  Assistance: Minimal assistance;Contact guard assistance (2person assist)  Comment: Ascending FWD and Descending Retro. Patient fearful when descending FWD due to visual deficits.               Propulsion 1  Propulsion: Manual  Level: Level Tile  Method: RUE;RLE;LLE  Level of Assistance: Supervision  Description/ Details: limited by low endurance/weakness  Distance: 60 ft                                     FIMS:      Transfers  Bed, Chair, Wheel Chair: 3 - Requires 25-49% assistance to transfer   Locomotion  Primary Mode: Walk  Distance Walked: 60 ft  Distance Traveled in [facility] Chair: 60 ft  Walk: 2 - Maximal Assistance Requires up to [address] requires assistance of one person to walk/operate wheelchair between  feet (Patient performs 25-49% of locomotion effort or goes between  feet)  Wheel Chair: 2 - Maximal Assistance Requires up to [address] requires assistance of one person to walk/operate wheelchair between  feet  Stairs: 1- Total Assistance perfoms less than 25% of the effort, or requirs the assistance of two people, or goes up and down fewer than 4 stairs       BALANCE Posture: Fair  Sitting - Static: Fair;+  Sitting - Dynamic: Fair;+  Standing - Static: Fair  Standing - Dynamic: Fair  Comments: standing with RW and hemiwalker CGA, flexed independent to prepare for home  Long term goal 4: Improve transfers with appropriate device to mod I to prepare for home  Long term goal 5: Improve gait with appropriate device to mod I 150 ft to prepare for home  Long term goal 6: Improve stair negotiation to SBA on 4 steps with 1 rail use to allow pt to enter and exit home       10/30/17 0945 10/30/17 1500   PT Individual Minutes   Time In 0945 1500   Time Out [id number]   Minutes 45 45       Electronically signed by Juan Irvin PTA on 10/30/17 at 4:48 PM

## 2017-10-30 NOTE — PROGRESS NOTES
[facility]      Patient name:  Shasta Chu  Date of admission:  10/17/2017  7:07 PM  MRN:   [id number]  YOB: 1950      Cc  CVA       HPI   Pt admitted to rehab for CVA   LUE weakness MRI showed multifocal infarcts R    There were also chronic infarcts seen in the occipital lobes and left cerebellar hemisphere  Underwent ICA stenting     hospitalization complicated by ureteral stone removal   UTI sepsis       10/29  Urinary frequency     Past Medical History:   Diagnosis Date    CVA (cerebral vascular accident) ([address]     Diabetes mellitus ([address]     Other disorders of skin and subcutaneous tissue in diseases classified elsewhere several years ago    Bullous Pemphkgoid    Patient in clinical research study 10/13/2017    Anticipated end date 04/13/2018    PE (pulmonary thromboembolism) ([address]      No family history on file. reports that she has never smoked. She has never used smokeless tobacco. She reports that she does not drink alcohol or use drugs. Past Medical History:   Diagnosis Date    CVA (cerebral vascular accident) ([address]     Diabetes mellitus ([address]     Other disorders of skin and subcutaneous tissue in diseases classified elsewhere several years ago    Bullous Pemphkgoid    Patient in clinical research study 10/13/2017    Anticipated end date 04/13/2018    PE (pulmonary thromboembolism) ([address]      Allergies   Allergen Reactions    Iv Contrast [Iodides] Other (See Comments)     unknown       Review of systems    Constitutional: Negative for fever and fatigue. Respiratory: Negative for cough and shortness of breath. Cardiovascular: Negative for chest pain, palpitations and leg swelling. Gastrointestinal: Negative for abdominal pain, diarrhea and blood in stool.        ROS  Physical exam   BP (!) 98/51   Pulse 68   Temp 98.3 °F (36.8 °C)   Resp 17   Ht 5' (1.524 m)   Wt 248 lb (112.5 kg) SpO2 96%   BMI 48.43 kg/m²      General appearance: well nourished in no distress  Lungs: normal effort, clear to auscultation. CVS sinus with no murmurs. Abdomen: Soft, non-tender; no masses or HSM,   Extremities: no edema; no digital cyanosis or clubbing.   Neurologic: Cranial nerves II-XII grossly intact; LUE 3/5 LLE 4/5           Assessment / Plan      Patient Active Problem List   Diagnosis    Hydronephrosis of left kidney    Acute cystitis with hematuria    CKD (chronic kidney disease), stage III    Hypotension arterial    Essential hypertension    Morbid obesity with BMI of 40.0-44.9, adult (HCC)    History of arterial ischemic stroke    History of pulmonary embolus (PE)    Paroxysmal atrial fibrillation (HCC)    Left ureteral stone    Left-sided weakness    Non-intractable vomiting with nausea    Cerebrovascular accident (CVA) due to embolism of right middle cerebral artery (HCC)    Hypotension    Sepsis secondary to UTI ([address]    Elevated serum creatinine    Kidney stone    Anemia    Hypokalemia    Acute ischemic right MCA stroke [facility]    Right-sided carotid artery disease (HCC)    Incontinence associated dermatitis    Cerebrovascular accident (CVA) due to stenosis of right carotid artery (HCC)       A/P  UTI Sepsis s/p ureteral stent on cipro   MCA infarct Left hemiparesis on ASA  lipitor   A fib on eliquis   DM on lantus  to 248    10/20  Pt on lovenox prophylacic dose  cotn present dose of lantus  Plan to start eliquis after 2 weeks previously that will be 10/26   flomax to be stopped soon has hypotension    10/24  Dc flomax   Bmp cr stable     10/25  Urine cx pending   Cont cipro   Stp lasix hypotension     10/26  Change lantus to 24 units from 30   Add eliquis   Stop lovenox stop plavix     10/27  FS well controlled  U/a WBC + on cipro   On eliquis follow h/h    10/29     UTI on cipro from 10/27 will follow   Cannot give flomax due to hypotension     Breann Shah,

## 2017-10-31 LAB
GLUCOSE BLD-MCNC: 146 MG/DL (ref 65–105)
GLUCOSE BLD-MCNC: 166 MG/DL (ref 65–105)
GLUCOSE BLD-MCNC: 77 MG/DL (ref 65–105)

## 2017-10-31 PROCEDURE — 97116 GAIT TRAINING THERAPY: CPT

## 2017-10-31 PROCEDURE — 99231 SBSQ HOSP IP/OBS SF/LOW 25: CPT | Performed by: INTERNAL MEDICINE

## 2017-10-31 PROCEDURE — 1180000000 HC REHAB R&B

## 2017-10-31 PROCEDURE — 97530 THERAPEUTIC ACTIVITIES: CPT

## 2017-10-31 PROCEDURE — 97532 HC COGNITIVE THERAPY 15 MIN: CPT

## 2017-10-31 PROCEDURE — 97110 THERAPEUTIC EXERCISES: CPT

## 2017-10-31 PROCEDURE — 6370000000 HC RX 637 (ALT 250 FOR IP): Performed by: INTERNAL MEDICINE

## 2017-10-31 PROCEDURE — 97532 HC OT DEVELOP COGNITIVE SKILLS 15MIN: CPT

## 2017-10-31 PROCEDURE — 6360000002 HC RX W HCPCS: Performed by: INTERNAL MEDICINE

## 2017-10-31 PROCEDURE — 82947 ASSAY GLUCOSE BLOOD QUANT: CPT

## 2017-10-31 PROCEDURE — 6370000000 HC RX 637 (ALT 250 FOR IP): Performed by: PHYSICAL MEDICINE & REHABILITATION

## 2017-10-31 PROCEDURE — 99232 SBSQ HOSP IP/OBS MODERATE 35: CPT | Performed by: PHYSICAL MEDICINE & REHABILITATION

## 2017-10-31 PROCEDURE — 6370000000 HC RX 637 (ALT 250 FOR IP): Performed by: FAMILY MEDICINE

## 2017-10-31 PROCEDURE — 97535 SELF CARE MNGMENT TRAINING: CPT

## 2017-10-31 PROCEDURE — 51798 US URINE CAPACITY MEASURE: CPT

## 2017-10-31 RX ADMIN — MULTIPLE VITAMINS W/ MINERALS TAB 1 TABLET: TAB at 08:57

## 2017-10-31 RX ADMIN — PAROXETINE HYDROCHLORIDE 20 MG: 20 TABLET, FILM COATED ORAL at 08:59

## 2017-10-31 RX ADMIN — MYCOPHENOLATE MOFETIL 1500 MG: 250 CAPSULE ORAL at 08:58

## 2017-10-31 RX ADMIN — FAMOTIDINE 40 MG: 20 TABLET ORAL at 08:57

## 2017-10-31 RX ADMIN — Medication 24 UNITS: at 20:51

## 2017-10-31 RX ADMIN — INSULIN LISPRO 2 UNITS: 100 INJECTION, SOLUTION INTRAVENOUS; SUBCUTANEOUS at 18:19

## 2017-10-31 RX ADMIN — FERROUS SULFATE TAB 325 MG (65 MG ELEMENTAL FE) 325 MG: 325 (65 FE) TAB at 18:19

## 2017-10-31 RX ADMIN — MICONAZOLE NITRATE: 1.42 POWDER TOPICAL at 08:57

## 2017-10-31 RX ADMIN — POLYETHYLENE GLYCOL 3350 17 G: 17 POWDER, FOR SOLUTION ORAL at 08:57

## 2017-10-31 RX ADMIN — POTASSIUM CHLORIDE 20 MEQ: 1.5 POWDER, FOR SOLUTION ORAL at 08:58

## 2017-10-31 RX ADMIN — FERROUS SULFATE TAB 325 MG (65 MG ELEMENTAL FE) 325 MG: 325 (65 FE) TAB at 08:57

## 2017-10-31 RX ADMIN — MYCOPHENOLATE MOFETIL 1500 MG: 250 CAPSULE ORAL at 20:50

## 2017-10-31 RX ADMIN — APIXABAN 5 MG: 5 TABLET, FILM COATED ORAL at 08:59

## 2017-10-31 RX ADMIN — CLOPIDOGREL BISULFATE 75 MG: 75 TABLET ORAL at 08:57

## 2017-10-31 RX ADMIN — DOCUSATE SODIUM 100 MG: 100 CAPSULE, LIQUID FILLED ORAL at 08:57

## 2017-10-31 RX ADMIN — POTASSIUM CHLORIDE 20 MEQ: 1.5 POWDER, FOR SOLUTION ORAL at 20:50

## 2017-10-31 RX ADMIN — MICONAZOLE NITRATE: 1.42 POWDER TOPICAL at 20:51

## 2017-10-31 RX ADMIN — ATORVASTATIN CALCIUM 40 MG: 40 TABLET, FILM COATED ORAL at 08:57

## 2017-10-31 RX ADMIN — Medication 1 TABLET: at 08:57

## 2017-10-31 RX ADMIN — APIXABAN 5 MG: 5 TABLET, FILM COATED ORAL at 20:51

## 2017-10-31 NOTE — PROGRESS NOTES
[address] [facility]      Patient name:  Mikki Parra  Date of admission:  10/17/2017  7:07 PM  MRN:   [id number]  YOB: 1950      Cc  CVA       HPI   Pt admitted to rehab for CVA   LUE weakness MRI showed multifocal infarcts R    There were also chronic infarcts seen in the occipital lobes and left cerebellar hemisphere  Underwent ICA stenting     hospitalization complicated by ureteral stone removal   UTI sepsis       10/30  Hypotensive   No fever     Past Medical History:   Diagnosis Date    CVA (cerebral vascular accident) ([address]     Diabetes mellitus ([address]     Other disorders of skin and subcutaneous tissue in diseases classified elsewhere several years ago    Bullous Pemphkgoid    Patient in clinical research study 10/13/2017    Anticipated end date 04/13/2018    PE (pulmonary thromboembolism) ([address]      No family history on file. reports that she has never smoked. She has never used smokeless tobacco. She reports that she does not drink alcohol or use drugs. Past Medical History:   Diagnosis Date    CVA (cerebral vascular accident) ([address]     Diabetes mellitus ([address]     Other disorders of skin and subcutaneous tissue in diseases classified elsewhere several years ago    Bullous Pemphkgoid    Patient in clinical research study 10/13/2017    Anticipated end date 04/13/2018    PE (pulmonary thromboembolism) ([address]      Allergies   Allergen Reactions    Iv Contrast [Iodides] Other (See Comments)     unknown       Review of systems    Constitutional: Negative for fever and fatigue. Respiratory: Negative for cough and shortness of breath. Cardiovascular: Negative for chest pain, palpitations and leg swelling. Gastrointestinal: Negative for abdominal pain, diarrhea and blood in stool.        ROS  Physical exam   BP (!) 98/51   Pulse 68   Temp 98.3 °F (36.8 °C)   Resp 17   Ht 5' (1.524 m)   Wt 248 lb (112.5 kg) SpO2 96%   BMI 48.43 kg/m²      General appearance: well nourished in no distress  Lungs: normal effort, clear to auscultation. CVS sinus with no murmurs. Abdomen: Soft, non-tender; no masses or HSM,   Extremities: no edema; no digital cyanosis or clubbing.   Neurologic: Cranial nerves II-XII grossly intact; LUE 3/5 LLE 4/5           Assessment / Plan      Patient Active Problem List   Diagnosis    Hydronephrosis of left kidney    Acute cystitis with hematuria    CKD (chronic kidney disease), stage III    Hypotension arterial    Essential hypertension    Morbid obesity with BMI of 40.0-44.9, adult (HCC)    History of arterial ischemic stroke    History of pulmonary embolus (PE)    Paroxysmal atrial fibrillation (HCC)    Left ureteral stone    Left-sided weakness    Non-intractable vomiting with nausea    Cerebrovascular accident (CVA) due to embolism of right middle cerebral artery (HCC)    Hypotension    Sepsis secondary to UTI ([address]    Elevated serum creatinine    Kidney stone    Anemia    Hypokalemia    Acute ischemic right MCA stroke ([address]    Right-sided carotid artery disease (HCC)    Incontinence associated dermatitis    Cerebrovascular accident (CVA) due to stenosis of right carotid artery (HCC)       A/P  UTI Sepsis s/p ureteral stent on cipro   MCA infarct Left hemiparesis on ASA  lipitor   A fib on eliquis   DM on lantus  to 248    10/20  Pt on lovenox prophylacic dose  cotn present dose of lantus  Plan to start eliquis after 2 weeks previously that will be 10/26   flomax to be stopped soon has hypotension    10/24  Dc flomax   Bmp cr stable     10/25  Urine cx pending   Cont cipro   Stp lasix hypotension     10/26  Change lantus to 24 units from 30   Add eliquis   Stop lovenox stop plavix     10/27  FS well controlled  U/a WBC + on cipro   On eliquis follow h/h    10/29     UTI on cipro from 10/27 will follow   Cannot give flomax due to hypotension       10/30  Cont plavix eliquis  DM fs MD ZEFERINO King [address], [address] [place] [address].    Phone [phone]   Fax: [phone]  Answering Service: [phone]

## 2017-10-31 NOTE — PROGRESS NOTES
Physical Medicine & Rehabilitation  Progress Note    10/31/2017 5:41 PM     CC: Ambulatory and ADL dysfunction due to CVA    Subjective:   Doing better today. Bowels and bladder okay. No pain. .  No further nosebleed. Cont frequent urinationno incontinencePVRs Low per nursing . Asks discharge date daily    ROS:  Denies fevers, chills, sweats. No chest pain, palpitations, lightheadedness. Denies coughing, wheezing or shortness of breath. Denies abdominal pain, nausea, diarrhea or constipation. No new areas of joint pain. Denies new areas of numbness or weakness. Denies new anxiety or depression issues. No new skin problems. Rehabilitation:   See team conference note       Objective:  BP (!) 123/91   Pulse 92   Temp 98.1 °F (36.7 °C)   Resp 17   Ht 5' (1.524 m)   Wt 269 lb 10 oz (122.3 kg)   SpO2 95%   BMI 52.66 kg/m²  I Body mass index is 52.66 kg/m². I   Wt Readings from Last 1 Encounters:   10/29/17 269 lb 10 oz (122.3 kg)      Temp (24hrs), Av.2 °F (36.8 °C), Min:98.1 °F (36.7 °C), Max:98.2 °F (36.8 °C)      Alert, no distress. Good speech and language function. Lungs clear. Heart Irregularly irregular  Abdomen non-distended, non-tender. No calf tenderness or 1+ edema.   B/l   Left upper extremity  4/f 5, left lower extremity 4 out of 5 .right extremity 5 out of 5,   Neuro - mental status - no change      Medications   Scheduled Meds:   PARoxetine  20 mg Oral QAM    insulin glargine  24 Units Subcutaneous Nightly    apixaban  5 mg Oral BID    clopidogrel  75 mg Oral Daily    mycophenolate  1,500 mg Oral BID    miconazole   Topical BID    atorvastatin  40 mg Oral Daily    Calcium Carb-Cholecalciferol  1 tablet Oral Daily    docusate sodium  100 mg Oral Daily    famotidine  40 mg Oral Daily    ferrous sulfate  325 mg Oral BID WC    insulin lispro  0-12 Units Subcutaneous TID WC    insulin lispro  0-6 Units Subcutaneous Nightly    polyethylene glycol  17 g Oral Daily   

## 2017-10-31 NOTE — PLAN OF CARE
Problem: Risk for Impaired Skin Integrity  Goal: Tissue integrity - skin and mucous membranes  Structural intactness and normal physiological function of skin and  mucous membranes. Outcome: Ongoing  There are no new skin issues so far this shift. Will continue to assist patient with repositioning at least every two hours. Problem: Falls - Risk of  Goal: Absence of falls  Outcome: Ongoing  Patient remains free from falls so far this shift. Will continue to round at least hourly, place bed in lowest position with call light within reach, and alarm activated. Problem: Pain:  Goal: Control of acute pain  Control of acute pain   Outcome: Ongoing  Patient denies pain at this time. Will continue to assess.

## 2017-10-31 NOTE — PROGRESS NOTES
10/31/17 0911 10/31/17 1509   OT Individual Minutes   Time In 0241 0060   Time Out 0835 1300   Minutes 39 [facility]   ACUTE REHABILITATION OCCUPATIONAL THERAPY  DAILY NOTE    Date: 10/31/17  Patient Name: Julianne Mitchell      Room: 9878/0911-81    MRN: [id number]   : 1950  (74 y.o.)  Gender: female      Diagnosis: R CVA, Acute ischmic right MCA stroke,   Additional Pertinent Hx: UTI, s/p Ureteral stone removed dueing stent placement 10/10/17, h/o CVA a year ago    Restrictions  Restrictions/Precautions: General Precautions, Fall Risk  Other position/activity restrictions: up with assistance  Position Activity Restriction  Other position/activity restrictions: up with assistance     Subjective \"Is it Wednesday\"  Patient Currently in Pain: No     Orientation Level: Oriented to person (patient can use visual aide for time and needs occassionally directed)       Objective Patient uses visual aide to orient to time, without it cannot remember. Needs mod to max vc's to string 3 correct colors and shapes using visual card. Poor motor plan. Completed 2 sets of PROM and isometric exercises for LUE. Personal alarm placed on patient in w/c when therapist left room. Continue POC  Cognition  Overall Cognitive Status: Exceptions  Following Commands:  Follows one step commands with increased time  Attention Span: Attends with cues to redirect  Memory: Decreased recall of recent events;Decreased long term memory;Decreased short term memory  Safety Judgement: Decreased awareness of need for assistance;Decreased awareness of need for safety  Problem Solving: Assistance required to generate solutions;Assistance required to identify errors made  Insights: Decreased awareness of deficits  Initiation: Requires cues for some     Bed mobility  Supine to Sit: Minimal assistance        Gross Motor: bilateral activities cones  (supervision and maximum cues to intertwine hands ) OT FIM:   Groomin - Requires setup/cues to do all tasks (cues to problem solve and sequence)  Bathin - Able to bathe 3-4 areas (assist to lift breasts, adipose, cant wash perineal )  Dressing-Lower: 2 - Requires assist with 4-5 parts of dressing (hand over hand with reacher )  Toiletin - Able to perform 1 task only (e.g. hygiene)  Shower Transfer: 0 - Activity does not occur (washed sinkside )               Assessment  Performance deficits / Impairments: Decreased functional mobility ; Decreased balance;Decreased ADL status; Decreased endurance;Decreased strength;Decreased vision/visual deficit; Decreased coordination;Decreased cognition;Decreased safe awareness;Decreased ROM  Prognosis: Fair  Activity Tolerance: Patient Tolerated treatment well  Safety Devices in place: Yes  Type of devices: All fall risk precautions in place;Call light within reach;Gait belt;Left in chair;Nurse notified          Patient Education:  Patient Goals   Patient goals : to be able to do things for myself     Learner:patient  Method: explanation       Outcome: needs reinforcement     Plan  Plan  Times per week: 5-7x/week  Times per day: Twice a day  Current Treatment Recommendations: Balance Training, Functional Mobility Training, Endurance Training, Safety Education & Training, Self-Care / ADL, Equipment Evaluation, Education, & procurement, Patient/Caregiver Education & Training, Strengthening, ROM, Neuromuscular Re-education, Cognitive Reorientation, Cognitive/Perceptual Training, Home Management Training  Plan Comment: continue OT  Patient Goals   Patient goals : to be able to do things for myself  Short term goals  Time Frame for Short term goals: One week,   Short term goal 1: Pt. will demo. CGA with bed mobility. Short term goal 2: Pt. will demo. min. A with toilet transfer and mod. A with toileting tasks with good . Short term goal 3: Pt. will demo. min. A with UB bathing/dressing,

## 2017-10-31 NOTE — PROGRESS NOTES
Physical Therapy  [address]  Acute Rehabilitation Physical Therapy Progress Note    Date: 10/31/17  Patient Name: Catia Phillips       Room: [facility]0/4862-04  MRN: [id number]   Account: [de-identified]   : 1950  (75 y.o.) Gender: female        Diagnosis: R CVA, Acute ischmic right MCA stroke,   Past Medical History:  has a past medical history of CVA (cerebral vascular accident) ([facility] [address] Diabetes mellitus ([facility] [address] Other disorders of skin and subcutaneous tissue in diseases classified elsewhere; Patient in clinical research study; and PE (pulmonary thromboembolism) ([facility] [address] Past Surgical History:   has a past surgical history that includes Cholecystectomy; hernia repair; and Cystoscopy (Left, 10/10/2017). Additional Pertinent Hx: Pt presents to ER on 10/9/17 with c/o vomiting and severe abdominal and back pain. Pt was found to have acute cystitis, kidney stone and hydronephrosis. On 10/10/17, pt dev L sided weakness and facial droop, found to have acute R MCA infarct. Pt underwent emergent cysto with ureteral stent placement 10/10/2017. Pt had angiogram and found to have ICA stenosis, stent placed 10/12/17. Additional PMH: CKD, A fib, morbid obesity, HTN, pt is in clinical trial that will end in 2018- unspecified. Overall Orientation Status: Within Functional Limits  Orientation Level: Oriented X4  Restrictions/Precautions  Restrictions/Precautions: General Precautions; Fall Risk  Required Braces or Orthoses?: No  Position Activity Restriction  Other position/activity restrictions: up with assistance    Subjective: Pleasant with no complaints on this date. Comments: Pt is pleasant and cooperative. No confusion noted, but does not remember previous session activities or training. Vital Signs  Patient Currently in Pain: Denies                   Bed Mobility:   Supine to Sit: Minimal assistance  Sit to Supine:  Moderate assistance (assist with LEs )  Scooting: Minimal assistance      Transfers:  Sit to Stand: Minimal Assistance  Stand to sit: Contact guard assistance  Bed to Chair: Minimal assistance (With AD)  Stand Pivot Transfers: Minimal Assistance        WB Status: no restrictions  Ambulation 1  Surface: level tile  Device: Hemiwalker  Assistance: Minimal assistance;Contact guard assistance  Quality of Gait: Slow steady pace;  Cues to increase step length to L LE;  HHA to L UE due to patient wt. shifts well and this provides WB to L UE during amb. Distance: 110 ft  AM;  60 ft PM (Fatigue post amb.)        Stairs/Curb  Stairs?: Yes  Stairs  # Steps : 6  Stairs Height: 4\"  Rails: Right ascending  Device: Hand Held Assist (L hand)  Assistance: Minimal assistance;Contact guard assistance (2person assist)  Comment: Ascending FWD and Descending Retro. Patient fearful when descending FWD due to visual deficits.               Propulsion 1  Propulsion: Manual  Level: Level Tile  Method: RUE;RLE;LLE  Level of Assistance: Supervision  Description/ Details: limited by low endurance/weakness  Distance: 60 ft                                     FIMS:      Transfers  Bed, Chair, Wheel Chair: 3 - Requires 25-49% assistance to transfer   Locomotion  Primary Mode: Walk  Distance Walked: 60 ft  Distance Traveled in [employer] Chair: 60ft  Walk: 2 - Maximal Assistance Requires up to [address] requires assistance of one person to walk/operate wheelchair between  feet (Patient performs 25-49% of locomotion effort or goes between  feet)  Wheel Chair: 2 - Maximal Assistance Requires up to [address] requires assistance of one person to walk/operate wheelchair between  feet  Stairs: 1- Total Assistance perfoms less than 25% of the effort, or requirs the assistance of two people, or goes up and down fewer than 4 stairs       BALANCE Posture: Fair  Sitting - Static: Fair;+  Sitting - Dynamic: Fair;+  Standing - Static: Fair  Standing - Dynamic: Fair  Comments: standing with RW and hemiwalker CGA, flexed posture    EXERCISES    Other exercises?: Yes  Other exercises 1: Seated in w/c bilat LE therexs x15 2lb ankle weights, green t band ankle PF and heel slides x15 ea  Other exercises 2: amb in // bars 20 ft with R sided rail only   Other exercises 3: supine B LE ther exs 10x, AROM>AAROM, unable to tolerate ankle wts  Other exercises 4: standing UBE 3 min FWD/BACK with seated break in between,  L hand wrapped to handle  Other exercises 5: Standing ex. bilat. LE in // bars  x 10 reps  Other exercises 7: NuStep  x 10 mins. Workload 3           Activity Tolerance: Patient limited by endurance              Current Treatment Recommendations: Strengthening, Transfer Training, Endurance Training, Cognitive Reorientation, Patient/Caregiver Education & Training, ROM, Balance Training, Gait Training, Functional Mobility Training, Stair training, Safety Education & Training    Conditions Requiring Skilled Therapeutic Intervention  Body structures, Functions, Activity limitations: Decreased functional mobility ; Decreased strength;Decreased ROM; Decreased endurance;Decreased safe awareness;Decreased balance  Treatment Diagnosis: CVA, L hemiplegia  Prognosis: Good  Patient Education: kelechi walker use, safe mobility  REQUIRES PT FOLLOW UP: Yes  Discharge Recommendations: Home with nursing aide;Home with Home health PT;Home with assist PRN    Goals  Short term goals  Time Frame for Short term goals: 7 days  Short term goal 1: Improve L UE and LE strength by 1/2 MMG  Short term goal 2: Increase endurance to good  Short term goal 3: Improve sitting balance to good  Short term goal 4: Improve bed mobility to min A x1  Short term goal 5: Improve transfers to min A x1  Long term goals  Time Frame for Long term goals : 21 days  Long term goal 1:  Increase UEs and LEs strength to [facility] to maximize mobiility  Long term goal 2: Improve standing balance with appropriate device to good to increase

## 2017-11-01 LAB
GLUCOSE BLD-MCNC: 108 MG/DL (ref 65–105)
GLUCOSE BLD-MCNC: 146 MG/DL (ref 65–105)
GLUCOSE BLD-MCNC: 165 MG/DL (ref 65–105)
GLUCOSE BLD-MCNC: 81 MG/DL (ref 65–105)

## 2017-11-01 PROCEDURE — 97112 NEUROMUSCULAR REEDUCATION: CPT

## 2017-11-01 PROCEDURE — 99232 SBSQ HOSP IP/OBS MODERATE 35: CPT | Performed by: PHYSICAL MEDICINE & REHABILITATION

## 2017-11-01 PROCEDURE — 6370000000 HC RX 637 (ALT 250 FOR IP): Performed by: FAMILY MEDICINE

## 2017-11-01 PROCEDURE — 97535 SELF CARE MNGMENT TRAINING: CPT

## 2017-11-01 PROCEDURE — 6370000000 HC RX 637 (ALT 250 FOR IP): Performed by: INTERNAL MEDICINE

## 2017-11-01 PROCEDURE — 97542 WHEELCHAIR MNGMENT TRAINING: CPT

## 2017-11-01 PROCEDURE — 6370000000 HC RX 637 (ALT 250 FOR IP): Performed by: PHYSICAL MEDICINE & REHABILITATION

## 2017-11-01 PROCEDURE — 97532 HC COGNITIVE THERAPY 15 MIN: CPT

## 2017-11-01 PROCEDURE — 1180000000 HC REHAB R&B

## 2017-11-01 PROCEDURE — 51798 US URINE CAPACITY MEASURE: CPT

## 2017-11-01 PROCEDURE — 82947 ASSAY GLUCOSE BLOOD QUANT: CPT

## 2017-11-01 PROCEDURE — 97110 THERAPEUTIC EXERCISES: CPT

## 2017-11-01 PROCEDURE — 6360000002 HC RX W HCPCS: Performed by: INTERNAL MEDICINE

## 2017-11-01 PROCEDURE — 97116 GAIT TRAINING THERAPY: CPT

## 2017-11-01 RX ADMIN — Medication 24 UNITS: at 23:09

## 2017-11-01 RX ADMIN — APIXABAN 5 MG: 5 TABLET, FILM COATED ORAL at 23:10

## 2017-11-01 RX ADMIN — MICONAZOLE NITRATE: 1.42 POWDER TOPICAL at 23:10

## 2017-11-01 RX ADMIN — MYCOPHENOLATE MOFETIL 1500 MG: 250 CAPSULE ORAL at 08:03

## 2017-11-01 RX ADMIN — MULTIPLE VITAMINS W/ MINERALS TAB 1 TABLET: TAB at 08:04

## 2017-11-01 RX ADMIN — PAROXETINE HYDROCHLORIDE 20 MG: 20 TABLET, FILM COATED ORAL at 08:04

## 2017-11-01 RX ADMIN — POTASSIUM CHLORIDE 20 MEQ: 1.5 POWDER, FOR SOLUTION ORAL at 23:09

## 2017-11-01 RX ADMIN — FERROUS SULFATE TAB 325 MG (65 MG ELEMENTAL FE) 325 MG: 325 (65 FE) TAB at 17:42

## 2017-11-01 RX ADMIN — FAMOTIDINE 40 MG: 20 TABLET ORAL at 08:04

## 2017-11-01 RX ADMIN — Medication 1 TABLET: at 08:04

## 2017-11-01 RX ADMIN — POTASSIUM CHLORIDE 20 MEQ: 1.5 POWDER, FOR SOLUTION ORAL at 08:04

## 2017-11-01 RX ADMIN — MYCOPHENOLATE MOFETIL 1500 MG: 250 CAPSULE ORAL at 23:10

## 2017-11-01 RX ADMIN — MICONAZOLE NITRATE: 1.42 POWDER TOPICAL at 08:04

## 2017-11-01 RX ADMIN — CLOPIDOGREL BISULFATE 75 MG: 75 TABLET ORAL at 08:04

## 2017-11-01 RX ADMIN — APIXABAN 5 MG: 5 TABLET, FILM COATED ORAL at 08:04

## 2017-11-01 RX ADMIN — FERROUS SULFATE TAB 325 MG (65 MG ELEMENTAL FE) 325 MG: 325 (65 FE) TAB at 08:04

## 2017-11-01 RX ADMIN — ATORVASTATIN CALCIUM 40 MG: 40 TABLET, FILM COATED ORAL at 08:04

## 2017-11-01 ASSESSMENT — PAIN SCALES - GENERAL: PAINLEVEL_OUTOF10: 0

## 2017-11-01 NOTE — PROGRESS NOTES
Recommendations: Balance Training, Functional Mobility Training, Endurance Training, Safety Education & Training, Self-Care / ADL, Equipment Evaluation, Education, & procurement, Patient/Caregiver Education & Training, Strengthening, ROM, Neuromuscular Re-education, Cognitive Reorientation, Cognitive/Perceptual Training, Home Management Training  Plan Comment: continue OT  Patient Goals   Patient goals : to be able to do things for myself  Short term goals  Time Frame for Short term goals: One week,   Short term goal 1: Pt. will demo. CGA with bed mobility. Short term goal 2: Pt. will demo. min. A with toilet transfer and mod. A with toileting tasks with good . Short term goal 3: Pt. will demo. min. A with UB bathing/dressing, and MOd. A with LB bathing/dressing using AE as needed. Short term goal 4: Pt. will participate in left UE ROM and stgth ex. to assist with functional tasks. Short term goal 5: Pt. will tolerate facilitation tech. to elicit movement in left wrist and hand to assist with functional tasks. Short term goal 6: Pt. will attend to left side of body and L visual field with min. vc's 90% of the time  Short term goal 7: Pt. will tolerate 5-8 min. functional standing activities to promote increased indep.with ADL's and mobility. Long term goals  Time Frame for Long term goals : By discharge,   Long term goal 1: Pt. will demo. Mod.I with bed mobility. Long term goal 2: Pt. will demo. SBA with functional ADL transfers using appropriate AD with good . Long term goal 3: Pt. will demo. SBA with UB bathing/dressing, and min. A with LB bathing/dressing using AE as needed. Long term goal 4: Pt.will demo. SBA with toileting tasks.        Electronically signed by GABE Wilson on 11/1/17 at 3:37 PM

## 2017-11-01 NOTE — PROGRESS NOTES
[address] [facility]      Patient name:  Meda Krabbe  Date of admission:  10/17/2017  7:07 PM  MRN:   [id number]  YOB: 1950      Cc  CVA       HPI   Pt admitted to rehab for CVA   LUE weakness MRI showed multifocal infarcts R    There were also chronic infarcts seen in the occipital lobes and left cerebellar hemisphere  Underwent ICA stenting     hospitalization complicated by ureteral stone removal   UTI sepsis       10/31  Hypotensive not on any medications   No fever     Past Medical History:   Diagnosis Date    [facility]A [facility]cerebral vascular accident) ([facility] [address]     Diabetes mellitus ([address]     Other disorders of skin and subcutaneous tissue in diseases classified elsewhere several years ago    Bullous Pemphkgoid    Patient in clinical research study 10/13/2017    Anticipated end date 04/13/2018    PE (pulmonary thromboembolism) ([address]      No family history on file. reports that she has never smoked. She has never used smokeless tobacco. She reports that she does not drink alcohol or use drugs. Past Medical History:   Diagnosis Date    CVA (cerebral vascular accident) ([address]     Diabetes mellitus ([address]     Other disorders of skin and subcutaneous tissue in diseases classified elsewhere several years ago    Bullous Pemphkgoid    Patient in clinical research study 10/13/2017    Anticipated end date 04/13/2018    PE (pulmonary thromboembolism) ([address]      Allergies   Allergen Reactions    Iv Contrast [Iodides] Other (See Comments)     unknown       Review of systems    Constitutional: Negative for fever and fatigue. Respiratory: Negative for cough and shortness of breath. Cardiovascular: Negative for chest pain, palpitations and leg swelling. Gastrointestinal: Negative for abdominal pain, diarrhea and blood in stool.        ROS  Physical exam   BP (!) 98/51   Pulse 68   Temp 98.3 °F (36.8 °C)   Resp 17   Ht 5' (1.524 m)

## 2017-11-01 NOTE — PROGRESS NOTES
Speech Language Pathology  Speech Language Pathology  [facility]    Cognitive Treatment Note    Date: 11/1/2017  Patients Name: Minh Franks  MRN: [id number]  Diagnosis:   Patient Active Problem List   Diagnosis Code    Hydronephrosis of left kidney N13.30    Acute cystitis with hematuria N30.01    CKD (chronic kidney disease), stage III N18.3    Hypotension arterial I95.9    Essential hypertension I10    Morbid obesity with BMI of 40.0-44.9, adult ([facility] [address] E66.01, Z68.41    History of arterial ischemic stroke Z86.73    History of pulmonary embolus (PE) Z86.711    Paroxysmal atrial fibrillation (HCC) I48.0    Left ureteral stone N20.1    Left-sided weakness R53.1    Non-intractable vomiting with nausea R11.2    Cerebrovascular accident (CVA) due to embolism of right middle cerebral artery (HCC) I63.411    Hypotension I95.9    Sepsis secondary to UTI ([facility] [address] A41.9, N39.0    Elevated serum creatinine R79.89    Kidney stone N20.0    Anemia D64.9    Hypokalemia E87.6    Acute ischemic right MCA stroke (HCC) I63.511    Right-sided carotid artery disease (HCC) I77.9    Incontinence associated dermatitis L30.8, R32    Cerebrovascular accident (CVA) due to stenosis of right carotid artery (HCC) I63.231       Pain: 0/10    Cognitive Treatment    Treatment time: 2104-9031      Subjective: [x] Alert [x] Cooperative     [] Confused     [] Agitated    [] Lethargic      Objective/Assessment:    Attention: Pt able to sustain attn Argenis. Orientation: Pt oriented to date c reference to external aid (demonstrated difficulty reading on first attempt). Pt oriented to hospital name only when given max cuing to reference whiteboard. Pt oriented to day of week c reference to external aid and time of day.      Recall: Pt asked to recall date later in session without referencing external aids- self-corrected month, unable to recall date; demonstrated difficulty locating today's date when referencing

## 2017-11-01 NOTE — PROGRESS NOTES
Physical Medicine & Rehabilitation  Progress Note    2017 11:05 AM     CC: Ambulatory and ADL dysfunction due to CVA    Subjective:   Doing better today. Luna Ricardo No pain. .  Cont frequent urinationno incontinencePVRs Low per nursing . Asks discharge date daily    ROS:  Denies fevers, chills, sweats. No chest pain, palpitations, lightheadedness. Denies coughing, wheezing or shortness of breath. Denies abdominal pain, nausea, diarrhea or constipation. No new areas of joint pain. Denies new areas of numbness or weakness. Denies new anxiety or depression issues. No new skin problems. Rehabilitation:   Bed Mobility:   Supine to Sit: Minimal assistance  Sit to Supine: Moderate assistance (assist with LEs )  Scooting: Minimal assistance        Transfers:  Sit to Stand: Minimal Assistance  Stand to sit: Contact guard assistance  Bed to Chair: Minimal assistance (With AD)  Stand Pivot Transfers: Minimal Assistance        WB Status: no restrictions  Ambulation 1  Surface: level tile  Device: Hemiwalker  Assistance: Minimal assistance;Contact guard assistance  Quality of Gait: Slow steady pace;  Cues to increase step length to L LE;  HHA to L UE due to patient wt. shifts well and this provides WB to L UE during amb. Distance: 110 ft  AM;  60 ft PM (Fatigue post amb.)     OT FIM:   Groomin - Requires setup/cues to do all tasks (cues to problem solve and sequence)  Bathin - Able to bathe 3-4 areas (assist to lift breasts, adipose, cant wash perineal )  Dressing-Lower: 2 - Requires assist with 4-5 parts of dressing (hand over hand with reacher )  Toiletin - Able to perform 1 task only (e.g. hygiene)  Shower Transfer: 0 - Activity does not occur (washed sinkside )       Objective:  BP 97/71   Pulse 89   Temp 99 °F (37.2 °C) (Oral)   Resp 16   Ht 5' (1.524 m)   Wt 269 lb 10 oz (122.3 kg)   SpO2 94%   BMI 52.66 kg/m²  I Body mass index is 52.66 kg/m².  I   Wt Readings from Last 1 Encounters:   10/29/17 269 lb 10 oz (122.3 kg)      Temp (24hrs), Av.1 °F (37.3 °C), Min:99 °F (37.2 °C), Max:99.2 °F (37.3 °C)      Alert, no distress. Good speech and language function. Lungs clear. Heart Irregularly irregular  Abdomen non-distended, non-tender. No calf tenderness or 1+ edema. B/l   Left upper extremity  4/ 5, left lower extremity 4 out of 5 .right extremity 5 out of 5,   Neuro - mental status - no change      Medications   Scheduled Meds:   PARoxetine  20 mg Oral QAM    insulin glargine  24 Units Subcutaneous Nightly    apixaban  5 mg Oral BID    clopidogrel  75 mg Oral Daily    mycophenolate  1,500 mg Oral BID    miconazole   Topical BID    atorvastatin  40 mg Oral Daily    Calcium Carb-Cholecalciferol  1 tablet Oral Daily    docusate sodium  100 mg Oral Daily    famotidine  40 mg Oral Daily    ferrous sulfate  325 mg Oral BID WC    insulin lispro  0-12 Units Subcutaneous TID WC    insulin lispro  0-6 Units Subcutaneous Nightly    polyethylene glycol  17 g Oral Daily    potassium chloride  20 mEq Oral BID    therapeutic multivitamin-minerals  1 tablet Oral Daily     Continuous Infusions:   dextrose       PRN Meds:.sodium chloride, HYDROcodone 5 mg - acetaminophen **OR** HYDROcodone 5 mg - acetaminophen, nicotine, ondansetron, dextrose, dextrose, glucagon (rDNA), glucose, LORazepam, acetaminophen, magnesium hydroxide     Diagnostics:     CBC:   Recent Labs      10/30/17   0702   WBC  4.1   RBC  3.22*   HGB  9.7*   HCT  31.5*   MCV  97.9   RDW  18.5*   PLT  194     BMP:   Recent Labs      10/30/17   0702   NA  143   K  4.1   CL  107   CO2  24   BUN  11   CREATININE  0.67     BNP: No results for input(s): BNP in the last 72 hours. PT/INR: No results for input(s): PROTIME, INR in the last 72 hours. APTT: No results for input(s): APTT in the last 72 hours. CARDIAC ENZYMES: No results for input(s): CKMB, CKMBINDEX, TROPONINT in the last 72 hours.     Invalid input(s): CKTOTAL;3  FASTING LIPID nasal mist, monitor closely, head of the bed at 30-45° , no further episodes  5. PAF - Per cardio on Eliquis as above  6. Blood pressure1+ bilateral edema- off Lasix due to low BP per IM, continue to monitor, continues on potassiumincreasing edema lower extremities, blood pressure still low, orthostatics ordered, will recheck BMP in a.m.  7. Anemia- protonix, s/p 1 unit PRBC transfusion.  10/30 Hb 9.7 asymptomatic. No obvious blood loss. Continue iron supplementation. Recheck CBC in a.m.   8. Constipation - Colace  9. Reflux - Pepcid  10. Dep - on paxil 20 monitor  11. Bulbous pemphigus- Dr ALFONSO had discussion with the patient's son- He states that his mother has been on CellCept 1500 mg twice a day for approximately 15 years for bulbous pemphigus. 12. Sacral ulcer- wound care management. Change mepilex q 3 days    13. H/O PE/DVT prophylaxis -  on Eliquis  14. DM- Cont lantus. IM following for medical management. , medications adjusted per internal medicine    15. Mild confusion -no change- neurologic exam no significant change,   16.  internal medicine for medical management. Roseline Reilly. Marlen Meadows MD

## 2017-11-01 NOTE — PLAN OF CARE
Problem: Risk for Impaired Skin Integrity  Goal: Tissue integrity - skin and mucous membranes  Structural intactness and normal physiological function of skin and  mucous membranes. Outcome: Met This Shift  Skin integrity improved/maintained this shift. See head to toe assessment. Problem: Falls - Risk of  Goal: Absence of falls  Outcome: Met This Shift  Pt. Free of falls and injuries this shift. Problem: Pain:  Goal: Pain level will decrease  Pain level will decrease   Outcome: Met This Shift  Adequate pain control achieved this shift. See MAR.

## 2017-11-02 LAB
ANION GAP SERPL CALCULATED.3IONS-SCNC: 10 MMOL/L (ref 9–17)
BUN BLDV-MCNC: 10 MG/DL (ref 8–23)
BUN/CREAT BLD: ABNORMAL (ref 9–20)
CALCIUM SERPL-MCNC: 8.1 MG/DL (ref 8.6–10.4)
CHLORIDE BLD-SCNC: 106 MMOL/L (ref 98–107)
CO2: 24 MMOL/L (ref 20–31)
CREAT SERPL-MCNC: 0.73 MG/DL (ref 0.5–0.9)
GFR AFRICAN AMERICAN: >60 ML/MIN
GFR NON-AFRICAN AMERICAN: >60 ML/MIN
GFR SERPL CREATININE-BSD FRML MDRD: ABNORMAL ML/MIN/{1.73_M2}
GFR SERPL CREATININE-BSD FRML MDRD: ABNORMAL ML/MIN/{1.73_M2}
GLUCOSE BLD-MCNC: 128 MG/DL (ref 65–105)
GLUCOSE BLD-MCNC: 138 MG/DL (ref 65–105)
GLUCOSE BLD-MCNC: 163 MG/DL (ref 65–105)
GLUCOSE BLD-MCNC: 66 MG/DL (ref 65–105)
GLUCOSE BLD-MCNC: 72 MG/DL (ref 70–99)
HCT VFR BLD CALC: 27.3 % (ref 36–46)
HEMOGLOBIN: 8.7 G/DL (ref 12–16)
MCH RBC QN AUTO: 30.4 PG (ref 26–34)
MCHC RBC AUTO-ENTMCNC: 31.6 G/DL (ref 31–37)
MCV RBC AUTO: 96 FL (ref 80–100)
PDW BLD-RTO: 16.6 % (ref 11.5–14.9)
PLATELET # BLD: 185 K/UL (ref 150–450)
PMV BLD AUTO: 9.2 FL (ref 6–12)
POTASSIUM SERPL-SCNC: 3.9 MMOL/L (ref 3.7–5.3)
RBC # BLD: 2.85 M/UL (ref 4–5.2)
SODIUM BLD-SCNC: 140 MMOL/L (ref 135–144)
WBC # BLD: 4.9 K/UL (ref 3.5–11)

## 2017-11-02 PROCEDURE — 6370000000 HC RX 637 (ALT 250 FOR IP): Performed by: INTERNAL MEDICINE

## 2017-11-02 PROCEDURE — 99232 SBSQ HOSP IP/OBS MODERATE 35: CPT | Performed by: PHYSICAL MEDICINE & REHABILITATION

## 2017-11-02 PROCEDURE — 1180000000 HC REHAB R&B

## 2017-11-02 PROCEDURE — 85027 COMPLETE CBC AUTOMATED: CPT

## 2017-11-02 PROCEDURE — 92507 TX SP LANG VOICE COMM INDIV: CPT

## 2017-11-02 PROCEDURE — 6370000000 HC RX 637 (ALT 250 FOR IP): Performed by: PHYSICAL MEDICINE & REHABILITATION

## 2017-11-02 PROCEDURE — 6370000000 HC RX 637 (ALT 250 FOR IP): Performed by: FAMILY MEDICINE

## 2017-11-02 PROCEDURE — 97110 THERAPEUTIC EXERCISES: CPT

## 2017-11-02 PROCEDURE — 97112 NEUROMUSCULAR REEDUCATION: CPT

## 2017-11-02 PROCEDURE — 97542 WHEELCHAIR MNGMENT TRAINING: CPT

## 2017-11-02 PROCEDURE — 97116 GAIT TRAINING THERAPY: CPT

## 2017-11-02 PROCEDURE — 97535 SELF CARE MNGMENT TRAINING: CPT

## 2017-11-02 PROCEDURE — 97532 HC COGNITIVE THERAPY 15 MIN: CPT

## 2017-11-02 PROCEDURE — 36415 COLL VENOUS BLD VENIPUNCTURE: CPT

## 2017-11-02 PROCEDURE — 80048 BASIC METABOLIC PNL TOTAL CA: CPT

## 2017-11-02 PROCEDURE — 99231 SBSQ HOSP IP/OBS SF/LOW 25: CPT | Performed by: INTERNAL MEDICINE

## 2017-11-02 PROCEDURE — 6360000002 HC RX W HCPCS: Performed by: INTERNAL MEDICINE

## 2017-11-02 PROCEDURE — 82947 ASSAY GLUCOSE BLOOD QUANT: CPT

## 2017-11-02 RX ORDER — SPIRONOLACTONE 25 MG/1
50 TABLET ORAL DAILY
Status: DISCONTINUED | OUTPATIENT
Start: 2017-11-02 | End: 2017-11-09 | Stop reason: HOSPADM

## 2017-11-02 RX ADMIN — PAROXETINE HYDROCHLORIDE 20 MG: 20 TABLET, FILM COATED ORAL at 07:39

## 2017-11-02 RX ADMIN — SPIRONOLACTONE 50 MG: 25 TABLET, FILM COATED ORAL at 16:41

## 2017-11-02 RX ADMIN — APIXABAN 5 MG: 5 TABLET, FILM COATED ORAL at 21:21

## 2017-11-02 RX ADMIN — MICONAZOLE NITRATE: 1.42 POWDER TOPICAL at 21:21

## 2017-11-02 RX ADMIN — FERROUS SULFATE TAB 325 MG (65 MG ELEMENTAL FE) 325 MG: 325 (65 FE) TAB at 16:41

## 2017-11-02 RX ADMIN — MULTIPLE VITAMINS W/ MINERALS TAB 1 TABLET: TAB at 07:37

## 2017-11-02 RX ADMIN — Medication 1 TABLET: at 07:37

## 2017-11-02 RX ADMIN — Medication 24 UNITS: at 21:24

## 2017-11-02 RX ADMIN — CLOPIDOGREL BISULFATE 75 MG: 75 TABLET ORAL at 07:37

## 2017-11-02 RX ADMIN — ATORVASTATIN CALCIUM 40 MG: 40 TABLET, FILM COATED ORAL at 07:37

## 2017-11-02 RX ADMIN — MICONAZOLE NITRATE: 1.42 POWDER TOPICAL at 07:38

## 2017-11-02 RX ADMIN — FAMOTIDINE 40 MG: 20 TABLET ORAL at 07:38

## 2017-11-02 RX ADMIN — MYCOPHENOLATE MOFETIL 1500 MG: 250 CAPSULE ORAL at 21:21

## 2017-11-02 RX ADMIN — INSULIN LISPRO 1 UNITS: 100 INJECTION, SOLUTION INTRAVENOUS; SUBCUTANEOUS at 21:24

## 2017-11-02 RX ADMIN — MYCOPHENOLATE MOFETIL 1500 MG: 250 CAPSULE ORAL at 07:39

## 2017-11-02 RX ADMIN — FERROUS SULFATE TAB 325 MG (65 MG ELEMENTAL FE) 325 MG: 325 (65 FE) TAB at 07:38

## 2017-11-02 RX ADMIN — DOCUSATE SODIUM 100 MG: 100 CAPSULE, LIQUID FILLED ORAL at 07:38

## 2017-11-02 RX ADMIN — APIXABAN 5 MG: 5 TABLET, FILM COATED ORAL at 07:39

## 2017-11-02 RX ADMIN — POTASSIUM CHLORIDE 20 MEQ: 1.5 POWDER, FOR SOLUTION ORAL at 07:38

## 2017-11-02 NOTE — PROGRESS NOTES
deficits. Objective:  BP (!) 125/50   Pulse 91   Temp 98.8 °F (37.1 °C) (Oral)   Resp 16   Ht 5' (1.524 m)   Wt 269 lb 10 oz (122.3 kg)   SpO2 98%   BMI 52.66 kg/m²  I Body mass index is 52.66 kg/m². I   Wt Readings from Last 1 Encounters:   10/29/17 269 lb 10 oz (122.3 kg)      Temp (24hrs), Av.1 °F (37.3 °C), Min:98.8 °F (37.1 °C), Max:99.4 °F (37.4 °C)      Alert, no distress. Good speech and language function. Lungs clear. Heart Irregularly irregular  Abdomen non-distended, non-tender. No calf tenderness or 1+ edema.   B/l   Left upper extremity  4/ 5, left lower extremity 4 out of 5 .right extremity 5 out of 5,   Neuro - mental status      Medications   Scheduled Meds:   spironolactone  50 mg Oral Daily    PARoxetine  20 mg Oral QAM    insulin glargine  24 Units Subcutaneous Nightly    apixaban  5 mg Oral BID    clopidogrel  75 mg Oral Daily    mycophenolate  1,500 mg Oral BID    miconazole   Topical BID    atorvastatin  40 mg Oral Daily    Calcium Carb-Cholecalciferol  1 tablet Oral Daily    docusate sodium  100 mg Oral Daily    famotidine  40 mg Oral Daily    ferrous sulfate  325 mg Oral BID WC    insulin lispro  0-12 Units Subcutaneous TID WC    insulin lispro  0-6 Units Subcutaneous Nightly    polyethylene glycol  17 g Oral Daily    potassium chloride  20 mEq Oral BID    therapeutic multivitamin-minerals  1 tablet Oral Daily     Continuous Infusions:   dextrose       PRN Meds:.sodium chloride, HYDROcodone 5 mg - acetaminophen **OR** HYDROcodone 5 mg - acetaminophen, nicotine, ondansetron, dextrose, dextrose, glucagon (rDNA), glucose, LORazepam, acetaminophen, magnesium hydroxide     Diagnostics:     CBC:   Recent Labs      17   0650   WBC  4.9   RBC  2.85*   HGB  8.7*   HCT  27.3*   MCV  96.0   RDW  16.6*   PLT  185     BMP:   Recent Labs      17   0650   NA  140   K  3.9   CL  106   CO2  24   BUN  10   CREATININE  0.73     BNP: No results for input(s):

## 2017-11-02 NOTE — PROGRESS NOTES
Solving/Reasoning:  Pt verbally identified appropriate steps to complete ADLs 2/3 Argenis, 3/3 c min A. Pt responded to questions related to her daily checklist 75% Argenis, 100% c min A (task completed c pt reference to checklist). This checklist was utilized by the patient at home prior to pt CVA. Checklist is very familiar to her as she states she uses this checklist everyday. Pt completed word problems c receipt task (task involved identifying information)- 44 % accuracy Argenis; 100% c max A. Other: Throughout session, pt required multiple cues to scan left in order to see entire page/whiteboard.      Plan:  [x] Continue ST services    [] Discharge from ST:      Discharge recommendations: [] Inpatient Rehab   [] [place]   [] Outpatient Therapy  [] Follow up at trauma clinic   [] Other:       Treatment completed by: Angelia Veloz,  clinician

## 2017-11-02 NOTE — PROGRESS NOTES
17 0851 17 1403 17 1457   OT Individual Minutes   Time In 0017 [id number]   Time Out 0840 1255 1500   Minutes 50 125 [facility] REHABILITATION OCCUPATIONAL THERAPY  DAILY NOTE    Date: 17  Patient Name: Julito Han      Room: 6835/0792-33    MRN: [id number]   : 1950  (74 y.o.)  Gender: female      Diagnosis: R CVA, Acute ischmic right MCA stroke,   Additional Pertinent Hx: UTI, s/p Ureteral stone removed dueing stent placement 10/10/17, h/o CVA a year ago    Restrictions  Restrictions/Precautions: General Precautions, Fall Risk  Other position/activity restrictions: up with assistance  Position Activity Restriction  Other position/activity restrictions: up with assistance     Subjective \"No I'm okay\"   Patient Currently in Pain: No             Objective patient looked as though crying. Patient high risk for falls secondary to decreased strength and cognition. Poor carryover with education. Continue POC  Cognition  Following Commands:  Follows one step commands with increased time (decreased concentration, cues to redirect fair carryover  with 1 step directions )  Attention Span: Attends with cues to redirect  Memory: Decreased recall of recent events;Decreased long term memory;Decreased short term memory  Safety Judgement: Decreased awareness of need for assistance;Decreased awareness of need for safety  Problem Solving: Assistance required to generate solutions;Assistance required to identify errors made  Insights: Decreased awareness of deficits  Initiation: Requires cues for some     Transfers  Sit to stand: Minimal assistance (8 to 10 minute static stand while weight bear into LUE)  Standing Balance  Sit to stand: Minimal assistance (8 to 10 minute static stand while weight bear into LUE)        Type of ROM/Therapeutic Exercise  Type of ROM/Therapeutic Exercise: AAROM;AROM  Exercises  Shoulder Elevation: 3 sets of 8  Shoulder Flexion: 3 sets of 8   Elbow Flexion: 6 sets of 8  Elbow Extension: 6 sets of 8   Other: UEHEP with theraband, dusting with AAROM , isometric holds with LUE                            OT FIM:   Eatin - Feeds self with setup/supervision/cues and/or requires only setup/supervision/cues to perform tube feedings  Groomin - Requires setup/cues to do all tasks  Bathin - Able to bathe 3-4 areas  Dressing-Upper: 3 - Requires assist with 2 tasks  Dressing-Lower: 2 - Requires assist with 4-5 parts of dressing  Toiletin - Able to perform 1 task only (e.g. hygiene)  Toilet Transfer: 4 - Requires steadying assistance only < 25% assist  Shower Transfer: 0 - Activity does not occur (washed sinkside )               Assessment  Performance deficits / Impairments: Decreased functional mobility ; Decreased balance;Decreased ADL status; Decreased endurance;Decreased strength;Decreased vision/visual deficit; Decreased coordination;Decreased cognition;Decreased safe awareness;Decreased ROM  Prognosis: Fair  Activity Tolerance: Patient Tolerated treatment well  Safety Devices in place: Yes  Type of devices:  All fall risk precautions in place;Call light within reach;Gait belt;Left in chair;Nurse notified          Patient Education:  Patient Goals   Patient goals : to be able to do things for myself     Learner:patient  Method: explanation       Outcome: needs reinforcement     Plan  Plan  Times per week: 5-7x/week  Times per day: Twice a day  Current Treatment Recommendations: Balance Training, Functional Mobility Training, Endurance Training, Safety Education & Training, Self-Care / ADL, Equipment Evaluation, Education, & procurement, Patient/Caregiver Education & Training, Strengthening, ROM, Neuromuscular Re-education, Cognitive Reorientation, Cognitive/Perceptual Training, Home Management Training  Plan Comment: continue OT  Patient Goals   Patient goals : to be able to do things for myself  Short term goals  Time Frame for Short term goals: One week,   Short term goal 1: Pt. will demo. CGA with bed mobility. Short term goal 2: Pt. will demo. min. A with toilet transfer and mod. A with toileting tasks with good . Short term goal 3: Pt. will demo. min. A with UB bathing/dressing, and MOd. A with LB bathing/dressing using AE as needed. Short term goal 4: Pt. will participate in left UE ROM and stgth ex. to assist with functional tasks. Short term goal 5: Pt. will tolerate facilitation tech. to elicit movement in left wrist and hand to assist with functional tasks. Short term goal 6: Pt. will attend to left side of body and L visual field with min. vc's 90% of the time  Short term goal 7: Pt. will tolerate 5-8 min. functional standing activities to promote increased indep.with ADL's and mobility. Long term goals  Time Frame for Long term goals : By discharge,   Long term goal 1: Pt. will demo. Mod.I with bed mobility. Long term goal 2: Pt. will demo. SBA with functional ADL transfers using appropriate AD with good . Long term goal 3: Pt. will demo. SBA with UB bathing/dressing, and min. A with LB bathing/dressing using AE as needed. Long term goal 4: Pt.will demo. SBA with toileting tasks.        Electronically signed by GABE Arenas on 11/2/17 at 2:58 PM

## 2017-11-02 NOTE — PLAN OF CARE
Outcome: Ongoing  Interventions:  -Control environmental factors  -Medicate for pain prior to treatment/therapy  -Assess pain using appropriate pain scale tool  -Administer analgesic as ordered  -Handle areas of discomfort gently  -Assess and document results of pain interventions  -Initiate appropriate non-invasive pain relief measures  -Turn and reposition patient as appropriate for comfort    Evaluations:  Pain assessment completed. Pt. able to rest.  Pt. denies pain this shift. Pt. denies need for oral analgesic. Verbalizes understanding of nonpharmacologic strategies that provide comfort. Pt. repositioned for comfort. Nonverbal cues indicate absence of pain.

## 2017-11-02 NOTE — PROGRESS NOTES
[facility]: [facility]  Acute Rehabilitation Physical Therapy Progress Note    Date: 17  Patient Name: Carmelita Child       Room: 06/3785-26  MRN: [id number]   Account: [de-identified]   : 1950  (75 y.o.) Gender: female        Diagnosis: R CVA, Acute ischmic right MCA stroke,   Past Medical History:  has a past medical history of CVA (cerebral vascular accident) ([facility] [address] Diabetes mellitus ([facility] [address] Other disorders of skin and subcutaneous tissue in diseases classified elsewhere; Patient in clinical research study; and PE (pulmonary thromboembolism) ([facility] [address] Past Surgical History:   has a past surgical history that includes Cholecystectomy; hernia repair; and Cystoscopy (Left, 10/10/2017). Additional Pertinent Hx: Pt presents to ER on 10/9/17 with c/o vomiting and severe abdominal and back pain. Pt was found to have acute cystitis, kidney stone and hydronephrosis. On 10/10/17, pt dev L sided weakness and facial droop, found to have acute R MCA infarct. Pt underwent emergent cysto with ureteral stent placement 10/10/2017. Pt had angiogram and found to have ICA stenosis, stent placed 10/12/17. Additional PMH: CKD, A fib, morbid obesity, HTN, pt is in clinical trial that will end in 2018- unspecified. Overall Orientation Status: Within Functional Limits  Orientation Level: Oriented X4  Restrictions/Precautions  Restrictions/Precautions: General Precautions; Fall Risk  Required Braces or Orthoses?: No  Position Activity Restriction  Other position/activity restrictions: up with assistance    Subjective: Pleasant with no complaints on this date. Comments: Left side neglect    Vital Signs  Patient Currently in Pain: Denies                   Bed Mobility:   Bed Mobility  Bridging: Minimal assistance  Rolling: Moderate assistance  Supine to Sit: Minimal assistance  Sit to Supine:  Moderate assistance (assist with LEs )  Scooting: Minimal assistance  Bed mobility  Bridging: Minimal assistance  Scooting: Minimal assistance    Transfers:  Sit to Stand: Minimal Assistance  Stand to sit: Contact guard assistance  Bed to Chair: Minimal assistance (With AD)  Stand Pivot Transfers: Minimal Assistance        WB Status: no restrictions  Ambulation 1  Surface: level tile  Device: Large [place]  Assistance: Minimal assistance;Contact guard assistance  Quality of Gait: Cues for scanning to left during ambulations to watch for objects. Patient walks toward left into wall. Distance: 100 ft x 2  Comments: Patient had a major loss of balance with max assist for correction. Patient was distracted on left        Stairs/Curb  Stairs?: Yes  Stairs  # Steps : 4  Stairs Height: 6\"  Rails: Right ascending  Device: Hand Held Assist (L hand)  Assistance: Moderate assistance (2person assist)  Comment: Ascending FWD and Descending Retro. Patient fearful when descending FWD due to visual deficits. Propulsion 1  Propulsion: Manual  Level: Level Tile  Method: RUE;RLE;LLE  Level of Assistance: Supervision  Description/ Details: limited by low endurance/weakness  Distance: 60 ft                                     FIMS:      Transfers  Bed, Chair, Wheel Chair: 3 - Requires 25-49% assistance to transfer   Locomotion  Primary Mode:  Both  Distance Walked: 100 ft  Distance Traveled in [facility] Chair: 60 ft  Walk: 2 - Maximal Assistance Requires up to [address] requires assistance of one person to walk/operate wheelchair between  feet (Patient performs 25-49% of locomotion effort or goes between  feet)  Wheel Chair: 2 - Maximal Assistance Requires up to [address] requires assistance of one person to walk/operate wheelchair between  feet  Stairs: 2- Maximal Assistance Performs 25-49% of the effort to go up and down 4 to 6 stairs and requires the assistance of one person only       BALANCE Posture: Fair  Sitting - Static: Fair;+  Sitting - Dynamic: Fair;+  Standing - Static:

## 2017-11-03 LAB
ANION GAP SERPL CALCULATED.3IONS-SCNC: 12 MMOL/L (ref 9–17)
BUN BLDV-MCNC: 8 MG/DL (ref 8–23)
BUN/CREAT BLD: ABNORMAL (ref 9–20)
CALCIUM SERPL-MCNC: 8.2 MG/DL (ref 8.6–10.4)
CHLORIDE BLD-SCNC: 105 MMOL/L (ref 98–107)
CO2: 25 MMOL/L (ref 20–31)
CREAT SERPL-MCNC: 0.73 MG/DL (ref 0.5–0.9)
GFR AFRICAN AMERICAN: >60 ML/MIN
GFR NON-AFRICAN AMERICAN: >60 ML/MIN
GFR SERPL CREATININE-BSD FRML MDRD: ABNORMAL ML/MIN/{1.73_M2}
GFR SERPL CREATININE-BSD FRML MDRD: ABNORMAL ML/MIN/{1.73_M2}
GLUCOSE BLD-MCNC: 149 MG/DL (ref 65–105)
GLUCOSE BLD-MCNC: 162 MG/DL (ref 65–105)
GLUCOSE BLD-MCNC: 200 MG/DL (ref 65–105)
GLUCOSE BLD-MCNC: 84 MG/DL (ref 70–99)
GLUCOSE BLD-MCNC: 85 MG/DL (ref 65–105)
HCT VFR BLD CALC: 32.6 % (ref 36–46)
HEMOGLOBIN: 10 G/DL (ref 12–16)
POTASSIUM SERPL-SCNC: 4.4 MMOL/L (ref 3.7–5.3)
SODIUM BLD-SCNC: 142 MMOL/L (ref 135–144)

## 2017-11-03 PROCEDURE — 80048 BASIC METABOLIC PNL TOTAL CA: CPT

## 2017-11-03 PROCEDURE — 97530 THERAPEUTIC ACTIVITIES: CPT

## 2017-11-03 PROCEDURE — 97110 THERAPEUTIC EXERCISES: CPT

## 2017-11-03 PROCEDURE — 97116 GAIT TRAINING THERAPY: CPT

## 2017-11-03 PROCEDURE — 6360000002 HC RX W HCPCS: Performed by: INTERNAL MEDICINE

## 2017-11-03 PROCEDURE — 82947 ASSAY GLUCOSE BLOOD QUANT: CPT

## 2017-11-03 PROCEDURE — 6370000000 HC RX 637 (ALT 250 FOR IP): Performed by: PHYSICAL MEDICINE & REHABILITATION

## 2017-11-03 PROCEDURE — 97532 HC COGNITIVE THERAPY 15 MIN: CPT

## 2017-11-03 PROCEDURE — 36415 COLL VENOUS BLD VENIPUNCTURE: CPT

## 2017-11-03 PROCEDURE — 6370000000 HC RX 637 (ALT 250 FOR IP): Performed by: INTERNAL MEDICINE

## 2017-11-03 PROCEDURE — 85014 HEMATOCRIT: CPT

## 2017-11-03 PROCEDURE — 97535 SELF CARE MNGMENT TRAINING: CPT

## 2017-11-03 PROCEDURE — 1180000000 HC REHAB R&B

## 2017-11-03 PROCEDURE — 85018 HEMOGLOBIN: CPT

## 2017-11-03 PROCEDURE — 99232 SBSQ HOSP IP/OBS MODERATE 35: CPT | Performed by: PHYSICAL MEDICINE & REHABILITATION

## 2017-11-03 PROCEDURE — 6370000000 HC RX 637 (ALT 250 FOR IP): Performed by: FAMILY MEDICINE

## 2017-11-03 RX ADMIN — DOCUSATE SODIUM 100 MG: 100 CAPSULE, LIQUID FILLED ORAL at 10:10

## 2017-11-03 RX ADMIN — FAMOTIDINE 40 MG: 20 TABLET ORAL at 10:10

## 2017-11-03 RX ADMIN — MAGNESIUM HYDROXIDE 30 ML: 400 SUSPENSION ORAL at 11:58

## 2017-11-03 RX ADMIN — MULTIPLE VITAMINS W/ MINERALS TAB 1 TABLET: TAB at 10:10

## 2017-11-03 RX ADMIN — MICONAZOLE NITRATE: 1.42 POWDER TOPICAL at 10:10

## 2017-11-03 RX ADMIN — MYCOPHENOLATE MOFETIL 1500 MG: 250 CAPSULE ORAL at 10:10

## 2017-11-03 RX ADMIN — MICONAZOLE NITRATE: 1.42 POWDER TOPICAL at 21:34

## 2017-11-03 RX ADMIN — INSULIN LISPRO 2 UNITS: 100 INJECTION, SOLUTION INTRAVENOUS; SUBCUTANEOUS at 11:58

## 2017-11-03 RX ADMIN — FERROUS SULFATE TAB 325 MG (65 MG ELEMENTAL FE) 325 MG: 325 (65 FE) TAB at 10:10

## 2017-11-03 RX ADMIN — Medication 1 TABLET: at 10:10

## 2017-11-03 RX ADMIN — APIXABAN 5 MG: 5 TABLET, FILM COATED ORAL at 10:10

## 2017-11-03 RX ADMIN — INSULIN LISPRO 2 UNITS: 100 INJECTION, SOLUTION INTRAVENOUS; SUBCUTANEOUS at 17:32

## 2017-11-03 RX ADMIN — ATORVASTATIN CALCIUM 40 MG: 40 TABLET, FILM COATED ORAL at 10:10

## 2017-11-03 RX ADMIN — MYCOPHENOLATE MOFETIL 1500 MG: 250 CAPSULE ORAL at 21:30

## 2017-11-03 RX ADMIN — INSULIN LISPRO 2 UNITS: 100 INJECTION, SOLUTION INTRAVENOUS; SUBCUTANEOUS at 21:31

## 2017-11-03 RX ADMIN — APIXABAN 5 MG: 5 TABLET, FILM COATED ORAL at 21:30

## 2017-11-03 RX ADMIN — FERROUS SULFATE TAB 325 MG (65 MG ELEMENTAL FE) 325 MG: 325 (65 FE) TAB at 16:45

## 2017-11-03 RX ADMIN — Medication 24 UNITS: at 21:31

## 2017-11-03 RX ADMIN — CLOPIDOGREL BISULFATE 75 MG: 75 TABLET ORAL at 10:10

## 2017-11-03 RX ADMIN — SPIRONOLACTONE 50 MG: 25 TABLET, FILM COATED ORAL at 10:10

## 2017-11-03 RX ADMIN — PAROXETINE HYDROCHLORIDE 20 MG: 20 TABLET, FILM COATED ORAL at 10:10

## 2017-11-03 RX ADMIN — POLYETHYLENE GLYCOL 3350 17 G: 17 POWDER, FOR SOLUTION ORAL at 11:58

## 2017-11-03 ASSESSMENT — PAIN SCALES - GENERAL: PAINLEVEL_OUTOF10: 0

## 2017-11-03 NOTE — PROGRESS NOTES
[address]   ACUTE REHABILITATION OCCUPATIONAL THERAPY  DAILY NOTE    Date: 11/3/17  Patient Name: Mikki Parra      Room: 2712/8316-27    MRN: [id number]   : 1950  (74 y.o.)  Gender: female      Diagnosis: R CVA, Acute ischmic right MCA stroke,   Additional Pertinent Hx: UTI, s/p Ureteral stone removed dueing stent placement 10/10/17, h/o CVA a year ago    Restrictions  Restrictions/Precautions: General Precautions, Fall Risk  Other position/activity restrictions: up with assistance  Position Activity Restriction  Other position/activity restrictions: up with assistance     Subjective  Subjective: pts family in room during tx and requesting to practice tub transfers this PM   Patient Currently in Pain: No  Pain Level: 0  Restrictions/Precautions: General Precautions; Fall Risk          Objective     Balance  Sitting Balance: Stand by assistance  Standing Balance: Minimal assistance  Transfers  Sit to stand: Minimal assistance  Stand to sit: Minimal assistance  Standing Balance  Time: 2-3 mins   Activity: adl engagement, functional transfers  Sit to stand: Minimal assistance  Stand to sit: Minimal assistance  Comment: no lob noted, min vc required in am with sequencing with use of devide   Functional Mobility  Functional - Mobility Device: Hemiwalker  Activity: To/from bathroom  Assist Level: Minimal assistance  Functional Mobility Comments: slight lob, min a to correct with vc given          OT FIM:   Eatin - Requires help steadying cup or utensil/check mouth for pocketing (built up equipment provided c education)  Groomin - Requires setup/cues to do all tasks (oral and face hygeine completed sinkside at wc level )  Bathing: 3 - Able to bathe 5-7 areas (completed upper body at toilet/ LB at wc )  Dressing-Upper: 3 - Requires assist with 2 tasks (vc necessary for sequencing and techniques)  Dressing-Lower: 2 - Requires assist with 4-5 parts of dressing (pt utilzed reacher to [place] [employer]

## 2017-11-03 NOTE — FLOWSHEET NOTE
Visit with patient in PT gym  Patient talked about her many blessings and gratitude     11/03/17 1401   Encounter Summary   Services provided to: Patient   Referral/Consult From: [address] [place] [address] Children;Family members   Continue Visiting (11/3/17)   Complexity of Encounter Low   Length of Encounter 15 minutes   Spiritual Assessment Completed Yes   Routine   Type Follow up   Assessment Coping; Hopeful; Approachable   Intervention Active listening;Explored feelings, thoughts, concerns;Sustaining presence/ Ministry of presence;Prayer;Nurtured hope   Outcome Hopeful;Expressed gratitude;Comfort;Engaged in conversation   Spiritual/Anabaptist   Type Spiritual support

## 2017-11-03 NOTE — PROGRESS NOTES
Component Value Date    LABA1C 6.2 10/10/2017     PTT: No components found for: LABPTT      Assessment:     Patient Active Problem List   Diagnosis    Hydronephrosis of left kidney    Acute cystitis with hematuria    CKD (chronic kidney disease), stage III    Hypotension arterial    Essential hypertension    Morbid obesity with BMI of 40.0-44.9, adult (HCC)    History of arterial ischemic stroke    History of pulmonary embolus (PE)    Paroxysmal atrial fibrillation (HCC)    Left ureteral stone    Left-sided weakness    Non-intractable vomiting with nausea    Cerebrovascular accident (CVA) due to embolism of right middle cerebral artery (HCC)    Hypotension    Sepsis secondary to UTI ([address]    Elevated serum creatinine    Kidney stone    Anemia    Hypokalemia    Acute ischemic right MCA stroke ([address]    Right-sided carotid artery disease (HCC)    Incontinence associated dermatitis    Cerebrovascular accident (CVA) due to stenosis of right carotid artery ([address]       Measurements:  Wound 10/12/17 Other (Comment) Back Mid;Right; Lower approx [id number] circular redness (Active)   Wound Type Wound 10/26/2017  7:45 PM   Wound Skin Tear 10/17/2017  7:45 AM   Dressing Status Clean;Dry; Intact 10/26/2017  7:45 PM   Dressing Changed Changed/New 10/17/2017  4:00 AM   Dressing/Treatment Open to air 10/26/2017  7:45 PM   Wound Assessment Red 10/13/2017  4:00 PM   Marianna-wound Assessment Clean;Dry; Intact 10/17/2017  4:00 AM   Drainage Amount None 10/24/2017 10:37 PM   Odor None 10/24/2017 10:37 PM   Number of days: 22       Response to treatment:  Well tolerated by patient.          Plan:     Plan of Care: [REMOVED] Pressure Ulcer 10/18/17 Buttocks Inner;Right purple chronic discoloration that is blanchable-Dressing/Treatment: Moisture barrier  [REMOVED] Pressure Ulcer 10/21/17 Other (Comment) Proximal;Upper upper posterior thighs with purple skin that is intact- with blanchable discoloration that is chronic-Dressing/Treatment: Moisture barrier  [REMOVED] Pressure Ulcer 10/21/17 Other (Comment) Right -Dressing/Treatment: Foam  [REMOVED] Wound 10/11/17 gluteal crease- healed-Dressing/Treatment: Moisture barrier  Wound 10/12/17 Other (Comment) Back Mid;Right; Lower approx [id number] circular redness-Dressing/Treatment: Open to air     No new areas of skin breakdown on buttocks or posterior thighs. Purple bruised like areas on buttocks but unchanged since previous assessment. Turn every 2 hours  Float heels of of bed with pillows under calves    Mepilex sacrum dressing to sacrococcygeal area. Peel back dressing, inspect skin beneath, re-secure. Change every 72 hours and prn wrinkles, soilage. Discontinue Sacral Mepilex if repeatedly soiled by incontinence. Routine incontinence care with Héctor barrier cloths and zinc oxide cream. Apply zinc oxide cream BID and prn incontinence. Covidien moisture wicking under pads vs cloth backed briefs    Static air overlay. Check inflation each shift by sliding hand under the air overlay. Feel for the patient's heaviest/ most dependant body part. Ideally 1/2 to 1\" of air will be between your hand and the patient's body. Add air prn.   Waffle chair cushion    Specialty Bed Required : Yes   [] Low Air Loss   [] Pressure Redistribution  [] Fluid Immersion  [] Bariatric  [] Total Pressure Relief  [] Other: waffle chair cushion    Discharge Plan:  Placement for patient upon discharge: home with support   Hospice Care: No   Patient appropriate for Outpatient [address]: No    Patient/Caregiver Teaching:  Level of patientunderstanding able to:     [] Indicates understanding       [] Needs reinforcement  [] Unsuccessful      [x] Verbal Understanding  [] Demonstrated understanding       [] No evidence of learning  [] Refused teaching         [] N/A       Electronically signed by Rod Moser, CARLINE, CWOCN on 11/3/2017 at 3:03 PM

## 2017-11-03 NOTE — PROGRESS NOTES
Physical Therapy  [facility]  Acute Rehabilitation Physical Therapy Progress Note    Date: 11/3/17  Patient Name: Carmelita Child       Room: 2754/2674-99  MRN: [id number]   Account: [de-identified]   : 1950  (75 y.o.) Gender: female        Diagnosis: R CVA, Acute ischmic right MCA stroke,   Past Medical History:  has a past medical history of Bullous pemphigoid; CVA (cerebral vascular accident) ([facility] [address] Diabetes mellitus ([facility] [address] Other disorders of skin and subcutaneous tissue in diseases classified elsewhere; Patient in clinical research study; and PE (pulmonary thromboembolism) ([facility] Past Surgical History:   has a past surgical history that includes Cholecystectomy; hernia repair; and Cystoscopy (Left, 10/10/2017). Additional Pertinent Hx: Pt presents to ER on 10/9/17 with c/o vomiting and severe abdominal and back pain. Pt was found to have acute cystitis, kidney stone and hydronephrosis. On 10/10/17, pt dev L sided weakness and facial droop, found to have acute R MCA infarct. Pt underwent emergent cysto with ureteral stent placement 10/10/2017. Pt had angiogram and found to have ICA stenosis, stent placed 10/12/17. Additional PMH: CKD, A fib, morbid obesity, HTN, pt is in clinical trial that will end in 2018- unspecified. Overall Orientation Status: Impaired (Confusion and difficulty recalling previous sessions)  Orientation Level: Oriented X4  Restrictions/Precautions  Restrictions/Precautions: General Precautions; Fall Risk  Required Braces or Orthoses?: No  Position Activity Restriction  Other position/activity restrictions: up with assistance    Subjective: Patient no able to recall previous PT session. Emotional with family present. Comments: Left side neglect      Patient Currently in Pain: Denies                   Bed Mobility:   Bridging: Minimal assistance  Rolling: Moderate assistance  Supine to Sit: Minimal assistance  Sit to Supine:  Moderate assistance (assist with LEs Both  Distance Walked: 61 ft  Distance Traveled in [employer] Chair: 60 ft  Walk: 2 - Maximal Assistance Requires up to [address] requires assistance of one person to walk/operate wheelchair between  feet (Patient performs 25-49% of locomotion effort or goes between  feet)  Wheel Chair: 2 - Maximal Assistance Requires up to [address] requires assistance of one person to walk/operate wheelchair between  feet  Stairs: 2- Maximal Assistance Performs 25-49% of the effort to go up and down 4 to 6 stairs and requires the assistance of one person only       BALANCE Posture: Fair  Sitting - Static: Fair;+  Sitting - Dynamic: Fair;+  Standing - Static: Fair  Standing - Dynamic: Fair  Comments: Standing with [facility]    EXERCISES    Other exercises?: Yes  Other exercises 1: Seated in w/c bilat LE therexs x15 2lb ankle weights, green t band ankle PF and heel slides x15 ea  Other exercises 2: Green tband x 15 reps  Other exercises 3: Supine bilateral LE x 15 reps - Aarom on right LE  Other exercises 4: Family training.   Other exercises 6: UBE x 10 min f/b           Activity Tolerance: Patient limited by endurance  PT Equipment Recommendations  Equipment Needed: Yes (Order given to SW on this date for [facility] )  Cane: [employer]  Wheelchair: [facility]       Patient Education  New Education Provided: Family training with transfers, gait, and steps  Learner:caregiver, family and patient  Method: demonstration and explanation       Outcome: demonstrated understanding and asked questions     Current Treatment Recommendations: Strengthening, Transfer Training, Endurance Training, Cognitive Reorientation, Patient/Caregiver Education & Training, ROM, Balance Training, Gait Training, Functional Mobility Training, Stair training, Safety Education & Training    Conditions Requiring Skilled Therapeutic Intervention  Body structures, Functions, Activity limitations: Decreased functional mobility ; Decreased strength;Decreased ROM; Decreased endurance;Decreased safe awareness;Decreased balance  Treatment Diagnosis: CVA, L hemiplegia  Prognosis: Good  Patient Education: Family training with transfers, gait, and steps  REQUIRES PT FOLLOW UP: Yes  Discharge Recommendations: Home with nursing aide;Home with Home health PT;Home with assist PRN    Goals  Short term goals  Time Frame for Short term goals: 7 days  Short term goal 1: Improve L UE and LE strength by 1/2 MMG  Short term goal 2: Increase endurance to good  Short term goal 3: Improve sitting balance to good  Short term goal 4: Improve bed mobility to min A x1  Short term goal 5: Improve transfers to min A x1  Long term goals  Time Frame for Long term goals : 21 days  Long term goal 1:  Increase UEs and LEs strength to [facility] to maximize mobiility  Long term goal 2: Improve standing balance with appropriate device to good to increase safety  Long term goal 3: Improve bed mobility to independent to prepare for home  Long term goal 4: Improve transfers with appropriate device to mod I to prepare for home  Long term goal 5: Improve gait with appropriate device to mod I 150 ft to prepare for home  Long term goal 6: Improve stair negotiation to SBA on 4 steps with 1 rail use to allow pt to enter and exit home       11/03/17 1020 11/03/17 1430   PT Individual Minutes   Time In 1020 1430   Time Out 1045 1510   Minutes 25 40   PT Concurrent Minutes   Time In 4008 --    Time Out 1110 --    Minutes 25 --        Electronically signed by Antwan Hernandez PTA on 11/3/17 at 3:48 PM

## 2017-11-03 NOTE — PROGRESS NOTES
Dr Nava Haque office notified of consult and reason for consult (urinary stricture and continued gant use). Office stated that Dr Puma Bourgeois will see patient today.

## 2017-11-03 NOTE — PROGRESS NOTES
Patient fearful when descending FWD due to visual deficits. Propulsion 1  Propulsion: Manual  Level: Level Tile  Method: RUE;RLE;LLE  Level of Assistance: Supervision  Description/ Details: limited by low endurance/weakness  Distance: 60 ft         Objective:  BP (!) 142/52   Pulse 82   Temp 98 °F (36.7 °C)   Resp 17   Ht 5' (1.524 m)   Wt 253 lb (114.8 kg)   SpO2 99%   BMI 49.41 kg/m²  I Body mass index is 49.41 kg/m². I   Wt Readings from Last 1 Encounters:   17 253 lb (114.8 kg)      Temp (24hrs), Av.9 °F (37.2 °C), Min:98 °F (36.7 °C), Max:99.7 °F (37.6 °C)      Alert, no distress. Good speech and language function. Lungs clear. Heart Irregularly irregular  Abdomen non-distended, non-tender. No calf tenderness or 1+ edema.   B/l   Left upper extremity  4/ 5, left lower extremity 4 out of 5 .right extremity 5 out of 5,   Neuro - mental status stable, not change, knew in hospital, not know yr, Adeola Patricio, follow commands      Medications   Scheduled Meds:   spironolactone  50 mg Oral Daily    PARoxetine  20 mg Oral QAM    insulin glargine  24 Units Subcutaneous Nightly    apixaban  5 mg Oral BID    clopidogrel  75 mg Oral Daily    mycophenolate  1,500 mg Oral BID    miconazole   Topical BID    atorvastatin  40 mg Oral Daily    Calcium Carb-Cholecalciferol  1 tablet Oral Daily    docusate sodium  100 mg Oral Daily    famotidine  40 mg Oral Daily    ferrous sulfate  325 mg Oral BID WC    insulin lispro  0-12 Units Subcutaneous TID WC    insulin lispro  0-6 Units Subcutaneous Nightly    polyethylene glycol  17 g Oral Daily    therapeutic multivitamin-minerals  1 tablet Oral Daily     Continuous Infusions:   dextrose       PRN Meds:.sodium chloride, HYDROcodone 5 mg - acetaminophen **OR** HYDROcodone 5 mg - acetaminophen, nicotine, ondansetron, dextrose, dextrose, glucagon (rDNA), glucose, acetaminophen, magnesium hydroxide     Diagnostics:     CBC:   Recent Labs      17 3153  11/03/17   0641   WBC  4.9   --    RBC  2.85*   --    HGB  8.7*  10.0*   HCT  27.3*  32.6*   MCV  96.0   --    RDW  16.6*   --    PLT  185   --      BMP:   Recent Labs      11/02/17   0650  11/03/17   0641   NA  140  142   K  3.9  4.4   CL  106  105   CO2  24  25   BUN  10  8   CREATININE  0.73  0.73     BNP: No results for input(s): BNP in the last 72 hours. PT/INR: No results for input(s): PROTIME, INR in the last 72 hours. APTT: No results for input(s): APTT in the last 72 hours. CARDIAC ENZYMES: No results for input(s): CKMB, CKMBINDEX, TROPONINT in the last 72 hours. Invalid input(s): CKTOTAL;3  FASTING LIPID PANEL:  Lab Results   Component Value Date    CHOL 73 10/10/2017    HDL 39 (L) 10/10/2017    TRIG 66 10/10/2017     LIVER PROFILE: No results for input(s): AST, ALT, ALB, BILIDIR, BILITOT, ALKPHOS in the last 72 hours. I/O (24Hr): No intake or output data in the 24 hours ending 11/03/17 1343    Glu last 24 hour  Recent Labs      11/02/17   1621  11/02/17   2028  11/03/17   0657  11/03/17   1114   POCGLU  138*  163*  85  162*       No results for input(s): CLARITYU, COLORU, PHUR, SPECGRAV, PROTEINU, RBCUA, BLOODU, BACTERIA, NITRU, WBCUA, LEUKOCYTESUR, YEAST, GLUCOSEU, BILIRUBINUR in the last 72 hours. Urine   Culture 10/24/2017  9:15  [address]   NO SIGNIFICANT GROWTH      Urin cx  Culture 10/27/2017  5:20  [address]   NO SIGNIFICANT GROWTH          Impression/Plan:    Patient is a 68-year-old female with ADL and mobility dysfunction s/p MCA CVA.     1. Ambulatory and ADL dysfunction secondary to MCA CVA -continue rehab efforts, making progress, team conference reviewed, will need 24/ 7 care, discharge plan 11/9/2017 , Noted family training set for end of this week, team conference Reviewed today, Again reviewed discharge planning with patient  2.  Sepsis due to UTI and left pylenephritis, urethral stent, bilateral kidney stones- leukocytosis- Cipro till 10/18 per IM- completed. ., urology consulted due to continued hematuria and frequent urinationfollow up noted, urine culture negative ×2DC Cipro, continue frequent urination, PVR low, defer to urology for further evaluation or treatment of frequent urination, stones, etc., nursing notes PVR-0  3. R MCA cva with LHP- embolic. . Cardiology recommendations noted and internal medicine orders notedpatient now on Eliquis 5 mg twice a day and Plavix, Lovenox discontinued, Continue lipitor. Reviewed with patient and son as well. 4. Bloody nose 10/24improved,  nasal mist, monitor closely, head of the bed at 30-45° , no further episodes  5. PAF - Per cardio on Eliquis as above  6. Blood pressure1+ bilateral edema- off Lasix due to low BP per IM, continue to monitor, Noted addition of spironolactone per internal medicine,,  Potassium stopped  and monitor closely  7. Anemia- protonix, s/p 1 unit PRBC transfusion.  10/30 Hb 10 asymptomatic. No obvious blood loss. Continue iron supplementation. Monitor H&H  8. Constipation - Colace  9. Reflux - Pepcid  10. Dep - on paxil 20 monitor  11. Bulbous pemphigus- Dr Ilya Leal had discussion with the patient's son- He states that his mother has been on CellCept 1500 mg twice a day for approximately 15 years for bulbous pemphigus. 12. Sacral ulcer- wound care management. Change mepilex q 3 days    13. H/O PE/DVT prophylaxis -  on Eliquis  14. DM- Cont lantus. IM following for medical management. , medications adjusted per internal medicine    15. Mild confusion -no change- neurologic exam no significant change,   16.  internal medicine for medical management. Wendy Christianson. Jose Alberto Holman MD

## 2017-11-04 LAB
GLUCOSE BLD-MCNC: 132 MG/DL (ref 65–105)
GLUCOSE BLD-MCNC: 161 MG/DL (ref 65–105)
GLUCOSE BLD-MCNC: 80 MG/DL (ref 65–105)

## 2017-11-04 PROCEDURE — 99223 1ST HOSP IP/OBS HIGH 75: CPT | Performed by: INTERNAL MEDICINE

## 2017-11-04 PROCEDURE — 6370000000 HC RX 637 (ALT 250 FOR IP): Performed by: INTERNAL MEDICINE

## 2017-11-04 PROCEDURE — 82947 ASSAY GLUCOSE BLOOD QUANT: CPT

## 2017-11-04 PROCEDURE — 6370000000 HC RX 637 (ALT 250 FOR IP): Performed by: FAMILY MEDICINE

## 2017-11-04 PROCEDURE — 97116 GAIT TRAINING THERAPY: CPT

## 2017-11-04 PROCEDURE — 6360000002 HC RX W HCPCS: Performed by: INTERNAL MEDICINE

## 2017-11-04 PROCEDURE — 1180000000 HC REHAB R&B

## 2017-11-04 PROCEDURE — 97530 THERAPEUTIC ACTIVITIES: CPT

## 2017-11-04 PROCEDURE — 97110 THERAPEUTIC EXERCISES: CPT

## 2017-11-04 PROCEDURE — 97535 SELF CARE MNGMENT TRAINING: CPT

## 2017-11-04 PROCEDURE — 6370000000 HC RX 637 (ALT 250 FOR IP): Performed by: PHYSICAL MEDICINE & REHABILITATION

## 2017-11-04 RX ADMIN — SPIRONOLACTONE 50 MG: 25 TABLET, FILM COATED ORAL at 09:43

## 2017-11-04 RX ADMIN — POLYETHYLENE GLYCOL 3350 17 G: 17 POWDER, FOR SOLUTION ORAL at 09:44

## 2017-11-04 RX ADMIN — APIXABAN 5 MG: 5 TABLET, FILM COATED ORAL at 20:19

## 2017-11-04 RX ADMIN — FAMOTIDINE 40 MG: 20 TABLET ORAL at 09:43

## 2017-11-04 RX ADMIN — MICONAZOLE NITRATE: 1.42 POWDER TOPICAL at 09:44

## 2017-11-04 RX ADMIN — FERROUS SULFATE TAB 325 MG (65 MG ELEMENTAL FE) 325 MG: 325 (65 FE) TAB at 17:11

## 2017-11-04 RX ADMIN — MYCOPHENOLATE MOFETIL 1500 MG: 250 CAPSULE ORAL at 20:20

## 2017-11-04 RX ADMIN — CLOPIDOGREL BISULFATE 75 MG: 75 TABLET ORAL at 09:43

## 2017-11-04 RX ADMIN — DOCUSATE SODIUM 100 MG: 100 CAPSULE, LIQUID FILLED ORAL at 09:43

## 2017-11-04 RX ADMIN — Medication 1 TABLET: at 09:43

## 2017-11-04 RX ADMIN — MICONAZOLE NITRATE: 1.42 POWDER TOPICAL at 20:18

## 2017-11-04 RX ADMIN — INSULIN LISPRO 2 UNITS: 100 INJECTION, SOLUTION INTRAVENOUS; SUBCUTANEOUS at 11:23

## 2017-11-04 RX ADMIN — ATORVASTATIN CALCIUM 40 MG: 40 TABLET, FILM COATED ORAL at 09:44

## 2017-11-04 RX ADMIN — FERROUS SULFATE TAB 325 MG (65 MG ELEMENTAL FE) 325 MG: 325 (65 FE) TAB at 09:43

## 2017-11-04 RX ADMIN — MYCOPHENOLATE MOFETIL 1500 MG: 250 CAPSULE ORAL at 09:44

## 2017-11-04 RX ADMIN — Medication 24 UNITS: at 20:22

## 2017-11-04 RX ADMIN — MULTIPLE VITAMINS W/ MINERALS TAB 1 TABLET: TAB at 09:43

## 2017-11-04 RX ADMIN — APIXABAN 5 MG: 5 TABLET, FILM COATED ORAL at 09:45

## 2017-11-04 RX ADMIN — INSULIN LISPRO 2 UNITS: 100 INJECTION, SOLUTION INTRAVENOUS; SUBCUTANEOUS at 20:21

## 2017-11-04 RX ADMIN — PAROXETINE HYDROCHLORIDE 20 MG: 20 TABLET, FILM COATED ORAL at 09:46

## 2017-11-04 NOTE — PLAN OF CARE
Problem: Risk for Impaired Skin Integrity  Goal: Tissue integrity - skin and mucous membranes  Structural intactness and normal physiological function of skin and  mucous membranes. Outcome: Ongoing  There are no new skin issues so far this shift. Will continue to assist patient with repositioning at least every two hours. Problem: Falls - Risk of  Goal: Absence of falls  Outcome: Ongoing  Patient remains free from falls so far this shift. Will continue to round at least hourly, place bed in lowest position with call light within reach, and alarm activated. Problem: Pain:  Goal: Control of acute pain  Control of acute pain   Outcome: Ongoing  Patient denies pain.

## 2017-11-04 NOTE — PLAN OF CARE
Problem: Risk for Impaired Skin Integrity  Goal: Tissue integrity - skin and mucous membranes  Structural intactness and normal physiological function of skin and  mucous membranes. Outcome: Ongoing  No new skin issues. See head to toe assessment. Will continue to monitor. Problem: Falls - Risk of  Goal: Absence of falls  Outcome: Ongoing  Remains free from falls. Call light in reach. Rounds continue. Problem: Pain:  Goal: Control of acute pain  Control of acute pain   Outcome: Ongoing  No c/o pain. Rests quietly in bed.

## 2017-11-04 NOTE — PROGRESS NOTES
Physical Therapy  [address]  Acute Rehabilitation Physical Therapy Progress Note    Date: 17  Patient Name: Shasta Chu       Room: 9155/5036-26  MRN: [id number]   Account: [de-identified]   : 1950  (75 y.o.) Gender: female        Diagnosis: R CVA, Acute ischmic right MCA stroke,   Past Medical History:  has a past medical history of Bullous pemphigoid; CVA (cerebral vascular accident) ([facility] [address] Diabetes mellitus ([facility] [address] Other disorders of skin and subcutaneous tissue in diseases classified elsewhere; Patient in clinical research study; and PE [facility]emb[facility] Past Surgical History:   has a past surgical history that includes Cholecystectomy; hernia repair; and Cystoscopy (Left, 10/10/2017). Additional Pertinent Hx: Pt presents to ER on 10/9/17 with c/o vomiting and severe abdominal and back pain. Pt was found to have acute cystitis, kidney stone and hydronephrosis. On 10/10/17, pt dev L sided weakness and facial droop, found to have acute R MCA infarct. Pt underwent emergent cysto with ureteral stent placement 10/10/2017. Pt had angiogram and found to have ICA stenosis, stent placed 10/12/17. Additional PMH: CKD, A fib, morbid obesity, HTN, pt is in clinical trial that will end in 2018- unspecified. Overall Orientation Status: Impaired (Confusion and difficulty recalling previous sessions)  Orientation Level: Oriented X4  Restrictions/Precautions  Restrictions/Precautions: General Precautions; Fall Risk  Required Braces or Orthoses?: No  Position Activity Restriction  Other position/activity restrictions: up with assistance    Subjective: Patient not able to recall previous PT session. Emotional with family present. Comments: Left side neglect    Vital Signs  Patient Currently in Pain: Denies                   Bed Mobility:   Bridging: Minimal assistance  Rolling: Moderate assistance  Supine to Sit: Minimal assistance  Sit to Supine:  Moderate assistance Static: Fair  Standing - Dynamic: Fair  Comments: Standing with [facility]    EXERCISES    Other exercises?: Yes  Other exercises 1: Seated in w/c bilat LE therexs x15 2lb ankle weights, green t band ankle PF and heel slides x15 ea  Other exercises 2: Green tband x 15 reps  Other exercises 3: Supine bilateral LE x 15 reps - Aarom on right LE  Other exercises 4: Family training  AM with son. Other exercises 6: UBE x 10 min f/b  Other exercises 7: NuStep  x 10 mins. Workload 3           Activity Tolerance: Patient limited by endurance  PT Equipment Recommendations  Equipment Needed: Yes (Order given to  for [facility] and )  Cane: [employer]  Wheelchair: [employer]         Current Treatment Recommendations: Strengthening, Transfer Training, Endurance Training, Cognitive Reorientation, Patient/Caregiver Education & Training, ROM, Balance Training, Gait Training, Functional Mobility Training, Stair training, Safety Education & Training    Conditions Requiring Skilled Therapeutic Intervention  Body structures, Functions, Activity limitations: Decreased functional mobility ; Decreased strength;Decreased ROM; Decreased endurance;Decreased safe awareness;Decreased balance  Treatment Diagnosis: CVA, L hemiplegia  Prognosis: Good  Patient Education: Family training with transfers, gait, and steps  REQUIRES PT FOLLOW UP: Yes  Discharge Recommendations: Home with nursing aide;Home with Home health PT;Home with assist PRN    Goals  Short term goals  Time Frame for Short term goals: 7 days  Short term goal 1: Improve L UE and LE strength by 1/2 MMG  Short term goal 2: Increase endurance to good  Short term goal 3: Improve sitting balance to good  Short term goal 4: Improve bed mobility to min A x1  Short term goal 5: Improve transfers to min A x1  Long term goals  Time Frame for Long term goals : 21 days  Long term goal 1:  Increase UEs and LEs strength to [facility] to maximize mobiility  Long term goal 2: Improve standing balance

## 2017-11-04 NOTE — PROGRESS NOTES
Physical Medicine & Rehabilitation  Progress Note    2017 10:33 AM     CC: Ambulatory and ADL dysfunction due to CVA    Subjective:   Doing better today. Lova Mean No pain. .was disimpacted last night, had a BM last night   no incontinencePVRs Low per nursing . ROS:  Denies fevers, chills, sweats. No chest pain, palpitations, lightheadedness. Denies coughing, wheezing or shortness of breath. Denies abdominal pain, nausea, diarrhea or constipation. No new areas of joint pain. Denies new areas of numbness or weakness. Denies new anxiety or depression issues. No new skin problems. Rehabilitation:   OT FIM:   Eatin - Feeds self with setup/supervision/cues and/or requires only setup/supervision/cues to perform tube feedings  Groomin - Requires setup/cues to do all tasks  Bathin - Able to bathe 3-4 areas  Dressing-Upper: 3 - Requires assist with 2 tasks  Dressing-Lower: 2 - Requires assist with 4-5 parts of dressing  Toiletin - Able to perform 1 task only (e.g. hygiene)  Toilet Transfer: 4 - Requires steadying assistance only < 25% assist  Shower Transfer: 0 - Activity does not occur (washed sinkside      PT   Transfers:  Sit to Stand: Minimal Assistance  Stand to sit: Contact guard assistance  Bed to Chair: Minimal assistance (With AD)  Stand Pivot Transfers: Minimal Assistance     WB Status: no restrictions  Ambulation 1  Surface: level tile  Device: Large Sajan Ela  Assistance: Minimal assistance;Contact guard assistance  Quality of Gait: Cues for scanning to left during ambulations to watch for objects. Patient walks toward left into wall. Distance: 100 ft x 2  Comments: Patient had a major loss of balance with max assist for correction. Patient was distracted on left    Stairs/Curb  Stairs?: Yes  Stairs  # Steps : 4  Stairs Height: 6\"  Rails: Right ascending  Device: Hand Held Assist (L hand)  Assistance:  Moderate assistance (2person assist)  Comment: Ascending FWD and Descending Retro. Patient fearful when descending FWD due to visual deficits. Propulsion 1  Propulsion: Manual  Level: Level Tile  Method: RUE;RLE;LLE  Level of Assistance: Supervision  Description/ Details: limited by low endurance/weakness  Distance: 60 ft         Objective:  BP (!) 123/56   Pulse 82   Temp 99.2 °F (37.3 °C) (Oral)   Resp 16   Ht 5' (1.524 m)   Wt 253 lb (114.8 kg)   SpO2 98%   BMI 49.41 kg/m²  I Body mass index is 49.41 kg/m². I   Wt Readings from Last 1 Encounters:   17 253 lb (114.8 kg)      Temp (24hrs), Av.1 °F (37.3 °C), Min:99 °F (37.2 °C), Max:99.2 °F (37.3 °C)      Alert, no distress. Good speech and language function. Lungs clear. Heart Irregularly irregular  Abdomen non-distended, non-tender. No calf tenderness or 1+ edema.   B/l   Left upper extremity  4/ 5, left lower extremity 4 out of 5 .right extremity 5 out of 5,   Neuro - mental status stable, not change, knew in hospital, not know Shan mcdaniels Minor, follow commands      Medications   Scheduled Meds:   spironolactone  50 mg Oral Daily    PARoxetine  20 mg Oral QAM    insulin glargine  24 Units Subcutaneous Nightly    apixaban  5 mg Oral BID    clopidogrel  75 mg Oral Daily    mycophenolate  1,500 mg Oral BID    miconazole   Topical BID    atorvastatin  40 mg Oral Daily    Calcium Carb-Cholecalciferol  1 tablet Oral Daily    docusate sodium  100 mg Oral Daily    famotidine  40 mg Oral Daily    ferrous sulfate  325 mg Oral BID WC    insulin lispro  0-12 Units Subcutaneous TID WC    insulin lispro  0-6 Units Subcutaneous Nightly    polyethylene glycol  17 g Oral Daily    therapeutic multivitamin-minerals  1 tablet Oral Daily     Continuous Infusions:   dextrose       PRN Meds:.sodium chloride, HYDROcodone 5 mg - acetaminophen **OR** HYDROcodone 5 mg - acetaminophen, nicotine, ondansetron, dextrose, dextrose, glucagon (rDNA), glucose, acetaminophen, magnesium hydroxide     Diagnostics:     CBC:   Recent Labs      11/02/17   0650  11/03/17   0641   WBC  4.9   --    RBC  2.85*   --    HGB  8.7*  10.0*   HCT  27.3*  32.6*   MCV  96.0   --    RDW  16.6*   --    PLT  185   --      BMP:   Recent Labs      11/02/17   0650  11/03/17   0641   NA  140  142   K  3.9  4.4   CL  106  105   CO2  24  25   BUN  10  8   CREATININE  0.73  0.73     BNP: No results for input(s): BNP in the last 72 hours. PT/INR: No results for input(s): PROTIME, INR in the last 72 hours. APTT: No results for input(s): APTT in the last 72 hours. CARDIAC ENZYMES: No results for input(s): CKMB, CKMBINDEX, TROPONINT in the last 72 hours. Invalid input(s): CKTOTAL;3  FASTING LIPID PANEL:  Lab Results   Component Value Date    CHOL 73 10/10/2017    HDL 39 (L) 10/10/2017    TRIG 66 10/10/2017     LIVER PROFILE: No results for input(s): AST, ALT, ALB, BILIDIR, BILITOT, ALKPHOS in the last 72 hours. I/O (24Hr): No intake or output data in the 24 hours ending 11/04/17 1033    Glu last 24 hour  Recent Labs      11/03/17   1114  11/03/17   1559  11/03/17   1958  11/04/17   0542   POCGLU  162*  149*  200*  80       No results for input(s): CLARITYU, COLORU, PHUR, SPECGRAV, PROTEINU, RBCUA, BLOODU, BACTERIA, NITRU, WBCUA, LEUKOCYTESUR, YEAST, GLUCOSEU, BILIRUBINUR in the last 72 hours. Urine   Culture 10/24/2017  9:15  [address]   NO SIGNIFICANT GROWTH      Urin cx  Culture 10/27/2017  5:20  [address]   NO SIGNIFICANT GROWTH          Impression/Plan:    Patient is a 20-year-old female with ADL and mobility dysfunction s/p MCA CVA.     1. Ambulatory and ADL dysfunction secondary to MCA CVA -continue rehab efforts, making progress, team conference reviewed, will need 24/ 7 care, discharge plan 11/9/2017 , Noted family training set for end of this week, team conference Reviewed today, Again reviewed discharge planning with patient  2.  Sepsis due to UTI and left pylenephritis, urethral stent, bilateral kidney stones-

## 2017-11-04 NOTE — PROGRESS NOTES
[facility]: RAY AWAD   ACUTE REHABILITATION OCCUPATIONAL THERAPY  DAILY NOTE    Date: 17  Patient Name: Shantanu Swain      Room: 9327/8281-31    MRN: [id number]   : 1950  (74 y.o.)  Gender: female      Diagnosis: R CVA, Acute ischmic right MCA stroke,   Additional Pertinent Hx: UTI, s/p Ureteral stone removed dueing stent placement 10/10/17, h/o CVA a year ago    Restrictions  Restrictions/Precautions: General Precautions, Fall Risk  Other position/activity restrictions: up with assistance  Position Activity Restriction  Other position/activity restrictions: up with assistance     Subjective  Subjective: \"I just won't. \"  pt frustrated about how difficult tub transfer is for her, son reassured her she would only need to shower once / week at home, son very effectively calms pt's anxiety and uses humor to help her also  Comments: Pt pleasant and cooperative during OT. OT made plans with pt's son to practice tub transfers again tomorrow but to trial extended tub bench facing opposite direction so pt could grab bar with R hand while enter, exit. Objective  Cognition  Arousal/Alertness: Delayed responses to stimuli  Following Commands:  Follows one step commands with increased time  Attention Span: Attends with cues to redirect  Memory: Decreased recall of recent events;Decreased long term memory;Decreased short term memory  Safety Judgement: Decreased awareness of need for assistance;Decreased awareness of need for safety  Problem Solving: Assistance required to generate solutions;Assistance required to identify errors made  Insights: Decreased awareness of deficits  Initiation: Requires cues for some (due to memory)  Sequencing: Requires cues for some  Cognition Comment: pt not able to remember accurately which body parts were washed during shower, 5-8 min lapse, poor memory, problem solving  Balance  Sitting Balance: Stand by assistance  Standing Balance: Minimal assistance (CGA for

## 2017-11-04 NOTE — PLAN OF CARE
Problem: Falls - Risk of  Goal: Absence of falls  Outcome: Ongoing  Remains free from falls. Call light in reach. Bed alarm on. Rounds continue. Problem: Pain:  Goal: Control of acute pain  Control of acute pain   Outcome: Ongoing  No c/o pain. Rests quietly in bed. Will continue to monitor.

## 2017-11-05 LAB
GLUCOSE BLD-MCNC: 102 MG/DL (ref 65–105)
GLUCOSE BLD-MCNC: 209 MG/DL (ref 65–105)
GLUCOSE BLD-MCNC: 227 MG/DL (ref 65–105)
GLUCOSE BLD-MCNC: 74 MG/DL (ref 65–105)
GLUCOSE BLD-MCNC: 91 MG/DL (ref 65–105)

## 2017-11-05 PROCEDURE — 6370000000 HC RX 637 (ALT 250 FOR IP): Performed by: INTERNAL MEDICINE

## 2017-11-05 PROCEDURE — 82947 ASSAY GLUCOSE BLOOD QUANT: CPT

## 2017-11-05 PROCEDURE — 97535 SELF CARE MNGMENT TRAINING: CPT

## 2017-11-05 PROCEDURE — 97116 GAIT TRAINING THERAPY: CPT

## 2017-11-05 PROCEDURE — 6360000002 HC RX W HCPCS: Performed by: INTERNAL MEDICINE

## 2017-11-05 PROCEDURE — 99232 SBSQ HOSP IP/OBS MODERATE 35: CPT | Performed by: INTERNAL MEDICINE

## 2017-11-05 PROCEDURE — 6370000000 HC RX 637 (ALT 250 FOR IP): Performed by: PHYSICAL MEDICINE & REHABILITATION

## 2017-11-05 PROCEDURE — 6370000000 HC RX 637 (ALT 250 FOR IP): Performed by: FAMILY MEDICINE

## 2017-11-05 PROCEDURE — 1180000000 HC REHAB R&B

## 2017-11-05 PROCEDURE — 97110 THERAPEUTIC EXERCISES: CPT

## 2017-11-05 PROCEDURE — 97530 THERAPEUTIC ACTIVITIES: CPT

## 2017-11-05 RX ADMIN — MULTIPLE VITAMINS W/ MINERALS TAB 1 TABLET: TAB at 07:51

## 2017-11-05 RX ADMIN — MICONAZOLE NITRATE: 1.42 POWDER TOPICAL at 20:46

## 2017-11-05 RX ADMIN — Medication 24 UNITS: at 20:57

## 2017-11-05 RX ADMIN — POLYETHYLENE GLYCOL 3350 17 G: 17 POWDER, FOR SOLUTION ORAL at 07:50

## 2017-11-05 RX ADMIN — SPIRONOLACTONE 50 MG: 25 TABLET, FILM COATED ORAL at 07:50

## 2017-11-05 RX ADMIN — FERROUS SULFATE TAB 325 MG (65 MG ELEMENTAL FE) 325 MG: 325 (65 FE) TAB at 16:51

## 2017-11-05 RX ADMIN — Medication 1 TABLET: at 07:50

## 2017-11-05 RX ADMIN — DOCUSATE SODIUM 100 MG: 100 CAPSULE, LIQUID FILLED ORAL at 07:50

## 2017-11-05 RX ADMIN — FERROUS SULFATE TAB 325 MG (65 MG ELEMENTAL FE) 325 MG: 325 (65 FE) TAB at 07:50

## 2017-11-05 RX ADMIN — MYCOPHENOLATE MOFETIL 1500 MG: 250 CAPSULE ORAL at 20:45

## 2017-11-05 RX ADMIN — APIXABAN 5 MG: 5 TABLET, FILM COATED ORAL at 20:46

## 2017-11-05 RX ADMIN — ATORVASTATIN CALCIUM 40 MG: 40 TABLET, FILM COATED ORAL at 07:50

## 2017-11-05 RX ADMIN — APIXABAN 5 MG: 5 TABLET, FILM COATED ORAL at 07:52

## 2017-11-05 RX ADMIN — MICONAZOLE NITRATE: 1.42 POWDER TOPICAL at 07:50

## 2017-11-05 RX ADMIN — CLOPIDOGREL BISULFATE 75 MG: 75 TABLET ORAL at 07:50

## 2017-11-05 RX ADMIN — PAROXETINE HYDROCHLORIDE 20 MG: 20 TABLET, FILM COATED ORAL at 07:52

## 2017-11-05 RX ADMIN — MYCOPHENOLATE MOFETIL 1500 MG: 250 CAPSULE ORAL at 07:52

## 2017-11-05 RX ADMIN — INSULIN LISPRO 4 UNITS: 100 INJECTION, SOLUTION INTRAVENOUS; SUBCUTANEOUS at 16:51

## 2017-11-05 RX ADMIN — FAMOTIDINE 40 MG: 20 TABLET ORAL at 07:50

## 2017-11-05 NOTE — PROGRESS NOTES
Physical Therapy  [address]  Acute Rehabilitation Physical Therapy Progress Note    Date: 17  Patient Name: Julito Han       Room: 1539/7879-99  MRN: [id number]   Account: [de-identified]   : 1950  (75 y.o.) Gender: female        Diagnosis: R CVA, Acute ischmic right MCA stroke,   Past Medical History:  has a past medical history of Bullous pemphigoid; CVA (cerebral vascular accident) ([facility] [address] Diabetes mellitus ([facility] [address] Other disorders of skin and subcutaneous tissue in diseases classified elsewhere; Patient in clinical research study; and PE [facility] throm[facility]emb[facility]ism) [facility] Past Surgical History:   has a past surgical history that includes Cholecystectomy; hernia repair; and Cystoscopy (Left, 10/10/2017). Additional Pertinent Hx: Pt presents to ER on 10/9/17 with c/o vomiting and severe abdominal and back pain. Pt was found to have acute cystitis, kidney stone and hydronephrosis. On 10/10/17, pt dev L sided weakness and facial droop, found to have acute R MCA infarct. Pt underwent emergent cysto with ureteral stent placement 10/10/2017. Pt had angiogram and found to have ICA stenosis, stent placed 10/12/17. Additional PMH: CKD, A fib, morbid obesity, HTN, pt is in clinical trial that will end in 2018- unspecified. Overall Orientation Status: Impaired (Confusion and difficulty recalling previous sessions)  Orientation Level: Oriented X4  Restrictions/Precautions  Restrictions/Precautions: General Precautions; Fall Risk  Required Braces or Orthoses?: No  Position Activity Restriction  Other position/activity restrictions: up with assistance    Subjective: Patient not able to recall previous PT session. Emotional with family present. Comments: Left side neglect    Vital Signs  Patient Currently in Pain: Denies                   Bed Mobility:   Bed Mobility  Bridging: Minimal assistance  Rolling: Moderate assistance  Supine to Sit: Minimal assistance  Sit to Supine:  Moderate assistance (assist with LEs )  Scooting: Minimal assistance  Bed mobility  Bridging: Minimal assistance  Scooting: Minimal assistance    Transfers:  Sit to Stand: Minimal Assistance  Stand to sit: Contact guard assistance  Bed to Chair: Minimal assistance (With AD)  Stand Pivot Transfers: Minimal Assistance        WB Status: no restrictions  Ambulation 1  Surface: level tile  Device: Large Sajan Lanett  Assistance: Minimal assistance  Quality of Gait: Cues for scanning to left during ambulations to watch for objects. Patient walks toward left into wall. Distance: 100 ft x2 (Family education completed with amb.)  Ambulation 2  Surface - 2: carpet  Device 2: Large Sajan Ela  Assistance 2: Minimal assistance  Quality of Gait 2: Cues to scan environment during floor transition. Safety concerns  Distance: 20 ft     Stairs/Curb  Stairs?: Yes  Stairs  # Steps : 6  Stairs Height: 4\"  Rails: Right ascending  Device: Hand Held Assist (L hand)  Assistance: Moderate assistance  Comment: Ascending FWD and Descending Retro. Patient fearful when descending FWD due to visual deficits. Wheelchair Activities  Wheelchair Size: 22x18  Wheelchair Type: Standard  Wheelchair Cushion: Standard  Propulsion 1  Propulsion: Manual  Level: Level Tile  Method: RUE;RLE;LLE  Level of Assistance: Supervision  Description/ Details: limited by low endurance/weakness  Distance: 60 ft                                     FIMS:      Transfers  Bed, Chair, Wheel Chair: 3 - Requires 25-49% assistance to transfer   Locomotion  Primary Mode:  Both  Distance Walked: 100 ft  Distance Traveled in [facility] Chair: 60 ft  Walk: 2 - Maximal Assistance Requires up to [address] requires assistance of one person to walk/operate wheelchair between  feet (Patient performs 25-49% of locomotion effort or goes between  feet)  Wheel Chair: 2 - Maximal Assistance Requires up to [address] requires assistance of one person to walk/operate wheelchair between  feet  Stairs: 2- Maximal Assistance Performs 25-49% of the effort to go up and down 4 to 6 stairs and requires the assistance of one person only       BALANCE Posture: Fair  Sitting - Static: Fair;+  Sitting - Dynamic: Fair;+  Standing - Static: Fair  Standing - Dynamic: Fair  Comments: Standing with LBQC    EXERCISES    Other exercises?: Yes  Other exercises 1: Seated in w/c bilat LE therexs x15 2lb ankle weights, green t band ankle PF and heel slides x15 ea  Other exercises 2: Green tband x 15 reps  Other exercises 3: Supine bilateral LE x 15 reps - Aarom on right LE  Other exercises 4: Family training  AM with son. Other exercises 6: UBE x 10 min f/b  Other exercises 7: NuStep  x 10 mins. Workload 3           Activity Tolerance: Patient limited by endurance  PT Equipment Recommendations  Equipment Needed: Yes (Order given to  for [facility] and )  Cane: [employer]  Wheelchair: [employer]         Current Treatment Recommendations: Strengthening, Transfer Training, Endurance Training, Cognitive Reorientation, Patient/Caregiver Education & Training, ROM, Balance Training, Gait Training, Functional Mobility Training, Stair training, Safety Education & Training    Conditions Requiring Skilled Therapeutic Intervention  Body structures, Functions, Activity limitations: Decreased functional mobility ; Decreased strength;Decreased ROM; Decreased endurance;Decreased safe awareness;Decreased balance  Treatment Diagnosis: CVA, L hemiplegia  Prognosis: Good  Patient Education: Family training with transfers, gait, and steps  REQUIRES PT FOLLOW UP: Yes  Discharge Recommendations: Home with nursing aide;Home with Home health PT;Home with assist PRN    Goals  Short term goals  Time Frame for Short term goals: 7 days  Short term goal 1: Improve L UE and LE strength by 1/2 MMG  Short term goal 2:  Increase endurance to good  Short term goal 3: Improve sitting balance to good  Short term goal 4: Improve bed mobility to min A x1  Short term goal 5: Improve transfers to min A x1  Long term goals  Time Frame for Long term goals : 21 days  Long term goal 1:  Increase UEs and LEs strength to [facility] to maximize mobiility  Long term goal 2: Improve standing balance with appropriate device to good to increase safety  Long term goal 3: Improve bed mobility to independent to prepare for home  Long term goal 4: Improve transfers with appropriate device to mod I to prepare for home  Long term goal 5: Improve gait with appropriate device to mod I 150 ft to prepare for home  Long term goal 6: Improve stair negotiation to SBA on 4 steps with 1 rail use to allow pt to enter and exit home       11/05/17 0935 11/05/17 1330   PT Individual Minutes   Time In 0935 1330   Time Out 1035 1400   Minutes 60 30       Electronically signed by Saul Jennings PTA on 11/5/17 at 3:54 PM

## 2017-11-05 NOTE — CONSULTS
[facility]    Consult Note. Date:   11/4/2017  Patient name:  Meda Krabbe  Date of admission:  10/17/2017  7:07 PM  MRN:   [id number]  YOB: 1950    Pt seen at the request of Isabelle Dickson MD    CHIEF COMPLAINT:     History Obtained From:  Patient and chart review. HISTORY OF PRESENT ILLNESS:      The patient is a 74 y.o.  female who is admitted to the hospital for  cva lt weakness   htn   dm2     pe     rash    Medical mangent    Past Medical History:   has a past medical history of Bullous pemphigoid; CVA (cerebral vascular accident) ([facility] [address] [facility] Diabetes mellitus ([facility] [address] Other disorders of skin and subcutaneous tissue in diseases classified elsewhere; Patient in clinical research study; and PE (pulmonary thromboembolism) ([facility] [address] Past Surgical History:   has a past surgical history that includes Cholecystectomy; hernia repair; and Cystoscopy (Left, 10/10/2017). Home Medications:    Prior to Admission medications    Medication Sig Start Date End Date Taking?  Authorizing Provider   carvedilol (COREG) 6.25 MG tablet Take 6.25 mg by mouth 2 times daily (with meals)   Yes Historical Provider, MD   aspirin 325 MG EC tablet Take 1 tablet by mouth daily 10/18/17   Victorino Garrido MD   apixaban (ELIQUIS) 5 MG TABS tablet Take 1 tablet by mouth 2 times daily 10/28/17   Victorino Garrido MD   metoprolol tartrate (LOPRESSOR) 25 MG tablet Take 1 tablet by mouth 2 times daily 10/17/17   Victorino Garrido MD   furosemide (LASIX) 40 MG tablet Take 1 tablet by mouth daily 10/17/17   Victorino Garrido MD   ferrous sulfate 325 (65 Fe) MG EC tablet Take 1 tablet by mouth 2 times daily (with meals) 10/17/17   Victorino Garrido MD   docusate sodium (COLACE, DULCOLAX) 100 MG CAPS Take 100 mg by mouth 2 times daily as needed for Constipation 10/17/17   Victorino Garrido MD   tamsulosin (FLOMAX) 0.4 MG capsule Take 1 capsule by mouth daily 10/18/17   Jackie Palm MD   clopidogrel (PLAVIX) 75 MG tablet Take 1 tablet by mouth daily for 10 days 10/18/17 10/28/17  Jackie Palm MD   losartan (COZAAR) 25 MG tablet Take 1 tablet by mouth daily 10/17/17   Jackie Palm MD   mycophenolate (CELLCEPT) 500 MG tablet Take 1,500 mg by mouth 2 times daily    Historical Provider, MD   Multiple Vitamins-Minerals (THERAPEUTIC MULTIVITAMIN-MINERALS) tablet Take 1 tablet by mouth daily    Historical Provider, MD   ondansetron (ZOFRAN ODT) 4 MG disintegrating tablet Take 1 tablet by mouth every 8 hours as needed for Nausea 8/14/17   Srinivasan Marquez MD   atorvastatin (LIPITOR) 40 MG tablet Take 40 mg by mouth daily    Historical Provider, MD   calcium carbonate 600 MG TABS tablet Take 1 tablet by mouth daily    Historical Provider, MD   famotidine (PEPCID) 40 MG tablet Take 40 mg by mouth daily    Historical Provider, MD   Insulin Glargine (LANTUS SC) Inject 40 Units into the skin nightly     Historical Provider, MD   metFORMIN (GLUCOPHAGE) 500 MG tablet Take 500 mg by mouth 2 times daily (with meals)    Historical Provider, MD   pantoprazole (PROTONIX) 40 MG tablet Take 40 mg by mouth daily    Historical Provider, MD   PARoxetine (PAXIL) 10 MG tablet Take 10 mg by mouth every morning    Historical Provider, MD   POTASSIUM CHLORIDE PO Take 20 mEq by mouth daily     Historical Provider, MD       Allergies: Iv contrast [iodides]    Social History:   reports that she has never smoked. She has never used smokeless tobacco. She reports that she does not drink alcohol or use drugs. Family History: family history is not on file. REVIEW OF SYSTEMS:    · Constitutional: Negative for weight loss  · Eyes: Negative for visual changes, diplopia, scleral icterus. · ENT: Negative for Headaches, hearing loss, vertigo, mouth sores, sore throat.   · Cardiovascular: Negative for lightheadedness/orthostatic symptoms ,chest pain, dyspnea on exertion, palpitations or loss of 11/03/17   0641   CALCIUM  8.2*     S. Ionized Calcium:No results for input(s): IONCA in the last 72 hours. S. Magnesium:No results for input(s): MG in the last 72 hours. S. Phosphorus:No results for input(s): PHOS in the last 72 hours. S. Glucose:  Recent Labs      11/04/17   0542  11/04/17   1110  11/04/17   1613   POCGLU  80  161*  132*     Glycosylated hemoglobin A1C: No results for input(s): LABA1C in the last 72 hours. INR: No results for input(s): INR in the last 72 hours. Hepatic functions: No results for input(s): ALKPHOS, ALT, AST, PROT, BILITOT, BILIDIR, LABALBU in the last 72 hours. Pancreatic functions:No results for input(s): LACTA, AMYLASE in the last 72 hours. S. Lactic Acid: No results for input(s): LACTA in the last 72 hours. Cardiac enzymes:No results for input(s): CKTOTAL, CKMB, CKMBINDEX, TROPONINI in the last 72 hours. BNP:No results for input(s): BNP in the last 72 hours. Lipid profile: No results for input(s): CHOL, TRIG, HDL, LDLCALC in the last 72 hours. Invalid input(s): LDL  Blood Gases: No results found for: PH, PCO2, PO2, HCO3, O2SAT  Thyroid functions:   Lab Results   Component Value Date    TSH 1.22 08/17/2017        Imaging/Diagonstics:      CXR: No acute cardiopulmonary findings.       ASSESSMENT:    Patient Active Problem List   Diagnosis    Hydronephrosis of left kidney    Acute cystitis with hematuria    CKD (chronic kidney disease), stage III    Hypotension arterial    Essential hypertension    Morbid obesity with BMI of 40.0-44.9, adult (HCC)    History of arterial ischemic stroke    History of pulmonary embolus (PE)    Paroxysmal atrial fibrillation (HCC)    Left ureteral stone    Left-sided weakness    Non-intractable vomiting with nausea    Cerebrovascular accident (CVA) due to embolism of right middle cerebral artery (HCC)    Hypotension    Sepsis secondary to UTI (HCC)    Elevated serum creatinine    Kidney stone    Anemia    Hypokalemia  Acute ischemic right MCA stroke ([facility] [facility]    Right-sided carotid artery disease ([facility] [facility]    Incontinence associated dermatitis    Cerebrovascular accident (CVA) due to stenosis of right carotid artery ([facility] [address]      rt cva   neuro stable     htn bp controlled      dm2 bs controlled       PLAN:  Cont same meds   check labs          MD ZEFERINO Fagan [address], [address] [place] [address].    Phone [phone]   Fax: [phone]  Answering Service: [phone]

## 2017-11-05 NOTE — PLAN OF CARE
Problem: Risk for Impaired Skin Integrity  Goal: Tissue integrity - skin and mucous membranes  Structural intactness and normal physiological function of skin and  mucous membranes. Outcome: Ongoing  Zero obvious new skin issues so far this shift. Safety maintained this shift. Will continue to monitor. Problem: Falls - Risk of  Goal: Absence of falls  Outcome: Ongoing  Patient remains free from falls/injuries at this time. Call light within reach. Safety maintained this shift. Will continue to monitor. Problem: Pain:  Goal: Pain level will decrease  Pain level will decrease   Patient denies pain so far this shift. Safety maintained this shift. Will continue to monitor.

## 2017-11-05 NOTE — PROGRESS NOTES
guard assistance  Transfers  Sit to stand: Minimal assistance (at times CGA)  Stand to sit: Minimal assistance (at times CGA)  Standing Balance  Time: 6-8 min x 2, 3-5 min x 4  Activity: adl , functional transfers  Sit to stand: Minimal assistance (at times CGA)  Stand to sit: Minimal assistance (at times CGA)  Functional Mobility  Functional - Mobility Device: Hemiwalker  Activity: To/from bathroom  Assist Level: Contact guard assistance  Functional Mobility Comments: 14 feet x 1, 6 feet x 2  Tub Transfers  Tub Transfers Comments: bench was placed in opposite direction so pt could use RUE during seated pivot which helped, was mod- max A x 1, had to stand during pivot out of tub quickly due to slid forward to front edge of tub seat and could not safely reposition hips, pt with max difficulty shifting hips in w/c and on tub bench due to body proportions ie.  arms not long enough to lift hips to shift due to obesity  Fine Motor: pt has very little functional use of LUE for adls coordination very poor for  or pinch, was dominant hand prior, attempted to have pt use mitt on LUE to wash RUE, pt tried but was unable to move LUE well - poor coordination of shld and elbow AROM for task, also difficult to reach to access entire RUE due to obesity and hand with increased tone causing fingers to flex making task difficult  Type of ROM/Therapeutic Exercise  Type of ROM/Therapeutic Exercise: PROM;AROM  Comment: PROM L hand, son aware of positioning to decrease tone L hand, LUE on armboard when in w/c  Exercises  Shoulder Flexion: x  Shoulder ADduction: x  Horizontal ADduction: x  Elbow Flexion: x  Elbow Extension: x  Other: AROM LUE ex checked above during functional task of bathing (with mitt on LUE)                            OT FIM:   Eatin - Feeds self with setup/supervision/cues and/or requires only setup/supervision/cues to perform tube feedings (pt was able to see food on L side of plate without cues)  Groomin - Requires setup/cues to do all tasks  Bathing: 3 - Able to bathe 5-7 areas (uses LHS) and mitt on LUE to wash RUE  Dressing-Upper: 5 - Requires setup/supervision/cues and/or requires assist with presthesis/brace only (extra time, set up, no cues needed)  Dressing-Lower: 2 - Requires assist with 4-5 parts of dressing (used reachers, wearing pullup brief, pants, ace wrap BLE, socks and shoes)  Toiletin - Able to perform 1 task only (e.g. hygiene) (assist needed due to LUE deficits and obesity)  Toilet Transfer: 4 - Requires steadying assistance only < 25% assist  Shower Transfer: 0 - Activity does not occur (bathed at sink, showered yesterday)    Social Interaction: 6 - Patient requires medication for mood and/or effect  Problem Solving: 3 - Patient solves simple/routine tasks 50%-74%  Memory: 3 - Patient remembers 50%-74% of the time    Assessment     Activity Tolerance: Patient Tolerated treatment well               17 0952   OT Individual Minutes   Time In 0800   Time Out 0932   Minutes 92       Patient Education:  Patient Goals   Patient goals : to be able to do things for myself  Patient Education: son updated on L visual field cut, son practiced extended tub bench transfer with pt, bench was placed in opposite direction so pt could use RUE during seated pivot and pt was able to do much better than yesterday. son with excellant cueing and assist technique with pt and is very safe. ed son re level of assist with self care and technique to use mitt on LUE to wash RUE. ed re when seated in chair where pt completes LE dressing at home, pt working to stretch and practice reaching to feet to improve LE dressing indep.   Barriers to Learning: ed focus on pt son as pt is unable to remember post 10 min lapse  Learner:family- sonMary  Method: demonstration and explanation       Outcome: acknowledged understanding  and demonstrated understanding     Plan  Plan  Times per week: 5-7x/week  Times per day: Twice a day  Current Treatment Recommendations: Balance Training, Functional Mobility Training, Endurance Training, Safety Education & Training, Self-Care / ADL, Equipment Evaluation, Education, & procurement, Patient/Caregiver Education & Training, Strengthening, ROM, Neuromuscular Re-education, Cognitive Reorientation, Cognitive/Perceptual Training, Home Management Training  Plan Comment: continue OT  Patient Goals   Patient goals : to be able to do things for myself  Short term goals  Time Frame for Short term goals: One week,   Short term goal 1: Pt. will demo. CGA with bed mobility. Short term goal 2: Pt. will demo. min. A with toilet transfer and mod. A with toileting tasks with good . Short term goal 3: Pt. will demo. min. A with UB bathing/dressing, and MOd. A with LB bathing/dressing using AE as needed. Short term goal 4: Pt. will participate in left UE ROM and stgth ex. to assist with functional tasks. Short term goal 5: Pt. will tolerate facilitation tech. to elicit movement in left wrist and hand to assist with functional tasks. Short term goal 6: Pt. will attend to left side of body and L visual field with min. vc's 90% of the time  Short term goal 7: Pt. will tolerate 5-8 min. functional standing activities to promote increased indep.with ADL's and mobility. Long term goals  Time Frame for Long term goals : By discharge,   Long term goal 1: Pt. will demo. Mod.I with bed mobility. Long term goal 2: Pt. will demo. SBA with functional ADL transfers using appropriate AD with good . Long term goal 3: Pt. will demo. SBA with UB bathing/dressing, and min. A with LB bathing/dressing using AE as needed. Long term goal 4: Pt.will demo. SBA with toileting tasks.        Electronically signed by Vincenzo Rodriguez OT on 11/5/17 at 10:24 AM

## 2017-11-05 NOTE — PROGRESS NOTES
Physical Medicine & Rehabilitation  Progress Note    2017 9:56 AM     CC: Ambulatory and ADL dysfunction due to CVA    Subjective:   Doing better today. BM yesterday   no incontinence PVRs Low per nursing . ROS:  Denies fevers, chills, sweats. No chest pain, palpitations, lightheadedness. Denies coughing, wheezing or shortness of breath. Denies abdominal pain, nausea, diarrhea or constipation. No new areas of joint pain. Denies new areas of numbness or weakness. Denies new anxiety or depression issues. No new skin problems. Rehabilitation:   OT FIM:   Eatin - Feeds self with setup/supervision/cues and/or requires only setup/supervision/cues to perform tube feedings  Groomin - Requires setup/cues to do all tasks  Bathin - Able to bathe 3-4 areas  Dressing-Upper: 3 - Requires assist with 2 tasks  Dressing-Lower: 2 - Requires assist with 4-5 parts of dressing  Toiletin - Able to perform 1 task only (e.g. hygiene)  Toilet Transfer: 4 - Requires steadying assistance only < 25% assist  Shower Transfer: 0 - Activity does not occur (washed sinkside      PT   Transfers:  Sit to Stand: Minimal Assistance  Stand to sit: Contact guard assistance  Bed to Chair: Minimal assistance (With AD)  Stand Pivot Transfers: Minimal Assistance     WB Status: no restrictions  Ambulation 1  Surface: level tile  Device: Large Sajan Ela  Assistance: Minimal assistance;Contact guard assistance  Quality of Gait: Cues for scanning to left during ambulations to watch for objects. Patient walks toward left into wall. Distance: 100 ft x 2  Comments: Patient had a major loss of balance with max assist for correction. Patient was distracted on left    Stairs/Curb  Stairs?: Yes  Stairs  # Steps : 4  Stairs Height: 6\"  Rails: Right ascending  Device: Hand Held Assist (L hand)  Assistance: Moderate assistance (2person assist)  Comment: Ascending FWD and Descending Retro.   Patient fearful when descending FWD due to visual deficits. Propulsion 1  Propulsion: Manual  Level: Level Tile  Method: RUE;RLE;LLE  Level of Assistance: Supervision  Description/ Details: limited by low endurance/weakness  Distance: 60 ft         Objective:  BP (!) 131/51   Pulse 88   Temp 98.5 °F (36.9 °C) (Oral)   Resp 16   Ht 5' (1.524 m)   Wt 253 lb (114.8 kg)   SpO2 96%   BMI 49.41 kg/m²  I Body mass index is 49.41 kg/m². I   Wt Readings from Last 1 Encounters:   17 253 lb (114.8 kg)      Temp (24hrs), Av °F (37.2 °C), Min:98.5 °F (36.9 °C), Max:99.5 °F (37.5 °C)      Alert, no distress. Good speech and language function. Lungs clear. Heart Irregularly irregular  Abdomen non-distended, non-tender. No calf tenderness or 1+ edema.   B/l   Left upper extremity  4/ 5, left lower extremity 4 out of 5 .right extremity 5 out of 5,   Neuro - mental status stable, not change, knew in hospital, not know yr, Joe Juan, follow commands      Medications   Scheduled Meds:   spironolactone  50 mg Oral Daily    PARoxetine  20 mg Oral QAM    insulin glargine  24 Units Subcutaneous Nightly    apixaban  5 mg Oral BID    clopidogrel  75 mg Oral Daily    mycophenolate  1,500 mg Oral BID    miconazole   Topical BID    atorvastatin  40 mg Oral Daily    Calcium Carb-Cholecalciferol  1 tablet Oral Daily    docusate sodium  100 mg Oral Daily    famotidine  40 mg Oral Daily    ferrous sulfate  325 mg Oral BID WC    insulin lispro  0-12 Units Subcutaneous TID WC    insulin lispro  0-6 Units Subcutaneous Nightly    polyethylene glycol  17 g Oral Daily    therapeutic multivitamin-minerals  1 tablet Oral Daily     Continuous Infusions:   dextrose       PRN Meds:.sodium chloride, HYDROcodone 5 mg - acetaminophen **OR** HYDROcodone 5 mg - acetaminophen, nicotine, ondansetron, dextrose, dextrose, glucagon (rDNA), glucose, acetaminophen, magnesium hydroxide     Diagnostics:     CBC:   Recent Labs      17   0641   HGB  10.0*   HCT  32.6* defer to urology for further evaluation or treatment of frequent urination, stones, etc., nursing notes PVR-0  3. R MCA cva with LHP- embolic. . Cardiology recommendations noted and internal medicine orders notedpatient now on Eliquis 5 mg twice a day and Plavix, Lovenox discontinued, Continue lipitor. Reviewed with patient and son as well. 4. Bloody nose 10/24improved,  nasal mist, monitor closely, head of the bed at 30-45° , no further episodes  5. PAF - Per cardio on Eliquis as above  6. Blood pressure1+ bilateral edema- off Lasix due to low BP per IM, continue to monitor, Noted addition of spironolactone per internal medicine,,  Potassium stopped  and monitor closely  7. Anemia- protonix, s/p 1 unit PRBC transfusion.  10/30 Hb 10 asymptomatic. No obvious blood loss. Continue iron supplementation. Monitor H&H  8. Constipation - Colace  9. Reflux - Pepcid  10. Dep - on paxil 20 monitor  11. Bulbous pemphigus- Dr Sandpi Watkins had discussion with the patient's son- He states that his mother has been on CellCept 1500 mg twice a day for approximately 15 years for bulbous pemphigus. 12. Sacral ulcer- wound care management. Change mepilex q 3 days    13. H/O PE/DVT prophylaxis -  on Eliquis  14. DM- Cont lantus. IM following for medical management. , medications adjusted per internal medicine    15. Mild confusion -no change- neurologic exam no significant change,   16.  internal medicine for medical management.     Leela Murphy MD

## 2017-11-06 LAB
ANION GAP SERPL CALCULATED.3IONS-SCNC: 12 MMOL/L (ref 9–17)
BUN BLDV-MCNC: 7 MG/DL (ref 8–23)
BUN/CREAT BLD: ABNORMAL (ref 9–20)
CALCIUM SERPL-MCNC: 8.2 MG/DL (ref 8.6–10.4)
CHLORIDE BLD-SCNC: 105 MMOL/L (ref 98–107)
CO2: 23 MMOL/L (ref 20–31)
CREAT SERPL-MCNC: 0.6 MG/DL (ref 0.5–0.9)
GFR AFRICAN AMERICAN: >60 ML/MIN
GFR NON-AFRICAN AMERICAN: >60 ML/MIN
GFR SERPL CREATININE-BSD FRML MDRD: ABNORMAL ML/MIN/{1.73_M2}
GFR SERPL CREATININE-BSD FRML MDRD: ABNORMAL ML/MIN/{1.73_M2}
GLUCOSE BLD-MCNC: 119 MG/DL (ref 65–105)
GLUCOSE BLD-MCNC: 132 MG/DL (ref 65–105)
GLUCOSE BLD-MCNC: 142 MG/DL (ref 65–105)
GLUCOSE BLD-MCNC: 70 MG/DL (ref 70–99)
GLUCOSE BLD-MCNC: 79 MG/DL (ref 65–105)
HCT VFR BLD CALC: 30 % (ref 36–46)
HEMOGLOBIN: 9.5 G/DL (ref 12–16)
MCH RBC QN AUTO: 30.5 PG (ref 26–34)
MCHC RBC AUTO-ENTMCNC: 31.7 G/DL (ref 31–37)
MCV RBC AUTO: 96.3 FL (ref 80–100)
PDW BLD-RTO: 16.6 % (ref 11.5–14.9)
PLATELET # BLD: 245 K/UL (ref 150–450)
PMV BLD AUTO: 9.6 FL (ref 6–12)
POTASSIUM SERPL-SCNC: 4.1 MMOL/L (ref 3.7–5.3)
RBC # BLD: 3.11 M/UL (ref 4–5.2)
SODIUM BLD-SCNC: 140 MMOL/L (ref 135–144)
WBC # BLD: 4.8 K/UL (ref 3.5–11)

## 2017-11-06 PROCEDURE — 6370000000 HC RX 637 (ALT 250 FOR IP): Performed by: INTERNAL MEDICINE

## 2017-11-06 PROCEDURE — 97530 THERAPEUTIC ACTIVITIES: CPT

## 2017-11-06 PROCEDURE — 99232 SBSQ HOSP IP/OBS MODERATE 35: CPT | Performed by: PHYSICAL MEDICINE & REHABILITATION

## 2017-11-06 PROCEDURE — 97535 SELF CARE MNGMENT TRAINING: CPT

## 2017-11-06 PROCEDURE — 97116 GAIT TRAINING THERAPY: CPT

## 2017-11-06 PROCEDURE — 97110 THERAPEUTIC EXERCISES: CPT

## 2017-11-06 PROCEDURE — 80048 BASIC METABOLIC PNL TOTAL CA: CPT

## 2017-11-06 PROCEDURE — 36415 COLL VENOUS BLD VENIPUNCTURE: CPT

## 2017-11-06 PROCEDURE — 6370000000 HC RX 637 (ALT 250 FOR IP): Performed by: PHYSICAL MEDICINE & REHABILITATION

## 2017-11-06 PROCEDURE — 6370000000 HC RX 637 (ALT 250 FOR IP): Performed by: FAMILY MEDICINE

## 2017-11-06 PROCEDURE — 6360000002 HC RX W HCPCS: Performed by: INTERNAL MEDICINE

## 2017-11-06 PROCEDURE — 85027 COMPLETE CBC AUTOMATED: CPT

## 2017-11-06 PROCEDURE — 97532 HC COGNITIVE THERAPY 15 MIN: CPT

## 2017-11-06 PROCEDURE — 82947 ASSAY GLUCOSE BLOOD QUANT: CPT

## 2017-11-06 PROCEDURE — 1180000000 HC REHAB R&B

## 2017-11-06 RX ADMIN — FAMOTIDINE 40 MG: 20 TABLET ORAL at 07:59

## 2017-11-06 RX ADMIN — APIXABAN 5 MG: 5 TABLET, FILM COATED ORAL at 20:30

## 2017-11-06 RX ADMIN — FERROUS SULFATE TAB 325 MG (65 MG ELEMENTAL FE) 325 MG: 325 (65 FE) TAB at 07:59

## 2017-11-06 RX ADMIN — MYCOPHENOLATE MOFETIL 1500 MG: 250 CAPSULE ORAL at 20:30

## 2017-11-06 RX ADMIN — SPIRONOLACTONE 50 MG: 25 TABLET, FILM COATED ORAL at 07:59

## 2017-11-06 RX ADMIN — CLOPIDOGREL BISULFATE 75 MG: 75 TABLET ORAL at 07:59

## 2017-11-06 RX ADMIN — MICONAZOLE NITRATE: 1.42 POWDER TOPICAL at 07:59

## 2017-11-06 RX ADMIN — FERROUS SULFATE TAB 325 MG (65 MG ELEMENTAL FE) 325 MG: 325 (65 FE) TAB at 16:49

## 2017-11-06 RX ADMIN — MULTIPLE VITAMINS W/ MINERALS TAB 1 TABLET: TAB at 07:59

## 2017-11-06 RX ADMIN — PAROXETINE HYDROCHLORIDE 20 MG: 20 TABLET, FILM COATED ORAL at 07:59

## 2017-11-06 RX ADMIN — ATORVASTATIN CALCIUM 40 MG: 40 TABLET, FILM COATED ORAL at 07:59

## 2017-11-06 RX ADMIN — MICONAZOLE NITRATE: 1.42 POWDER TOPICAL at 20:31

## 2017-11-06 RX ADMIN — Medication 24 UNITS: at 20:32

## 2017-11-06 RX ADMIN — Medication 1 TABLET: at 07:59

## 2017-11-06 RX ADMIN — MYCOPHENOLATE MOFETIL 1500 MG: 250 CAPSULE ORAL at 08:00

## 2017-11-06 RX ADMIN — APIXABAN 5 MG: 5 TABLET, FILM COATED ORAL at 07:59

## 2017-11-06 RX ADMIN — DOCUSATE SODIUM 100 MG: 100 CAPSULE, LIQUID FILLED ORAL at 07:59

## 2017-11-06 ASSESSMENT — PAIN SCALES - GENERAL
PAINLEVEL_OUTOF10: 0
PAINLEVEL_OUTOF10: 0

## 2017-11-06 NOTE — PROGRESS NOTES
[address] [facility]   ACUTE REHABILITATION OCCUPATIONAL THERAPY  DAILY NOTE    Date: 17  Patient Name: Bernie Stapleton      Room: 9714/0502-04    MRN: [id number]   : 1950  (74 y.o.)  Gender: female      Diagnosis: R CVA, Acute ischmic right MCA stroke,   Additional Pertinent Hx: UTI, s/p Ureteral stone removed dueing stent placement 10/10/17, h/o CVA a year ago    Restrictions  Restrictions/Precautions: General Precautions, Fall Risk  Other position/activity restrictions: up with assistance  Position Activity Restriction  Other position/activity restrictions: up with assistance     Subjective  Subjective: \" I need to go to bathroom\" Pt. requested to go to bathroom upon arrival in AM.   Comments: Pt. pleasant and cooperative. Patient Currently in Pain: Denies  Pain Level: 0  Restrictions/Precautions: General Precautions; Fall Risk    Objective  Cognition  Arousal/Alertness: Delayed responses to stimuli  Following Commands: Follows one step commands with increased time  Attention Span: Attends with cues to redirect  Memory: Decreased recall of recent events;Decreased long term memory;Decreased short term memory  Safety Judgement: Good awareness of safety precautions  Problem Solving: Assistance required to generate solutions;Assistance required to identify errors made  Insights: Decreased awareness of deficits  Initiation: Requires cues for some (due to poor memory)  Sequencing: Requires cues for some  Perception  Overall Perceptual Status: Impaired  Unilateral Attention: Cues to attend left visual field  Initiation: Cues to initiate tasks  Balance  Sitting Balance: Independent (static : indep.  at EOB)  Standing Balance: Contact guard assistance  Bed mobility  Supine to Sit: Stand by assistance (with HOB elevated, increased time)  Scooting: Stand by assistance (EOB)  Transfers  Stand Step Transfers: Contact guard assistance (kelechi-walker, vc's with position of Cognitive/Perceptual Training, Home Management Training  Plan Comment: continue OT  Patient Goals   Patient goals : to be able to do things for myself  Short term goals  Time Frame for Short term goals: One week,   Short term goal 1: Pt. will demo. CGA with bed mobility. Short term goal 2: Pt. will demo. min. A with toilet transfer and mod. A with toileting tasks with good . Short term goal 3: Pt. will demo. min. A with UB bathing/dressing, and MOd. A with LB bathing/dressing using AE as needed. Short term goal 4: Pt. will participate in left UE ROM and stgth ex. to assist with functional tasks. Short term goal 5: Pt. will tolerate facilitation tech. to elicit movement in left wrist and hand to assist with functional tasks. Short term goal 6: Pt. will attend to left side of body and L visual field with min. vc's 90% of the time  Short term goal 7: Pt. will tolerate 5-8 min. functional standing activities to promote increased indep.with ADL's and mobility. Long term goals  Time Frame for Long term goals : By discharge,   Long term goal 1: Pt. will demo. Mod.I with bed mobility. Long term goal 2: Pt. will demo. SBA with functional ADL transfers using appropriate AD with good . Long term goal 3: Pt. will demo. SBA with UB bathing/dressing, and min. A with LB bathing/dressing using AE as needed. Long term goal 4: Pt.will demo. SBA with toileting tasks.         11/06/17 1627 11/06/17 1632   OT Individual Minutes   Time In 0830 1230   Time Out 0930 1300   Minutes 60 30       Electronically signed by ELISEO House/L on 11/6/17 at 4:34 PM

## 2017-11-06 NOTE — PROGRESS NOTES
accuracy Argenis, demonstrating some self corrections. Pt attempted to teach ST card game she had mentioned in a previous session as a game she enjoyed (called \"Cinch\"). Pt demonstrated some difficulties recalling directions, stating \"I'm a little shaky on the rules,\" and \"I haven't played in a long time. \"  Pt reported conflicting information when ST asked questions re rules. ST taught pt a card game (called \"Garbage\"). Pt required min-mod A to complete a round of the game. Other: While playing card game, pt did not require direct cues to scan left to see all the cards laid out.       Plan:  [x] Continue ST services    [] Discharge from ST:      Discharge recommendations: [] Inpatient Rehab   [] [place]   [] Outpatient Therapy  [] Follow up at trauma clinic   [] Other:       Treatment completed by: Breanne Del Valle,  clinician

## 2017-11-06 NOTE — PROGRESS NOTES
Physical Therapy  [address] [facility]   Acute Rehabilitation Physical Therapy Progress Note    Date: 17  Patient Name: Gabriella James       Room: 6146/7922-21  MRN: [id number]   Account: [de-identified]   : 1950  (75 y.o.) Gender: female        Diagnosis: R CVA, Acute ischmic right MCA stroke,   Past Medical History:  has a past medical history of Bullous pemphigoid; CVA (cerebral vascular accident) ([facility] [address] Diabetes mellitus ([facility] [address] Other disorders of skin and subcutaneous tissue in diseases classified elsewhere; Patient in clinical research study; and [facility] Past Surgical History:   has a past surgical history that includes Cholecystectomy; hernia repair; and Cystoscopy (Left, 10/10/2017). Additional Pertinent Hx: Pt presents to ER on 10/9/17 with c/o vomiting and severe abdominal and back pain. Pt was found to have acute cystitis, kidney stone and hydronephrosis. On 10/10/17, pt dev L sided weakness and facial droop, found to have acute R MCA infarct. Pt underwent emergent cysto with ureteral stent placement 10/10/2017. Pt had angiogram and found to have ICA stenosis, stent placed 10/12/17. Additional PMH: CKD, A fib, morbid obesity, HTN, pt is in clinical trial that will end in 2018- unspecified. Overall Orientation Status: Impaired (Confusion and difficulty recalling previous sessions)  Orientation Level: Oriented X4  Restrictions/Precautions  Restrictions/Precautions: General Precautions; Fall Risk  Required Braces or Orthoses?: No  Position Activity Restriction  Other position/activity restrictions: up with assistance    Subjective: Patient not able to recall previous PT session. Emotional with family present. Comments: Left side neglect    Vital Signs    Patient Currently in Pain: Denies                   Bed Mobility:   Bridging: Minimal assistance  Rolling: Moderate assistance  Supine to Sit: Minimal assistance  Sit to Supine:  Moderate min A x1  Short term goal 5: Improve transfers to min A x1  Long term goals  Time Frame for Long term goals : 21 days  Long term goal 1:  Increase UEs and LEs strength to [facility] to maximize mobiility  Long term goal 2: Improve standing balance with appropriate device to good to increase safety  Long term goal 3: Improve bed mobility to independent to prepare for home  Long term goal 4: Improve transfers with appropriate device to mod I to prepare for home  Long term goal 5: Improve gait with appropriate device to mod I 150 ft to prepare for home  Long term goal 6: Improve stair negotiation to SBA on 4 steps with 1 rail use to allow pt to enter and exit home       11/06/17 1015 11/06/17 1445   PT Individual Minutes   Time In 1030 1445   Time Out 1100 1515   Minutes 30 30   PT Concurrent Minutes   Time In 1046 2455   Time Out 7385 2062   Minutes 15 15       Electronically signed by Saul Jennings PTA on 11/6/17 at 5:07 PM

## 2017-11-06 NOTE — PLAN OF CARE
Problem: Nutrition  Goal: Optimal nutrition therapy  Outcome: Ongoing  Nutrition Problem: Altered nutrition-related lab values  Intervention: Food and/or Nutrient Delivery: Continue current diet  Nutritional Goals: PO intake greater than 75% at meals with improvement in glucose control

## 2017-11-06 NOTE — PROGRESS NOTES
dyspnea on exertion, palpitations or loss of consciousness. · Respiratory: Negative for cough or wheezing, sputum production, hemoptysis, pleuritic pain. · Gastrointestinal: Negative for nausea/vomiting, change in bowel habits, abdominal pain, dysphagia/appetite loss, hematemesis, blood in stools. · Genitourinary:Negative for change in bladder habits, dysuria, trouble voiding, hematuria. · positivefor gait disturbance, weakness, joint complaints. · Integumentary: Negative for rash, pruritis. · Neurological: Negative for headache, dizziness,pos change in muscle strength, numbness/tingling,pos change in gait, balance, coordination,   · Endocrine: negative for temperature intolerance, excessive thirst, fluid intake, or urination, tremor. · Hematologic/Lymphatic: negative for abnormal bruising or bleeding, blood clots, swollen lymph nodes. · Allergic/Immunologic: negative for nasal congestion, pruritis, hives. PHYSICAL EXAM:    /65   Pulse 86   Temp 98.2 °F (36.8 °C) (Oral)   Resp 12   Ht 5' (1.524 m)   Wt 253 lb (114.8 kg)   SpO2 94%   BMI 49.41 kg/m²      · General appearance: well nourished  · HEENT: Head: Normocephalic, no lesions, without obvious abnormality. · Lungs: clear to auscultation bilaterally  · Heart: regular rate and rhythm, S1, S2 normal, no murmur, click, rub or gallop  · Abdomen: soft, non-tender; bowel sounds normal; no masses,  no organomegaly  · Extremities: extremities normal, atraumatic, no cyanosis or edema  · Neurological: lt ue and le weakness 4/5 Skin -posrash, no lump   · Eye no icterus no redness  · Lymphatic system-no lymphadenopathy no splenomegaly       DIAGNOSTICS:    Laboratory Testing:  CBC:   Recent Labs      11/03/17 0641   HGB  10.0*     BMP:    Recent Labs      11/03/17 0641   NA  142   K  4.4   CL  105   CO2  25   BUN  8   CREATININE  0.73   GLUCOSE  84     S. Calcium:  Recent Labs      11/03/17 0641   CALCIUM  8.2*     S.  Ionized Calcium:No

## 2017-11-06 NOTE — PROGRESS NOTES
Nutrition Assessment    Type and Reason for Visit: Reassess    Nutrition Recommendations: Continue current diet    Malnutrition Assessment:  · Malnutrition Status: No malnutrition (Does not appear to meet criteria)    Nutrition Diagnosis:   · Problem: Altered nutrition-related lab values  · Etiology: related to Endocrine dysfunction (diabetes mellitus)     Signs and symptoms:  as evidenced by Lab values    Nutrition Assessment:  · Subjective Assessment: Pt states that she continues to eat well. No questions about the diet. Nurse reports sacral ulcer is healed. · Nutrition-Focused Physical Findings: Edema: +1 RUE, LUE, Generalized: +3 RLE, LLE  · Wound Type:  (Abrasion)  · Current Nutrition Therapies:  · Oral Diet Orders: Carb Control 4 Carbs/Meal   · Oral Diet intake: %, 51-75%  · Anthropometric Measures:  · Ht: 5' (152.4 cm)   · Current Body Wt: 253 lb 1.4 oz (114.8 kg) (Wt likely fluctuates with fluid balance)  · Admission Body Wt: 248 lb 0.3 oz (112.5 kg) (10-17)  · Ideal Body Wt: 100 lb (45.4 kg), % Ideal Body 248% (based on admission weight)  · BMI Classification: BMI > or equal to 40.0 Obese Class III  · Comparative Standards (Estimated Nutrition Needs):  · Estimated Daily Total Kcal: 8273-3605  · Estimated Daily Protein (g): 54-64    Estimated Intake vs Estimated Needs: Intake Meets Needs    Nutrition Risk Level: Moderate    Nutrition Interventions:   Continue current diet  Continued Inpatient Monitoring    Nutrition Evaluation:   · Evaluation: Progressing toward goals   · Goals: PO intake greater than 75% at meals with improvement in glucose control    · Monitoring: Meal Intake, Weight, Pertinent Labs, Diet Tolerance, Ascites/Edema    See Adult Nutrition Doc Flowsheet for more detail. Amedeo Fothergill, R.D., L.D.   Pager: [phone]

## 2017-11-06 NOTE — PROGRESS NOTES
Physical Medicine & Rehabilitation  Progress Note    2017 10:24 AM     CC: Ambulatory and ADL dysfunction due to CVA    Subjective:   Positive bowel movement . No complaints    ROS:  Denies fevers, chills, sweats. No chest pain, palpitations, lightheadedness. Denies coughing, wheezing or shortness of breath. Denies abdominal pain, nausea, diarrhea or constipation. No new areas of joint pain. Denies new areas of numbness or weakness. Denies new anxiety or depression issues. No new skin problems. Rehabilitation:      OT FIM:   Eatin - Feeds self with setup/supervision/cues and/or requires only setup/supervision/cues to perform tube feedings (pt was able to see food on L side of plate without cues)  Groomin - Requires setup/cues to do all tasks  Bathing: 3 - Able to bathe 5-7 areas (uses LHS) and mitt on LUE to wash RUE  Dressing-Upper: 5 - Requires setup/supervision/cues and/or requires assist with presthesis/brace only (extra time, set up, no cues needed)  Dressing-Lower: 2 - Requires assist with 4-5 parts of dressing (used reachers, wearing pullup brief, pants, ace wrap BLE, socks and shoes)  Toiletin - Able to perform 1 task only (e.g. hygiene) (assist needed due to LUE deficits and obesity)  Toilet Transfer: 4 - Requires steadying assistance only < 25% assist  Shower Transfer: 0 - Activity does not occur (bathed at sink, showered yesterday)     Social Interaction: 6 - Patient requires medication for mood and/or effect  Problem Solving: 3 - Patient solves simple/routine tasks 50%-74%  Memory: 3 - Patient remembers 50%-74% of the time    PT  Bed Mobility  Bridging: Minimal assistance  Rolling: Moderate assistance  Supine to Sit: Minimal assistance  Sit to Supine:  Moderate assistance (assist with LEs )  Scooting: Minimal assistance  Bed mobility  Bridging: Minimal assistance  Scooting: Minimal assistance     Transfers:  Sit to Stand: Minimal Assistance  Stand to sit: Contact guard acetaminophen **OR** HYDROcodone 5 mg - acetaminophen, nicotine, ondansetron, dextrose, dextrose, glucagon (rDNA), glucose, acetaminophen, magnesium hydroxide     Diagnostics:     CBC:   Recent Labs      11/06/17   0618   WBC  4.8   RBC  3.11*   HGB  9.5*   HCT  30.0*   MCV  96.3   RDW  16.6*   PLT  245     BMP:   Recent Labs      11/06/17   0618   NA  140   K  4.1   CL  105   CO2  23   BUN  7*   CREATININE  0.60     BNP: No results for input(s): BNP in the last 72 hours. PT/INR: No results for input(s): PROTIME, INR in the last 72 hours. APTT: No results for input(s): APTT in the last 72 hours. CARDIAC ENZYMES: No results for input(s): CKMB, CKMBINDEX, TROPONINT in the last 72 hours. Invalid input(s): CKTOTAL;3  FASTING LIPID PANEL:  Lab Results   Component Value Date    CHOL 73 10/10/2017    HDL 39 (L) 10/10/2017    TRIG 66 10/10/2017     LIVER PROFILE: No results for input(s): AST, ALT, ALB, BILIDIR, BILITOT, ALKPHOS in the last 72 hours. I/O (24Hr): No intake or output data in the 24 hours ending 11/06/17 1024    Glu last 24 hour  Recent Labs      11/05/17   1047  11/05/17   1637  11/05/17   2043  11/06/17   0642   POCGLU  91  209*  102  79       No results for input(s): CLARITYU, COLORU, PHUR, SPECGRAV, PROTEINU, RBCUA, BLOODU, BACTERIA, NITRU, WBCUA, LEUKOCYTESUR, YEAST, GLUCOSEU, BILIRUBINUR in the last 72 hours. Urine   Culture 10/24/2017  9:15  [address]   NO SIGNIFICANT GROWTH      Urin cx  Culture 10/27/2017  5:20  [address]   NO SIGNIFICANT GROWTH          Impression/Plan:    Patient is a 26-year-old female with ADL and mobility dysfunction s/p MCA CVA.     1. Ambulatory and ADL dysfunction secondary to MCA CVA -continue rehab efforts, making progress, team conference reviewed, will need 24/ 7 care, discharge plan 11/9/2017 -To daughter's house. 2. R MCA cva with LHP- embolic.  Cardiology recommendations noted and internal medicine orders notedpatient

## 2017-11-06 NOTE — PATIENT CARE CONFERENCE
[address]   ACUTE REHABILITATION  TEAM CONFERENCE NOTE  Date: 17  Patient Name: Pineda Esposito       Room: 9986/3451-07  MRN: [id number]       : 1950  (75 y.o.)     Gender: female      Diagnosis: R MCA CVA, ICA stenosis, cystitits    NURSING  FIMS:  Bladder: 6 - Uses device independently (including EMPTYING of device, or uses medication)  Bladder Level of Assistance: 4- Minimal Assistance (Subject equal 75% or more)  Bladder Frequency of Accidents: 6 - No accidents: uses device  Bowel: 3 - Requires 25-49% assistance with device  Bowel Level of Assistance: 4- Minimal Assistance (Subject equal 75% or more)  Bowel Frequency of Accidents: 6 - No accidents: uses device   Bladder  Incontintent  Bowel   Continent  Intervention    Both Bowel & Bladder Program     Wounds/Incisions/Ulcers: Wounds present on - belly button scabbed and leg scabbed  Medication Education Program: Patient currently unable to manage medications and family being educated  Pain: no pain concerns to address    Fall Risk:  Falling star program initiated    PHYSICAL THERAPY  Bed mobility  Bridging: Minimal assistance  Scooting: Minimal assistance  Bed Mobility  Bridging: Minimal assistance  Rolling: Moderate assistance  Supine to Sit: Minimal assistance  Sit to Supine: Moderate assistance (assist with LEs )  Scooting: Minimal assistance      Transfers:  Sit to Stand: Minimal Assistance  Stand to sit: Contact guard assistance  Bed to Chair: Minimal assistance (With AD)  Stand Pivot Transfers: Minimal Assistance    WB Status: no restrictions  Ambulation 1  Surface: level tile;ramp  Device: Large Sajan Ela  Assistance: Minimal assistance  Quality of Gait: Cues for scanning to left during ambulations to watch for objects. Patient walks toward left into wall.   Distance: 100 ft x2;  48 ft x2 (Family education completed with amb.)  Ambulation 2  Surface - 2: carpet  Device 2: Large Sajan Ela  Assistance 2: Minimal ADL's and mobility. SPEECH THERAPY  Mod A memory, min A problem solving. Needs 24 hour care at d/c  Short Term Goal: min A memory, supervision level problem solving. RECREATIONAL THERAPY  Comment/Participation: Participating in unit program      NUTRITION  Weight: 253 lb (114.8 kg) / Body mass index is 49.41 kg/m². Diet Rx: CHO Control. PO intake appears adequate. Ref. Range 11/5/2017 07:25 11/5/2017 10:47 11/5/2017 16:37 11/5/2017 20:43 11/6/2017 06:42 11/6/2017 11:14 11/6/2017 16:24   POC Glucose Latest Ref Range: 65 - 105 mg/dL 74 91 209 (H) 102 79 119 (H) 142 (H)   Please see nutrition note for details. SOCIAL WORK ASSESSMENT  Assessment: with dtr  Pre-Admission Status:  Lives With: Alone (has been living with daughter since last month)  Type of Home: House  Home Layout: One level  Home Access: Stairs to enter with rails  Entrance Stairs - Number of Steps: 1  Entrance Stairs - Rails: Left  Bathroom Shower/Tub: Tub/Shower unit  Bathroom Toilet: Handicap height  Bathroom Equipment: Grab bars in shower  Bathroom Accessibility: Accessible  Home Equipment: Standard walker  Receives Help From: Family  ADL Assistance: Independent  Homemaking Assistance: Needs assistance  Meal Prep: Moderate (family cooks meals, pt warms meals as needed)  Cleaning: Total  Gardening: Total  Yard Work: Total  Shopping: Total  Homemaking Responsibilities: Yes  Meal Prep Responsibility: No (famil cook meals and patient warms foof using microwave)  Laundry Responsibility: Primary  Cleaning Responsibility: Primary  Shopping Responsibility: No (family do the grocery shopping)  Ambulation Assistance: Independent (uses of RW)  Transfer Assistance: Independent  Active : No  Occupation: Retired  Additional Comments: Pt states she was living with dtr prior to admit. Pt states she can go to son or dtr's home at d/c, but will most likely return to dtr's.      Family Education: Family Education Completed    Risk for Readmission: 4 steps with 1 rail use to allow pt to enter and exit home  OT:Long term goals  Time Frame for Long term goals : By discharge,   Long term goal 1: Pt. will demo. Mod.I with bed mobility. Long term goal 2: Pt. will demo. SBA with functional ADL transfers using appropriate AD with good . Long term goal 3: Pt. will demo. SBA with UB bathing/dressing, and min. A with LB bathing/dressing using AE as needed. Long term goal 4: Pt.will demo. SBA with toileting tasks. ST:  Mod I problem solving, supervision level memory    Team Members Present at Conference:  :  Magda Charlton  LSW  Occupational Therapist: Erik Flores OT   Physical [address] PT\"  Speech Therapist: Dianna SEGURACCC/SLP  Nurse: Meliza Bro RN   Recreation Therapy: John Gimenez  Dietary/Nutrition: Akosua Nicholas RD LD    Pastoral Care: Gabriela Gonsales  CMG:  Peace Sheridan RN    I approve the established interdisciplinary plan of care as documented within the medical record of Carmelita Child.     Aurelia Shearer MD

## 2017-11-07 LAB
GLUCOSE BLD-MCNC: 105 MG/DL (ref 65–105)
GLUCOSE BLD-MCNC: 219 MG/DL (ref 65–105)
GLUCOSE BLD-MCNC: 53 MG/DL (ref 65–105)
GLUCOSE BLD-MCNC: 84 MG/DL (ref 65–105)

## 2017-11-07 PROCEDURE — 97116 GAIT TRAINING THERAPY: CPT

## 2017-11-07 PROCEDURE — 6370000000 HC RX 637 (ALT 250 FOR IP): Performed by: PHYSICAL MEDICINE & REHABILITATION

## 2017-11-07 PROCEDURE — 97110 THERAPEUTIC EXERCISES: CPT

## 2017-11-07 PROCEDURE — 6370000000 HC RX 637 (ALT 250 FOR IP): Performed by: INTERNAL MEDICINE

## 2017-11-07 PROCEDURE — 97530 THERAPEUTIC ACTIVITIES: CPT

## 2017-11-07 PROCEDURE — 87086 URINE CULTURE/COLONY COUNT: CPT

## 2017-11-07 PROCEDURE — 86403 PARTICLE AGGLUT ANTBDY SCRN: CPT

## 2017-11-07 PROCEDURE — 6370000000 HC RX 637 (ALT 250 FOR IP): Performed by: FAMILY MEDICINE

## 2017-11-07 PROCEDURE — 97535 SELF CARE MNGMENT TRAINING: CPT

## 2017-11-07 PROCEDURE — 97532 HC COGNITIVE THERAPY 15 MIN: CPT

## 2017-11-07 PROCEDURE — 6360000002 HC RX W HCPCS: Performed by: INTERNAL MEDICINE

## 2017-11-07 PROCEDURE — 99232 SBSQ HOSP IP/OBS MODERATE 35: CPT | Performed by: PHYSICAL MEDICINE & REHABILITATION

## 2017-11-07 PROCEDURE — 1180000000 HC REHAB R&B

## 2017-11-07 PROCEDURE — 87077 CULTURE AEROBIC IDENTIFY: CPT

## 2017-11-07 PROCEDURE — 99232 SBSQ HOSP IP/OBS MODERATE 35: CPT | Performed by: INTERNAL MEDICINE

## 2017-11-07 PROCEDURE — 87186 SC STD MICRODIL/AGAR DIL: CPT

## 2017-11-07 PROCEDURE — 82947 ASSAY GLUCOSE BLOOD QUANT: CPT

## 2017-11-07 RX ADMIN — MYCOPHENOLATE MOFETIL 1500 MG: 250 CAPSULE ORAL at 07:23

## 2017-11-07 RX ADMIN — APIXABAN 5 MG: 5 TABLET, FILM COATED ORAL at 07:24

## 2017-11-07 RX ADMIN — MYCOPHENOLATE MOFETIL 1500 MG: 250 CAPSULE ORAL at 20:23

## 2017-11-07 RX ADMIN — FAMOTIDINE 40 MG: 20 TABLET ORAL at 07:24

## 2017-11-07 RX ADMIN — ATORVASTATIN CALCIUM 40 MG: 40 TABLET, FILM COATED ORAL at 07:23

## 2017-11-07 RX ADMIN — CLOPIDOGREL BISULFATE 75 MG: 75 TABLET ORAL at 07:24

## 2017-11-07 RX ADMIN — PAROXETINE HYDROCHLORIDE 20 MG: 20 TABLET, FILM COATED ORAL at 07:23

## 2017-11-07 RX ADMIN — Medication 24 UNITS: at 20:25

## 2017-11-07 RX ADMIN — DOCUSATE SODIUM 100 MG: 100 CAPSULE, LIQUID FILLED ORAL at 07:23

## 2017-11-07 RX ADMIN — Medication 1 TABLET: at 07:24

## 2017-11-07 RX ADMIN — SPIRONOLACTONE 50 MG: 25 TABLET, FILM COATED ORAL at 07:24

## 2017-11-07 RX ADMIN — FERROUS SULFATE TAB 325 MG (65 MG ELEMENTAL FE) 325 MG: 325 (65 FE) TAB at 07:24

## 2017-11-07 RX ADMIN — APIXABAN 5 MG: 5 TABLET, FILM COATED ORAL at 20:24

## 2017-11-07 RX ADMIN — MICONAZOLE NITRATE: 1.42 POWDER TOPICAL at 07:23

## 2017-11-07 RX ADMIN — MICONAZOLE NITRATE: 1.42 POWDER TOPICAL at 20:24

## 2017-11-07 RX ADMIN — INSULIN LISPRO 4 UNITS: 100 INJECTION, SOLUTION INTRAVENOUS; SUBCUTANEOUS at 16:33

## 2017-11-07 RX ADMIN — MULTIPLE VITAMINS W/ MINERALS TAB 1 TABLET: TAB at 07:23

## 2017-11-07 RX ADMIN — FERROUS SULFATE TAB 325 MG (65 MG ELEMENTAL FE) 325 MG: 325 (65 FE) TAB at 16:33

## 2017-11-07 ASSESSMENT — PAIN SCALES - GENERAL: PAINLEVEL_OUTOF10: 0

## 2017-11-07 NOTE — PROGRESS NOTES
the cards laid out. Session was conducted in [address] [place] [address] Dr office.      Plan:  [x] Continue ST services    [] Discharge from ST:      Discharge recommendations: [] Inpatient Rehab   [] [place]   [] Outpatient Therapy  [] Follow up at trauma clinic   [] Other:       Treatment completed by: Brigido Barkley,  clinician

## 2017-11-07 NOTE — PATIENT CARE CONFERENCE
assistance  Quality of Gait 2: Cues to scan environment during floor transition. Safety concerns  Distance: 20 ft    Stairs  # Steps : 6  Stairs Height: 4\"  Rails: Right ascending  Device: Hand Held Assist (L hand)  Assistance: Moderate assistance  Comment: Ascending FWD and Descending Retro. Patient fearful when descending FWD due to visual deficits. FIMS:  Bed, Chair, Wheel Chair: 3 - Requires 25-49% assistance to transfer  Walk: 2 - Maximal Assistance Requires up to Maximal Assistance AND requires assistance of one person to walk/operate wheelchair between  feet (Patient performs 25-49% of locomotion effort or goes between  feet)  Distance Walked: 100 ft  Wheel Chair: 2 - Maximal Assistance Requires up to [address] requires assistance of one person to walk/operate wheelchair between [facility] [address] in [address] [place] [address] Street: 60 ft  Stairs: 2- Maximal Assistance Performs 25-49% of the effort to go up and down 4 to 6 stairs and requires the assistance of one person only    PT Equipment Recommendations  Equipment Needed: Yes (Order given to  for [facility] and )  Cane: Primo Round  Wheelchair: Light Weight  Other: TBD    Assessment: Pt presents s/p CVA with L hemiplegia, ICA occlusion requiring stent, and cystitis, hydronephritis, and kidney stone requiring ureteral stent. Pt demo's impaired balance, strength, endurance, safety awareness and needs assist with moibility. Cont per POC to prepare for home. Will need to determine appropriate home set up (dtr vs son vs pt's own home) as rehab progresses. Goals  Short term goals  Time Frame for Short term goals: 7 days  Short term goal 1: Improve L UE and LE strength by 1/2 MMG  Short term goal 2:  Increase endurance to good  Short term goal 3: Improve sitting balance to good  Short term goal 4: Improve bed mobility to min A x1  Short term goal 5: Improve transfers to CGA A x1        OCCUPATIONAL THERAPY  FIMS:  Eatin - Feeds self with setup/supervision/cues and/or requires only setup/supervision/cues to perform tube feedings (pt was able to see food on L side of plate without cues)  Groomin - Requires setup/cues to do all tasks  Bathing: 3 - Able to bathe 5-7 areas (uses LHS, assist to wash buttocks)  Dressing-Upper: 5 - Requires setup/supervision/cues and/or requires assist with presthesis/brace only (set up, cues with distinqushing front/back )  Dressing-Lower: 2 - Requires assist with 4-5 parts of dressing (pt. wears pull ups, Ace wraps of angie. LE, socks and shoes, assist with pulling up pants and pull ups over the left hip)  Toiletin - Able to perform 1 task only (e.g. hygiene)  Toilet Transfer: 4 - Requires steadying assistance only < 25% assist  Primary Mode: Shower  Tub Transfer: 0 - Activity does not occur  Shower Transfer: 4 - Minimal contact assistance, pt. expends 75% or more effort         Assessment: pt is progressing towards goals but would benifit from contd skilled therapies to increase overall strength, endurance and balance necessary to promote ease with increased adl engagement and functional transfers/mobility    Short term goals  Time Frame for Short term goals: One week,   Short term goal 1: Pt. will demo. CGA with bed mobility. Short term goal 2: Pt. will demo. min. A with toilet transfer and mod. A with toileting tasks with good . Short term goal 3: Pt. will demo. min. A with UB bathing/dressing, and MOd. A with LB bathing/dressing using AE as needed. Short term goal 4: Pt. will participate in left UE ROM and stgth ex. to assist with functional tasks. Short term goal 5: Pt. will tolerate facilitation tech. to elicit movement in left wrist and hand to assist with functional tasks. Short term goal 6: Pt. will attend to left side of body and L visual field with min. vc's 90% of the time  Short term goal 7: Pt. will tolerate 5-8 min.  functional standing activities to promote increased indep.with ADL's and mobility. SPEECH THERAPY  Mod A memory, min A problem solving. Needs 24 hour care at d/c  Short Term Goal: min A memory, supervision level problem solving. RECREATIONAL THERAPY  Comment/Participation: Participating in unit program      NUTRITION  Weight: 253 lb (114.8 kg) / Body mass index is 49.41 kg/m². Diet Rx: CHO Control. PO intake appears adequate. Ref. Range 11/5/2017 07:25 11/5/2017 10:47 11/5/2017 16:37 11/5/2017 20:43 11/6/2017 06:42 11/6/2017 11:14 11/6/2017 16:24   POC Glucose Latest Ref Range: 65 - 105 mg/dL 74 91 209 (H) 102 79 119 (H) 142 (H)   Please see nutrition note for details. SOCIAL WORK ASSESSMENT  Assessment: with dtr  Pre-Admission Status:  Lives With: Alone (has been living with daughter since last month)  Type of Home: House  Home Layout: One level  Home Access: Stairs to enter with rails  Entrance Stairs - Number of Steps: 1  Entrance Stairs - Rails: Left  Bathroom Shower/Tub: Tub/Shower unit  Bathroom Toilet: Handicap height  Bathroom Equipment: Grab bars in shower  Bathroom Accessibility: Accessible  Home Equipment: Standard walker  Receives Help From: Family  ADL Assistance: Independent  Homemaking Assistance: Needs assistance  Meal Prep: Moderate (family cooks meals, pt warms meals as needed)  Cleaning: Total  Gardening: Total  Yard Work: Total  Shopping: Total  Homemaking Responsibilities: Yes  Meal Prep Responsibility: No (famil cook meals and patient warms foof using microwave)  Laundry Responsibility: Primary  Cleaning Responsibility: Primary  Shopping Responsibility: No (family do the grocery shopping)  Ambulation Assistance: Independent (uses of RW)  Transfer Assistance: Independent  Active : No  Occupation: Retired  Additional Comments: Pt states she was living with dtr prior to admit. Pt states she can go to son or dtr's home at d/c, but will most likely return to dtr's.      Family Education: Family Education Completed    Risk for Readmission: Moderate 10-13.9   Readmission Risk              Readmission Risk:        10.75       Age 72 or Greater:  1    Admitted from SNF or Requires Paid or Family Care:  0    Currently has CHF,COPD,ARF,CRI,or is on dialysis:  0    Takes more than 5 Prescription Medications:  4    Takes Digoxin,Insulin,Anticoagulants,Narcotics or ASA/Plavix:  2    [address] in Past 12 Months:  0    On Disability:  0    Patient Considers own Health:  3.75        Critical Items:      Problem / Barrier Intervention / Plan  Results   Alteration in ADL Functional Status  Training in use of devices and modified care techniques for safety and independence in care tasks      L neglect Verbal, visual cues     Impaired recall Retraining.      Impaired mobility Transfer/gait training with AD; therapeutic exer; balance activities; stair training                            Functional FIM Gain  Admission Score:  50  Progress:  51, 67, 69  Goal:  89   `  Discharge Plan   Estimated Discharge Date: 11/9/17  Overnight or Day Pass: No  Factors facilitating achievement of predicted outcomes: Family support, Pleasant and Has needed Durable Medical Equipment at home  Barriers to the achievement of predicted outcomes: Confusion, Cognitive deficit, Limited participation, Decreased endurance and Incontinence of bladder    Functional Goals at discharge:  Home with family Minimal contact assistance  Discharge therapy goals:  PT: Long term goals  Time Frame for Long term goals : 21 days  Long term goal 1:  Increase UEs and LEs strength to [facility] to maximize mobiility  Long term goal 2: Improve standing balance with appropriate device to good to increase safety  Long term goal 3: Improve bed mobility to independent to prepare for home  Long term goal 4: Improve transfers with appropriate device to mod I to prepare for home  Long term goal 5: Improve gait with appropriate device to mod I 150 ft to prepare for home  Long term goal 6: Improve stair negotiation to SBA on

## 2017-11-07 NOTE — PROGRESS NOTES
[address] [facility]    ACUTE REHABILITATION OCCUPATIONAL THERAPY  DAILY NOTE    Date: 17  Patient Name: Jesenia Campbell      Room: 1835/7650-14    MRN: [id number]   : 1950  (74 y.o.)  Gender: female      Diagnosis: R CVA, Acute ischmic right MCA stroke,   Additional Pertinent Hx: UTI, s/p Ureteral stone removed dueing stent placement 10/10/17, h/o CVA a year ago    Restrictions  Restrictions/Precautions: General Precautions, Fall Risk  Other position/activity restrictions: up with assistance  Position Activity Restriction  Other position/activity restrictions: up with assistance     Subjective  Comments: Pt. pleasant and cooperative. Patient Currently in Pain: Denies  Pain Level: 0  Restrictions/Precautions: General Precautions; Fall Risk        Objective  Perception  Overall Perceptual Status: Impaired  Unilateral Attention: Cues to attend left visual field  Initiation: Cues to initiate tasks  Balance  Sitting Balance: Independent (static sit at EOB)  Standing Balance: Contact guard assistance  Bed mobility  Supine to Sit: Minimal assistance (with HOB elevated, increased time)  Transfers  Stand Step Transfers: Contact guard assistance (using kelechi walker)  Stand Pivot Transfers: Contact guard assistance (using kelechi walker)  Sit to stand: Minimal assistance  Stand to sit: Minimal assistance  Standing Balance  Time: AM: 2-3 min, 1-2 min PM: 1-2 min x 2, 2-3 min   Activity: AM: ADL's PM: toileting task, static standing at tabletop to increase standing tolerance for functional tasks. Sit to stand: Minimal assistance  Stand to sit: Minimal assistance  Comment: VC's for hand placement   Functional Mobility  Functional - Mobility Device: Hemiwalker  Activity: To/from bathroom  Assist Level: Contact guard assistance  Functional Mobility Comments: vc's with kelechi-walker positioning  and .      Type of ROM/Therapeutic Exercise  Type of ROM/Therapeutic Exercise: AROM;AAROM  Comment: Cone reaching/grasping using L UE towards L side of body (minimal cues to visually locate); mod-max A to to fully grasp cone 2* to decreased wrist and digit mvmt. Exercises  Shoulder Flexion: AROM  Shoulder Extension: AROM  Shoulder ABduction: AAROM  Shoulder ADduction: AAROM  Elbow Flexion: 1# cane; B UEs, A to stabilize L wrist in neurtral to prevent wrist flexion contracture. Wrist Flexion: AAROM  Wrist Extension: AAROM  Finger Flexion: AAROM  Finger Extension: AAROM  Other: A for digit opposition. Additional Activities: Other  Additional Activities: Attempted p/u of resistive ringed velcro pieces using L hand-unsuccessful. OT FIM:   Eatin - Feeds self with setup/supervision/cues and/or requires only setup/supervision/cues to perform tube feedings  Groomin - Requires setup/cues to do all tasks (A to jed toothpaste onto toothbrush. )  Bathin - Able to bathe all 10 areas with setup/sup/cues (pt washes UB and upper LE's only at sinkside. )  Dressing-Upper: 5 - Requires setup/supervision/cues and/or requires assist with presthesis/brace only (set up, cues to obtain proper orientation of shirt. )  Dressing-Lower: 2 - Requires assist with 4-5 parts of dressing (A Ace wrap BLEs, jed socks/shoes, thread LLE, pull up on L side)  Toiletin - Able to perform 1 task only (e.g. hygiene)  Toilet Transfer: 4 - Requires steadying assistance only < 25% assist  Primary Mode: Shower  Tub Transfer: 0 - Activity does not occur  Shower Transfer: 0 - Activity does not occur    Assessment  Performance deficits / Impairments: Decreased functional mobility ; Decreased balance;Decreased ADL status; Decreased endurance;Decreased strength;Decreased vision/visual deficit; Decreased coordination;Decreased cognition;Decreased safe awareness;Decreased ROM  Prognosis: Fair  Discharge Recommendations: 24 hour supervision or assist  Activity Tolerance: Patient Tolerated treatment well  Safety Devices in place: Yes  Type of devices: Chair alarm in place;Call light within reach; Left in chair  Equipment Recommendations  Equipment Needed: Yes     Plan  Plan  Times per week: 5-7x/week  Times per day: Twice a day  Current Treatment Recommendations: Balance Training, Functional Mobility Training, Endurance Training, Safety Education & Training, Self-Care / ADL, Equipment Evaluation, Education, & procurement, Patient/Caregiver Education & Training, Strengthening, ROM, Neuromuscular Re-education, Cognitive Reorientation, Cognitive/Perceptual Training, Home Management Training  Plan Comment: continue OT  Patient Goals   Patient goals : to be able to do things for myself  Short term goals  Time Frame for Short term goals: One week,   Short term goal 1: Pt. will demo. CGA with bed mobility. Short term goal 2: Pt. will demo. min. A with toilet transfer and mod. A with toileting tasks with good . Short term goal 3: Pt. will demo. min. A with UB bathing/dressing, and MOd. A with LB bathing/dressing using AE as needed. Short term goal 4: Pt. will participate in left UE ROM and stgth ex. to assist with functional tasks. Short term goal 5: Pt. will tolerate facilitation tech. to elicit movement in left wrist and hand to assist with functional tasks. Short term goal 6: Pt. will attend to left side of body and L visual field with min. vc's 90% of the time  Short term goal 7: Pt. will tolerate 5-8 min. functional standing activities to promote increased indep.with ADL's and mobility. Long term goals  Time Frame for Long term goals : By discharge,   Long term goal 1: Pt. will demo. Mod.I with bed mobility. Long term goal 2: Pt. will demo. SBA with functional ADL transfers using appropriate AD with good . Long term goal 3: Pt. will demo. SBA with UB bathing/dressing, and min. A with LB bathing/dressing using AE as needed. Long term goal 4: Pt.will demo. SBA with toileting tasks.         11/07/17 0951 11/07/17 1454   OT Individual Minutes   Time In 0061 [id number]   Time Out [id number]   Minutes 67 29     Electronically signed by GRACIA Perry on 11/7/17 at 3:46 PM

## 2017-11-07 NOTE — CONSULTS
Dear Lenora Cordoba DO,     I had the pleasure of seeing  Mikki Parra [facility]. [address]    Date:   11/7/2017  Patient name:  Mikki Prara  Date of admission:  10/17/2017  7:07 PM  MRN:   [id number]  YOB: 1950    Patient seen on the request of Primary Michele Rose MD      HPI-  Admitted with rmca cva  And left hydrnephrosis and sepsis from this  HTN  Onset more than 2 years ago  jane mild to mod  Controlled with current po meds  Not associated with headaches or blurry vision  No chest pain    HLD  Onset more than 5 years ago  Severity is mild, not getting worse  Not associated with pancreatitis  Tolerating statin well no muscle pain              REVIEW OF SYSTEMS:    · Cardiovascular: Negative for lightheadedness/orthostatic symptoms ,chest pain, dyspnea on exertion, palpitations or loss of consciousness. · Respiratory: Negative for cough or wheezing, sputum production, hemoptysis, pleuritic pain. · Gastrointestinal: Negative for nausea/vomiting, change in bowel habits, abdominal pain, dysphagia/appetite loss, hematemesis, blood in stools. Positive for rue weakness        OBJECTIVE:    BP (!) 126/52   Pulse 85   Temp 98.6 °F (37 °C)   Resp 18   Ht 5' (1.524 m)   Wt 253 lb (114.8 kg)   SpO2 97%   BMI 49.41 kg/m²      · Lungs: clear to auscultation bilaterally,no wheeze. · Heart: regular rate and rhythm, S1, S2 normal, no murmur, click, rub or gallop  · Abdomen: soft, non-tender; bowel sounds normal; no masses,  no organomegaly  · Morbid obese  · legd edema  ·   · Extremities: extremities normal, atraumatic,  · nocysnos  · Leg edema  ·   · Neurological: . Reflexes normal and symmetric.  Sensation grossly normal  · righ uper ext 3/5  · Limited mobility  · Gait ataxia  ·   · Skin - no rash, no lump   · Eye- no icterus no redness  · GI-oral mucosa moist,  · Lymphatic system-no

## 2017-11-07 NOTE — PROGRESS NOTES
Speech Language Pathology  Speech Language Pathology  [facility]    Cognitive Treatment Note    Date: 11/7/2017  Patients Name: Meda Krabbe  MRN: [id number]  Diagnosis:   Patient Active Problem List   Diagnosis Code    Hydronephrosis of left kidney N13.30    Acute cystitis with hematuria N30.01    CKD (chronic kidney disease), stage III N18.3    Hypotension arterial I95.9    Essential hypertension I10    Morbid obesity with BMI of 40.0-44.9, adult ([facility] E66.01, Z68.41    History of arterial ischemic stroke Z86.73    History of pulmonary embolus (PE) Z86.711    Paroxysmal atrial fibrillation (HCC) I48.0    Left ureteral stone N20.1    Left-sided weakness R53.1    Non-intractable vomiting with nausea R11.2    Cerebrovascular accident (CVA) due to embolism of right middle cerebral artery (HCC) I63.411    Hypotension I95.9    Sepsis secondary to UTI ([facility] A41.9, N39.0    Elevated serum creatinine R79.89    Kidney stone N20.0    Anemia D64.9    Hypokalemia E87.6    Acute ischemic right MCA stroke (HCC) I63.511    Right-sided carotid artery disease (HCC) I77.9    Incontinence associated dermatitis L30.8, R32    Cerebrovascular accident (CVA) due to stenosis of right carotid artery (HCC) I63.231       Pain: 0/10    Cognitive Treatment    Treatment time: 2327-6920      Subjective: [x] Alert [x] Cooperative     [] Confused     [] Agitated    [] Lethargic      Objective/Assessment:    Attention: Pt able to sustain attn Argen[place]. Orientation: Pt disoriented to date [employer]; when told she was incorrect, she referenced external aid successfully. Pt oriented to hospital name 2/2 trials c independent reference to external aid; this is the first day in which pt was able to locate information on whiteboard Argenis. Pt oriented to day of week and time of day c independent reference to external aid.      Recall:  When pt was asked what she remembered about card game taught by Karishma Vegas Dr yesterday, she stated

## 2017-11-07 NOTE — CONSULTS
40.0-44.9, adult ([facility]    History of arterial ischemic stroke    History of pulmonary embolus (PE)    Paroxysmal atrial fibrillation (HCC)    Left ureteral stone    Left-sided weakness    Non-intractable vomiting with nausea    Cerebrovascular accident (CVA) due to embolism of right middle cerebral artery (HCC)    Hypotension    Sepsis secondary to UTI ([facility]    Elevated serum creatinine    Kidney stone    Anemia    Hypokalemia    Acute ischemic right MCA stroke (HCC)    Right-sided carotid artery disease (HCC)    Incontinence associated dermatitis    Cerebrovascular accident (CVA) due to stenosis of right carotid artery (HCC)       PLAN:  Sepsis from pyelonephritis  abx cipro   cva with gait atxia  Morbid obese      nov7  Repeat urine culture  Will rev with urine cx      Beni Ryan MD  [address] Phone [phone]   Fax: [phone]  Answering Service: [phone]            I hope this updates you on my evaluation and clinical thinking. Thank you for allowing me to participate in [facility].  Please do not hesitate to contact me on my office number at [phone]    Sincerely,        Beni Ryan 11/7/2017 12:20 PM

## 2017-11-07 NOTE — PROGRESS NOTES
Physical Therapy  [address]  Acute Rehabilitation Physical Therapy Progress Note    Date: 17  Patient Name: Minh Franks       Room: 7477/6882-59  MRN: [id number]   Account: [de-identified]   : 1950  (75 y.o.) Gender: female        Diagnosis: R CVA, Acute ischmic right MCA stroke,   Past Medical History:  has a past medical history of Bullous pemphigoid; CVA (cerebral vascular accident) ([facility] [address] Diabetes mellitus ([facility] [address] Other disorders of skin and subcutaneous tissue in diseases classified elsewhere; Patient in clinical research study; and [facility] Past Surgical History:   has a past surgical history that includes Cholecystectomy; hernia repair; and Cystoscopy (Left, 10/10/2017). Additional Pertinent Hx: Pt presents to ER on 10/9/17 with c/o vomiting and severe abdominal and back pain. Pt was found to have acute cystitis, kidney stone and hydronephrosis. On 10/10/17, pt dev L sided weakness and facial droop, found to have acute R MCA infarct. Pt underwent emergent cysto with ureteral stent placement 10/10/2017. Pt had angiogram and found to have ICA stenosis, stent placed 10/12/17. Additional PMH: CKD, A fib, morbid obesity, HTN, pt is in clinical trial that will end in 2018- unspecified. Overall Orientation Status: Impaired (Confusion and difficulty recalling previous sessions)  Orientation Level: Oriented X4  Restrictions/Precautions  Restrictions/Precautions: General Precautions; Fall Risk  Required Braces or Orthoses?: No  Position Activity Restriction  Other position/activity restrictions: up with assistance    Subjective: Patient not able to recall previous PT session. Pleasant and cooperative with PT. Comments: Left side neglect    Vital Signs  Patient Currently in Pain: Denies                   Bed Mobility:   Bridging: Minimal assistance  Rolling:  Moderate assistance  Supine to Sit: Minimal assistance;Contact guard assistance  Sit to Supine: Moderate assistance (assist with LEs )  Scooting: Contact guard assistance      Transfers:  Sit to Stand: Minimal Assistance  Stand to sit: Contact guard assistance (Cues for hand placement.)  Bed to Chair: Minimal assistance;Contact guard assistance  Stand Pivot Transfers: Contact guard assistance        WB Status: no restrictions  Ambulation 1  Surface: level tile;ramp  Device: Large Sajan [place]  Assistance: Minimal assistance  Quality of Gait: Cues to scan environment and to the left. Cues to increase L LE step length. Distance: 100 ft x2;  48 ft x2 (Family education completed with amb.)  Comments: Increased fatigue and end of amb. distance and needs seated break. Stairs/Curb  Stairs?: Yes  Stairs  # Steps : 6  Stairs Height: 4\"  Rails: Right ascending  Device: Hand Held Assist (L hand)  Assistance: Minimal assistance  Comment: Ascending FWD and Descending Retro. Patient fearful when descending FWD due to visual deficits. Wheelchair Activities  Wheelchair Size: 22x18  Wheelchair Type: Standard  Wheelchair Cushion: Standard  Propulsion 1  Propulsion: Manual  Level: Level Tile  Method: RUE;RLE;LLE  Level of Assistance: Supervision  Description/ Details: limited by low endurance/weakness  Distance: 48 ft                   FIMS:      Transfers  Bed, Chair, Wheel Chair: 3 - Requires 25-49% assistance to transfer   Locomotion  Primary Mode:  Both  Distance Walked: 100 ft  Distance Traveled in [facility] Chair: 50 ft  Walk: 2 - Maximal Assistance Requires up to [address] requires assistance of one person to walk/operate wheelchair between  feet (Patient performs 25-49% of locomotion effort or goes between  feet)  Wheel Chair: 2 - Maximal Assistance Requires up to [address] requires assistance of one person to walk/operate wheelchair between  feet  Stairs: 2- Maximal Assistance Performs 25-49% of the effort to go up and down 4 to 6 stairs and requires Improve standing balance with appropriate device to good to increase safety  Long term goal 3: Improve bed mobility to independent to prepare for home  Long term goal 4: Improve transfers with appropriate device to mod I to prepare for home  Long term goal 5: Improve gait with appropriate device to mod I 150 ft to prepare for home  Long term goal 6: Improve stair negotiation to SBA on 4 steps with 1 rail use to allow pt to enter and exit home       11/07/17 1015 11/07/17 1300   PT Individual Minutes   Time In 1015 1300   Time Out 1115 1332   Minutes 60 32       Electronically signed by Eliceo Cao PTA on 11/7/17 at 1:57 PM

## 2017-11-07 NOTE — PROGRESS NOTES
Physical Medicine & Rehabilitation  Progress Note    2017 11:24 AM     CC: Ambulatory and ADL dysfunction due to CVA    Subjective:   Positive bowel movement Monday. No complaints    ROS:  Denies fevers, chills, sweats. No chest pain, palpitations, lightheadedness. Denies coughing, wheezing or shortness of breath. Denies abdominal pain, nausea, diarrhea or constipation. No new areas of joint pain. Denies new areas of numbness or weakness. Denies new anxiety or depression issues. No new skin problems. Rehabilitation:     OT FIM:   Groomin - Requires setup/cues to do all tasks  Bathing: 3 - Able to bathe 5-7 areas (uses LHS, assist to wash buttocks)  Dressing-Upper: 5 - Requires setup/supervision/cues and/or requires assist with presthesis/brace only (set up, cues with distinqushing front/back )  Dressing-Lower: 2 - Requires assist with 4-5 parts of dressing (pt. wears pull ups, Ace wraps of angie. LE, socks and shoes, assist with pulling up pants and pull ups over the left hip)  Toiletin - Able to perform 1 task only (e.g. hygiene)  Toilet Transfer: 4 - Requires steadying assistance only < 25% assist  Primary Mode: Shower  Tub Transfer: 0 - Activity does not occur  Shower Transfer: 4 - Minimal contact assistance, pt. expends 75% or more effort     Social Interaction: 6 - Patient requires medication for mood and/or effect  Problem Solvin - Patient solves simple/routine tasks 75-90%+   Memory: 3 - Patient remembers 50%-74% of the time    PT  Bed Mobility  Bridging: Minimal assistance  Rolling: Moderate assistance  Supine to Sit: Minimal assistance  Sit to Supine:  Moderate assistance (assist with LEs )  Scooting: Minimal assistance  Bed mobility  Bridging: Minimal assistance  Scooting: Minimal assistance     Transfers:  Sit to Stand: Minimal Assistance  Stand to sit: Contact guard assistance  Bed to Chair: Minimal assistance (With AD)  Stand Pivot Transfers: Minimal Assistance        WB Status: no restrictions  Ambulation 1  Surface: level tile  Device: Large Sajan [place]  Assistance: Minimal assistance  Quality of Gait: Cues for scanning to left during ambulations to watch for objects. Patient walks toward left into wall. Distance: 100 ft x2 (Family education completed with amb.)  Ambulation 2  Surface - 2: carpet  Device 2: Large Sajan [place]  Assistance 2: Minimal assistance  Quality of Gait 2: Cues to scan environment during floor transition. Safety concerns  Distance: 20 ft     Stairs/Curb  Stairs?: Yes  Stairs  # Steps : 6  Stairs Height: 4\"  Rails: Right ascending  Device: Hand Held Assist (L hand)  Assistance: Moderate assistance  Comment: Ascending FWD and Descending Retro. Patient fearful when descending FWD due to visual deficits.      Wheelchair Activities  Wheelchair Size: 22x18  Wheelchair Type: Standard  Wheelchair Cushion: Standard  Propulsion 1  Propulsion: Manual  Level: Level Tile  Method: RUE;RLE;LLE  Level of Assistance: Supervision  Description/ Details: limited by low endurance/weakness  Distance: 60 ft    Medications   Scheduled Meds:   spironolactone  50 mg Oral Daily    PARoxetine  20 mg Oral QAM    insulin glargine  24 Units Subcutaneous Nightly    apixaban  5 mg Oral BID    clopidogrel  75 mg Oral Daily    mycophenolate  1,500 mg Oral BID    miconazole   Topical BID    atorvastatin  40 mg Oral Daily    Calcium Carb-Cholecalciferol  1 tablet Oral Daily    docusate sodium  100 mg Oral Daily    famotidine  40 mg Oral Daily    ferrous sulfate  325 mg Oral BID WC    insulin lispro  0-12 Units Subcutaneous TID WC    insulin lispro  0-6 Units Subcutaneous Nightly    polyethylene glycol  17 g Oral Daily    therapeutic multivitamin-minerals  1 tablet Oral Daily     Continuous Infusions:   dextrose       PRN Meds:.sodium chloride, HYDROcodone 5 mg - acetaminophen **OR** HYDROcodone 5 mg - acetaminophen, nicotine, ondansetron, dextrose, dextrose, glucagon Eliquis as above. 4. Blood pressure off Lasix due to low BP per IM, continue to monitor. Patient on spironolactone  5. Anemia- protonix, had transfusion in past.  No obvious blood loss. Hemoglobin 9.5 on 11/6/2017 asymptomatic. Continue iron supplementation. Monitor H&H  6. Constipation - Colace  7. Reflux - Pepcid  8. Dep - on paxil 20 monitor. 9. Bulbous pemphigus-  on CellCept 1500 mg twice a day for approximately 15 years for bulbous pemphigus. 10. Sacral ulcer- wound care management. Change mepilex q 3 days    11. H/O PE/DVT prophylaxis -  on Eliquis  12. DM- Cont lantus. IM following for medical management. medications adjusted per internal medicine. 13. Mild confusion -no change- neurologic exam no significant change. 14.  Internal medicine for medical management.     Luc Mcclain MD

## 2017-11-08 LAB
GLUCOSE BLD-MCNC: 140 MG/DL (ref 65–105)
GLUCOSE BLD-MCNC: 203 MG/DL (ref 65–105)
GLUCOSE BLD-MCNC: 81 MG/DL (ref 65–105)

## 2017-11-08 PROCEDURE — 97110 THERAPEUTIC EXERCISES: CPT

## 2017-11-08 PROCEDURE — 99231 SBSQ HOSP IP/OBS SF/LOW 25: CPT | Performed by: INTERNAL MEDICINE

## 2017-11-08 PROCEDURE — 97535 SELF CARE MNGMENT TRAINING: CPT

## 2017-11-08 PROCEDURE — 82947 ASSAY GLUCOSE BLOOD QUANT: CPT

## 2017-11-08 PROCEDURE — 97532 HC COGNITIVE THERAPY 15 MIN: CPT

## 2017-11-08 PROCEDURE — 99232 SBSQ HOSP IP/OBS MODERATE 35: CPT | Performed by: PHYSICAL MEDICINE & REHABILITATION

## 2017-11-08 PROCEDURE — 6360000002 HC RX W HCPCS: Performed by: INTERNAL MEDICINE

## 2017-11-08 PROCEDURE — 97112 NEUROMUSCULAR REEDUCATION: CPT

## 2017-11-08 PROCEDURE — 97116 GAIT TRAINING THERAPY: CPT

## 2017-11-08 PROCEDURE — 1180000000 HC REHAB R&B

## 2017-11-08 PROCEDURE — 6370000000 HC RX 637 (ALT 250 FOR IP): Performed by: INTERNAL MEDICINE

## 2017-11-08 PROCEDURE — 6370000000 HC RX 637 (ALT 250 FOR IP): Performed by: FAMILY MEDICINE

## 2017-11-08 PROCEDURE — 6370000000 HC RX 637 (ALT 250 FOR IP): Performed by: PHYSICAL MEDICINE & REHABILITATION

## 2017-11-08 RX ADMIN — MYCOPHENOLATE MOFETIL 1500 MG: 250 CAPSULE ORAL at 07:46

## 2017-11-08 RX ADMIN — Medication 24 UNITS: at 21:34

## 2017-11-08 RX ADMIN — DOCUSATE SODIUM 100 MG: 100 CAPSULE, LIQUID FILLED ORAL at 07:43

## 2017-11-08 RX ADMIN — Medication 1 TABLET: at 07:43

## 2017-11-08 RX ADMIN — APIXABAN 5 MG: 5 TABLET, FILM COATED ORAL at 21:39

## 2017-11-08 RX ADMIN — FAMOTIDINE 40 MG: 20 TABLET ORAL at 07:43

## 2017-11-08 RX ADMIN — INSULIN LISPRO 4 UNITS: 100 INJECTION, SOLUTION INTRAVENOUS; SUBCUTANEOUS at 16:50

## 2017-11-08 RX ADMIN — MYCOPHENOLATE MOFETIL 1500 MG: 250 CAPSULE ORAL at 21:39

## 2017-11-08 RX ADMIN — MICONAZOLE NITRATE: 1.42 POWDER TOPICAL at 07:49

## 2017-11-08 RX ADMIN — MULTIPLE VITAMINS W/ MINERALS TAB 1 TABLET: TAB at 07:43

## 2017-11-08 RX ADMIN — APIXABAN 5 MG: 5 TABLET, FILM COATED ORAL at 07:46

## 2017-11-08 RX ADMIN — ATORVASTATIN CALCIUM 40 MG: 40 TABLET, FILM COATED ORAL at 07:43

## 2017-11-08 RX ADMIN — FERROUS SULFATE TAB 325 MG (65 MG ELEMENTAL FE) 325 MG: 325 (65 FE) TAB at 16:50

## 2017-11-08 RX ADMIN — PAROXETINE HYDROCHLORIDE 20 MG: 20 TABLET, FILM COATED ORAL at 07:47

## 2017-11-08 RX ADMIN — MICONAZOLE NITRATE: 1.42 POWDER TOPICAL at 21:42

## 2017-11-08 RX ADMIN — SPIRONOLACTONE 50 MG: 25 TABLET, FILM COATED ORAL at 07:43

## 2017-11-08 RX ADMIN — FERROUS SULFATE TAB 325 MG (65 MG ELEMENTAL FE) 325 MG: 325 (65 FE) TAB at 07:43

## 2017-11-08 RX ADMIN — CLOPIDOGREL BISULFATE 75 MG: 75 TABLET ORAL at 07:43

## 2017-11-08 ASSESSMENT — PAIN SCALES - GENERAL: PAINLEVEL_OUTOF10: 0

## 2017-11-08 NOTE — PROGRESS NOTES
assistance (Seated and supine)  Bed mobility  Bridging: Minimal assistance  Scooting: Minimal assistance (Seated and supine)    Transfers:  Sit to Stand: Minimal Assistance  Stand to sit: Contact guard assistance (Cues for hand placement.)     Stand Pivot Transfers: Contact guard assistance  Squat Pivot Transfers: Minimal Assistance     WB Status: no restrictions  Ambulation 1  Surface: level tile;ramp  Device: Large Sajan [place]  Assistance: Minimal assistance  Quality of Gait: Cues to scan environment and to the left. Cues to increase L LE step length. Distance: 100 ft x 2  Comments: Increased fatigue and end of amb. distance and needs seated break. Stairs/Curb  Stairs?: Yes  Stairs  # Steps : 6  Stairs Height: 4\"  Rails: Right ascending  Device: Hand Held Assist (L hand)  Assistance: Minimal assistance  Comment: Ascending FWD and Descending Retro. Patient fearful when descending FWD due to visual deficits. Wheelchair Activities  Wheelchair Size: 22x18  Wheelchair Type: Standard  Wheelchair Cushion: Standard  Propulsion 1  Propulsion: Manual  Level: Level Tile  Method: RUE;RLE;LLE  Level of Assistance: Supervision  Description/ Details: limited by low endurance/weakness  Distance: 48 ft                                     FIMS:      Transfers  Bed, Chair, Wheel Chair: 3 - Requires 25-49% assistance to transfer   Locomotion  Primary Mode:  Both  Distance Walked: 100 ft  Distance Traveled in [facility] Chair: 50 ft  Walk: 2 - Maximal Assistance Requires up to [address] requires assistance of one person to walk/operate wheelchair between  feet (Patient performs 25-49% of locomotion effort or goes between  feet)  Wheel Chair: 2 - Maximal Assistance Requires up to [address] requires assistance of one person to walk/operate wheelchair between  feet  Stairs: 2- Maximal Assistance Performs 25-49% of the effort to go up and down 4 to 6 stairs and requires the assistance of one person only       BALANCE Posture: Fair  Sitting - Static: Fair;+  Sitting - Dynamic: Fair;+  Standing - Static: Fair  Standing - Dynamic: Fair  Comments: Standing with LBQC    EXERCISES    Other exercises?: Yes  Other exercises 1: Seated in w/c bilat LE therexs x20 2lb ankle weights, Blue t band  x 15 reps  Other exercises 3: Supine bilateral LE x 15 reps -AROM ; bed mobility education   Other exercises 5: Standing ex. bilat. LE in // bars  x 10 reps  Other exercises 6: UBE x 10 min f/b  Other exercises 7: NuStep  x 10 mins. Workload 3           Activity Tolerance: Patient limited by endurance  PT Equipment Recommendations  Equipment Needed: Yes (Order given to  for [facility] and )  Cane: [place]  Wheelchair: [employer] [place]       Patient Education  New Education Provided:    Learner:patient  Method: demonstration and explanation       Outcome: demonstrated understanding and needs reinforcement     Current Treatment Recommendations: Strengthening, Transfer Training, Endurance Training, Cognitive Reorientation, Patient/Caregiver Education & Training, ROM, Balance Training, Gait Training, Functional Mobility Training, Stair training, Safety Education & Training    Conditions Requiring Skilled Therapeutic Intervention  Body structures, Functions, Activity limitations: Decreased functional mobility ; Decreased strength;Decreased ROM; Decreased endurance;Decreased safe awareness;Decreased balance  Treatment Diagnosis: CVA, L hemiplegia  Prognosis: Good  REQUIRES PT FOLLOW UP: Yes  Discharge Recommendations: Home with nursing aide;Home with Home health PT;Home with assist PRN    Goals  Short term goals  Time Frame for Short term goals: 7 days  Short term goal 1: Improve L UE and LE strength by 1/2 MMG  Short term goal 2:  Increase endurance to good  Short term goal 3: Improve sitting balance to good  Short term goal 4: Improve bed mobility to min A x1  Short term goal 5: Improve transfers to min

## 2017-11-08 NOTE — PROGRESS NOTES
17 1425 17 1453   OT Individual Minutes   Time In 0730 1300   Time Out 0830 1330   Minutes [facility]   ACUTE REHABILITATION OCCUPATIONAL THERAPY  DAILY NOTE    Date: 17  Patient Name: Ashley Joya      Room: 8877/0449-91    MRN: [id number]   : 1950  (69 y.o.)  Gender: female      Diagnosis: R CVA, Acute ischmic right MCA stroke,   Additional Pertinent Hx: UTI, s/p Ureteral stone removed dueing stent placement 10/10/17, h/o CVA a year ago    Restrictions  Restrictions/Precautions: General Precautions, Fall Risk  Other position/activity restrictions: up with assistance  Position Activity Restriction  Other position/activity restrictions: up with assistance     Subjective \"Am I going home tomorrow\"  Patient Currently in Pain: No  Pain Level: 0             Objective Oriented only to self. AAROM and isometric holds for LUE and exercises for RUE to increase strength. WB for LUE as well. Tactile and vc's to look to left. Poor carryover with education. Nsg states family in for education with therapy over the weekend  Continue POC  Cognition  Overall Cognitive Status: Exceptions  Following Commands:  Follows multistep commands with repitition (simple steps and needs mod cues )  Memory: Decreased recall of recent events;Decreased long term memory;Decreased short term memory  Safety Judgement: Decreased awareness of need for safety  Problem Solving: Assistance required to generate solutions;Assistance required to identify errors made  Insights: Decreased awareness of deficits  Initiation: Requires cues for some  Sequencing: Requires cues for some                 Type of ROM/Therapeutic Exercise  Type of ROM/Therapeutic Exercise: AROM;AAROM  Exercises  Shoulder Elevation: 3 sets of 5   Shoulder Flexion: 3 sets of 5  Shoulder ADduction: 3 sets of 5   Horizontal ABduction: 2 sets of 5   Horizontal ADduction: 2 sets of 5   Elbow Flexion: 2 sets as needed. Short term goal 4: Pt. will participate in left UE ROM and stgth ex. to assist with functional tasks. Short term goal 5: Pt. will tolerate facilitation tech. to elicit movement in left wrist and hand to assist with functional tasks. Short term goal 6: Pt. will attend to left side of body and L visual field with min. vc's 90% of the time  Short term goal 7: Pt. will tolerate 5-8 min. functional standing activities to promote increased indep.with ADL's and mobility. Long term goals  Time Frame for Long term goals : By discharge,   Long term goal 1: Pt. will demo. Mod.I with bed mobility. Long term goal 2: Pt. will demo. SBA with functional ADL transfers using appropriate AD with good . Long term goal 3: Pt. will demo. SBA with UB bathing/dressing, and min. A with LB bathing/dressing using AE as needed. Long term goal 4: Pt.will demo. SBA with toileting tasks.        Electronically signed by GABE Boland on 11/8/17 at 2:54 PM

## 2017-11-08 NOTE — PROGRESS NOTES
restrictions  Ambulation 1  Surface: level tile;ramp  Device: Tern  Assistance: Minimal assistance  Quality of Gait: Cues to scan environment and to the left. Cues to increase L LE step length. Distance: 100 ft x2;  48 ft x2 (Family education completed with amb.)  Comments: Increased fatigue and end of amb. distance and needs seated break. Stairs/Curb  Stairs?: Yes  Stairs  # Steps : 6  Stairs Height: 4\"  Rails: Right ascending  Device: Hand Held Assist (L hand)  Assistance: Minimal assistance  Comment: Ascending FWD and Descending Retro. Patient fearful when descending FWD due to visual deficits.      Wheelchair Activities  Wheelchair Size: 22x18  Wheelchair Type: Standard  Wheelchair Cushion: Standard  Propulsion 1  Propulsion: Manual  Level: Level Tile  Method: RUE;RLE;LLE  Level of Assistance: Supervision  Description/ Details: limited by low endurance/weakness  Distance: 50 ft       Medications   Scheduled Meds:   spironolactone  50 mg Oral Daily    PARoxetine  20 mg Oral QAM    insulin glargine  24 Units Subcutaneous Nightly    apixaban  5 mg Oral BID    clopidogrel  75 mg Oral Daily    mycophenolate  1,500 mg Oral BID    miconazole   Topical BID    atorvastatin  40 mg Oral Daily    Calcium Carb-Cholecalciferol  1 tablet Oral Daily    docusate sodium  100 mg Oral Daily    famotidine  40 mg Oral Daily    ferrous sulfate  325 mg Oral BID WC    insulin lispro  0-12 Units Subcutaneous TID WC    insulin lispro  0-6 Units Subcutaneous Nightly    polyethylene glycol  17 g Oral Daily    therapeutic multivitamin-minerals  1 tablet Oral Daily     Continuous Infusions:   dextrose       PRN Meds:.sodium chloride, HYDROcodone 5 mg - acetaminophen **OR** HYDROcodone 5 mg - acetaminophen, nicotine, ondansetron, dextrose, dextrose, glucagon (rDNA), glucose, acetaminophen, magnesium hydroxide     Diagnostics:     CBC:   Recent Labs      11/06/17   0618   WBC  4.8   RBC  3.11*   HGB  9.5* HCT  30.0*   MCV  96.3   RDW  16.6*   PLT  245     BMP:   Recent Labs      11/06/17   0618   NA  140   K  4.1   CL  105   CO2  23   BUN  7*   CREATININE  0.60     BNP: No results for input(s): BNP in the last 72 hours. PT/INR: No results for input(s): PROTIME, INR in the last 72 hours. APTT: No results for input(s): APTT in the last 72 hours. CARDIAC ENZYMES: No results for input(s): CKMB, CKMBINDEX, TROPONINT in the last 72 hours. Invalid input(s): CKTOTAL;3  FASTING LIPID PANEL:  Lab Results   Component Value Date    CHOL 73 10/10/2017    HDL 39 (L) 10/10/2017    TRIG 66 10/10/2017     LIVER PROFILE: No results for input(s): AST, ALT, ALB, BILIDIR, BILITOT, ALKPHOS in the last 72 hours. I/O (24Hr): No intake or output data in the 24 hours ending 11/08/17 1010    Glu last 24 hour  Recent Labs      11/07/17   1108  11/07/17   1551  11/07/17   1958  11/08/17   0652   POCGLU  84  219*  105  81       No results for input(s): CLARITYU, COLORU, PHUR, SPECGRAV, PROTEINU, RBCUA, BLOODU, BACTERIA, NITRU, WBCUA, LEUKOCYTESUR, YEAST, Kranthi Terrence in the last 72 hours. Urine   Culture 10/24/2017  9:15  [address]   NO SIGNIFICANT GROWTH      Urin cx  Culture 10/27/2017  5:20  [address]   NO SIGNIFICANT GROWTH          Impression/Plan:    Patient is a 77-year-old female with ADL and mobility dysfunction s/p MCA CVA.     1. Ambulatory and ADL dysfunction secondary to MCA CVA -continue rehab efforts, making progress, team conference reviewed, will need 24/ 7 care, discharge date- 11/9/2017 -To daughter in law and sons house and then daughters house near [place]. 2. R MCA cva with LHP- embolic. Cardiology recommendations noted and internal medicine orders notedpatient now on Eliquis, Plavix, and lipitor. 3. PAF - Per cardio on Eliquis as above. 4. Blood pressure off Lasix due to low BP per IM, continue to monitor. Patient on spironolactone  5.  Anemia-

## 2017-11-08 NOTE — PROGRESS NOTES
game play successfully c min A. Pt completed category member task in which she had to generate category members beginning with certain letters- 02% [place], 100% c min-mod A. Other: While playing card game, pt did not require cues to scan left to see all the cards laid out.           Plan:  [x] Continue ST services    [] Discharge from ST:      Discharge recommendations: [] Inpatient Rehab   [] [place]   [] Outpatient Therapy  [] Follow up at trauma clinic   [] Other:       Treatment completed by: Ladonna Mott,  clinician

## 2017-11-08 NOTE — PLAN OF CARE
Problem: Risk for Impaired Skin Integrity  Goal: Tissue integrity - skin and mucous membranes  Structural intactness and normal physiological function of skin and  mucous membranes. Outcome: Ongoing  No new skin issues this shift. Skin integrity maintained. Safety maintained this shift. Will continue to monitor. Problem: Falls - Risk of  Goal: Absence of falls  Outcome: Ongoing  Patient remains free from falls/injuries at this time. Call light within reach. Safety maintained this shift. Will continue to monitor. Problem: Pain:  Goal: Pain level will decrease  Pain level will decrease   Patient denies pain so far this shift. Safety maintained this shift. Will continue to monitor.

## 2017-11-09 VITALS
BODY MASS INDEX: 49.67 KG/M2 | WEIGHT: 253 LBS | DIASTOLIC BLOOD PRESSURE: 77 MMHG | SYSTOLIC BLOOD PRESSURE: 128 MMHG | OXYGEN SATURATION: 97 % | HEIGHT: 60 IN | RESPIRATION RATE: 18 BRPM | TEMPERATURE: 98.2 F | HEART RATE: 86 BPM

## 2017-11-09 LAB
GLUCOSE BLD-MCNC: 179 MG/DL (ref 65–105)
GLUCOSE BLD-MCNC: 78 MG/DL (ref 65–105)

## 2017-11-09 PROCEDURE — 99232 SBSQ HOSP IP/OBS MODERATE 35: CPT | Performed by: PHYSICAL MEDICINE & REHABILITATION

## 2017-11-09 PROCEDURE — 99231 SBSQ HOSP IP/OBS SF/LOW 25: CPT | Performed by: INTERNAL MEDICINE

## 2017-11-09 PROCEDURE — 97530 THERAPEUTIC ACTIVITIES: CPT

## 2017-11-09 PROCEDURE — 6370000000 HC RX 637 (ALT 250 FOR IP): Performed by: PHYSICAL MEDICINE & REHABILITATION

## 2017-11-09 PROCEDURE — 82947 ASSAY GLUCOSE BLOOD QUANT: CPT

## 2017-11-09 PROCEDURE — 6360000002 HC RX W HCPCS: Performed by: PHYSICAL MEDICINE & REHABILITATION

## 2017-11-09 PROCEDURE — 97116 GAIT TRAINING THERAPY: CPT

## 2017-11-09 PROCEDURE — 6370000000 HC RX 637 (ALT 250 FOR IP): Performed by: FAMILY MEDICINE

## 2017-11-09 PROCEDURE — 90670 PCV13 VACCINE IM: CPT | Performed by: PHYSICAL MEDICINE & REHABILITATION

## 2017-11-09 PROCEDURE — 6370000000 HC RX 637 (ALT 250 FOR IP): Performed by: INTERNAL MEDICINE

## 2017-11-09 PROCEDURE — 97535 SELF CARE MNGMENT TRAINING: CPT

## 2017-11-09 PROCEDURE — 97110 THERAPEUTIC EXERCISES: CPT

## 2017-11-09 PROCEDURE — 6360000002 HC RX W HCPCS: Performed by: INTERNAL MEDICINE

## 2017-11-09 PROCEDURE — G0009 ADMIN PNEUMOCOCCAL VACCINE: HCPCS | Performed by: PHYSICAL MEDICINE & REHABILITATION

## 2017-11-09 PROCEDURE — 97532 HC COGNITIVE THERAPY 15 MIN: CPT

## 2017-11-09 RX ORDER — SPIRONOLACTONE 50 MG/1
50 TABLET, FILM COATED ORAL DAILY
Qty: 30 TABLET | Refills: 0 | Status: SHIPPED | OUTPATIENT
Start: 2017-11-10

## 2017-11-09 RX ORDER — INSULIN GLARGINE 100 [IU]/ML
18 INJECTION, SOLUTION SUBCUTANEOUS NIGHTLY
Qty: 1 VIAL | Refills: 0 | Status: SHIPPED | OUTPATIENT
Start: 2017-11-09

## 2017-11-09 RX ORDER — PAROXETINE HYDROCHLORIDE 20 MG/1
20 TABLET, FILM COATED ORAL EVERY MORNING
Qty: 30 TABLET | Refills: 0 | Status: SHIPPED | OUTPATIENT
Start: 2017-11-10

## 2017-11-09 RX ORDER — ONDANSETRON 2 MG/ML
4 INJECTION INTRAMUSCULAR; INTRAVENOUS EVERY 6 HOURS PRN
Qty: 15 ML | Refills: 0 | Status: SHIPPED | OUTPATIENT
Start: 2017-11-09

## 2017-11-09 RX ORDER — CLOPIDOGREL BISULFATE 75 MG/1
75 TABLET ORAL DAILY
Qty: 30 TABLET | Refills: 0 | Status: SHIPPED | OUTPATIENT
Start: 2017-11-10

## 2017-11-09 RX ORDER — ATORVASTATIN CALCIUM 40 MG/1
40 TABLET, FILM COATED ORAL DAILY
Qty: 30 TABLET | Refills: 0 | Status: SHIPPED | OUTPATIENT
Start: 2017-11-10

## 2017-11-09 RX ORDER — PSEUDOEPHEDRINE HCL 30 MG
100 TABLET ORAL DAILY
Qty: 30 CAPSULE | Refills: 0 | Status: SHIPPED | OUTPATIENT
Start: 2017-11-10

## 2017-11-09 RX ORDER — FERROUS SULFATE 325(65) MG
325 TABLET ORAL 2 TIMES DAILY WITH MEALS
Qty: 30 TABLET | Refills: 0 | Status: SHIPPED | OUTPATIENT
Start: 2017-11-09

## 2017-11-09 RX ORDER — MYCOPHENOLATE MOFETIL 250 MG/1
1500 CAPSULE ORAL 2 TIMES DAILY
Qty: 60 CAPSULE | Refills: 0 | Status: SHIPPED | OUTPATIENT
Start: 2017-11-09

## 2017-11-09 RX ORDER — INSULIN GLARGINE 100 [IU]/ML
18 INJECTION, SOLUTION SUBCUTANEOUS NIGHTLY
Status: DISCONTINUED | OUTPATIENT
Start: 2017-11-09 | End: 2017-11-09 | Stop reason: HOSPADM

## 2017-11-09 RX ORDER — CALCIUM CARBONATE/VITAMIN D3 600 MG-10
1 TABLET ORAL DAILY
Qty: 60 TABLET | Refills: 0 | Status: SHIPPED | OUTPATIENT
Start: 2017-11-10

## 2017-11-09 RX ORDER — FAMOTIDINE 40 MG/1
40 TABLET, FILM COATED ORAL DAILY
Qty: 60 TABLET | Refills: 0 | Status: SHIPPED | OUTPATIENT
Start: 2017-11-10

## 2017-11-09 RX ADMIN — DOCUSATE SODIUM 100 MG: 100 CAPSULE, LIQUID FILLED ORAL at 07:29

## 2017-11-09 RX ADMIN — FAMOTIDINE 40 MG: 20 TABLET ORAL at 07:29

## 2017-11-09 RX ADMIN — Medication 1 TABLET: at 07:29

## 2017-11-09 RX ADMIN — FERROUS SULFATE TAB 325 MG (65 MG ELEMENTAL FE) 325 MG: 325 (65 FE) TAB at 07:29

## 2017-11-09 RX ADMIN — PAROXETINE HYDROCHLORIDE 20 MG: 20 TABLET, FILM COATED ORAL at 07:29

## 2017-11-09 RX ADMIN — MULTIPLE VITAMINS W/ MINERALS TAB 1 TABLET: TAB at 07:29

## 2017-11-09 RX ADMIN — PNEUMOCOCCAL 13-VALENT CONJUGATE VACCINE 0.5 ML: 2.2; 2.2; 2.2; 2.2; 2.2; 4.4; 2.2; 2.2; 2.2; 2.2; 2.2; 2.2; 2.2 INJECTION, SUSPENSION INTRAMUSCULAR at 14:31

## 2017-11-09 RX ADMIN — CLOPIDOGREL BISULFATE 75 MG: 75 TABLET ORAL at 07:29

## 2017-11-09 RX ADMIN — APIXABAN 5 MG: 5 TABLET, FILM COATED ORAL at 07:30

## 2017-11-09 RX ADMIN — POLYETHYLENE GLYCOL 3350 17 G: 17 POWDER, FOR SOLUTION ORAL at 07:29

## 2017-11-09 RX ADMIN — MICONAZOLE NITRATE: 1.42 POWDER TOPICAL at 07:29

## 2017-11-09 RX ADMIN — ATORVASTATIN CALCIUM 40 MG: 40 TABLET, FILM COATED ORAL at 07:29

## 2017-11-09 RX ADMIN — SPIRONOLACTONE 50 MG: 25 TABLET, FILM COATED ORAL at 07:29

## 2017-11-09 RX ADMIN — MYCOPHENOLATE MOFETIL 1500 MG: 250 CAPSULE ORAL at 07:30

## 2017-11-09 RX ADMIN — INSULIN LISPRO 2 UNITS: 100 INJECTION, SOLUTION INTRAVENOUS; SUBCUTANEOUS at 11:31

## 2017-11-09 NOTE — PROGRESS NOTES
°C)   Resp 17   Ht 5' (1.524 m)   Wt 248 lb (112.5 kg)   SpO2 96%   BMI 48.43 kg/m²      General appearance: well nourished in no distress  Lungs: normal effort, clear to auscultation. CVS sinus with no murmurs. Abdomen: Soft, non-tender; no masses or HSM,   Extremities: no edema; no digital cyanosis or clubbing. Neurologic: Cranial nerves II-XII grossly intact; LUE 3/5 LLE 4/5           Assessment / Plan      Patient Active Problem List   Diagnosis    Hydronephrosis of left kidney    Acute cystitis with hematuria    CKD (chronic kidney disease), stage III    Hypotension arterial    Essential hypertension    Morbid obesity with BMI of 40.0-44.9, adult (HCC)    History of arterial ischemic stroke    History of pulmonary embolus (PE)    Paroxysmal atrial fibrillation (HCC)    Left ureteral stone    Left-sided weakness    Non-intractable vomiting with nausea    Cerebrovascular accident (CVA) due to embolism of right middle cerebral artery (HCC)    Hypotension    Sepsis secondary to UTI ([facility] [address] [address]    Elevated serum creatinine    Kidney stone    Anemia    Hypokalemia    Acute ischemic right MCA stroke ([facility] [address] [facility]    Right-sided carotid artery disease (HCC)    Incontinence associated dermatitis    Cerebrovascular accident (CVA) due to stenosis of right carotid artery ([facility])       A/P  UTI Sepsis s/p ureteral stent on cipro   MCA infarct Left hemiparesis on ASA  lipitor   A fib on eliquis   DM on lantus FS well controlled     11/8  Pt on eliquis plavix lantus   Change to lantus 18 units one episode of hypoglycemia               MD ZEFERINO Scott[address].    Phone [phone]   Fax: [phone]  Answering Service: [phone]

## 2017-11-09 NOTE — CARE COORDINATION
[address] [facility]   Date: 2017    Patient Care Needs  Patient Name: Bronson Anderson       Room: 1202/4210-38 D/C : 17  : 1950  (69 y.o.)     Gender: female   Diagnosis: R MCA CVA, ICA stenosis, cystitits    Sensory Problems  Visual impairment: Glasses  Vision  Vision: Impaired  Vision Exceptions: Wears glasses at all times  Hearing  Hearing: Within functional limits    Personal Equipment and Devices: (eyeglasses, hearing,aids, dentures)  Assistive Devices: Walker    Feeding / Swallow:   Eatin - Feeds self with setup/supervision/cues and/or requires only setup/supervision/cues to perform tube feedings    Diet Ordered: Carb Control 4 Carbs/Meal  Supplements: None       Precautions: Precautions: Fall risk, Bleeding,Restrictions/Precautions  Restrictions/Precautions: General Precautions, Fall Risk  Required Braces or Orthoses?: No  Position Activity Restriction  Other position/activity restrictions: up with assistance,Additional Pertinent Hx: UTI, s/p Ureteral stone removed dueing stent placement 10/10/17, h/o CVA a year ago  Restrictions/Precautions: General Precautions, Fall Risk Required Braces or Orthoses?: No    Mobility Equipment needs:  Device: DearJane Assistance: Minimal assistance    Bed Mobility:  Bed mobility  Bridging: Minimal assistance  Rolling to Right: Minimal assistance  Supine to Sit: Minimal assistance (with HOB elevated, increased time)  Sit to Supine: Moderate assistance  Scooting: Minimal assistance (Seated and supine)  Comment: hob slightly raised and assist c LUE placement     Transfers:   Sit to Stand: Minimal Assistance   Bed to Chair: Minimal assistance, Contact guard assistance Stand Pivot Transfers: Contact guard assistance Squat Pivot Transfers: Minimal Assistance    Posture: Fair      Ambulation Status:  Assistance: Minimal assistance  Quality of Gait: Cues to scan environment and to the left. Cues to increase L LE step length. Distance: 100 ft x 2  Device: xTurion  Other Apparatus: Wheelchair follow  Comments: Increased fatigue and end of amb. distance and needs seated break. Stairs  # Steps : 6  Stairs Height: 4\"  Rails: Right ascending  Device: Hand Held Assist (L hand)  Assistance: Minimal assistance  Comment: Ascending FWD and Descending Retro. Patient fearful when descending FWD due to visual deficits. Self-care Equipment Needs:  Equipment Needed: Yes    Self-care Assist Bathin - Able to bathe 3-4 areas  Dressing-Upper: 3 - Requires assist with 2 tasks  Dressing-Lower: 2 - Requires assist with 4-5 parts of dressing  Toiletin - Able to perform 1 task only (e.g. hygiene)  Toilet Transfer: 4 - Requires steadying assistance only < 25% assist    Nursing   Toilet Every 2 Hours-In Advance of Need: No (Comment) (able to make needs known)Activity: Up to chair,Activity and Safety  Activity: Up to chair  Repositioned: Turns self  Assistive Device: None  Anti-Embolism Devices: Bilateral, Antiembolism stockings, knee  Anti-Embolism Intervention: On,Activity: Up to chair,Pressure Ulcer or Non-Healing Wound: No  Alarm On: Bed   Bladder: 6 - Uses device independently (including EMPTYING of device, or uses medication)  Bowel: 4 - Requires touching assistance to manage or place device (e.g.  insertion of suppository only) < 25% assist    Wound Care Documentation and Therapy:   Wound 10/12/17 Other (Comment) Back Mid;Right; Lower approx [id number] circular redness (Active)   Wound Type Wound 10/26/2017  7:45 PM   Wound Skin Tear 10/17/2017  7:45 AM   Dressing Status Clean;Dry; Intact 10/26/2017  7:45 PM   Dressing Changed Changed/New 10/17/2017  4:00 AM   Dressing/Treatment Open to air 10/26/2017  7:45 PM   Wound Assessment Red 10/13/2017  4:00 PM   Marianna-wound Assessment Clean;Dry; Intact 10/17/2017  4:00 AM   Drainage Amount None 10/24/2017 10:37 PM   Odor None 10/24/2017 10:37 PM   Number of days: 28

## 2017-11-09 NOTE — CARE COORDINATION
Mode: Shower 4   Tub Transfer: 0 - Activity does not occur 0 - Activity does not occur    Shower Transfer: 0 - Activity does not occur 0 - Activity does not occur (washed up sinkside )      Locomotion   Primary Mode:  Both    Primary Mode: Walk Both 6   Distance Walked: 10 100 ft    Distance Traveled in Wheel Chair: 20ft 50 ft    Walk: 1 - Total Assistance Walks/operates wheelchair < 50 feet OR requires two or more people OR patient performs < 25% of locomotion effort 2 - Maximal Assistance Requires up to [address] requires assistance of one person to walk/operate wheelchair between  feet (Patient performs 25-49% of locomotion effort or goes between  feet)    Wheel Chair: 0 - Activity Not Assessed/Does Not Occur 2 - Maximal Assistance Requires up to [address] requires assistance of one person to walk/operate wheelchair between  feet    Stairs: 0 - Activity Does not Occur ( 0 only for the admission assessment)   2- Maximal Assistance Performs 25-49% of the effort to go up and down 4 to 6 stairs and requires the assistance of one person only 2     Communication     Comprehension: 6 - Complex ideas 90% or device (hearing aid/glasses) 6 - Complex ideas 90% or device (hearing aid/glasses) 6   Expression: 6 - Device used to express complex ideas/needs Expression: 6 - Device used to express complex ideas/needs 6     Social Cognition     Social Interaction: 6 - Patient requires medication for mood and/or effect Social Interaction: 6 - Patient requires medication for mood and/or effect 6   Problem Solvin - Patient solves simple/routine tasks 75-90%+  5 - Patient able to solve simple/routine tasks 3   Memory: 3 - Patient remembers 50%-74% of the time 3 - Patient remembers 50%-74% of the time 3

## 2017-11-09 NOTE — DISCHARGE SUMMARY
between  feet  Distance Traveled in Wheel Chair: 48 ft  Stairs: 2- Maximal Assistance Performs 25-49% of the effort to go up and down 4 to 6 stairs and requires the assistance of one person only, PT Equipment Recommendations  Equipment Needed: Yes (Order given to SW for [facility] and )  Cane: [employer]  Wheelchair: Light Weight  Other: TBD, Assessment: Pt presents s/p CVA with L hemiplegia, ICA occlusion requiring stent, and cystitis, hydronephritis, and kidney stone requiring ureteral stent. Pt demo's impaired balance, strength, endurance, safety awareness and needs assist with moibility. Cont per POC to prepare for home. Will need to determine appropriate home set up (dtr vs son vs pt's own home) as rehab progresses.     Occupational therapy: Eatin - Feeds self with setup/supervision/cues and/or requires only setup/supervision/cues to perform tube feedings  Groomin - Requires setup/cues to do all tasks  Bathin - Able to bathe 3-4 areas  Dressing-Upper: 3 - Requires assist with 2 tasks  Dressing-Lower: 2 - Requires assist with 4-5 parts of dressing  Toiletin - Able to perform 1 task only (e.g. hygiene)  Toilet Transfer: 4 - Requires steadying assistance only < 25% assist  Tub Transfer: 0 - Activity does not occur  Shower Transfer: 0 - Activity does not occur (washed up sinkside ), Equipment Recommendations  Equipment Needed: Yes, Assessment: pt is progressing towards goals but would benifit from contd skilled therapies to increase overall strength, endurance and balance necessary to promote ease with increased adl engagement and functional transfers/mobility    Speech therapy:  Comprehension: 6 - Complex ideas 90% or device (hearing aid/glasses)  Expression: 6 - Device used to express complex ideas/needs  Social Interaction: 6 - Patient requires medication for mood and/or effect  Problem Solvin - Patient able to solve simple/routine tasks  Memory: 3 - Patient remembers 50%-74% of the time      Inpatient Rehabilitation Course:   Gabriella James is a 69 y.o. female admitted to inpatient rehabilitation on 10/17/2017 for rehab for Ambulatory and ADL dysfunction due to CVA        Rehab course: The patient participated in an aggressive multidisciplinary inpatient rehabilitation program involving 3 hours per day, 5 days per week of rehabilitation. Patient benefited from inpatient rehab and was discharged in good stable condition. Consults:   cardiology and internal medicine    Significant Diagnostics:   CBC:   Lab Results   Component Value Date    WBC 4.8 2017    RBC 3.11 2017    HGB 9.5 2017    HCT 30.0 2017    MCV 96.3 2017    MCH 30.5 2017    MCHC 31.7 2017    RDW 16.6 2017     2017    MPV 9.6 2017     BMP:    Lab Results   Component Value Date     2017    K 4.1 2017     2017    CO2 23 2017    BUN 7 2017    LABALBU 2.4 10/18/2017    CREATININE 0.60 2017    CALCIUM 8.2 2017    GFRAA >60 2017    LABGLOM >60 2017    GLUCOSE 70 2017         Patient Instructions:    Patient will get home physical, occupational, and speech therapy-home health.       Medications, precautions and follow up reviewed with patient and family    Follow-up visits: See after visit summary from hospitalization    Discharge Medications:   [place], [address] [place] [address] Medication Instructions     Printed on:17 1127   Medication Information                      apixaban (ELIQUIS) 5 MG TABS tablet  Take 1 tablet by mouth 2 times daily             atorvastatin (LIPITOR) 40 MG tablet  Take 1 tablet by mouth daily             Calcium Carb-Cholecalciferol 600-400 MG-UNIT TABS  Take 1 tablet by mouth daily             clopidogrel (PLAVIX) 75 MG tablet  Take 1 tablet by mouth daily             docusate sodium (COLACE, DULCOLAX) 100 MG CAPS  Take 100 mg by mouth daily famotidine (PEPCID) 40 MG tablet  Take 1 tablet by mouth daily             ferrous sulfate 325 (65 Fe) MG tablet  Take 1 tablet by mouth 2 times daily (with meals)             insulin glargine (LANTUS) 100 UNIT/ML injection vial  Inject 18 Units into the skin nightly             Multiple Vitamins-Minerals (THERAPEUTIC MULTIVITAMIN-MINERALS) tablet  Take 1 tablet by mouth daily             mycophenolate (CELLCEPT) 250 MG capsule  Take 6 capsules by mouth 2 times daily             ondansetron (ZOFRAN) 4 MG/2ML injection  Infuse 2 mLs intravenously every 6 hours as needed for Nausea             PARoxetine (PAXIL) 20 MG tablet  Take 1 tablet by mouth every morning             spironolactone (ALDACTONE) 50 MG tablet  Take 1 tablet by mouth daily                 Carlie Holloway MD

## 2017-11-10 LAB
CULTURE: ABNORMAL
Lab: ABNORMAL
ORGANISM: ABNORMAL
ORGANISM: ABNORMAL
SPECIMEN DESCRIPTION: ABNORMAL
SPECIMEN DESCRIPTION: ABNORMAL
STATUS: ABNORMAL

## 2017-11-17 ENCOUNTER — TELEPHONE (OUTPATIENT)
Dept: NEUROSURGERY | Age: 67
End: 2017-11-17

## 2017-12-04 ENCOUNTER — TELEPHONE (OUTPATIENT)
Dept: NEUROSURGERY | Age: 67
End: 2017-12-04

## 2017-12-04 NOTE — TELEPHONE ENCOUNTER
Vivienne's son Олег Tenorio called asking if they can stop her blood thinners just until after her procedure for Kidney stone removal. She is scheduled this Friday for the procedure.    E-mail sent to Dr. Dumont Danger

## 2023-02-01 NOTE — PROGRESS NOTES
Physical Medicine & Rehabilitation  Progress Note    10/13/2017 12:22 PM     CC: Ambulatory and ADL dysfunction due to R MCA cva with LHP    Subjective:   Feels well. Did therapy in bed today. Will receive blood today    ROS:  Denies fevers, chills, sweats. No chest pain, palpitations, lightheadedness. Denies coughing, wheezing or shortness of breath. Denies abdominal pain, nausea, diarrhea or constipation. No new areas of joint pain. Denies new areas of numbness or weakness. Denies new anxiety or depression issues. No new skin problems. Rehabilitation:   PT:  Restrictions/Precautions: General Precautions, Fall Risk  Other position/activity restrictions: up with assist   Transfers  Sit to Stand: Unable to assess (Hgb 6.5, hypotension)  Stand to sit: Minimal Assistance  Comment: Patient completed 2x sit to stand transfers  Ambulation 1  Surface: level tile  Device: [address]: Moderate assistance  Quality of Gait: patient demonstrated decreased advancement of left foot during gait, difficulty staying within walker, small step length, and difficulty holding walker with LUE. Distance: 10'    Transfers  Sit to Stand: Unable to assess (Hgb 6.5, hypotension)  Stand to sit: Minimal Assistance  Comment: Patient completed 2x sit to stand transfers  Ambulation  Ambulation?: No  Ambulation 1  Surface: level tile  Device: Rolling Walker  Assistance: Moderate assistance  Quality of Gait: patient demonstrated decreased advancement of left foot during gait, difficulty staying within walker, small step length, and difficulty holding walker with LUE. Distance: 10'    Surface: level tile  Ambulation 1  Surface: level tile  Device: Rolling Walker  Assistance: Moderate assistance  Quality of Gait: patient demonstrated decreased advancement of left foot during gait, difficulty staying within walker, small step length, and difficulty holding walker with LUE.    Distance: 10'      OT:   Bed mobility  Supine to Once    sodium chloride  500 mL Intravenous Once    aspirin  81 mg Oral Daily     Continuous Infusions:   sodium chloride 75 mL/hr at 10/13/17 0909    dextrose       PRN Meds:. LORazepam, sodium chloride flush, HYDROcodone 5 mg - acetaminophen **OR** HYDROcodone 5 mg - acetaminophen, magnesium hydroxide, bisacodyl, ondansetron, nicotine, HYDROmorphone **OR** HYDROmorphone, glucose, dextrose, glucagon (rDNA), dextrose     Diagnostics:     CBC: Recent Labs      10/11/17   0539  10/12/17   1015  10/12/17   1806  10/13/17   0745   WBC  18.5*  11.1*  16.0*   --    RBC  2.67*  2.86*  2.69*   --    HGB  7.7*  8.2*  7.6*  6.5*   HCT  24.0*  25.5*  24.1*  20.6*   MCV  89.9  89.1  89.6   --    RDW  19.4*  18.9*  18.7*   --    PLT  256  241  324   --      BMP: Recent Labs      10/10/17   1806  10/11/17   0539  10/12/17   1015   NA  138  140  140   K  3.3*  3.3*  4.6   CL  103  108*  107   CO2  20  20  17*   BUN  47*  36*  20   CREATININE  1.55*  1.16*  0.88     BNP: No results for input(s): BNP in the last 72 hours. PT/INR: No results for input(s): PROTIME, INR in the last 72 hours. APTT: No results for input(s): APTT in the last 72 hours. CARDIAC ENZYMES: No results for input(s): CKMB, CKMBINDEX, TROPONINT in the last 72 hours. Invalid input(s): CKTOTAL;3  FASTING LIPID PANEL:  Lab Results   Component Value Date    CHOL 73 10/10/2017    HDL 39 (L) 10/10/2017    TRIG 66 10/10/2017     LIVER PROFILE: Recent Labs      10/10/17   1806   AST  19   ALT  10   BILITOT  0.28*   ALKPHOS  38        I/O (24Hr):     Intake/Output Summary (Last 24 hours) at 10/13/17 1222  Last data filed at 10/13/17 0914   Gross per 24 hour   Intake             3463 ml   Output             1150 ml   Net             2313 ml       Glu last 24 hour  Recent Labs      10/12/17   0753  10/12/17   2202  10/13/17   0850  10/13/17   1212   POCGLU  152*  216*  217*  247*       No results for input(s): [facility], [address] [place] [address] 37, [address] [facility], [facility], [address], RBCUA, BLOODU, BACTERIA, NITRU, WBCUA, LEUKOCYTESUR, YEAST, GLUCOSEU, BILIRUBINUR in the last 72 hours. Impression: Ms. Bernie Stapleton is a 74 y.o. right handed female with a history of Sepsis secondary to UTI ([facility] [address]  1. Sepsis due to UTI and left pylenephritis- leukocytosis- cefepime- now on cipro oral- 7 days  2. S/p urethrel stent, gant, b/l kidney stones - f/u uro as outpt, flomax, gant - monitor void  3. R MCA cva with LHP- embolic, eliquis,asa, lipitor, - f/u zaidat 2 weeks- notes 2 weeks asa 325 and plavix 75 then stop plavix and resume eliquis 5 daily and asa 81  4. R ICA stenosis  5. PAF - eliquis  6. Anemia- protonix, hgb trend down 8.9-8.4-7.7- 6.5- stool pending-, for transfusion today, anticoag dosing rec as per neuro and IM  7. CKD  8. Sacral ulcer  9. H/O PE  10. DM- insulin  11. Hypokalemia     Recommendations:  1. Dx: R MCA CVA with LHP, facial droop  2. Med nec - as above, dm, anemia, CKD, stent, etc  3. Therapy- has PT and OT needs ( LUE weakness) -speech eval Re: cog  4. Support - family supportive, will have 24/7  5. Rehab - suspect will benefit from acute inpt rehab when med ready,  6. ?- Length of gant,  ? Anemia - trend down and on anticoag as per neuroint, stool pending as well- IM follow  7. Discussed with family - considering rehab in area, would like tour of [employer] and discussed with Nsg - awaiting SW to tour facilities    Above discussed with  nsg      Abelardo Faust. Tremaine Ferrer MD Infliximab Counseling:  I discussed with the patient the risks of infliximab including but not limited to myelosuppression, immunosuppression, autoimmune hepatitis, demyelinating diseases, lymphoma, and serious infections.  The patient understands that monitoring is required including a PPD at baseline and must alert us or the primary physician if symptoms of infection or other concerning signs are noted.

## 2023-02-21 NOTE — PROGRESS NOTES
[address]  Acute Rehabilitation Physical Therapy Progress Note    Date: 17  Patient Name: Fela Ge       Room: 4102/5085-01  MRN: [id number]   Account: [de-identified]   : 1950  (75 y.o.) Gender: female        Diagnosis: R CVA, Acute ischmic right MCA stroke,   Past Medical History:  has a past medical history of CVA (cerebral vascular accident) ([facility] [address] Diabetes mellitus ([facility] [address] Other disorders of skin and subcutaneous tissue in diseases classified elsewhere; Patient in clinical research study; and PE (pulmonary thromboembolism) ([facility] [address] Past Surgical History:   has a past surgical history that includes Cholecystectomy; hernia repair; and Cystoscopy (Left, 10/10/2017). Additional Pertinent Hx: Pt presents to ER on 10/9/17 with c/o vomiting and severe abdominal and back pain. Pt was found to have acute cystitis, kidney stone and hydronephrosis. On 10/10/17, pt dev L sided weakness and facial droop, found to have acute R MCA infarct. Pt underwent emergent cysto with ureteral stent placement 10/10/2017. Pt had angiogram and found to have ICA stenosis, stent placed 10/12/17. Additional PMH: CKD, A fib, morbid obesity, HTN, pt is in clinical trial that will end in 2018- unspecified. Overall Orientation Status: Within Functional Limits  Orientation Level: Oriented X4  Restrictions/Precautions  Restrictions/Precautions: General Precautions; Fall Risk  Required Braces or Orthoses?: No  Position Activity Restriction  Other position/activity restrictions: up with assistance    Subjective: Pleasant with no complaints on this date. Comments: Patient pleasant and very coperative. Vital Signs  Patient Currently in Pain: Denies                   Bed Mobility:   Bed Mobility  Supine to Sit: Minimal assistance  Sit to Supine:  Moderate assistance (assist with LEs )  Scooting: Minimal assistance  Bed mobility  Scooting: Minimal assistance    Transfers:  Sit to Stand: Minimal and hemiwalker CGA, flexed posture    EXERCISES    Other exercises?: Yes  Other exercises 1: Seated in w/c bilat LE therexs x15 2lb ankle weights, green t band ankle PF and heel slides x15 ea  Other exercises 2: amb in // bars 20 ft with R sided rail only   Other exercises 3: supine B LE ther exs 10x, AROM>AAROM, unable to tolerate ankle wts  Other exercises 4: standing UBE 3 min FWD/BACK with seated break in between,  L hand wrapped to handle  Other exercises 5: Standing ex. bilat. LE in // bars  x 10 reps  Other exercises 7: NuStep  x 10 mins. Workload 3           Activity Tolerance: Patient limited by endurance          Patient Education  New Education Provided: kelechi walker use, safe mobility  Learner:patient  Method: demonstration and explanation       Outcome: demonstrated understanding and needs reinforcement     Current Treatment Recommendations: Strengthening, Transfer Training, Endurance Training, Cognitive Reorientation, Patient/Caregiver Education & Training, ROM, Balance Training, Gait Training, Functional Mobility Training, Stair training, Safety Education & Training    Conditions Requiring Skilled Therapeutic Intervention  Body structures, Functions, Activity limitations: Decreased functional mobility ; Decreased strength;Decreased ROM; Decreased endurance;Decreased safe awareness;Decreased balance  Treatment Diagnosis: CVA, L hemiplegia  Prognosis: Good  Patient Education: kelechi walker use, safe mobility  REQUIRES PT FOLLOW UP: Yes  Discharge Recommendations: Home with nursing aide;Home with Home health PT;Home with assist PRN    Goals  Short term goals  Time Frame for Short term goals: 7 days  Short term goal 1: Improve L UE and LE strength by 1/2 MMG  Short term goal 2:  Increase endurance to good  Short term goal 3: Improve sitting balance to good  Short term goal 4: Improve bed mobility to min A x1  Short term goal 5: Improve transfers to min A x1  Long term goals  Time Frame for Long term goals : 24 days  Long term goal 1:  Increase UEs and LEs strength to [facility] to maximize mobiility  Long term goal 2: Improve standing balance with appropriate device to good to increase safety  Long term goal 3: Improve bed mobility to independent to prepare for home  Long term goal 4: Improve transfers with appropriate device to mod I to prepare for home  Long term goal 5: Improve gait with appropriate device to mod I 150 ft to prepare for home  Long term goal 6: Improve stair negotiation to SBA on 4 steps with 1 rail use to allow pt to enter and exit home       11/01/17 0930 11/01/17 1430   PT Individual Minutes   Time In --  1430   Time Out --  1515   Minutes --  45   PT Concurrent Minutes   Time In 0930 --    Time Out 1030 --    Minutes 60 --        Electronically signed by Susana Tanner PTA on 11/1/17 at 4:18 PM 4 = No assist / stand by assistance

## (undated) DEVICE — STRIP,CLOSURE,WOUND,MEDI-STRIP,1/2X4: Brand: MEDLINE

## (undated) DEVICE — PACK PROCEDURE SURG CYSTO SVMMC LF

## (undated) DEVICE — GLOVE ORANGE PI 8   MSG9080

## (undated) DEVICE — BAG URIN DRNGE 2000ML VYN INF CTRL GRAD ANTI REFLX VLV

## (undated) DEVICE — CATHETER URETH 16FR BLLN 5CC SIL ALLY W/ SIL HYDRGEL 2 W F

## (undated) DEVICE — DALE FOLEY CATHETER HOLDER, LEGBAND, FITS UP TO 30": Brand: DALE FOLEY CATHETER HOLDER